# Patient Record
Sex: MALE | Race: WHITE | NOT HISPANIC OR LATINO | Employment: OTHER | URBAN - METROPOLITAN AREA
[De-identification: names, ages, dates, MRNs, and addresses within clinical notes are randomized per-mention and may not be internally consistent; named-entity substitution may affect disease eponyms.]

---

## 2023-10-10 ENCOUNTER — APPOINTMENT (OUTPATIENT)
Dept: RADIOLOGY | Facility: HOSPITAL | Age: 53
End: 2023-10-10
Payer: MEDICAID

## 2023-10-10 ENCOUNTER — HOSPITAL ENCOUNTER (EMERGENCY)
Facility: HOSPITAL | Age: 53
Discharge: HOME | End: 2023-10-10
Attending: EMERGENCY MEDICINE
Payer: MEDICAID

## 2023-10-10 VITALS
SYSTOLIC BLOOD PRESSURE: 124 MMHG | OXYGEN SATURATION: 95 % | DIASTOLIC BLOOD PRESSURE: 76 MMHG | WEIGHT: 185 LBS | TEMPERATURE: 97.5 F | RESPIRATION RATE: 29 BRPM | BODY MASS INDEX: 23.74 KG/M2 | HEIGHT: 74 IN | HEART RATE: 112 BPM

## 2023-10-10 DIAGNOSIS — S01.01XA LACERATION OF SCALP, INITIAL ENCOUNTER: ICD-10-CM

## 2023-10-10 DIAGNOSIS — F10.920 ALCOHOLIC INTOXICATION WITHOUT COMPLICATION (CMS-HCC): ICD-10-CM

## 2023-10-10 DIAGNOSIS — S09.90XA HEAD INJURY, INITIAL ENCOUNTER: Primary | ICD-10-CM

## 2023-10-10 LAB
ALBUMIN SERPL BCP-MCNC: 4.2 G/DL (ref 3.4–5)
ALP SERPL-CCNC: 98 U/L (ref 33–120)
ALT SERPL W P-5'-P-CCNC: 24 U/L (ref 10–52)
ANION GAP SERPL CALC-SCNC: 13 MMOL/L (ref 10–20)
AST SERPL W P-5'-P-CCNC: 34 U/L (ref 9–39)
BASOPHILS # BLD AUTO: 0.1 X10*3/UL (ref 0–0.1)
BASOPHILS NFR BLD AUTO: 1.7 %
BILIRUB SERPL-MCNC: 0.3 MG/DL (ref 0–1.2)
BUN SERPL-MCNC: 15 MG/DL (ref 6–23)
CALCIUM SERPL-MCNC: 9.1 MG/DL (ref 8.6–10.3)
CHLORIDE SERPL-SCNC: 99 MMOL/L (ref 98–107)
CO2 SERPL-SCNC: 29 MMOL/L (ref 21–32)
CREAT SERPL-MCNC: 0.77 MG/DL (ref 0.5–1.3)
EOSINOPHIL # BLD AUTO: 0.27 X10*3/UL (ref 0–0.7)
EOSINOPHIL NFR BLD AUTO: 4.5 %
ERYTHROCYTE [DISTWIDTH] IN BLOOD BY AUTOMATED COUNT: 12.6 % (ref 11.5–14.5)
ETHANOL SERPL-MCNC: 344 MG/DL
GFR SERPL CREATININE-BSD FRML MDRD: >90 ML/MIN/1.73M*2
GLUCOSE BLD MANUAL STRIP-MCNC: 364 MG/DL (ref 74–99)
GLUCOSE SERPL-MCNC: 349 MG/DL (ref 74–99)
HCT VFR BLD AUTO: 43.4 % (ref 41–52)
HGB BLD-MCNC: 14.9 G/DL (ref 13.5–17.5)
HOLD SPECIMEN: NORMAL
IMM GRANULOCYTES # BLD AUTO: 0.05 X10*3/UL (ref 0–0.7)
IMM GRANULOCYTES NFR BLD AUTO: 0.8 % (ref 0–0.9)
INR PPP: 1 (ref 0.9–1.1)
LYMPHOCYTES # BLD AUTO: 2.26 X10*3/UL (ref 1.2–4.8)
LYMPHOCYTES NFR BLD AUTO: 37.7 %
MCH RBC QN AUTO: 33.6 PG (ref 26–34)
MCHC RBC AUTO-ENTMCNC: 34.3 G/DL (ref 32–36)
MCV RBC AUTO: 98 FL (ref 80–100)
MONOCYTES # BLD AUTO: 0.36 X10*3/UL (ref 0.1–1)
MONOCYTES NFR BLD AUTO: 6 %
NEUTROPHILS # BLD AUTO: 2.96 X10*3/UL (ref 1.2–7.7)
NEUTROPHILS NFR BLD AUTO: 49.3 %
NRBC BLD-RTO: 0 /100 WBCS (ref 0–0)
PLATELET # BLD AUTO: 173 X10*3/UL (ref 150–450)
PMV BLD AUTO: 10.2 FL (ref 7.5–11.5)
POTASSIUM SERPL-SCNC: 4.1 MMOL/L (ref 3.5–5.3)
PROT SERPL-MCNC: 6.4 G/DL (ref 6.4–8.2)
PROTHROMBIN TIME: 10.9 SECONDS (ref 9.8–12.8)
RBC # BLD AUTO: 4.43 X10*6/UL (ref 4.5–5.9)
SODIUM SERPL-SCNC: 137 MMOL/L (ref 136–145)
WBC # BLD AUTO: 6 X10*3/UL (ref 4.4–11.3)

## 2023-10-10 PROCEDURE — 82077 ASSAY SPEC XCP UR&BREATH IA: CPT | Performed by: EMERGENCY MEDICINE

## 2023-10-10 PROCEDURE — 85610 PROTHROMBIN TIME: CPT | Performed by: EMERGENCY MEDICINE

## 2023-10-10 PROCEDURE — 90715 TDAP VACCINE 7 YRS/> IM: CPT | Performed by: EMERGENCY MEDICINE

## 2023-10-10 PROCEDURE — 85025 COMPLETE CBC W/AUTO DIFF WBC: CPT | Performed by: EMERGENCY MEDICINE

## 2023-10-10 PROCEDURE — 82947 ASSAY GLUCOSE BLOOD QUANT: CPT

## 2023-10-10 PROCEDURE — 2500000004 HC RX 250 GENERAL PHARMACY W/ HCPCS (ALT 636 FOR OP/ED): Performed by: EMERGENCY MEDICINE

## 2023-10-10 PROCEDURE — 99291 CRITICAL CARE FIRST HOUR: CPT | Performed by: EMERGENCY MEDICINE

## 2023-10-10 PROCEDURE — 70450 CT HEAD/BRAIN W/O DYE: CPT | Performed by: RADIOLOGY

## 2023-10-10 PROCEDURE — 99285 EMERGENCY DEPT VISIT HI MDM: CPT | Mod: 25 | Performed by: EMERGENCY MEDICINE

## 2023-10-10 PROCEDURE — 72170 X-RAY EXAM OF PELVIS: CPT | Performed by: RADIOLOGY

## 2023-10-10 PROCEDURE — 72125 CT NECK SPINE W/O DYE: CPT | Performed by: RADIOLOGY

## 2023-10-10 PROCEDURE — 12004 RPR S/N/AX/GEN/TRK7.6-12.5CM: CPT | Performed by: EMERGENCY MEDICINE

## 2023-10-10 PROCEDURE — 36415 COLL VENOUS BLD VENIPUNCTURE: CPT | Performed by: EMERGENCY MEDICINE

## 2023-10-10 PROCEDURE — 71045 X-RAY EXAM CHEST 1 VIEW: CPT | Mod: FY

## 2023-10-10 PROCEDURE — 72125 CT NECK SPINE W/O DYE: CPT | Mod: MG

## 2023-10-10 PROCEDURE — 90471 IMMUNIZATION ADMIN: CPT | Performed by: EMERGENCY MEDICINE

## 2023-10-10 PROCEDURE — 71045 X-RAY EXAM CHEST 1 VIEW: CPT | Performed by: RADIOLOGY

## 2023-10-10 PROCEDURE — 70450 CT HEAD/BRAIN W/O DYE: CPT

## 2023-10-10 PROCEDURE — 2500000002 HC RX 250 W HCPCS SELF ADMINISTERED DRUGS (ALT 637 FOR MEDICARE OP, ALT 636 FOR OP/ED): Performed by: EMERGENCY MEDICINE

## 2023-10-10 PROCEDURE — 80053 COMPREHEN METABOLIC PANEL: CPT | Performed by: EMERGENCY MEDICINE

## 2023-10-10 PROCEDURE — 72170 X-RAY EXAM OF PELVIS: CPT | Mod: FY

## 2023-10-10 PROCEDURE — G0390 TRAUMA RESPONS W/HOSP CRITI: HCPCS | Performed by: EMERGENCY MEDICINE

## 2023-10-10 RX ORDER — INSULIN LISPRO 100 [IU]/ML
0-5 INJECTION, SOLUTION INTRAVENOUS; SUBCUTANEOUS
Status: DISCONTINUED | OUTPATIENT
Start: 2023-10-10 | End: 2023-10-10 | Stop reason: HOSPADM

## 2023-10-10 RX ORDER — DEXTROSE MONOHYDRATE 100 MG/ML
0.3 INJECTION, SOLUTION INTRAVENOUS ONCE AS NEEDED
Status: DISCONTINUED | OUTPATIENT
Start: 2023-10-10 | End: 2023-10-10 | Stop reason: HOSPADM

## 2023-10-10 RX ORDER — DEXTROSE 50 % IN WATER (D50W) INTRAVENOUS SYRINGE
25
Status: DISCONTINUED | OUTPATIENT
Start: 2023-10-10 | End: 2023-10-10 | Stop reason: HOSPADM

## 2023-10-10 RX ADMIN — INSULIN LISPRO 4 UNITS: 100 INJECTION, SOLUTION INTRAVENOUS; SUBCUTANEOUS at 18:03

## 2023-10-10 RX ADMIN — TETANUS TOXOID, REDUCED DIPHTHERIA TOXOID AND ACELLULAR PERTUSSIS VACCINE, ADSORBED 0.5 ML: 5; 2.5; 8; 8; 2.5 SUSPENSION INTRAMUSCULAR at 18:03

## 2023-10-10 ASSESSMENT — COLUMBIA-SUICIDE SEVERITY RATING SCALE - C-SSRS
6. HAVE YOU EVER DONE ANYTHING, STARTED TO DO ANYTHING, OR PREPARED TO DO ANYTHING TO END YOUR LIFE?: NO
2. HAVE YOU ACTUALLY HAD ANY THOUGHTS OF KILLING YOURSELF?: NO
1. IN THE PAST MONTH, HAVE YOU WISHED YOU WERE DEAD OR WISHED YOU COULD GO TO SLEEP AND NOT WAKE UP?: NO

## 2023-10-10 ASSESSMENT — PAIN - FUNCTIONAL ASSESSMENT: PAIN_FUNCTIONAL_ASSESSMENT: 0-10

## 2023-10-10 ASSESSMENT — LIFESTYLE VARIABLES
HAVE PEOPLE ANNOYED YOU BY CRITICIZING YOUR DRINKING: YES
EVER FELT BAD OR GUILTY ABOUT YOUR DRINKING: YES
EVER HAD A DRINK FIRST THING IN THE MORNING TO STEADY YOUR NERVES TO GET RID OF A HANGOVER: YES
HAVE YOU EVER FELT YOU SHOULD CUT DOWN ON YOUR DRINKING: YES

## 2023-10-10 ASSESSMENT — PAIN SCALES - GENERAL: PAINLEVEL_OUTOF10: 0 - NO PAIN

## 2023-10-10 ASSESSMENT — PAIN DESCRIPTION - PROGRESSION: CLINICAL_PROGRESSION: NOT CHANGED

## 2023-10-10 NOTE — ED PROVIDER NOTES
HPI   Chief Complaint   Patient presents with    Fall       Patient is a 53-year-old male, medical history significant for cardiovascular disease, diabetes, alcohol abuse who presents emergency department as a limited trauma.  Patient states that he had a fall downstairs.  He does not know how many stairs.  He does not know how he fell.  He did strike his head.  He thinks he lost consciousness.  History is hard to gather from the patient.  He states he is been noncompliant with his medications.  He has not been taking any of his diabetic medications.  He does admit to drinking.                          [place] Coma Scale Score: 15                  Patient History   Past Medical History:   Diagnosis Date    Acute ischemic heart disease, unspecified (CMS/HCC)     Coronary syndrome, acute    Cirrhosis of liver (CMS/HCC)     Coronary artery disease     Diabetes mellitus (CMS/HCC)     Personal history of other diseases of the circulatory system     History of heart block    Personal history of other diseases of the musculoskeletal system and connective tissue     History of arthritis    Personal history of other endocrine, nutritional and metabolic disease     History of obesity     Past Surgical History:   Procedure Laterality Date    APPENDECTOMY  05/03/2018    Appendectomy    CARDIAC CATHETERIZATION  05/02/2018    Cardiac Cath Procedure Outcome:     No family history on file.  Social History     Tobacco Use    Smoking status: Every Day     Packs/day: 1     Types: Cigarettes    Smokeless tobacco: Never   Vaping Use    Vaping Use: Never used   Substance Use Topics    Alcohol use: Yes     Alcohol/week: 6.0 standard drinks of alcohol     Types: 6 Cans of beer per week    Drug use: Never       Physical Exam   ED Triage Vitals [10/10/23 1643]   Temp Heart Rate Resp BP   36.4 °C (97.5 °F) 92 20 145/86      SpO2 Temp src Heart Rate Source Patient Position   97 % -- -- --      BP Location FiO2 (%)     -- --       Physical  Exam  Vitals and nursing note reviewed.   Constitutional:       General: He is not in acute distress.  HENT:      Head: Normocephalic. Laceration (7 cm) present. No raccoon eyes or Lewis's sign.      Jaw: No trismus, tenderness or pain on movement.        Nose: Nose normal.      Mouth/Throat:      Mouth: Mucous membranes are moist.   Eyes:      Extraocular Movements: Extraocular movements intact.      Pupils: Pupils are equal, round, and reactive to light.   Cardiovascular:      Rate and Rhythm: Normal rate and regular rhythm.   Pulmonary:      Effort: Pulmonary effort is normal. No respiratory distress.      Breath sounds: Normal breath sounds.   Abdominal:      General: Abdomen is flat.   Musculoskeletal:         General: No swelling or deformity. Normal range of motion.      Cervical back: Normal range of motion. No rigidity.   Lymphadenopathy:      Cervical: No cervical adenopathy.   Skin:     General: Skin is warm.      Capillary Refill: Capillary refill takes less than 2 seconds.   Neurological:      General: No focal deficit present.      Mental Status: He is alert and oriented to person, place, and time.      Cranial Nerves: No cranial nerve deficit.      Motor: No weakness.   Psychiatric:         Mood and Affect: Mood normal.         ED Course & MDM   ED Course as of 10/10/23 2010   Tue Oct 10, 2023   1720 Chest x-ray obtained and reviewed.  No infiltrate, rib fracture, effusion, or pneumonia. [MK]   1721 CT head shows no acute intracranial hemorrhage [MK]   1727 CT cervical spine shows no evidence of acute fracture or dislocation. [MK]   1743 Patient's blood sugar mildly elevated.  Insulin is ordered.  Patient is also intoxicated with an alcohol of 344. [MK]      ED Course User Index  [MK] Oscar Fuller MD         Diagnoses as of 10/10/23 2010   Head injury, initial encounter   Laceration of scalp, initial encounter   Alcoholic intoxication without complication (CMS/[facility])       Medical Decision  Making  Medical Decision Making: Patient was activated as a limited trauma given fall with loss of consciousness.  He was sent immediately for noncontrast head CT, CT of the cervical spine, plain films of the chest and pelvis.  Tetanus was updated.  Metabolic work-up was pursued.  Labs do show mild hyperglycemia but were otherwise unremarkable.  CTs were also negative.  Patient does have elevated ethanol level.  His laceration was repaired, see procedure note.    Differential Diagnoses Considered: Intoxication, concussion, intracranial hemorrhage, skull fracture    Chronic Medical Conditions Significantly Affecting Care: History of alcohol abuse, diabetes with noncompliance    External Records Reviewed: I reviewed recent and relevant outside records including: No recent visits    Independent Interpretation of Studies:  I independently interpreted: Chest x-ray and pelvis x-ray obtained reviewed    Escalation of Care:  Appropriate for outpatient management.  Given patient's history of alcohol abuse, he was seen and evaluated by [facility].  Patient will be set up for outpatient resources.  Social Determinants of Health Significantly Affecting Care:  Medication noncompliance    Prescription Drug Consideration: Tetanus            Procedure  Critical Care    Performed by: Oscar Fuller MD  Authorized by: Oscar Fuller MD    Critical care provider statement:     Critical care time (minutes):  35    Critical care time was exclusive of:  Separately billable procedures and treating other patients and teaching time    Critical care was necessary to treat or prevent imminent or life-threatening deterioration of the following conditions:  Trauma    Critical care was time spent personally by me on the following activities:  Ordering and performing treatments and interventions, ordering and review of laboratory studies, ordering and review of radiographic studies, development of treatment plan with patient or surrogate, pulse  oximetry, evaluation of patient's response to treatment, re-evaluation of patient's condition, review of old charts, examination of patient and obtaining history from patient or surrogate    I assumed direction of critical care for this patient from another provider in my specialty: no      Care discussed with: admitting provider         Oscar Fuller MD  10/10/23 2010

## 2023-10-10 NOTE — ED PROCEDURE NOTE
Procedure  Laceration Repair    Performed by: Oscar Fuller MD  Authorized by: Oscar Fuller MD    Consent:     Consent obtained:  Verbal    Consent given by:  Patient    Risks, benefits, and alternatives were discussed: yes      Risks discussed:  Infection, pain, poor cosmetic result and poor wound healing  Universal protocol:     Procedure explained and questions answered to patient or proxy's satisfaction: yes      Relevant documents present and verified: yes      Test results available: yes      Imaging studies available: yes      Patient identity confirmed:  Verbally with patient  Anesthesia:     Anesthesia method:  None  Laceration details:     Location:  Scalp    Scalp location:  Occipital    Length (cm):  8    Depth (mm):  4  Pre-procedure details:     Preparation:  Patient was prepped and draped in usual sterile fashion  Exploration:     Limited defect created (wound extended): no      Hemostasis achieved with:  Direct pressure    Imaging outcome: foreign body not noted      Wound exploration: wound explored through full range of motion      Wound extent: no foreign bodies/material noted, no muscle damage noted, no underlying fracture noted and no vascular damage noted      Contaminated: no    Treatment:     Area cleansed with:  Soap and water    Amount of cleaning:  Standard    Irrigation solution:  Sterile saline    Irrigation method:  Pressure wash    Visualized foreign bodies/material removed: no      Debridement:  None    Undermining:  None    Scar revision: no    Skin repair:     Repair method:  Staples    Number of staples:  9  Approximation:     Approximation:  Close  Repair type:     Repair type:  Simple  Post-procedure details:     Dressing:  Antibiotic ointment    Procedure completion:  Tolerated               Oscar Fuller MD  10/10/23 7736

## 2024-03-26 ENCOUNTER — APPOINTMENT (OUTPATIENT)
Dept: CARDIOLOGY | Facility: HOSPITAL | Age: 54
End: 2024-03-26
Payer: MEDICAID

## 2024-03-26 ENCOUNTER — HOSPITAL ENCOUNTER (INPATIENT)
Facility: HOSPITAL | Age: 54
LOS: 6 days | Discharge: NURSING, REHAB OR LONG-TERM CARE | End: 2024-04-02
Attending: STUDENT IN AN ORGANIZED HEALTH CARE EDUCATION/TRAINING PROGRAM | Admitting: STUDENT IN AN ORGANIZED HEALTH CARE EDUCATION/TRAINING PROGRAM
Payer: MEDICAID

## 2024-03-26 ENCOUNTER — APPOINTMENT (OUTPATIENT)
Dept: RADIOLOGY | Facility: HOSPITAL | Age: 54
End: 2024-03-26
Payer: MEDICAID

## 2024-03-26 DIAGNOSIS — R53.1 GENERALIZED WEAKNESS: ICD-10-CM

## 2024-03-26 DIAGNOSIS — E83.42 HYPOMAGNESEMIA: ICD-10-CM

## 2024-03-26 DIAGNOSIS — F10.929 ALCOHOLIC INTOXICATION WITH COMPLICATION (CMS-HCC): ICD-10-CM

## 2024-03-26 DIAGNOSIS — I70.262 ATHEROSCLEROSIS OF NATIVE ARTERIES OF EXTREMITIES WITH GANGRENE, LEFT LEG (MULTI): ICD-10-CM

## 2024-03-26 DIAGNOSIS — L97.512 FOOT ULCER WITH FAT LAYER EXPOSED, RIGHT (MULTI): ICD-10-CM

## 2024-03-26 DIAGNOSIS — J18.9 PNEUMONIA OF RIGHT LUNG DUE TO INFECTIOUS ORGANISM, UNSPECIFIED PART OF LUNG: Primary | ICD-10-CM

## 2024-03-26 DIAGNOSIS — K70.30 ALCOHOLIC CIRRHOSIS, UNSPECIFIED WHETHER ASCITES PRESENT (MULTI): ICD-10-CM

## 2024-03-26 DIAGNOSIS — J96.01 ACUTE HYPOXIC RESPIRATORY FAILURE (MULTI): ICD-10-CM

## 2024-03-26 DIAGNOSIS — I96 GANGRENE OF LEFT FOOT (MULTI): ICD-10-CM

## 2024-03-26 DIAGNOSIS — F10.10 ALCOHOL ABUSE: ICD-10-CM

## 2024-03-26 LAB
ALBUMIN SERPL BCP-MCNC: 3.3 G/DL (ref 3.4–5)
ALBUMIN SERPL BCP-MCNC: 3.3 G/DL (ref 3.4–5)
ALBUMIN SERPL BCP-MCNC: 3.4 G/DL (ref 3.4–5)
ALP SERPL-CCNC: 157 U/L (ref 33–120)
ALP SERPL-CCNC: 158 U/L (ref 33–120)
ALP SERPL-CCNC: 161 U/L (ref 33–120)
ALT SERPL W P-5'-P-CCNC: 87 U/L (ref 10–52)
ALT SERPL W P-5'-P-CCNC: 94 U/L (ref 10–52)
ALT SERPL W P-5'-P-CCNC: 97 U/L (ref 10–52)
AMMONIA PLAS-SCNC: 50 UMOL/L (ref 16–53)
ANION GAP SERPL CALC-SCNC: 18 MMOL/L (ref 10–20)
ANION GAP SERPL CALC-SCNC: 22 MMOL/L (ref 10–20)
ANION GAP SERPL CALC-SCNC: 23 MMOL/L (ref 10–20)
APAP SERPL-MCNC: <10 UG/ML
APPEARANCE UR: ABNORMAL
AST SERPL W P-5'-P-CCNC: 185 U/L (ref 9–39)
AST SERPL W P-5'-P-CCNC: 200 U/L (ref 9–39)
AST SERPL W P-5'-P-CCNC: 209 U/L (ref 9–39)
B-OH-BUTYR SERPL-SCNC: 0.95 MMOL/L (ref 0.02–0.27)
BACTERIA #/AREA URNS AUTO: ABNORMAL /HPF
BASOPHILS # BLD MANUAL: 0.08 X10*3/UL (ref 0–0.1)
BASOPHILS NFR BLD MANUAL: 1 %
BILIRUB DIRECT SERPL-MCNC: 1.2 MG/DL (ref 0–0.3)
BILIRUB SERPL-MCNC: 2.1 MG/DL (ref 0–1.2)
BILIRUB SERPL-MCNC: 2.3 MG/DL (ref 0–1.2)
BILIRUB SERPL-MCNC: 2.3 MG/DL (ref 0–1.2)
BILIRUB UR STRIP.AUTO-MCNC: NEGATIVE MG/DL
BUN SERPL-MCNC: 14 MG/DL (ref 6–23)
BUN SERPL-MCNC: 16 MG/DL (ref 6–23)
BUN SERPL-MCNC: 18 MG/DL (ref 6–23)
CALCIUM SERPL-MCNC: 7.7 MG/DL (ref 8.6–10.3)
CALCIUM SERPL-MCNC: 7.7 MG/DL (ref 8.6–10.3)
CALCIUM SERPL-MCNC: 7.9 MG/DL (ref 8.6–10.3)
CARDIAC TROPONIN I PNL SERPL HS: 62 NG/L (ref 0–20)
CARDIAC TROPONIN I PNL SERPL HS: 62 NG/L (ref 0–20)
CHLORIDE SERPL-SCNC: 91 MMOL/L (ref 98–107)
CHLORIDE SERPL-SCNC: 91 MMOL/L (ref 98–107)
CHLORIDE SERPL-SCNC: 93 MMOL/L (ref 98–107)
CO2 SERPL-SCNC: 25 MMOL/L (ref 21–32)
CO2 SERPL-SCNC: 26 MMOL/L (ref 21–32)
CO2 SERPL-SCNC: 27 MMOL/L (ref 21–32)
COLOR UR: ABNORMAL
CREAT SERPL-MCNC: 0.54 MG/DL (ref 0.5–1.3)
CREAT SERPL-MCNC: 0.63 MG/DL (ref 0.5–1.3)
CREAT SERPL-MCNC: 0.66 MG/DL (ref 0.5–1.3)
CRP SERPL-MCNC: 13.8 MG/DL
EGFRCR SERPLBLD CKD-EPI 2021: >90 ML/MIN/1.73M*2
EOSINOPHIL # BLD MANUAL: 0 X10*3/UL (ref 0–0.7)
EOSINOPHIL NFR BLD MANUAL: 0 %
ERYTHROCYTE [DISTWIDTH] IN BLOOD BY AUTOMATED COUNT: 13 % (ref 11.5–14.5)
ERYTHROCYTE [SEDIMENTATION RATE] IN BLOOD BY WESTERGREN METHOD: 34 MM/H (ref 0–20)
ETHANOL SERPL-MCNC: 353 MG/DL
FLUAV RNA RESP QL NAA+PROBE: NOT DETECTED
FLUBV RNA RESP QL NAA+PROBE: NOT DETECTED
GLUCOSE BLD MANUAL STRIP-MCNC: 159 MG/DL (ref 74–99)
GLUCOSE BLD MANUAL STRIP-MCNC: 311 MG/DL (ref 74–99)
GLUCOSE SERPL-MCNC: 155 MG/DL (ref 74–99)
GLUCOSE SERPL-MCNC: 198 MG/DL (ref 74–99)
GLUCOSE SERPL-MCNC: 299 MG/DL (ref 74–99)
GLUCOSE UR STRIP.AUTO-MCNC: ABNORMAL MG/DL
GRAN CASTS #/AREA UR COMP ASSIST: ABNORMAL /LPF
HCT VFR BLD AUTO: 34.4 % (ref 41–52)
HGB BLD-MCNC: 12.2 G/DL (ref 13.5–17.5)
HYALINE CASTS #/AREA URNS AUTO: ABNORMAL /LPF
IMM GRANULOCYTES # BLD AUTO: 0.04 X10*3/UL (ref 0–0.7)
IMM GRANULOCYTES NFR BLD AUTO: 0.5 % (ref 0–0.9)
INR PPP: 1.2 (ref 0.9–1.1)
KETONES UR STRIP.AUTO-MCNC: ABNORMAL MG/DL
LACTATE SERPL-SCNC: 3 MMOL/L (ref 0.4–2)
LACTATE SERPL-SCNC: 3.6 MMOL/L (ref 0.4–2)
LACTATE SERPL-SCNC: 6.3 MMOL/L (ref 0.4–2)
LEUKOCYTE ESTERASE UR QL STRIP.AUTO: NEGATIVE
LYMPHOCYTES # BLD MANUAL: 0.84 X10*3/UL (ref 1.2–4.8)
LYMPHOCYTES NFR BLD MANUAL: 11 %
MAGNESIUM SERPL-MCNC: 1.3 MG/DL (ref 1.6–2.4)
MAGNESIUM SERPL-MCNC: 1.58 MG/DL (ref 1.6–2.4)
MCH RBC QN AUTO: 34.9 PG (ref 26–34)
MCHC RBC AUTO-ENTMCNC: 35.5 G/DL (ref 32–36)
MCV RBC AUTO: 98 FL (ref 80–100)
METAMYELOCYTES # BLD MANUAL: 0.08 X10*3/UL
METAMYELOCYTES NFR BLD MANUAL: 1 %
MONOCYTES # BLD MANUAL: 0.38 X10*3/UL (ref 0.1–1)
MONOCYTES NFR BLD MANUAL: 5 %
MUCOUS THREADS #/AREA URNS AUTO: ABNORMAL /LPF
NEUTROPHILS # BLD MANUAL: 6.23 X10*3/UL (ref 1.2–7.7)
NEUTS BAND # BLD MANUAL: 3.65 X10*3/UL (ref 0–0.7)
NEUTS BAND NFR BLD MANUAL: 48 %
NEUTS SEG # BLD MANUAL: 2.58 X10*3/UL (ref 1.2–7)
NEUTS SEG NFR BLD MANUAL: 34 %
NITRITE UR QL STRIP.AUTO: NEGATIVE
NRBC BLD-RTO: 0 /100 WBCS (ref 0–0)
OSMOLALITY SERPL: 370 MOSM/KG (ref 280–300)
PH UR STRIP.AUTO: 5 [PH]
PLATELET # BLD AUTO: 89 X10*3/UL (ref 150–450)
POTASSIUM SERPL-SCNC: 3.4 MMOL/L (ref 3.5–5.3)
POTASSIUM SERPL-SCNC: 3.5 MMOL/L (ref 3.5–5.3)
POTASSIUM SERPL-SCNC: 3.7 MMOL/L (ref 3.5–5.3)
PROT SERPL-MCNC: 5.8 G/DL (ref 6.4–8.2)
PROT SERPL-MCNC: 5.9 G/DL (ref 6.4–8.2)
PROT SERPL-MCNC: 5.9 G/DL (ref 6.4–8.2)
PROT UR STRIP.AUTO-MCNC: ABNORMAL MG/DL
PROTHROMBIN TIME: 13.2 SECONDS (ref 9.8–12.8)
RBC # BLD AUTO: 3.5 X10*6/UL (ref 4.5–5.9)
RBC # UR STRIP.AUTO: ABNORMAL /UL
RBC #/AREA URNS AUTO: ABNORMAL /HPF
RBC MORPH BLD: ABNORMAL
SALICYLATES SERPL-MCNC: <3 MG/DL
SARS-COV-2 RNA RESP QL NAA+PROBE: NOT DETECTED
SODIUM SERPL-SCNC: 134 MMOL/L (ref 136–145)
SODIUM SERPL-SCNC: 134 MMOL/L (ref 136–145)
SODIUM SERPL-SCNC: 137 MMOL/L (ref 136–145)
SP GR UR STRIP.AUTO: 1.02
TOTAL CELLS COUNTED BLD: 100
UROBILINOGEN UR STRIP.AUTO-MCNC: 4 MG/DL
WBC # BLD AUTO: 7.6 X10*3/UL (ref 4.4–11.3)
WBC #/AREA URNS AUTO: ABNORMAL /HPF

## 2024-03-26 PROCEDURE — 82248 BILIRUBIN DIRECT: CPT | Performed by: STUDENT IN AN ORGANIZED HEALTH CARE EDUCATION/TRAINING PROGRAM

## 2024-03-26 PROCEDURE — G0378 HOSPITAL OBSERVATION PER HR: HCPCS

## 2024-03-26 PROCEDURE — 93005 ELECTROCARDIOGRAM TRACING: CPT

## 2024-03-26 PROCEDURE — 80143 DRUG ASSAY ACETAMINOPHEN: CPT | Performed by: STUDENT IN AN ORGANIZED HEALTH CARE EDUCATION/TRAINING PROGRAM

## 2024-03-26 PROCEDURE — 83930 ASSAY OF BLOOD OSMOLALITY: CPT | Mod: PARLAB | Performed by: STUDENT IN AN ORGANIZED HEALTH CARE EDUCATION/TRAINING PROGRAM

## 2024-03-26 PROCEDURE — 83735 ASSAY OF MAGNESIUM: CPT | Performed by: STUDENT IN AN ORGANIZED HEALTH CARE EDUCATION/TRAINING PROGRAM

## 2024-03-26 PROCEDURE — 85652 RBC SED RATE AUTOMATED: CPT | Performed by: STUDENT IN AN ORGANIZED HEALTH CARE EDUCATION/TRAINING PROGRAM

## 2024-03-26 PROCEDURE — 85027 COMPLETE CBC AUTOMATED: CPT | Performed by: STUDENT IN AN ORGANIZED HEALTH CARE EDUCATION/TRAINING PROGRAM

## 2024-03-26 PROCEDURE — 83605 ASSAY OF LACTIC ACID: CPT | Performed by: STUDENT IN AN ORGANIZED HEALTH CARE EDUCATION/TRAINING PROGRAM

## 2024-03-26 PROCEDURE — 84132 ASSAY OF SERUM POTASSIUM: CPT | Performed by: STUDENT IN AN ORGANIZED HEALTH CARE EDUCATION/TRAINING PROGRAM

## 2024-03-26 PROCEDURE — 96365 THER/PROPH/DIAG IV INF INIT: CPT | Mod: 59

## 2024-03-26 PROCEDURE — 2500000001 HC RX 250 WO HCPCS SELF ADMINISTERED DRUGS (ALT 637 FOR MEDICARE OP): Performed by: STUDENT IN AN ORGANIZED HEALTH CARE EDUCATION/TRAINING PROGRAM

## 2024-03-26 PROCEDURE — 36415 COLL VENOUS BLD VENIPUNCTURE: CPT | Performed by: STUDENT IN AN ORGANIZED HEALTH CARE EDUCATION/TRAINING PROGRAM

## 2024-03-26 PROCEDURE — 82435 ASSAY OF BLOOD CHLORIDE: CPT | Performed by: STUDENT IN AN ORGANIZED HEALTH CARE EDUCATION/TRAINING PROGRAM

## 2024-03-26 PROCEDURE — 71045 X-RAY EXAM CHEST 1 VIEW: CPT

## 2024-03-26 PROCEDURE — 85610 PROTHROMBIN TIME: CPT | Performed by: STUDENT IN AN ORGANIZED HEALTH CARE EDUCATION/TRAINING PROGRAM

## 2024-03-26 PROCEDURE — 99222 1ST HOSP IP/OBS MODERATE 55: CPT | Performed by: PODIATRIST

## 2024-03-26 PROCEDURE — 82947 ASSAY GLUCOSE BLOOD QUANT: CPT

## 2024-03-26 PROCEDURE — 96375 TX/PRO/DX INJ NEW DRUG ADDON: CPT

## 2024-03-26 PROCEDURE — 82010 KETONE BODYS QUAN: CPT | Performed by: STUDENT IN AN ORGANIZED HEALTH CARE EDUCATION/TRAINING PROGRAM

## 2024-03-26 PROCEDURE — 84484 ASSAY OF TROPONIN QUANT: CPT | Performed by: STUDENT IN AN ORGANIZED HEALTH CARE EDUCATION/TRAINING PROGRAM

## 2024-03-26 PROCEDURE — 82140 ASSAY OF AMMONIA: CPT | Performed by: STUDENT IN AN ORGANIZED HEALTH CARE EDUCATION/TRAINING PROGRAM

## 2024-03-26 PROCEDURE — 96372 THER/PROPH/DIAG INJ SC/IM: CPT | Mod: GC | Performed by: STUDENT IN AN ORGANIZED HEALTH CARE EDUCATION/TRAINING PROGRAM

## 2024-03-26 PROCEDURE — A9575 INJ GADOTERATE MEGLUMI 0.1ML: HCPCS | Performed by: STUDENT IN AN ORGANIZED HEALTH CARE EDUCATION/TRAINING PROGRAM

## 2024-03-26 PROCEDURE — 87081 CULTURE SCREEN ONLY: CPT | Mod: PARLAB | Performed by: STUDENT IN AN ORGANIZED HEALTH CARE EDUCATION/TRAINING PROGRAM

## 2024-03-26 PROCEDURE — 81001 URINALYSIS AUTO W/SCOPE: CPT | Performed by: STUDENT IN AN ORGANIZED HEALTH CARE EDUCATION/TRAINING PROGRAM

## 2024-03-26 PROCEDURE — 86738 MYCOPLASMA ANTIBODY: CPT | Performed by: STUDENT IN AN ORGANIZED HEALTH CARE EDUCATION/TRAINING PROGRAM

## 2024-03-26 PROCEDURE — 36600 WITHDRAWAL OF ARTERIAL BLOOD: CPT

## 2024-03-26 PROCEDURE — 2500000004 HC RX 250 GENERAL PHARMACY W/ HCPCS (ALT 636 FOR OP/ED): Performed by: STUDENT IN AN ORGANIZED HEALTH CARE EDUCATION/TRAINING PROGRAM

## 2024-03-26 PROCEDURE — 99222 1ST HOSP IP/OBS MODERATE 55: CPT | Performed by: STUDENT IN AN ORGANIZED HEALTH CARE EDUCATION/TRAINING PROGRAM

## 2024-03-26 PROCEDURE — 2550000001 HC RX 255 CONTRASTS: Performed by: STUDENT IN AN ORGANIZED HEALTH CARE EDUCATION/TRAINING PROGRAM

## 2024-03-26 PROCEDURE — 80320 DRUG SCREEN QUANTALCOHOLS: CPT | Performed by: STUDENT IN AN ORGANIZED HEALTH CARE EDUCATION/TRAINING PROGRAM

## 2024-03-26 PROCEDURE — 85007 BL SMEAR W/DIFF WBC COUNT: CPT | Performed by: STUDENT IN AN ORGANIZED HEALTH CARE EDUCATION/TRAINING PROGRAM

## 2024-03-26 PROCEDURE — 86140 C-REACTIVE PROTEIN: CPT | Performed by: STUDENT IN AN ORGANIZED HEALTH CARE EDUCATION/TRAINING PROGRAM

## 2024-03-26 PROCEDURE — 2500000002 HC RX 250 W HCPCS SELF ADMINISTERED DRUGS (ALT 637 FOR MEDICARE OP, ALT 636 FOR OP/ED): Performed by: STUDENT IN AN ORGANIZED HEALTH CARE EDUCATION/TRAINING PROGRAM

## 2024-03-26 PROCEDURE — 96366 THER/PROPH/DIAG IV INF ADDON: CPT

## 2024-03-26 PROCEDURE — 71045 X-RAY EXAM CHEST 1 VIEW: CPT | Performed by: RADIOLOGY

## 2024-03-26 PROCEDURE — 73720 MRI LWR EXTREMITY W/O&W/DYE: CPT | Mod: LT

## 2024-03-26 PROCEDURE — 73720 MRI LWR EXTREMITY W/O&W/DYE: CPT | Mod: LEFT SIDE | Performed by: RADIOLOGY

## 2024-03-26 PROCEDURE — 99285 EMERGENCY DEPT VISIT HI MDM: CPT | Mod: 25

## 2024-03-26 PROCEDURE — 87636 SARSCOV2 & INF A&B AMP PRB: CPT | Performed by: STUDENT IN AN ORGANIZED HEALTH CARE EDUCATION/TRAINING PROGRAM

## 2024-03-26 RX ORDER — INSULIN LISPRO 100 [IU]/ML
0-15 INJECTION, SOLUTION INTRAVENOUS; SUBCUTANEOUS
Status: DISCONTINUED | OUTPATIENT
Start: 2024-03-26 | End: 2024-04-02 | Stop reason: HOSPADM

## 2024-03-26 RX ORDER — NITROGLYCERIN 0.4 MG/1
0.4 TABLET SUBLINGUAL EVERY 5 MIN PRN
COMMUNITY
End: 2024-04-15 | Stop reason: WASHOUT

## 2024-03-26 RX ORDER — IPRATROPIUM BROMIDE AND ALBUTEROL SULFATE 2.5; .5 MG/3ML; MG/3ML
3 SOLUTION RESPIRATORY (INHALATION)
Status: DISCONTINUED | OUTPATIENT
Start: 2024-03-26 | End: 2024-03-26

## 2024-03-26 RX ORDER — SODIUM CHLORIDE, SODIUM LACTATE, POTASSIUM CHLORIDE, CALCIUM CHLORIDE 600; 310; 30; 20 MG/100ML; MG/100ML; MG/100ML; MG/100ML
100 INJECTION, SOLUTION INTRAVENOUS CONTINUOUS
Status: DISCONTINUED | OUTPATIENT
Start: 2024-03-26 | End: 2024-03-26

## 2024-03-26 RX ORDER — LISINOPRIL 10 MG/1
10 TABLET ORAL DAILY
COMMUNITY
Start: 2023-04-16

## 2024-03-26 RX ORDER — FOLIC ACID 1 MG/1
1 TABLET ORAL DAILY
Status: DISCONTINUED | OUTPATIENT
Start: 2024-03-26 | End: 2024-04-02 | Stop reason: HOSPADM

## 2024-03-26 RX ORDER — DIAZEPAM 5 MG/ML
10 INJECTION, SOLUTION INTRAMUSCULAR; INTRAVENOUS EVERY 2 HOUR PRN
Status: DISCONTINUED | OUTPATIENT
Start: 2024-03-26 | End: 2024-04-01

## 2024-03-26 RX ORDER — LANOLIN ALCOHOL/MO/W.PET/CERES
100 CREAM (GRAM) TOPICAL DAILY
Status: DISCONTINUED | OUTPATIENT
Start: 2024-03-30 | End: 2024-04-02 | Stop reason: HOSPADM

## 2024-03-26 RX ORDER — ASPIRIN 81 MG/1
81 TABLET ORAL DAILY
COMMUNITY
Start: 2022-05-23

## 2024-03-26 RX ORDER — LANOLIN ALCOHOL/MO/W.PET/CERES
100 CREAM (GRAM) TOPICAL ONCE
Status: COMPLETED | OUTPATIENT
Start: 2024-03-26 | End: 2024-03-26

## 2024-03-26 RX ORDER — METOPROLOL TARTRATE 25 MG/1
25 TABLET, FILM COATED ORAL 2 TIMES DAILY
Status: DISCONTINUED | OUTPATIENT
Start: 2024-03-26 | End: 2024-04-02 | Stop reason: HOSPADM

## 2024-03-26 RX ORDER — INSULIN LISPRO 100 [IU]/ML
7 INJECTION, SOLUTION INTRAVENOUS; SUBCUTANEOUS
COMMUNITY
Start: 2023-04-18

## 2024-03-26 RX ORDER — ENOXAPARIN SODIUM 100 MG/ML
40 INJECTION SUBCUTANEOUS EVERY 24 HOURS
Status: DISCONTINUED | OUTPATIENT
Start: 2024-03-26 | End: 2024-04-02 | Stop reason: HOSPADM

## 2024-03-26 RX ORDER — INSULIN GLARGINE 100 [IU]/ML
30 INJECTION, SOLUTION SUBCUTANEOUS EVERY MORNING
COMMUNITY
Start: 2023-04-18 | End: 2024-06-05 | Stop reason: SDUPTHER

## 2024-03-26 RX ORDER — MAGNESIUM SULFATE HEPTAHYDRATE 40 MG/ML
2 INJECTION, SOLUTION INTRAVENOUS ONCE
Status: COMPLETED | OUTPATIENT
Start: 2024-03-26 | End: 2024-03-26

## 2024-03-26 RX ORDER — LANOLIN ALCOHOL/MO/W.PET/CERES
100 CREAM (GRAM) TOPICAL DAILY
Status: DISCONTINUED | OUTPATIENT
Start: 2024-03-29 | End: 2024-03-26

## 2024-03-26 RX ORDER — MULTIVIT-MIN/IRON FUM/FOLIC AC 7.5 MG-4
1 TABLET ORAL DAILY
Status: DISCONTINUED | OUTPATIENT
Start: 2024-03-26 | End: 2024-04-02 | Stop reason: HOSPADM

## 2024-03-26 RX ORDER — MULTIVIT-MIN/IRON FUM/FOLIC AC 7.5 MG-4
1 TABLET ORAL DAILY
Status: DISCONTINUED | OUTPATIENT
Start: 2024-03-26 | End: 2024-03-26

## 2024-03-26 RX ORDER — IPRATROPIUM BROMIDE AND ALBUTEROL SULFATE 2.5; .5 MG/3ML; MG/3ML
3 SOLUTION RESPIRATORY (INHALATION) EVERY 2 HOUR PRN
Status: DISCONTINUED | OUTPATIENT
Start: 2024-03-26 | End: 2024-04-02 | Stop reason: HOSPADM

## 2024-03-26 RX ORDER — SODIUM CHLORIDE 9 MG/ML
100 INJECTION, SOLUTION INTRAVENOUS CONTINUOUS
Status: DISCONTINUED | OUTPATIENT
Start: 2024-03-26 | End: 2024-03-28

## 2024-03-26 RX ORDER — ATORVASTATIN CALCIUM 40 MG/1
40 TABLET, FILM COATED ORAL DAILY
COMMUNITY
Start: 2023-04-16

## 2024-03-26 RX ORDER — ASPIRIN 81 MG/1
81 TABLET ORAL DAILY
Status: DISCONTINUED | OUTPATIENT
Start: 2024-03-26 | End: 2024-04-02 | Stop reason: HOSPADM

## 2024-03-26 RX ORDER — CEFTRIAXONE 2 G/50ML
2 INJECTION, SOLUTION INTRAVENOUS ONCE
Status: COMPLETED | OUTPATIENT
Start: 2024-03-26 | End: 2024-03-26

## 2024-03-26 RX ORDER — VANCOMYCIN HYDROCHLORIDE 1 G/20ML
INJECTION, POWDER, LYOPHILIZED, FOR SOLUTION INTRAVENOUS DAILY PRN
Status: DISCONTINUED | OUTPATIENT
Start: 2024-03-26 | End: 2024-04-01

## 2024-03-26 RX ORDER — MAGNESIUM SULFATE HEPTAHYDRATE 40 MG/ML
2 INJECTION, SOLUTION INTRAVENOUS ONCE
Status: COMPLETED | OUTPATIENT
Start: 2024-03-27 | End: 2024-03-27

## 2024-03-26 RX ORDER — DEXTROSE 50 % IN WATER (D50W) INTRAVENOUS SYRINGE
12.5
Status: DISCONTINUED | OUTPATIENT
Start: 2024-03-26 | End: 2024-04-02 | Stop reason: HOSPADM

## 2024-03-26 RX ORDER — METOPROLOL TARTRATE 25 MG/1
1 TABLET, FILM COATED ORAL 2 TIMES DAILY
COMMUNITY
Start: 2023-05-12 | End: 2024-05-11

## 2024-03-26 RX ORDER — LORAZEPAM 2 MG/ML
1 INJECTION INTRAMUSCULAR ONCE
Status: COMPLETED | OUTPATIENT
Start: 2024-03-26 | End: 2024-03-26

## 2024-03-26 RX ORDER — MUPIROCIN 20 MG/G
1 OINTMENT TOPICAL 2 TIMES DAILY
COMMUNITY
Start: 2022-10-04 | End: 2024-04-15 | Stop reason: ALTCHOICE

## 2024-03-26 RX ORDER — DEXTROSE 50 % IN WATER (D50W) INTRAVENOUS SYRINGE
12.5
Status: DISCONTINUED | OUTPATIENT
Start: 2024-03-26 | End: 2024-03-27 | Stop reason: SDUPTHER

## 2024-03-26 RX ORDER — INSULIN GLARGINE 100 [IU]/ML
20 INJECTION, SOLUTION SUBCUTANEOUS EVERY MORNING
Status: DISCONTINUED | OUTPATIENT
Start: 2024-03-27 | End: 2024-03-27

## 2024-03-26 RX ORDER — POTASSIUM CHLORIDE 20 MEQ/1
40 TABLET, EXTENDED RELEASE ORAL ONCE
Status: COMPLETED | OUTPATIENT
Start: 2024-03-27 | End: 2024-03-26

## 2024-03-26 RX ORDER — MULTIVIT-MIN/IRON FUM/FOLIC AC 7.5 MG-4
1 TABLET ORAL ONCE
Status: COMPLETED | OUTPATIENT
Start: 2024-03-26 | End: 2024-03-26

## 2024-03-26 RX ORDER — THIAMINE HYDROCHLORIDE 100 MG/ML
100 INJECTION, SOLUTION INTRAMUSCULAR; INTRAVENOUS DAILY
Status: DISPENSED | OUTPATIENT
Start: 2024-03-26 | End: 2024-03-30

## 2024-03-26 RX ORDER — GADOTERATE MEGLUMINE 376.9 MG/ML
16 INJECTION INTRAVENOUS
Status: COMPLETED | OUTPATIENT
Start: 2024-03-26 | End: 2024-03-26

## 2024-03-26 RX ORDER — GUAIFENESIN 600 MG/1
600 TABLET, EXTENDED RELEASE ORAL 2 TIMES DAILY PRN
Status: DISCONTINUED | OUTPATIENT
Start: 2024-03-26 | End: 2024-04-02 | Stop reason: HOSPADM

## 2024-03-26 RX ORDER — FOLIC ACID 1 MG/1
1 TABLET ORAL ONCE
Status: COMPLETED | OUTPATIENT
Start: 2024-03-26 | End: 2024-03-26

## 2024-03-26 RX ADMIN — AZITHROMYCIN MONOHYDRATE 500 MG: 500 INJECTION, POWDER, LYOPHILIZED, FOR SOLUTION INTRAVENOUS at 16:08

## 2024-03-26 RX ADMIN — MAGNESIUM SULFATE HEPTAHYDRATE 2 G: 40 INJECTION, SOLUTION INTRAVENOUS at 23:55

## 2024-03-26 RX ADMIN — GADOTERATE MEGLUMINE 16 ML: 376.9 INJECTION INTRAVENOUS at 19:02

## 2024-03-26 RX ADMIN — PIPERACILLIN SODIUM AND TAZOBACTAM SODIUM 3.38 G: 3; .375 INJECTION, SOLUTION INTRAVENOUS at 17:32

## 2024-03-26 RX ADMIN — MAGNESIUM SULFATE HEPTAHYDRATE 2 G: 40 INJECTION, SOLUTION INTRAVENOUS at 17:32

## 2024-03-26 RX ADMIN — MAGNESIUM SULFATE HEPTAHYDRATE 2 G: 40 INJECTION, SOLUTION INTRAVENOUS at 12:50

## 2024-03-26 RX ADMIN — SODIUM CHLORIDE, POTASSIUM CHLORIDE, SODIUM LACTATE AND CALCIUM CHLORIDE 1000 ML: 600; 310; 30; 20 INJECTION, SOLUTION INTRAVENOUS at 12:11

## 2024-03-26 RX ADMIN — Medication 1 TABLET: at 12:11

## 2024-03-26 RX ADMIN — METOPROLOL TARTRATE 25 MG: 25 TABLET, FILM COATED ORAL at 21:00

## 2024-03-26 RX ADMIN — POTASSIUM CHLORIDE 40 MEQ: 1500 TABLET, EXTENDED RELEASE ORAL at 23:55

## 2024-03-26 RX ADMIN — DIAZEPAM 10 MG: 10 INJECTION, SOLUTION INTRAMUSCULAR; INTRAVENOUS at 17:31

## 2024-03-26 RX ADMIN — INSULIN LISPRO 3 UNITS: 100 INJECTION, SOLUTION INTRAVENOUS; SUBCUTANEOUS at 21:00

## 2024-03-26 RX ADMIN — SODIUM CHLORIDE 100 ML/HR: 9 INJECTION, SOLUTION INTRAVENOUS at 20:08

## 2024-03-26 RX ADMIN — THIAMINE HCL TAB 100 MG 100 MG: 100 TAB at 12:11

## 2024-03-26 RX ADMIN — PIPERACILLIN SODIUM AND TAZOBACTAM SODIUM 3.38 G: 3; .375 INJECTION, SOLUTION INTRAVENOUS at 23:15

## 2024-03-26 RX ADMIN — ENOXAPARIN SODIUM 40 MG: 100 INJECTION SUBCUTANEOUS at 17:36

## 2024-03-26 RX ADMIN — FOLIC ACID 1 MG: 1 TABLET ORAL at 12:11

## 2024-03-26 RX ADMIN — CEFTRIAXONE SODIUM 2 G: 2 INJECTION, SOLUTION INTRAVENOUS at 16:08

## 2024-03-26 RX ADMIN — INSULIN LISPRO 12 UNITS: 100 INJECTION, SOLUTION INTRAVENOUS; SUBCUTANEOUS at 17:43

## 2024-03-26 RX ADMIN — LORAZEPAM 1 MG: 2 INJECTION, SOLUTION INTRAMUSCULAR; INTRAVENOUS at 11:17

## 2024-03-26 RX ADMIN — SODIUM CHLORIDE, POTASSIUM CHLORIDE, SODIUM LACTATE AND CALCIUM CHLORIDE 1000 ML: 600; 310; 30; 20 INJECTION, SOLUTION INTRAVENOUS at 17:55

## 2024-03-26 SDOH — SOCIAL STABILITY: SOCIAL INSECURITY: DO YOU FEEL UNSAFE GOING BACK TO THE PLACE WHERE YOU ARE LIVING?: NO

## 2024-03-26 SDOH — SOCIAL STABILITY: SOCIAL INSECURITY: WERE YOU ABLE TO COMPLETE ALL THE BEHAVIORAL HEALTH SCREENINGS?: YES

## 2024-03-26 SDOH — SOCIAL STABILITY: SOCIAL INSECURITY: ABUSE: ADULT

## 2024-03-26 SDOH — SOCIAL STABILITY: SOCIAL INSECURITY: ARE THERE ANY APPARENT SIGNS OF INJURIES/BEHAVIORS THAT COULD BE RELATED TO ABUSE/NEGLECT?: NO

## 2024-03-26 SDOH — SOCIAL STABILITY: SOCIAL INSECURITY: DO YOU FEEL ANYONE HAS EXPLOITED OR TAKEN ADVANTAGE OF YOU FINANCIALLY OR OF YOUR PERSONAL PROPERTY?: NO

## 2024-03-26 SDOH — SOCIAL STABILITY: SOCIAL INSECURITY: HAVE YOU HAD THOUGHTS OF HARMING ANYONE ELSE?: NO

## 2024-03-26 SDOH — SOCIAL STABILITY: SOCIAL INSECURITY: HAS ANYONE EVER THREATENED TO HURT YOUR FAMILY OR YOUR PETS?: NO

## 2024-03-26 SDOH — SOCIAL STABILITY: SOCIAL INSECURITY: DOES ANYONE TRY TO KEEP YOU FROM HAVING/CONTACTING OTHER FRIENDS OR DOING THINGS OUTSIDE YOUR HOME?: NO

## 2024-03-26 SDOH — SOCIAL STABILITY: SOCIAL INSECURITY: ARE YOU OR HAVE YOU BEEN THREATENED OR ABUSED PHYSICALLY, EMOTIONALLY, OR SEXUALLY BY ANYONE?: NO

## 2024-03-26 ASSESSMENT — LIFESTYLE VARIABLES
AUDITORY DISTURBANCES: NOT PRESENT
HOW OFTEN DURING THE LAST YEAR HAVE YOU NEEDED AN ALCOHOLIC DRINK FIRST THING IN THE MORNING TO GET YOURSELF GOING AFTER A NIGHT OF HEAVY DRINKING: MONTHLY
BLOOD PRESSURE: 156/83
HOW MANY STANDARD DRINKS CONTAINING ALCOHOL DO YOU HAVE ON A TYPICAL DAY: 3 OR 4
BLOOD PRESSURE: 129/69
HAS A RELATIVE, FRIEND, DOCTOR, OR ANOTHER HEALTH PROFESSIONAL EXPRESSED CONCERN ABOUT YOUR DRINKING OR SUGGESTED YOU CUT DOWN: YES, DURING THE LAST YEAR
ORIENTATION AND CLOUDING OF SENSORIUM: ORIENTED AND CAN DO SERIAL ADDITIONS
AUDIT-C TOTAL SCORE: 6
AUDITORY DISTURBANCES: NOT PRESENT
PAROXYSMAL SWEATS: NO SWEAT VISIBLE
PAROXYSMAL SWEATS: NO SWEAT VISIBLE
AUDITORY DISTURBANCES: NOT PRESENT
NAUSEA AND VOMITING: NO NAUSEA AND NO VOMITING
ANXIETY: 2
AUDIT-C TOTAL SCORE: 6
HOW OFTEN DURING THE LAST YEAR HAVE YOU FOUND THAT YOU WERE NOT ABLE TO STOP DRINKING ONCE YOU HAD STARTED: MONTHLY
HAVE PEOPLE ANNOYED YOU BY CRITICIZING YOUR DRINKING: YES
PULSE: 102
NAUSEA AND VOMITING: NO NAUSEA AND NO VOMITING
PAROXYSMAL SWEATS: NO SWEAT VISIBLE
HEADACHE, FULLNESS IN HEAD: NOT PRESENT
AGITATION: NORMAL ACTIVITY
VISUAL DISTURBANCES: NOT PRESENT
AGITATION: NORMAL ACTIVITY
HOW OFTEN DURING THE LAST YEAR HAVE YOU HAD A FEELING OF GUILT OR REMORSE AFTER DRINKING: DAILY OR ALMOST DAILY
EVER FELT BAD OR GUILTY ABOUT YOUR DRINKING: YES
ANXIETY: NO ANXIETY, AT EASE
HOW OFTEN DO YOU HAVE A DRINK CONTAINING ALCOHOL: 4 OR MORE TIMES A WEEK
TOTAL SCORE: 3
TREMOR: 5
TOTAL SCORE: 5
HAVE YOU EVER FELT YOU SHOULD CUT DOWN ON YOUR DRINKING: YES
TREMOR: 2
AGITATION: NORMAL ACTIVITY
AGITATION: NORMAL ACTIVITY
ANXIETY: MILDLY ANXIOUS
ORIENTATION AND CLOUDING OF SENSORIUM: ORIENTED AND CAN DO SERIAL ADDITIONS
AUDITORY DISTURBANCES: NOT PRESENT
HOW OFTEN DURING THE LAST YEAR HAVE YOU BEEN UNABLE TO REMEMBER WHAT HAPPENED THE NIGHT BEFORE BECAUSE YOU HAD BEEN DRINKING: NEVER
AUDIT TOTAL SCORE: 15
BLOOD PRESSURE: 168/88
HEADACHE, FULLNESS IN HEAD: NOT PRESENT
TOTAL SCORE: 4
PULSE: 112
VISUAL DISTURBANCES: NOT PRESENT
TREMOR: MODERATE, WITH PATIENT'S ARMS EXTENDED
ORIENTATION AND CLOUDING OF SENSORIUM: ORIENTED AND CAN DO SERIAL ADDITIONS
PRESCIPTION_ABUSE_PAST_12_MONTHS: NO
HEADACHE, FULLNESS IN HEAD: NOT PRESENT
VISUAL DISTURBANCES: NOT PRESENT
ORIENTATION AND CLOUDING OF SENSORIUM: CANNOT DO SERIAL ADDITIONS OR IS UNCERTAIN ABOUT DATE
ORIENTATION AND CLOUDING OF SENSORIUM: ORIENTED AND CAN DO SERIAL ADDITIONS
TOTAL SCORE: 6
SUBSTANCE_ABUSE_PAST_12_MONTHS: YES
NAUSEA AND VOMITING: NO NAUSEA AND NO VOMITING
AGITATION: NORMAL ACTIVITY
AUDITORY DISTURBANCES: NOT PRESENT
TOTAL SCORE: 6
ANXIETY: NO ANXIETY, AT EASE
VISUAL DISTURBANCES: NOT PRESENT
HOW OFTEN DURING THE LAST YEAR HAVE YOU FAILED TO DO WHAT WAS NORMALLY EXPECTED FROM YOU BECAUSE OF DRINKING: WEEKLY
TOTAL SCORE: 11
HEADACHE, FULLNESS IN HEAD: NOT PRESENT
AUDIT TOTAL SCORE: 21
HAVE YOU OR SOMEONE ELSE BEEN INJURED AS A RESULT OF YOUR DRINKING: NO
ANXIETY: MILDLY ANXIOUS
TREMOR: MODERATE, WITH PATIENT'S ARMS EXTENDED
HOW OFTEN DO YOU HAVE 6 OR MORE DRINKS ON ONE OCCASION: LESS THAN MONTHLY
EVER HAD A DRINK FIRST THING IN THE MORNING TO STEADY YOUR NERVES TO GET RID OF A HANGOVER: NO
HEADACHE, FULLNESS IN HEAD: NOT PRESENT
AGITATION: NORMAL ACTIVITY
PAROXYSMAL SWEATS: NO SWEAT VISIBLE
PULSE: 108
NAUSEA AND VOMITING: NO NAUSEA AND NO VOMITING
NAUSEA AND VOMITING: NO NAUSEA AND NO VOMITING
NAUSEA AND VOMITING: MILD NAUSEA WITH NO VOMITING
AUDITORY DISTURBANCES: NOT PRESENT
TREMOR: 5
PAROXYSMAL SWEATS: NO SWEAT VISIBLE
ORIENTATION AND CLOUDING OF SENSORIUM: DISORIENTED FOR DATE BY MORE THAN 2 CALENDAR DAYS
TOTAL SCORE: 2
TREMOR: 5
HEADACHE, FULLNESS IN HEAD: NOT PRESENT
VISUAL DISTURBANCES: NOT PRESENT
PAROXYSMAL SWEATS: NO SWEAT VISIBLE
SKIP TO QUESTIONS 9-10: 0
VISUAL DISTURBANCES: NOT PRESENT
ANXIETY: NO ANXIETY, AT EASE

## 2024-03-26 ASSESSMENT — COGNITIVE AND FUNCTIONAL STATUS - GENERAL
TURNING FROM BACK TO SIDE WHILE IN FLAT BAD: A LOT
TURNING FROM BACK TO SIDE WHILE IN FLAT BAD: A LITTLE
STANDING UP FROM CHAIR USING ARMS: A LOT
DRESSING REGULAR UPPER BODY CLOTHING: A LITTLE
PERSONAL GROOMING: A LOT
MOVING TO AND FROM BED TO CHAIR: A LOT
CLIMB 3 TO 5 STEPS WITH RAILING: TOTAL
STANDING UP FROM CHAIR USING ARMS: A LOT
MOVING FROM LYING ON BACK TO SITTING ON SIDE OF FLAT BED WITH BEDRAILS: A LITTLE
CLIMB 3 TO 5 STEPS WITH RAILING: A LOT
HELP NEEDED FOR BATHING: A LOT
PATIENT BASELINE BEDBOUND: NO
MOBILITY SCORE: 10
DRESSING REGULAR LOWER BODY CLOTHING: A LITTLE
DAILY ACTIVITIY SCORE: 22
TOILETING: TOTAL
MOVING TO AND FROM BED TO CHAIR: TOTAL
DAILY ACTIVITIY SCORE: 15
MOBILITY SCORE: 15
TOILETING: A LITTLE
WALKING IN HOSPITAL ROOM: TOTAL
HELP NEEDED FOR BATHING: A LITTLE
WALKING IN HOSPITAL ROOM: A LOT

## 2024-03-26 ASSESSMENT — ACTIVITIES OF DAILY LIVING (ADL)
WALKS IN HOME: NEEDS ASSISTANCE
HEARING - RIGHT EAR: FUNCTIONAL
BATHING: NEEDS ASSISTANCE
DRESSING YOURSELF: INDEPENDENT
JUDGMENT_ADEQUATE_SAFELY_COMPLETE_DAILY_ACTIVITIES: YES
TOILETING: NEEDS ASSISTANCE
HEARING - LEFT EAR: FUNCTIONAL
GROOMING: INDEPENDENT
PATIENT'S MEMORY ADEQUATE TO SAFELY COMPLETE DAILY ACTIVITIES?: YES
LACK_OF_TRANSPORTATION: NO
FEEDING YOURSELF: INDEPENDENT
ADEQUATE_TO_COMPLETE_ADL: YES

## 2024-03-26 ASSESSMENT — COLUMBIA-SUICIDE SEVERITY RATING SCALE - C-SSRS
6. HAVE YOU EVER DONE ANYTHING, STARTED TO DO ANYTHING, OR PREPARED TO DO ANYTHING TO END YOUR LIFE?: NO
1. IN THE PAST MONTH, HAVE YOU WISHED YOU WERE DEAD OR WISHED YOU COULD GO TO SLEEP AND NOT WAKE UP?: NO
1. IN THE PAST MONTH, HAVE YOU WISHED YOU WERE DEAD OR WISHED YOU COULD GO TO SLEEP AND NOT WAKE UP?: NO
2. HAVE YOU ACTUALLY HAD ANY THOUGHTS OF KILLING YOURSELF?: NO
2. HAVE YOU ACTUALLY HAD ANY THOUGHTS OF KILLING YOURSELF?: NO
6. HAVE YOU EVER DONE ANYTHING, STARTED TO DO ANYTHING, OR PREPARED TO DO ANYTHING TO END YOUR LIFE?: NO

## 2024-03-26 ASSESSMENT — PATIENT HEALTH QUESTIONNAIRE - PHQ9
1. LITTLE INTEREST OR PLEASURE IN DOING THINGS: NOT AT ALL
SUM OF ALL RESPONSES TO PHQ9 QUESTIONS 1 & 2: 1
2. FEELING DOWN, DEPRESSED OR HOPELESS: SEVERAL DAYS

## 2024-03-26 ASSESSMENT — PAIN SCALES - GENERAL
PAINLEVEL_OUTOF10: 0 - NO PAIN
PAINLEVEL_OUTOF10: 0 - NO PAIN

## 2024-03-26 ASSESSMENT — PAIN - FUNCTIONAL ASSESSMENT
PAIN_FUNCTIONAL_ASSESSMENT: 0-10
PAIN_FUNCTIONAL_ASSESSMENT: 0-10

## 2024-03-26 NOTE — PROGRESS NOTES
Pharmacy Medication History Review    Errol Fonseca is a 54 y.o. male admitted for Pneumonia of right lung due to infectious organism, unspecified part of lung. Pharmacy reviewed the patient's [id number] medications and allergies for accuracy.    The list below reflectives the updated PTA list. Please review each medication in order reconciliation for additional clarification and justification.  Prior to Admission medications    Medication Sig Start Date End Date Taking? Authorizing Provider   aspirin 81 mg EC tablet Take 1 tablet (81 mg) by mouth once daily. 5/23/22  Yes Historical Provider, MD   atorvastatin (Lipitor) 40 mg tablet Take 1 tablet (40 mg) by mouth once daily. 4/16/23  Yes Historical Provider, MD   insulin glargine (Lantus) 100 unit/mL (3 mL) pen Inject 30 Units under the skin once daily in the morning. 4/18/23  Yes Historical Provider, MD   insulin lispro (HumaLOG) 100 unit/mL injection Inject 5 Units under the skin 3 times a day with meals. Plus sliding scale 4/18/23  Yes Historical Provider, MD   lisinopril 10 mg tablet Take 1 tablet (10 mg) by mouth once daily. 4/16/23  Yes Historical Provider, MD   metoprolol tartrate (Lopressor) 25 mg tablet Take 1 tablet (25 mg) by mouth 2 times a day. 5/12/23 5/11/24 Yes Historical Provider, MD   mupirocin (Bactroban) 2 % ointment Apply 1 Application topically twice a day. Apply to right foot ulcer 10/4/22  Yes Historical Provider, MD   multivitamin with minerals iron-free (Centrum Silver) Take 1 tablet by mouth once daily.   Yes Historical Provider, MD   nitroglycerin (Nitrostat) 0.4 mg SL tablet Place 1 tablet (0.4 mg) under the tongue every 5 minutes if needed for chest pain (up to 3 doses).   Yes Historical Provider, MD        The list below reflectives the updated allergy list. Please review each documented allergy for additional clarification and justification.  Allergies  Reviewed by Marcy Tubbs RN on 3/26/2024   No Known Allergies          Below are additional concerns with the patient's PTA list.    Patient reports running out of his short acting insulin, Humalog, approximately 6 days prior to today.    Debbie Berumen

## 2024-03-26 NOTE — ED PROVIDER NOTES
HPI   Chief Complaint   Patient presents with    Weakness, Gen       Past medical history that includes hypertension, insulin-dependent diabetes, CAD status postcardiac stent, and alcohol abuse presents with generalized weakness and inability to ambulate secondary to tremors for the past day.  Patient states he has been out of his insulin for the past 4 days, but did take a form of insulin this morning.  States that he has been recently embarrassed by his alcohol use in front of his children, so has been drinking hand  for the past 5 days.  No recent sick symptoms.  Denies fevers, chills, chest pain, productive cough, abdominal pain, nausea, and vomiting.  No urinary symptoms.  No change in bowel habits.      History provided by:  Patient   used: No                        [place] Coma Scale Score: 15                     Patient History   Past Medical History:   Diagnosis Date    Acute ischemic heart disease, unspecified (CMS/HCC)     Coronary syndrome, acute    Cirrhosis of liver (CMS/HCC)     Coronary artery disease     Diabetes mellitus (CMS/HCC)     Personal history of other diseases of the circulatory system     History of heart block    Personal history of other diseases of the musculoskeletal system and connective tissue     History of arthritis    Personal history of other endocrine, nutritional and metabolic disease     History of obesity     Past Surgical History:   Procedure Laterality Date    APPENDECTOMY  05/03/2018    Appendectomy    CARDIAC CATHETERIZATION  05/02/2018    Cardiac Cath Procedure Outcome:     No family history on file.  Social History     Tobacco Use    Smoking status: Every Day     Packs/day: 1     Types: Cigarettes    Smokeless tobacco: Never   Vaping Use    Vaping Use: Never used   Substance Use Topics    Alcohol use: Yes     Alcohol/week: 6.0 standard drinks of alcohol     Types: 6 Cans of beer per week    Drug use: Never       Physical Exam   ED Triage  Vitals [03/26/24 1111]   Temperature Heart Rate Respirations BP   36.9 °C (98.4 °F) (!) 108 20 168/88      Pulse Ox Temp src Heart Rate Source Patient Position   (!) 89 % -- -- Lying      BP Location FiO2 (%)     Right arm --       Physical Exam  Vitals and nursing note reviewed.   HENT:      Head: Atraumatic.      Mouth/Throat:      Mouth: Mucous membranes are moist.   Eyes:      Conjunctiva/sclera: Conjunctivae normal.   Cardiovascular:      Rate and Rhythm: Normal rate and regular rhythm.   Pulmonary:      Effort: Pulmonary effort is normal.      Breath sounds: Normal breath sounds.   Abdominal:      Palpations: Abdomen is soft.      Tenderness: There is no abdominal tenderness.   Musculoskeletal:         General: No deformity.      Cervical back: Normal range of motion.   Skin:     General: Skin is warm and dry.   Neurological:      Mental Status: He is alert.      Comments: Mentating appropriately.  Cranial nerves II through XII are grossly intact.  Minimal tongue fasciculations.  Fine extremity tremors.  No drift with any extremities.  Speech is fluent.  Can identify objects in the room.         ED Course & MDM   ED Course as of 03/26/24 1438   Tue Mar 26, 2024   1108 Spoke with [facility] regarding ingestion of hand  -- Ester states that most hand sanitizers contain ethanol and usually no other toxic alcohols.  Agrees with current work-up and recommends supportive care. [AB]   1206 89% on room air.  Placed on 2 L nasal cannula by respiratory therapy. [AB]   1223 Calculated AG 19 [AB]   1224 Calculate Osm 368; do not see an order for osmolarity in the system; only see osmolality, which is different [AB]   1230 Troponin I, High Sensitivity(!!): 62 [AB]   1235 Patient is adamant that he did not ingest any other alcohol-containing product, such as ethylene glycol.  Suspect lactate elevation is secondary to alcohol.  No acidosis.  He has no ataxia on exam. [AB]   1255 ECG 12 lead  My ECG interpretation:  Normal sinus rhythm, heart rate 99, no ST segment elevation, T wave inversions in the precordium noted on prior resolved on this ECG, QTc 593 [AB]   1259 Basic metabolic panel(!)  Elevated anion gap without acidosis.  Not in DKA.  Suspect anion gap is from ethanol.  Lower suspicion for other toxic alcohols at this time. [AB]   1259 Troponin I, High Sensitivity(!!)  Does have a history of CAD status post stent.  No active chest pain.  No ischemic change on ECG.  Will trend. [AB]      ED Course User Index  [AB] Ignacio Walter MD         Diagnoses as of 03/26/24 1438   Generalized weakness   Hypomagnesemia   Alcohol abuse   Alcoholic intoxication with complication (CMS/HCC)   Pneumonia of right lung due to infectious organism, unspecified part of lung   Acute hypoxic respiratory failure (CMS/HCC)       Medical Decision Making  Presents with generalized weakness with multiple possible etiologies.    Certainly could be secondary to alcohol ingestion.  As he was drinking hands antigen, did also consider toxic alcohols.  Workup is not consistent with this.  Did discuss this with the poison center.    His difficulty walking could also be secondary to electrolyte abnormality secondary to his drinking.  Given vitamin supplementations.  Magnesium being repleted.    He has no typical cardiac features.  His troponin was noted to be elevated.  No ischemic ECG changes.  I do not think that ACS represents his current clinical picture.  Will trend troponin.    Also found to have pneumonia with new oxygen requirement.  This certainly could be the cause of his generalized weakness.  I would not be surprised if this was aspiration in the setting of his alcohol use.  Cover with antibiotics.    Will escalate care to hospitalization for further management.    Amount and/or Complexity of Data Reviewed  Labs: ordered.  Radiology: ordered and independent interpretation performed.     Details: Per my view, there is a right-sided  infiltrate  ECG/medicine tests: ordered and independent interpretation performed. Decision-making details documented in ED Course.  Discussion of management or test interpretation with external provider(s): The admitting hospitalist    Risk  Decision regarding hospitalization.        Procedure  Procedures     Ignacio Walter MD  03/26/24 2186

## 2024-03-26 NOTE — H&P
Subjective   Errol Fonseca is a 54 y.o. male who presents for Weakness, Gen.    HPI  This is a 54-year-old male who initially presented to [facility] on 3/26/2024 with generalized weakness and tremors x 2 days.  He admits to drinking 2-16 ounce bottles of hand  for the last 2 months as he was trying to hide his alcohol use from his family.  He previously was drinking 1 pint of vodka every other day or so.  His generalized weakness and tremors progressed to the point of making him unable to get up on his feet, thus prompting him to seek medical attention.  Last EtOH use was AM of 3/26.   He does have known history of liver cirrhosis, which was reportedly diagnosed 2 years ago.  However, he has been lost to follow-up since.    Upon arrival, he was tachycardic and hypertensive.  Hypoxic on room air.  Workup thus notable for metabolic acidosis in setting of mildly elevated BHB at 0.98.  EtOH level 353.  ABG showed 7.4 4/37/60 with lactate 5.5.  Patient was found on 4 L NC.  CXR concerning for right-sided pneumonia with concern for aspiration given his alcohol use.  COVID and influenza PCR negative.  Troponins elevated although flat at 62 x2.  EKG without ST/T changes although he does have prolonged QTc of 593ms.  LFTs later added, which are concerning for alcoholic hepatitis.  He will be admitted for further management of his alcoholic intoxication with impending withdrawal and acute hypoxemia secondary to suspected aspiration pneumonia.    PMH:  Alcohol use disorder  Alcoholic liver cirrhosis  History of alcohol hepatitis  IDDM2  HTN  CAD s/p PCI with stent  History of SAH    PSH: Hernia repair, appendectomy, reduction of mandible  FH: CAD  SH: Former smoker, quit 4 months ago. Admits to drinking 2-16 ounce bottles of hand . Before that, he was drinking 1 pint of vodka every other day or so. Denies any illicit drug use.    Review of Systems:  12-point ROS was reviewed and is negative, unless otherwise  noted in HPI    Objective   Vitals:    03/26/24 1703   BP: 119/59   Pulse: (!) 109   Resp: 20   Temp:    SpO2: 95%       Physical Exam:   Constitutional: obese, awake, alert, no acute distress  ENMT: mucous membranes moist, conjunctivae clear  Head/Neck: normocephalic, atraumatic; supple, trachea midline  Respiratory/Thorax: diminished breath sounds, scattered rhonchi, mild right basilar crackles  Cardiovascular: RRR, no murmur appreciated  Gastrointestinal: soft, nondistended, non-tender, bowel sounds appreciated  Extremities: R foot with ulcer seen over plantar surface without erythema or purulence, L 5th toe with necrotic appearing wound  Neurological: AO x3, no focal deficits  Psychological: appropriate mood and behavior  Skin: warm and dry    Scheduled Medications:   enoxaparin, 40 mg, subcutaneous, q24h  folic acid, 1 mg, oral, Daily  insulin lispro, 0-15 Units, subcutaneous, Before meals & nightly  magnesium sulfate, 2 g, intravenous, Once  multivitamin with minerals, 1 tablet, oral, Daily  piperacillin-tazobactam, 3.375 g, intravenous, q6h  [START ON 3/30/2024] thiamine, 100 mg, oral, Daily  thiamine, 100 mg, intravenous, Daily  vancomycin, 1,500 mg, intravenous, q12h    Continuous Medications:   lactated Ringer's, 100 mL/hr    PRN Medications:       Assessment/Plan   This is a 54-year-old male who [facility] with generalized weakness and tremors x 2 days.  He admits to drinking 2-16 ounce bottles of hand  for the last 2 months as he was trying to hide his alcohol use from his family.  He previously was drinking 1 pint of vodka every other day or so.  Last EtOH use on AM of 3/26.    Acute alcoholic intoxication with impending withdrawal  Acute hypoxemia secondary to suspected aspiration PNA  Left 5th toe would necrotic-appearing wound, concern for OM  HAGMA in setting of lactic acidosis and alcoholic ketosis  Acute alcoholic hepatitis  Elevated troponin, suspect  demand ischemia in setting of hypoxemia  Hypomagnesemia  IDDM2 with hyperglycemia    #Alcohol use disorder  #Alcoholic liver cirrhosis  #History of alcohol hepatitis  #IDDM2  #HTN  #CAD s/p PCI with stent  #History of SAH    Given consumption of hand , will check for volatile compounds, including isopropyl alcohol.  CIWA protocol with PRN Valium IV. Continue thiamine, folic acid. Counseled on alcohol cessation. SW consulted for resources.  Given concern for left 5th toe OM, will broaden antibiotics to Vanc/Zosyn. This will also cover PNA. Check ESR, CRP, MRSA, and MRI of left foot. Consult podiatry. Pt had been following with podiatry at [facility] with last visit on 3/12. XR of left toes reviewed from 2/27, significant only for soft tissue swelling.  Check UAg, Mycoplasma IgM. Wean O2 as tolerated. Sched and PRN Duonebs, Mucinex.  MDF 7.6 with good prognosis. No need for steroids.  MELD-Na 11 with 6% estimated 3-month mortality. Consult GI given that pt was lost to follow up.  Trend CMP, lactate.  Monitor and replete electrolytes PRN.  Insulin regimen:  Home: Lantus 30 QAM, Lispro 5 TID AC, SSI  Inpatient: Lantus 20 QAM, SSI (0-15) TID AC    - Continue home ASA 81mg, metoprolol tartrate, atorvastatin,   - Hold home lisinopril for now while normotensive    DVT PPX: Lovenox  Diet: 2g Na  IVF: LR @ 100cc/hr  Consults: GI, Podiatry  Code status: FULL    This is a preliminary note written by the resident. Please wait for attending addendum for finalization of note and recommendations.

## 2024-03-26 NOTE — PROGRESS NOTES
"Vancomycin Dosing by Pharmacy- INITIAL    Errol Fonseca is a 54 y.o. year old male who Pharmacy has been consulted for vancomycin dosing for osteomyelitis/septic arthritis. Based on the patient's indication and renal status this patient will be dosed based on a goal AUC of 500-600.     Renal function is currently stable.    Visit Vitals  /69   Pulse (!) 102   Temp 36.9 °C (98.4 °F)   Resp 18        Lab Results   Component Value Date    CREATININE 0.66 03/26/2024    CREATININE 0.77 10/10/2023    CREATININE 0.84 10/23/2022    CREATININE 0.90 01/20/2021    CREATININE 0.69 01/17/2021    CREATININE 0.57 04/05/2019        Patient weight is No results found for: \"PTWEIGHT\"    No results found for: \"CULTURE\"     No intake/output data recorded.  [unfilled]    Lab Results   Component Value Date    PATIENTTEMP 37.0 03/26/2024          Assessment/Plan     Patient will not be given a loading dose.  Will initiate vancomycin maintenance,  1250 mg every 12 hours.    This dosing regimen is predicted by [employer] to result in the following pharmacokinetic parameters:  Regimen: 1250 mg IV every 12 hours.  Start time: 16:44 on 03/26/2024  Exposure target: AUC24 (range)400-600 mg/L.hr   AUC24,ss: 458 mg/L.hr  Probability of AUC24 > 400: 64 %  Ctrough,ss: 13.3 mg/L  Probability of Ctrough,ss > 20: 20 %  Probability of nephrotoxicity (Lodise REI 2009): 8 %    Follow-up level will be ordered on 3/27 at mid am labs unless clinically indicated sooner.  Will continue to monitor renal function daily while on vancomycin and order serum creatinine at least every 48 hours if not already ordered.  Follow for continued vancomycin needs, clinical response, and signs/symptoms of toxicity.       Lissy Contreras, PharmD       "

## 2024-03-26 NOTE — NURSING NOTE
Admission complete. Patient is living with his daughter and son-in-law. States his last drink was a couple of weeks ago. States he has been drinking hand  for the past couple of weeks to try to hide his drinking problem from his kids

## 2024-03-26 NOTE — CONSULTS
Inpatient consult to Podiatry  Consult performed by: Stanislav Avila DPM  Consult ordered by: Ignacio Walter MD  Reason for consult: Right foot ulceration left foot gangrene          Reason For Consult   gangrenous changes to the left fifth digit.  Chronic ulcer plantar right foot.   History Of Present Illness  Errol Fonseca is a 54 y.o.   diabetic neuropathic male with a history of ulceration plantar aspect of the right foot.he states that ulcer was quite larger than what he has currently.  It has improved significantly.  It is dry at this time.  He has had no drainage from it.  It was quite larger previously.  He was seen within the last 2 weeks at [facility] by his podiatrist.  Additionally he has discoloration of the fifth digit of the left foot.  He was recently seen at [facility] by his podiatrist who did what sounds like a debridement.this too was about 2 weeks ago.  Duration unknown. He states that the redness on the dorsum of the foot and digit has diminished.   He last saw his podiatrist at [facility] approximately 2 weeks ago for both of these problems.  He has a long history of alcohol abuse.  His last drink was earlier today before coming to the hospital.   He feels that he is going through withdrawals.   Valium is currently being administered otherwise denies any fever or chills currently.  Admits to shortness of breath.  Patient denies having had any vascular intervention of the lower extremities.  He was scheduled to see vascular surgery but that has not done so.    Per admission: This is a 54-year-old male who initially presented to [facility] on 3/26/2024 with generalized weakness and tremors x 2 days.  He admits to drinking 2-16 ounce bottles of hand  for the last 2 months as he was trying to hide his alcohol use from his family.  He previously was drinking 1 pint of vodka every other day or so.  His generalized weakness and tremors progressed to the point of making him unable to  get up on his feet, thus prompting him to seek medical attention.  Last EtOH use was AM of 3/26.   He does have known history of liver cirrhosis, which was reportedly diagnosed 2 years ago.  However, he has been lost to follow-up since.     Upon arrival, he was tachycardic and hypertensive.  Hypoxic on room air.  Workup thus notable for metabolic acidosis in setting of mildly elevated BHB at 0.98.  EtOH level 353.  ABG showed 7.4 4/37/60 with lactate 5.5.  Patient was found on 4 L NC.  CXR concerning for right-sided pneumonia with concern for aspiration given his alcohol use.  COVID and influenza PCR negative.  Troponins elevated although flat at 62 x2.  EKG without ST/T changes although he does have prolonged QTc of 593ms.  LFTs later added, which are concerning for alcoholic hepatitis.  He will be admitted for further management of his alcoholic intoxication with impending withdrawal and acute hypoxemia secondary to suspected aspiration pneumonia.     PMH:  1. Alcohol use disorder  2. Alcoholic liver cirrhosis  3. History of alcohol hepatitis  4. IDDM2  5. HTN  6. CAD s/p PCI with stent  7. History of SAH     PSH: Hernia repair, appendectomy, reduction of mandible  FH: CAD  SH: Former smoker, quit 4 months ago. Admits to drinking 2-16 ounce bottles of hand . Before that, he was drinking 1 pint of vodka every other day or so. Denies any illicit drug use.     Review of Systems:  12-point ROS was reviewed and is negative, unless otherwise noted in HP    Past Medical History  He has a past medical history of Acute ischemic heart disease, unspecified (CMS/HCC), Cirrhosis of liver (CMS/HCC), Coronary artery disease, Diabetes mellitus (CMS/HCC), Personal history of other diseases of the circulatory system, Personal history of other diseases of the musculoskeletal system and connective tissue, and Personal history of other endocrine, nutritional and metabolic disease.    Surgical History  He has a past surgical  "history that includes Cardiac catheterization (05/02/2018) and Appendectomy (05/03/2018).     Social History  He reports that he has been smoking cigarettes. He has been smoking an average of 1 pack per day. He has never used smokeless tobacco. He reports current alcohol use of about 6.0 standard drinks of alcohol per week. He reports that he does not use drugs.    Family History  No family history on file.     Allergies  Patient has no known allergies.    Physical Exam     General/Constitutional: Alert. Anxious.   Respiratory: 02 per NC 5 L  Psychiatric: Normal appearing mood and affect.   HEENT: Sclera clear.  Dermatologic: Unstageable ulceration plantar aspect right foot beneath the second metatarsal head.  No erythema around the ulcer. There is some mild distal erythema in sulcus 2nd toe.  No ascending cellulitis.  No swelling at the second MTP joint.  No drainage from the ulcer site.  No fluctuance noted.  No malodor.  Web spaces dry.  Fifth digit left foot with distal gangrene and swelling of the toe and erythema progressing to the dorsum of the foot. Mild swelling. No drainage.   Vascular: Pedal pulses not palpable except for the left DP pulse which is normal. Other pedal pulses not palpable. Feet warm.   Neurological: Alert and oriented. No acute distress. S/W monofilament testing diminished both feet. Gross sensation intact.   Musculoskeletal: Strength is normal for age. No acute deficits appreciated.  PVRs pending  X-rays right foot pending  MRI left foot positive for osteomyelitis fifth digit.                    Last Recorded Vitals  Blood pressure 119/59, pulse (!) 109, temperature 36.9 °C (98.4 °F), resp. rate 20, height 1.854 m (6' 1\"), weight 85.3 kg (188 lb), SpO2 95 %.    Relevant Results  MR foot left w and wo IV contrast    Result Date: 3/26/2024  Interpreted By:  Emanuel Gaines, STUDY: MRI of the  left forefoot  without and with contrast dated  3/26/2024.   INDICATION: Signs/Symptoms:left 5th " toe with necrotic tissue   COMPARISON: None.   ACCESSION NUMBER(S): [id number]   ORDERING CLINICIAN: DERRICK STOVALL   TECHNIQUE: Multiplanar multisequence MRI of the  left forefoot was performed without and with intravenous gadolinium based contrast.   FINDINGS: OSSEOUS STRUCTURES AND JOINTS:   No fracture or dislocation is evident.  There is increased T2 and STIR signal intensity in the distal phalanx of the 5th digit. There is associated low T1 signal intensity on the T1 weighted imaging. There is not definitively increased T1 signal intensity after the administration of contrast although this may be technically limited. Increased T2 signal intensity is seen in other phalanges of the digits without corresponding STIR signal intensity and thus felt to be related to poor fat saturation. Mild degenerative changes seen in the midfoot. Mild degenerative changes seen of the 1st digit metatarsophalangeal joint. No joint effusion is evident.   SOFT TISSUES:   Diffuse atrophy is seen in the musculature. There is diffuse increased T2 signal intensity in the musculature with mild ill-defined postcontrast enhancement. Reticular increased T2 signal intensity is seen in the subcutaneous tissues greatest on the dorsum of the foot. There is extension into the 4th and 5th toes. No focal soft tissue collection is evident. Site wound/ulcer of the 5th toe is not well assessed for on this MRI, but there may be irregularity to the skin/subcutaneous tissues of the lateral to lateral plantar aspect of the 5th toe.       1. High likelihood of osteomyelitis involving the distal phalanx of the 5th digit. 2. Cellulitis and/or lymphedema of the lower extremity greatest on the dorsum of the foot extending into the 4th and 5th toes. 3. Limited assessment of known necrotic tissue of the 5th digit. 4. 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   Signed by: Emanuel Gaines 3/26/2024 8:15 PM Dictation workstation:   [id number]    XR  chest 1 view    Result Date: 3/26/2024  Interpreted By:  Shauna Coffey, STUDY: XR CHEST 1 VIEW;  3/26/2024 1:49 pm   INDICATION: Signs/Symptoms:89% RA, eval for PNA.   COMPARISON: 10/10/2023; CT chest dated 04/04/2019   ACCESSION NUMBER(S): [id number]   ORDERING CLINICIAN: ZEKE HERRON   FINDINGS: Heart is normal in size.   There is right-sided consolidation   There appears to be fullness to the left of the trachea. This may be due to rotation.   There are numerous old left rib fractures.   There are degenerative changes of the spine.   COMPARISON OF FINDING: The right-sided infiltrate is new.       Right-sided infiltration.   MACRO: none   Signed by: Shauna Coffey 3/26/2024 1:56 PM Dictation workstation:   [id number]       Scheduled medications  aspirin, 81 mg, oral, Daily  enoxaparin, 40 mg, subcutaneous, q24h  folic acid, 1 mg, oral, Daily  [START ON 3/27/2024] insulin glargine, 20 Units, subcutaneous, q AM  insulin lispro, 0-15 Units, subcutaneous, Before meals & nightly  lactated Ringer's, 500 mL, intravenous, Once  metoprolol tartrate, 25 mg, oral, BID  multivitamin with minerals, 1 tablet, oral, Daily  piperacillin-tazobactam, 3.375 g, intravenous, q6h  [START ON 3/30/2024] thiamine, 100 mg, oral, Daily  thiamine, 100 mg, intravenous, Daily  vancomycin, 1,500 mg, intravenous, q12h      Continuous medications  sodium chloride 0.9%, 100 mL/hr, Last Rate: 100 mL/hr (03/26/24 2008)      PRN medications  PRN medications: dextrose, dextrose, diazePAM, glucagon, glucagon, guaiFENesin, ipratropium-albuteroL, vancomycin     Results for orders placed or performed during the hospital encounter of 03/26/24 (from the past 24 hour(s))   CBC and Auto Differential   Result Value Ref Range    WBC 7.6 4.4 - 11.3 x10*3/uL    nRBC 0.0 0.0 - 0.0 /100 WBCs    RBC 3.50 (L) 4.50 - 5.90 x10*6/uL    Hemoglobin 12.2 (L) 13.5 - 17.5 g/dL    Hematocrit 34.4 (L) 41.0 - 52.0 %    MCV 98 80 - 100 fL    MCH 34.9 (H) 26.0 - 34.0 pg    MCHC  35.5 32.0 - 36.0 g/dL    RDW 13.0 11.5 - 14.5 %    Platelets 89 (L) 150 - 450 x10*3/uL    Immature Granulocytes %, Automated 0.5 0.0 - 0.9 %    Immature Granulocytes Absolute, Automated 0.04 0.00 - 0.70 x10*3/uL   Basic metabolic panel   Result Value Ref Range    Glucose 198 (H) 74 - 99 mg/dL    Sodium 137 136 - 145 mmol/L    Potassium 3.5 3.5 - 5.3 mmol/L    Chloride 91 (L) 98 - 107 mmol/L    Bicarbonate 27 21 - 32 mmol/L    Anion Gap 23 (H) 10 - 20 mmol/L    Urea Nitrogen 18 6 - 23 mg/dL    Creatinine 0.66 0.50 - 1.30 mg/dL    eGFR >90 >60 mL/min/1.73m*2    Calcium 7.9 (L) 8.6 - 10.3 mg/dL   Magnesium   Result Value Ref Range    Magnesium 1.30 (L) 1.60 - 2.40 mg/dL   Acute Toxicology Panel, Blood   Result Value Ref Range    Acetaminophen <10.0 10.0 - 30.0 ug/mL    Salicylate  <3 4 - 20 mg/dL    Alcohol 353 (H) <=10 mg/dL   Beta Hydroxybutyrate   Result Value Ref Range    Beta-Hydroxybutyrate 0.95 (H) 0.02 - 0.27 mmol/L   Troponin I, High Sensitivity   Result Value Ref Range    Troponin I, High Sensitivity 62 (HH) 0 - 20 ng/L   Manual Differential   Result Value Ref Range    Neutrophils %, Manual 34.0 40.0 - 80.0 %    Bands %, Manual 48.0 0.0 - 5.0 %    Lymphocytes %, Manual 11.0 13.0 - 44.0 %    Monocytes %, Manual 5.0 2.0 - 10.0 %    Eosinophils %, Manual 0.0 0.0 - 6.0 %    Basophils %, Manual 1.0 0.0 - 2.0 %    Metamyelocytes %, Manual 1.0 0.0 - 0.0 %    Seg Neutrophils Absolute, Manual 2.58 1.20 - 7.00 x10*3/uL    Bands Absolute, Manual 3.65 (H) 0.00 - 0.70 x10*3/uL    Lymphocytes Absolute, Manual 0.84 (L) 1.20 - 4.80 x10*3/uL    Monocytes Absolute, Manual 0.38 0.10 - 1.00 x10*3/uL    Eosinophils Absolute, Manual 0.00 0.00 - 0.70 x10*3/uL    Basophils Absolute, Manual 0.08 0.00 - 0.10 x10*3/uL    Metamyelocytes Absolute, Manual 0.08 0.00 - 0.00 x10*3/uL    Total Cells Counted 100     Neutrophils Absolute, Manual 6.23 1.20 - 7.70 x10*3/uL    RBC Morphology No significant RBC morphology present    Sedimentation  rate, automated   Result Value Ref Range    Sedimentation Rate 34 (H) 0 - 20 mm/h   Blood Gas Arterial Full Panel   Result Value Ref Range    POCT pH, Arterial 7.44 (H) 7.38 - 7.42 pH    POCT pCO2, Arterial 37 (L) 38 - 42 mm Hg    POCT pO2, Arterial 60 (L) 85 - 95 mm Hg    POCT SO2, Arterial 87 (L) 94 - 100 %    POCT Oxy Hemoglobin, Arterial 83.9 (L) 94.0 - 98.0 %    POCT Hematocrit Calculated, Arterial 36.0 (L) 41.0 - 52.0 %    POCT Sodium, Arterial 135 (L) 136 - 145 mmol/L    POCT Potassium, Arterial 3.6 3.5 - 5.3 mmol/L    POCT Chloride, Arterial 94 (L) 98 - 107 mmol/L    POCT Ionized Calcium, Arterial 0.98 (L) 1.10 - 1.33 mmol/L    POCT Glucose, Arterial 222 (H) 74 - 99 mg/dL    POCT Lactate, Arterial 5.5 (HH) 0.4 - 2.0 mmol/L    POCT Base Excess, Arterial 1.1 -2.0 - 3.0 mmol/L    POCT HCO3 Calculated, Arterial 25.1 22.0 - 26.0 mmol/L    POCT Hemoglobin, Arterial 11.9 (L) 13.5 - 17.5 g/dL    POCT Anion Gap, Arterial 20 10 - 25 mmo/L    Patient Temperature 37.0 degrees Celsius    FiO2 21 %    Critical Called By GUILLERMINA MARIA RRT     Critical Called To DR HERRON     Critical Call Time 1150     Critical Read Back Y     Site of Arterial Puncture Brachial Right     Sree's Test Positive    Sars-CoV-2 and Influenza A/B PCR   Result Value Ref Range    Flu A Result Not Detected Not Detected    Flu B Result Not Detected Not Detected    Coronavirus 2019, PCR Not Detected Not Detected   Troponin I, High Sensitivity   Result Value Ref Range    Troponin I, High Sensitivity 62 (HH) 0 - 20 ng/L   Hepatic function panel   Result Value Ref Range    Albumin 3.3 (L) 3.4 - 5.0 g/dL    Bilirubin, Total 2.1 (H) 0.0 - 1.2 mg/dL    Bilirubin, Direct 1.2 (H) 0.0 - 0.3 mg/dL    Alkaline Phosphatase 158 (H) 33 - 120 U/L    ALT 97 (H) 10 - 52 U/L     (H) 9 - 39 U/L    Total Protein 5.8 (L) 6.4 - 8.2 g/dL   C-reactive protein   Result Value Ref Range    C-Reactive Protein 13.80 (H) <1.00 mg/dL   Protime-INR   Result Value Ref Range     Protime 13.2 (H) 9.8 - 12.8 seconds    INR 1.2 (H) 0.9 - 1.1   Urinalysis with Reflex Culture and Microscopic   Result Value Ref Range    Color, Urine Meghan (N) Straw, Yellow    Appearance, Urine Hazy (N) Clear    Specific Gravity, Urine 1.023 1.005 - 1.035    pH, Urine 5.0 5.0, 5.5, 6.0, 6.5, 7.0, 7.5, 8.0    Protein, Urine 100 (2+) (N) NEGATIVE mg/dL    Glucose, Urine 150 (2+) (A) NEGATIVE mg/dL    Blood, Urine MODERATE (2+) (A) NEGATIVE    Ketones, Urine 20 (1+) (A) NEGATIVE mg/dL    Bilirubin, Urine NEGATIVE NEGATIVE    Urobilinogen, Urine 4.0 (N) <2.0 mg/dL    Nitrite, Urine NEGATIVE NEGATIVE    Leukocyte Esterase, Urine NEGATIVE NEGATIVE   Urinalysis Microscopic   Result Value Ref Range    WBC, Urine 1-5 1-5, NONE /HPF    RBC, Urine 1-2 NONE, 1-2, 3-5 /HPF    Bacteria, Urine 1+ (A) NONE SEEN /HPF    Mucus, Urine 3+ Reference range not established. /LPF    Hyaline Casts, Urine 2+ (A) NONE /LPF    Fine Granular Casts, Urine 1+ (A) NONE /LPF   POCT GLUCOSE   Result Value Ref Range    POCT Glucose 311 (H) 74 - 99 mg/dL   Comprehensive Metabolic Panel   Result Value Ref Range    Glucose 299 (H) 74 - 99 mg/dL    Sodium 134 (L) 136 - 145 mmol/L    Potassium 3.7 3.5 - 5.3 mmol/L    Chloride 91 (L) 98 - 107 mmol/L    Bicarbonate 25 21 - 32 mmol/L    Anion Gap 22 (H) 10 - 20 mmol/L    Urea Nitrogen 16 6 - 23 mg/dL    Creatinine 0.63 0.50 - 1.30 mg/dL    eGFR >90 >60 mL/min/1.73m*2    Calcium 7.7 (L) 8.6 - 10.3 mg/dL    Albumin 3.4 3.4 - 5.0 g/dL    Alkaline Phosphatase 161 (H) 33 - 120 U/L    Total Protein 5.9 (L) 6.4 - 8.2 g/dL     (H) 9 - 39 U/L    Bilirubin, Total 2.3 (H) 0.0 - 1.2 mg/dL    ALT 94 (H) 10 - 52 U/L   Ammonia   Result Value Ref Range    Ammonia 50 16 - 53 umol/L   Lactate   Result Value Ref Range    Lactate 3.0 (H) 0.4 - 2.0 mmol/L   POCT GLUCOSE   Result Value Ref Range    POCT Glucose 159 (H) 74 - 99 mg/dL      Assessment/Plan     Impression:  PVD/gangrene left foot; OM toe 5 left foot;  neuropathic ulcer/unstageable right foot.     -Today's treatment and course of therapy was discussed with the patient in detail. Patient's questions were answered. Proper foot care was discussed. This dictation was done using Dragon computer software and as such may contain grammatical errors.    -Review notes and labs and diagnostics and imaging.    -Lengthy discussion with patient on findings.  He is understanding of this.    -Questions were answered.    -This was discussed with vascular surgery.    -Will order PVR studies.  Obtain vascular consult.    -Continue current anti biotics.     -Discussed with Dr. Villegas as she will be managing patient tomorrow.

## 2024-03-27 ENCOUNTER — APPOINTMENT (OUTPATIENT)
Dept: VASCULAR MEDICINE | Facility: HOSPITAL | Age: 54
End: 2024-03-27
Payer: MEDICAID

## 2024-03-27 ENCOUNTER — APPOINTMENT (OUTPATIENT)
Dept: RADIOLOGY | Facility: HOSPITAL | Age: 54
End: 2024-03-27
Payer: MEDICAID

## 2024-03-27 LAB
ALBUMIN SERPL BCP-MCNC: 3.3 G/DL (ref 3.4–5)
ALP SERPL-CCNC: 195 U/L (ref 33–120)
ALT SERPL W P-5'-P-CCNC: 90 U/L (ref 10–52)
ANION GAP BLDA CALCULATED.4IONS-SCNC: 20 MMO/L (ref 10–25)
ANION GAP SERPL CALC-SCNC: 17 MMOL/L (ref 10–20)
ARTERIAL PATENCY WRIST A: ABNORMAL
AST SERPL W P-5'-P-CCNC: 202 U/L (ref 9–39)
ATRIAL RATE: 100 BPM
BASE EXCESS BLDA CALC-SCNC: 1.1 MMOL/L (ref -2–3)
BILIRUB SERPL-MCNC: 3.7 MG/DL (ref 0–1.2)
BODY TEMPERATURE: 37 DEGREES CELSIUS
BUN SERPL-MCNC: 16 MG/DL (ref 6–23)
CA-I BLDA-SCNC: 0.98 MMOL/L (ref 1.1–1.33)
CALCIUM SERPL-MCNC: 7.9 MG/DL (ref 8.6–10.3)
CHLORIDE BLDA-SCNC: 94 MMOL/L (ref 98–107)
CHLORIDE SERPL-SCNC: 92 MMOL/L (ref 98–107)
CO2 SERPL-SCNC: 27 MMOL/L (ref 21–32)
CREAT SERPL-MCNC: 0.61 MG/DL (ref 0.5–1.3)
CRITICAL CALL TIME: 1150
CRITICAL CALLED BY: ABNORMAL
CRITICAL CALLED TO: ABNORMAL
CRITICAL READ BACK: ABNORMAL
EGFRCR SERPLBLD CKD-EPI 2021: >90 ML/MIN/1.73M*2
ERYTHROCYTE [DISTWIDTH] IN BLOOD BY AUTOMATED COUNT: 13.3 % (ref 11.5–14.5)
EST. AVERAGE GLUCOSE BLD GHB EST-MCNC: 194 MG/DL
GLUCOSE BLD MANUAL STRIP-MCNC: 180 MG/DL (ref 74–99)
GLUCOSE BLD MANUAL STRIP-MCNC: 192 MG/DL (ref 74–99)
GLUCOSE BLD MANUAL STRIP-MCNC: 200 MG/DL (ref 74–99)
GLUCOSE BLD MANUAL STRIP-MCNC: 269 MG/DL (ref 74–99)
GLUCOSE BLDA-MCNC: 222 MG/DL (ref 74–99)
GLUCOSE SERPL-MCNC: 261 MG/DL (ref 74–99)
HBA1C MFR BLD: 8.4 %
HCO3 BLDA-SCNC: 25.1 MMOL/L (ref 22–26)
HCT VFR BLD AUTO: 29.6 % (ref 41–52)
HCT VFR BLD EST: 36 % (ref 41–52)
HGB BLD-MCNC: 10.5 G/DL (ref 13.5–17.5)
HGB BLDA-MCNC: 11.9 G/DL (ref 13.5–17.5)
INHALED O2 CONCENTRATION: 21 %
INR PPP: 1.2 (ref 0.9–1.1)
LACTATE BLDA-SCNC: 5.5 MMOL/L (ref 0.4–2)
LACTATE SERPL-SCNC: 0.8 MMOL/L (ref 0.4–2)
MAGNESIUM SERPL-MCNC: 2.23 MG/DL (ref 1.6–2.4)
MCH RBC QN AUTO: 34.9 PG (ref 26–34)
MCHC RBC AUTO-ENTMCNC: 35.5 G/DL (ref 32–36)
MCV RBC AUTO: 98 FL (ref 80–100)
NRBC BLD-RTO: 0.3 /100 WBCS (ref 0–0)
OXYHGB MFR BLDA: 83.9 % (ref 94–98)
P AXIS: -19 DEGREES
PCO2 BLDA: 37 MM HG (ref 38–42)
PH BLDA: 7.44 PH (ref 7.38–7.42)
PLATELET # BLD AUTO: 59 X10*3/UL (ref 150–450)
PO2 BLDA: 60 MM HG (ref 85–95)
POTASSIUM BLDA-SCNC: 3.6 MMOL/L (ref 3.5–5.3)
POTASSIUM SERPL-SCNC: 4.2 MMOL/L (ref 3.5–5.3)
PR INTERVAL: 51 MS
PROT SERPL-MCNC: 5.5 G/DL (ref 6.4–8.2)
PROTHROMBIN TIME: 13.4 SECONDS (ref 9.8–12.8)
Q ONSET: 249 MS
QRS COUNT: 16 BEATS
QRS DURATION: 108 MS
QT INTERVAL: 462 MS
QTC CALCULATION(BAZETT): 593 MS
QTC FREDERICIA: 545 MS
R AXIS: 15 DEGREES
RBC # BLD AUTO: 3.01 X10*6/UL (ref 4.5–5.9)
SAO2 % BLDA: 87 % (ref 94–100)
SODIUM BLDA-SCNC: 135 MMOL/L (ref 136–145)
SODIUM SERPL-SCNC: 132 MMOL/L (ref 136–145)
SPECIMEN DRAWN FROM PATIENT: ABNORMAL
T AXIS: 76 DEGREES
T OFFSET: 480 MS
VENTRICULAR RATE: 99 BPM
WBC # BLD AUTO: 6.4 X10*3/UL (ref 4.4–11.3)

## 2024-03-27 PROCEDURE — 2500000004 HC RX 250 GENERAL PHARMACY W/ HCPCS (ALT 636 FOR OP/ED): Performed by: STUDENT IN AN ORGANIZED HEALTH CARE EDUCATION/TRAINING PROGRAM

## 2024-03-27 PROCEDURE — 2500000001 HC RX 250 WO HCPCS SELF ADMINISTERED DRUGS (ALT 637 FOR MEDICARE OP): Performed by: STUDENT IN AN ORGANIZED HEALTH CARE EDUCATION/TRAINING PROGRAM

## 2024-03-27 PROCEDURE — 2500000002 HC RX 250 W HCPCS SELF ADMINISTERED DRUGS (ALT 637 FOR MEDICARE OP, ALT 636 FOR OP/ED): Performed by: STUDENT IN AN ORGANIZED HEALTH CARE EDUCATION/TRAINING PROGRAM

## 2024-03-27 PROCEDURE — 73630 X-RAY EXAM OF FOOT: CPT | Mod: RT

## 2024-03-27 PROCEDURE — 93922 UPR/L XTREMITY ART 2 LEVELS: CPT

## 2024-03-27 PROCEDURE — 85610 PROTHROMBIN TIME: CPT | Performed by: STUDENT IN AN ORGANIZED HEALTH CARE EDUCATION/TRAINING PROGRAM

## 2024-03-27 PROCEDURE — 1200000002 HC GENERAL ROOM WITH TELEMETRY DAILY

## 2024-03-27 PROCEDURE — S4991 NICOTINE PATCH NONLEGEND: HCPCS | Performed by: STUDENT IN AN ORGANIZED HEALTH CARE EDUCATION/TRAINING PROGRAM

## 2024-03-27 PROCEDURE — 2500000001 HC RX 250 WO HCPCS SELF ADMINISTERED DRUGS (ALT 637 FOR MEDICARE OP)

## 2024-03-27 PROCEDURE — 93922 UPR/L XTREMITY ART 2 LEVELS: CPT | Performed by: INTERNAL MEDICINE

## 2024-03-27 PROCEDURE — 85027 COMPLETE CBC AUTOMATED: CPT | Performed by: STUDENT IN AN ORGANIZED HEALTH CARE EDUCATION/TRAINING PROGRAM

## 2024-03-27 PROCEDURE — 99222 1ST HOSP IP/OBS MODERATE 55: CPT | Performed by: INTERNAL MEDICINE

## 2024-03-27 PROCEDURE — 87205 SMEAR GRAM STAIN: CPT | Mod: PARLAB

## 2024-03-27 PROCEDURE — 99232 SBSQ HOSP IP/OBS MODERATE 35: CPT

## 2024-03-27 PROCEDURE — 73630 X-RAY EXAM OF FOOT: CPT | Mod: RIGHT SIDE | Performed by: RADIOLOGY

## 2024-03-27 PROCEDURE — 83036 HEMOGLOBIN GLYCOSYLATED A1C: CPT | Performed by: STUDENT IN AN ORGANIZED HEALTH CARE EDUCATION/TRAINING PROGRAM

## 2024-03-27 PROCEDURE — 36415 COLL VENOUS BLD VENIPUNCTURE: CPT | Performed by: STUDENT IN AN ORGANIZED HEALTH CARE EDUCATION/TRAINING PROGRAM

## 2024-03-27 PROCEDURE — 82947 ASSAY GLUCOSE BLOOD QUANT: CPT

## 2024-03-27 PROCEDURE — 99222 1ST HOSP IP/OBS MODERATE 55: CPT | Performed by: NURSE PRACTITIONER

## 2024-03-27 PROCEDURE — 83735 ASSAY OF MAGNESIUM: CPT | Performed by: STUDENT IN AN ORGANIZED HEALTH CARE EDUCATION/TRAINING PROGRAM

## 2024-03-27 PROCEDURE — 99232 SBSQ HOSP IP/OBS MODERATE 35: CPT | Performed by: STUDENT IN AN ORGANIZED HEALTH CARE EDUCATION/TRAINING PROGRAM

## 2024-03-27 PROCEDURE — 80053 COMPREHEN METABOLIC PANEL: CPT | Performed by: STUDENT IN AN ORGANIZED HEALTH CARE EDUCATION/TRAINING PROGRAM

## 2024-03-27 PROCEDURE — 83605 ASSAY OF LACTIC ACID: CPT | Performed by: STUDENT IN AN ORGANIZED HEALTH CARE EDUCATION/TRAINING PROGRAM

## 2024-03-27 RX ORDER — POTASSIUM CHLORIDE 20 MEQ/1
40 TABLET, EXTENDED RELEASE ORAL ONCE
Status: COMPLETED | OUTPATIENT
Start: 2024-03-27 | End: 2024-03-27

## 2024-03-27 RX ORDER — INSULIN GLARGINE 100 [IU]/ML
10 INJECTION, SOLUTION SUBCUTANEOUS EVERY MORNING
Status: DISCONTINUED | OUTPATIENT
Start: 2024-03-27 | End: 2024-03-27

## 2024-03-27 RX ORDER — LACTULOSE 10 G/15ML
20 SOLUTION ORAL DAILY
Status: DISCONTINUED | OUTPATIENT
Start: 2024-03-27 | End: 2024-03-28

## 2024-03-27 RX ORDER — INSULIN GLARGINE 100 [IU]/ML
20 INJECTION, SOLUTION SUBCUTANEOUS EVERY MORNING
Status: DISCONTINUED | OUTPATIENT
Start: 2024-03-28 | End: 2024-04-01

## 2024-03-27 RX ORDER — IBUPROFEN 200 MG
1 TABLET ORAL DAILY
Status: DISCONTINUED | OUTPATIENT
Start: 2024-03-27 | End: 2024-04-02 | Stop reason: HOSPADM

## 2024-03-27 RX ORDER — MAGNESIUM SULFATE HEPTAHYDRATE 40 MG/ML
2 INJECTION, SOLUTION INTRAVENOUS ONCE
Status: COMPLETED | OUTPATIENT
Start: 2024-03-27 | End: 2024-03-27

## 2024-03-27 RX ORDER — TALC
3 POWDER (GRAM) TOPICAL NIGHTLY
Status: DISCONTINUED | OUTPATIENT
Start: 2024-03-27 | End: 2024-04-02 | Stop reason: HOSPADM

## 2024-03-27 RX ORDER — LISINOPRIL 10 MG/1
10 TABLET ORAL DAILY
Status: DISCONTINUED | OUTPATIENT
Start: 2024-03-27 | End: 2024-04-02 | Stop reason: HOSPADM

## 2024-03-27 RX ADMIN — METOPROLOL TARTRATE 25 MG: 25 TABLET, FILM COATED ORAL at 21:18

## 2024-03-27 RX ADMIN — SODIUM CHLORIDE 100 ML/HR: 9 INJECTION, SOLUTION INTRAVENOUS at 23:04

## 2024-03-27 RX ADMIN — VANCOMYCIN HYDROCHLORIDE 1500 MG: 1.5 INJECTION, POWDER, LYOPHILIZED, FOR SOLUTION INTRAVENOUS at 05:41

## 2024-03-27 RX ADMIN — Medication 1 TABLET: at 09:09

## 2024-03-27 RX ADMIN — PIPERACILLIN SODIUM AND TAZOBACTAM SODIUM 3.38 G: 3; .375 INJECTION, SOLUTION INTRAVENOUS at 09:18

## 2024-03-27 RX ADMIN — LISINOPRIL 10 MG: 10 TABLET ORAL at 15:37

## 2024-03-27 RX ADMIN — PIPERACILLIN SODIUM AND TAZOBACTAM SODIUM 3.38 G: 3; .375 INJECTION, SOLUTION INTRAVENOUS at 23:13

## 2024-03-27 RX ADMIN — FOLIC ACID 1 MG: 1 TABLET ORAL at 09:17

## 2024-03-27 RX ADMIN — Medication 3 MG: at 21:18

## 2024-03-27 RX ADMIN — INSULIN GLARGINE 10 UNITS: 100 INJECTION, SOLUTION SUBCUTANEOUS at 09:11

## 2024-03-27 RX ADMIN — MAGNESIUM SULFATE HEPTAHYDRATE 2 G: 40 INJECTION, SOLUTION INTRAVENOUS at 09:09

## 2024-03-27 RX ADMIN — THIAMINE HYDROCHLORIDE 100 MG: 100 INJECTION, SOLUTION INTRAMUSCULAR; INTRAVENOUS at 09:13

## 2024-03-27 RX ADMIN — PIPERACILLIN SODIUM AND TAZOBACTAM SODIUM 3.38 G: 3; .375 INJECTION, SOLUTION INTRAVENOUS at 05:07

## 2024-03-27 RX ADMIN — Medication 1 PATCH: at 09:08

## 2024-03-27 RX ADMIN — SODIUM CHLORIDE 100 ML/HR: 9 INJECTION, SOLUTION INTRAVENOUS at 06:41

## 2024-03-27 RX ADMIN — POTASSIUM CHLORIDE 40 MEQ: 1500 TABLET, EXTENDED RELEASE ORAL at 09:10

## 2024-03-27 RX ADMIN — METOPROLOL TARTRATE 25 MG: 25 TABLET, FILM COATED ORAL at 09:09

## 2024-03-27 RX ADMIN — LACTULOSE 20 G: 20 SOLUTION ORAL at 15:37

## 2024-03-27 RX ADMIN — PIPERACILLIN SODIUM AND TAZOBACTAM SODIUM 3.38 G: 3; .375 INJECTION, SOLUTION INTRAVENOUS at 15:38

## 2024-03-27 RX ADMIN — VANCOMYCIN HYDROCHLORIDE 1500 MG: 1.5 INJECTION, POWDER, LYOPHILIZED, FOR SOLUTION INTRAVENOUS at 17:07

## 2024-03-27 RX ADMIN — ASPIRIN 81 MG: 81 TABLET, COATED ORAL at 09:08

## 2024-03-27 RX ADMIN — INSULIN LISPRO 3 UNITS: 100 INJECTION, SOLUTION INTRAVENOUS; SUBCUTANEOUS at 17:07

## 2024-03-27 RX ADMIN — DOXYCYCLINE 100 MG: 100 INJECTION, POWDER, LYOPHILIZED, FOR SOLUTION INTRAVENOUS at 23:05

## 2024-03-27 RX ADMIN — INSULIN LISPRO 9 UNITS: 100 INJECTION, SOLUTION INTRAVENOUS; SUBCUTANEOUS at 21:18

## 2024-03-27 ASSESSMENT — LIFESTYLE VARIABLES
AUDITORY DISTURBANCES: NOT PRESENT
ANXIETY: MILDLY ANXIOUS
AUDITORY DISTURBANCES: NOT PRESENT
AGITATION: NORMAL ACTIVITY
HEADACHE, FULLNESS IN HEAD: NOT PRESENT
NAUSEA AND VOMITING: NO NAUSEA AND NO VOMITING
ORIENTATION AND CLOUDING OF SENSORIUM: ORIENTED AND CAN DO SERIAL ADDITIONS
PULSE: 93
TOTAL SCORE: 4
VISUAL DISTURBANCES: NOT PRESENT
HEADACHE, FULLNESS IN HEAD: NOT PRESENT
BLOOD PRESSURE: 167/92
BLOOD PRESSURE: 138/82
TOTAL SCORE: 2
TOTAL SCORE: 4
TREMOR: 3
ANXIETY: MILDLY ANXIOUS
VISUAL DISTURBANCES: NOT PRESENT
HEADACHE, FULLNESS IN HEAD: NOT PRESENT
HEADACHE, FULLNESS IN HEAD: NOT PRESENT
VISUAL DISTURBANCES: NOT PRESENT
ORIENTATION AND CLOUDING OF SENSORIUM: ORIENTED AND CAN DO SERIAL ADDITIONS
NAUSEA AND VOMITING: NO NAUSEA AND NO VOMITING
ORIENTATION AND CLOUDING OF SENSORIUM: ORIENTED AND CAN DO SERIAL ADDITIONS
VISUAL DISTURBANCES: NOT PRESENT
HEADACHE, FULLNESS IN HEAD: NOT PRESENT
TREMOR: 2
TREMOR: 3
TOTAL SCORE: 3
ORIENTATION AND CLOUDING OF SENSORIUM: ORIENTED AND CAN DO SERIAL ADDITIONS
ORIENTATION AND CLOUDING OF SENSORIUM: ORIENTED AND CAN DO SERIAL ADDITIONS
AGITATION: NORMAL ACTIVITY
TREMOR: 3
ANXIETY: MILDLY ANXIOUS
TREMOR: 3
PULSE: 87
HEADACHE, FULLNESS IN HEAD: NOT PRESENT
TREMOR: 3
ORIENTATION AND CLOUDING OF SENSORIUM: ORIENTED AND CAN DO SERIAL ADDITIONS
TOTAL SCORE: 4
AGITATION: NORMAL ACTIVITY
PAROXYSMAL SWEATS: NO SWEAT VISIBLE
AUDITORY DISTURBANCES: NOT PRESENT
AUDITORY DISTURBANCES: NOT PRESENT
HEADACHE, FULLNESS IN HEAD: NOT PRESENT
VISUAL DISTURBANCES: NOT PRESENT
AGITATION: NORMAL ACTIVITY
ANXIETY: MILDLY ANXIOUS
HEADACHE, FULLNESS IN HEAD: NOT PRESENT
AGITATION: NORMAL ACTIVITY
PULSE: 85
PAROXYSMAL SWEATS: NO SWEAT VISIBLE
VISUAL DISTURBANCES: NOT PRESENT
AUDITORY DISTURBANCES: NOT PRESENT
NAUSEA AND VOMITING: NO NAUSEA AND NO VOMITING
PAROXYSMAL SWEATS: NO SWEAT VISIBLE
ORIENTATION AND CLOUDING OF SENSORIUM: ORIENTED AND CAN DO SERIAL ADDITIONS
PAROXYSMAL SWEATS: NO SWEAT VISIBLE
AGITATION: NORMAL ACTIVITY
TOTAL SCORE: 4
AUDITORY DISTURBANCES: NOT PRESENT
PAROXYSMAL SWEATS: NO SWEAT VISIBLE
ANXIETY: MILDLY ANXIOUS
AGITATION: NORMAL ACTIVITY
NAUSEA AND VOMITING: NO NAUSEA AND NO VOMITING
BLOOD PRESSURE: 132/75
PAROXYSMAL SWEATS: NO SWEAT VISIBLE
ANXIETY: MILDLY ANXIOUS
AUDITORY DISTURBANCES: NOT PRESENT
TREMOR: 3
NAUSEA AND VOMITING: NO NAUSEA AND NO VOMITING
ORIENTATION AND CLOUDING OF SENSORIUM: ORIENTED AND CAN DO SERIAL ADDITIONS
NAUSEA AND VOMITING: NO NAUSEA AND NO VOMITING
VISUAL DISTURBANCES: NOT PRESENT
TREMOR: 3
PAROXYSMAL SWEATS: NO SWEAT VISIBLE
VISUAL DISTURBANCES: NOT PRESENT
ANXIETY: NO ANXIETY, AT EASE
PULSE: 80
AGITATION: NORMAL ACTIVITY
PAROXYSMAL SWEATS: NO SWEAT VISIBLE
TOTAL SCORE: 4
TOTAL SCORE: 4
AUDITORY DISTURBANCES: NOT PRESENT
ANXIETY: NO ANXIETY, AT EASE

## 2024-03-27 ASSESSMENT — COGNITIVE AND FUNCTIONAL STATUS - GENERAL
WALKING IN HOSPITAL ROOM: A LOT
PERSONAL GROOMING: A LOT
HELP NEEDED FOR BATHING: A LOT
TOILETING: A LOT
DAILY ACTIVITIY SCORE: 14
MOVING TO AND FROM BED TO CHAIR: A LOT
MOVING FROM LYING ON BACK TO SITTING ON SIDE OF FLAT BED WITH BEDRAILS: A LITTLE
CLIMB 3 TO 5 STEPS WITH RAILING: TOTAL
TURNING FROM BACK TO SIDE WHILE IN FLAT BAD: A LOT
STANDING UP FROM CHAIR USING ARMS: A LOT
DRESSING REGULAR LOWER BODY CLOTHING: A LOT
DRESSING REGULAR UPPER BODY CLOTHING: A LOT
MOBILITY SCORE: 12

## 2024-03-27 ASSESSMENT — PAIN SCALES - GENERAL: PAINLEVEL_OUTOF10: 0 - NO PAIN

## 2024-03-27 NOTE — PROGRESS NOTES
"SW went and spoke to patient bedside to discuss concerns about ETOH use.  Patient has resorted consuming two 8oz bottles of hand  per day in efforts to hide his alcohol use from his daughter who he lives with.  Patient has been in residential treatment in the past in [place] for three months but could not recall the name of the facility.  He continued he was sober for three years but relapsed during a time when him and his ex-wife were not getting along.  He sated he bought a beer at the gas station and that was what instigated the relapse.  When talking about a resources and help, patient would just say \"I will just go back to AA.\"  He continues his Uncle is in AA and he may ask him to be his sponsor.  Patient states he has a good amount of family support, including his children..  Patient ended up agreeing to [facility] consult.  SW did call the daughter however her VM box was full.  [facility] consult was placed.  SARAH will continue to follow up and assist with dispo planning.    VALDEMAR TORRES, KASHW      " [de-identified] : TTP midline spine and paraspinal musculature \par Strength                                         \par Hip flexor\par   Right: 5/5; Left: 5/5                             \par Knee extensor  \par   Right: 5/5; Left: 5/5                     \par Ankle dorsiflexion\par   Right: 5/5; Left: 5/5                  \par EHL        \par   Right: 5/5; Left: 5/5                                \par Ankle plantarflexion    \par   Right: 5/5; Left: 5/5\par \par Sensation\par L1\par   Right: 2/2; Left: 2/2\par L2\par   Right: 2/2; Left: 2/2\par L3\par   Right: 2/2; Left: 2/2\par L4\par   Right: 2/2; Left: 2/2\par L5\par   Right: 2/2; Left: 2/2\par S1\par   Right: 2/2; Left: 2/2\par \par Reflexes\par Patella\par   Right: 2+; Left 2+\par Achilles\par   Right: 2+; Left 2+\par Clonus\par  Right: absent; L: absent\par

## 2024-03-27 NOTE — PROGRESS NOTES
03/27/24 1564   Discharge Planning   Living Arrangements Family members   Support Systems Family members   Assistance Needed IADLs   Type of Residence Private residence   Number of Stairs to Enter Residence 2   Who is requesting discharge planning? Provider   Patient expects to be discharged to: Waiting for therapy notes     TCC Note: Requested SW to see regarding ETOH issue. They agreed, came to talk to pt regarding [facility]. Pt lives with daughter and will return home, no needs. Therapy was not ordered for this patient. Attempted to meet with pt at bedside and complete Discharge Assessment however, jpt was down in Ultrasound. Will attempt again. Thao De Luna, MSN, RN, TCC.

## 2024-03-27 NOTE — CARE PLAN
The patient's goals for the shift include to be safe and get better    The clinical goals for the shift include to decrease tremors    Over the shift, the patient did not make progress toward the following goals. Barriers to progression include acute illness. Recommendations to address these barriers include communication.

## 2024-03-27 NOTE — CONSULTS
Consults  Referred by Dr. Doran    Primary MD: No Assigned PCP Generic Provider, MD    Reason For Consult: Osteomyelitis.      History Of Present Illness  This is a 53 yo male with underlying hypertension, dyslipidemia, diabetes type 2 on insulin, CAD s/p stent, alcohol misuse disorder (recently drinking hand ) with alcohol liver cirrhosis, hx of SAH who presented to the ED on 3/26/2024 due to generalized weakness, tremors, and loss of balance. In the ED found to be hypoxic, alcohol level was high, CXR concerning for right sided PNA, and left 5th toe necrotic wound with MR left foot showing high likelihood osteomyelitis and cellulitis. Patient also has a right foot plantar ulcer. Started on CIWA protocol and broad spectrum antibiotics (vanc and zosyn). Patient follows with podiatrist at [facility]. Podiatrist consult here while inpatient. ID consulted due to osteomyelitis. Currently denies: fever, headache, visual changes, chest pain, palpitations, SOB, abdomen pain, urinary symptoms, and bloody stools.       Past Medical History  As above.    Surgical History  He has a past surgical history that includes Cardiac catheterization (05/02/2018) and Appendectomy (05/03/2018).     Social History     Occupational History    Not on file   Tobacco Use    Smoking status: Every Day     Packs/day: 1     Types: Cigarettes    Smokeless tobacco: Never   Vaping Use    Vaping Use: Never used   Substance and Sexual Activity    Alcohol use: Yes     Alcohol/week: 6.0 standard drinks of alcohol     Types: 6 Cans of beer per week    Drug use: Never    Sexual activity: Not on file     Travel History   Travel since 02/27/24    No documented travel since 02/27/24            Pets: unknown  Hobbies: unknown    Family History  No family history on file.  Allergies  Patient has no known allergies.     Immunization History   Administered Date(s) Administered    Tdap vaccine, age 7 year and older (BOOSTRIX, ADACEL) 10/10/2023      Medications  Home medications:  Medications Prior to Admission   Medication Sig Dispense Refill Last Dose    aspirin 81 mg EC tablet Take 1 tablet (81 mg) by mouth once daily.   3/24/2024    atorvastatin (Lipitor) 40 mg tablet Take 1 tablet (40 mg) by mouth once daily.   3/24/2024    insulin glargine (Lantus) 100 unit/mL (3 mL) pen Inject 30 Units under the skin once daily in the morning.   3/26/2024    insulin lispro (HumaLOG) 100 unit/mL injection Inject 5 Units under the skin 3 times a day with meals. Plus sliding scale   Unknown    lisinopril 10 mg tablet Take 1 tablet (10 mg) by mouth once daily.   3/24/2024    metoprolol tartrate (Lopressor) 25 mg tablet Take 1 tablet (25 mg) by mouth 2 times a day.   3/24/2024    mupirocin (Bactroban) 2 % ointment Apply 1 Application topically twice a day. Apply to right foot ulcer   3/24/2024    multivitamin with minerals iron-free (Centrum Silver) Take 1 tablet by mouth once daily.   3/24/2024    nitroglycerin (Nitrostat) 0.4 mg SL tablet Place 1 tablet (0.4 mg) under the tongue every 5 minutes if needed for chest pain (up to 3 doses).   Unknown     Current medications:  Scheduled medications  aspirin, 81 mg, oral, Daily  enoxaparin, 40 mg, subcutaneous, q24h  folic acid, 1 mg, oral, Daily  insulin glargine, 10 Units, subcutaneous, q AM  insulin lispro, 0-15 Units, subcutaneous, Before meals & nightly  lactated Ringer's, 500 mL, intravenous, Once  magnesium sulfate, 2 g, intravenous, Once  metoprolol tartrate, 25 mg, oral, BID  multivitamin with minerals, 1 tablet, oral, Daily  nicotine, 1 patch, transdermal, Daily  piperacillin-tazobactam, 3.375 g, intravenous, q6h  potassium chloride CR, 40 mEq, oral, Once  [START ON 3/30/2024] thiamine, 100 mg, oral, Daily  thiamine, 100 mg, intravenous, Daily  vancomycin, 1,500 mg, intravenous, q12h      Continuous medications  sodium chloride 0.9%, 100 mL/hr, Last Rate: 100 mL/hr (03/27/24 0642)      PRN medications  PRN  "medications: dextrose, dextrose, diazePAM, glucagon, glucagon, guaiFENesin, ipratropium-albuteroL, vancomycin    Review of Systems  A 12 point review of systems was performed and all systems were negative/normal except what is noted in the HPI. Please refer to the HPI for details.       Objective  Range of Vitals (last 24 hours)  Heart Rate:  []   Temp:  [36.3 °C (97.3 °F)-36.9 °C (98.4 °F)]   Resp:  [18-22]   BP: (119-168)/(59-92)   Height:  [185.4 cm (6' 0.99\")-185.4 cm (6' 1\")]   Weight:  [85.3 kg (188 lb)-85.3 kg (188 lb 0.8 oz)]   SpO2:  [89 %-97 %]   Daily Weight  03/26/24 : 85.3 kg (188 lb 0.8 oz)    Body mass index is 24.82 kg/m².     Physical Exam  General: alert and oriented. No acute distress.  HEENT: oral mucosa moist, EOMI.  CHEST: clear breath sounds, no crackles, no wheeze, no tachypnea.  CVS: regular rate and rhythm, no murmurs.   ABD: Soft, Non Tender, BS present.  EXT: no edema. Right foot plantar ulcer without discharge. Left 5th toe with necrotic open wound. Good DPs and PTs pulses.   SKIN: no rash.       Relevant Results  Outside Hospital Results  No  Labs  Results from last 72 hours   Lab Units 03/27/24  0656 03/26/24  1142   WBC AUTO x10*3/uL 6.4 7.6   HEMOGLOBIN g/dL 10.5* 12.2*   HEMATOCRIT % 29.6* 34.4*   PLATELETS AUTO x10*3/uL 59* 89*   LYMPHO PCT MAN %  --  11.0   MONO PCT MAN %  --  5.0   EOSINO PCT MAN %  --  0.0     Results from last 72 hours   Lab Units 03/27/24  0656 03/26/24  2249 03/26/24  1758   SODIUM mmol/L 132* 134* 134*   POTASSIUM mmol/L 4.2 3.4* 3.7   CHLORIDE mmol/L 92* 93* 91*   CO2 mmol/L 27 26 25   BUN mg/dL 16 14 16   CREATININE mg/dL 0.61 0.54 0.63   GLUCOSE mg/dL 261* 155* 299*   CALCIUM mg/dL 7.9* 7.7* 7.7*   ANION GAP mmol/L 17 18 22*   EGFR mL/min/1.73m*2 >90 >90 >90     Results from last 72 hours   Lab Units 03/27/24  0656 03/26/24  2249 03/26/24  1758 03/26/24  1332   ALK PHOS U/L 195* 157* 161* 158*   BILIRUBIN TOTAL mg/dL 3.7* 2.3* 2.3* 2.1*   BILIRUBIN " DIRECT mg/dL  --   --   --  1.2*   PROTEIN TOTAL g/dL 5.5* 5.9* 5.9* 5.8*   ALT U/L 90* 87* 94* 97*   AST U/L 202* 185* 200* 209*   ALBUMIN g/dL 3.3* 3.3* 3.4 3.3*     Estimated Creatinine Clearance: 125 mL/min (by C-G formula based on SCr of 0.61 mg/dL).  C-Reactive Protein   Date Value Ref Range Status   03/26/2024 13.80 (H) <1.00 mg/dL Final     Sedimentation Rate   Date Value Ref Range Status   03/26/2024 34 (H) 0 - 20 mm/h Final     HIV 1 and 2 Screen   Date Value Ref Range Status   01/20/2021 NON-REACTIVE NONREACTIVE Final     Comment:     The  HIV Ag/Ab Combo assay for use on the [employer]  iSystem is a two-step immunoassay to determine the presence  of HIV-1 p24 antigen as well as antibodies to HIV-1   (Group M and O) and antibodies to HIV-2.       Hepatitis C Ab   Date Value Ref Range Status   01/20/2021 NON-REACTIVE NONREACTIVE Final     Comment:      Patients receiving more than 5 mg/day of biotin may have interference   in test results.  A sample should be taken no sooner than eight hours   after  previous dose. Contact the testing laboratory for additional    information.        Microbiology  3/26/2024 MRSA concepciones in process.      Imaging  3/26/2024 chest x-ray right-sided infiltration.  3/26/2024 MR left foot highly likely hold off osteomyelitis of fifth digit, cellulitis dorsum of the foot extending to fourth and fifth toes.  3/26/24 PVRs final report pending.       Assessment/Plan:    Left foot cellulitis with 5th digit osteomyelitis. Right foot plantar ulcer. ON vanc and zosyn. Seen by vascular surgery and podiatrist. Dr. Pelletier called podiatrist to discuss case. Likely procedure tomorrow.    PNA, right. Concern for aspiration  in a setting of alcohol misuse disorder. ON zosyn.       Other issues  Hypertension, dyslipidemia, diabetes type 2 on insulin, CAD s/p stent, alcohol misuse disorder (recently drinking hand ) with alcohol liver cirrhosis, hx of  [facility].      Antibiotics:  -Received 1 dose of azithromycin and ceftriaxone on 3/26/2024  -Day 1 vancomycin  -Day 2 Zosyn      I have reviewed and interpreted all lab test imaging studies and documentations from other healthcare providers. Discussed antibiotics and potential side effects with patient.     This document was created using dragon dictation and may contain unintended error.        Mahsa Mckoy MD

## 2024-03-27 NOTE — PROGRESS NOTES
PROGRESS NOTE - PODIATRIC MEDICINE & SURGERY    HPI    Patient is a 54 y.o. male admitted for pneumonia. Laying in bed comfortably. Podiatry following for bilateral foot wounds. Right foot with plantar wound that has been stable for weeks. About 2 months ago, had episode of left foot cellulitis that improved with antibiotic treatment managed by his outpatient podiatrist.  Two weeks ago is when he began noticing the left 5th digit necrosis.     ROS: Negative except as above.        Past Medical History:   Diagnosis    Acute ischemic heart disease, unspecified (CMS/HCC)    Coronary syndrome, acute    Cirrhosis of liver (CMS/HCC)    Coronary artery disease    Diabetes mellitus (CMS/HCC)    Personal history of other diseases of the circulatory system    History of heart block    Personal history of other diseases of the musculoskeletal system and connective tissue    History of arthritis    Personal history of other endocrine, nutritional and metabolic disease    History of obesity         Problem List  Problem List Items Addressed This Visit          Pulmonary and Pneumonias    * (Principal) Pneumonia of right lung due to infectious organism, unspecified part of lung - Primary     Other Visit Diagnoses       Generalized weakness        Hypomagnesemia        Alcohol abuse        Alcoholic intoxication with complication (CMS/HCC)        Acute hypoxic respiratory failure (CMS/HCC)        Gangrene of left foot (CMS/HCC)        Relevant Orders    Vascular US Ankle Brachial Index (SPARKLE) Without Exercise (Completed)    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      PHYSICAL EXAM    General: Alert and oriented. No acute distress. Cooperative. Pleasant. Laying in bed comfortably.    Vascular: DP and PT pulses palpable b/l. LE warm to touch b/l.     Derm:  Ulcer along right sub-2nd metatarsal. Eschar fell off during exam. 1.0 x  0.8 x 0.5 cm. Granular base. No probe to bone. Hyperkeratotic fidelia-wound. Small amount of undermining distally. Serosanguinous drainage.  No malodor, No ascending cellulitis.  No surrounding edema. No fluctuance or crepitation. No necrotic tissue.     Left 5th digit is necrotic along distal-plantar aspect. Dry. No drainage. No malodor. No pain with palpation, patient is neuropathic. Mild edema and erythema of the digit.     Nails 1-5 b/l intact. Webspaces 1-4 b/l are clean, dry, intact.    Neuro: SWMF diminished both feet. Light touch sensation diminished b/l. No pain with palpation of wounds.    MSK: Strength intact to age and mobility status. No acute deficits appreciated.        RESULTS    CBC  Lab Results   Component Value Date    WBC 6.4 03/27/2024    HGB 10.5 (L) 03/27/2024    HCT 29.6 (L) 03/27/2024    MCV 98 03/27/2024    PLT 59 (L) 03/27/2024       ESR  Lab Results   Component Value Date    SEDRATE 34 (H) 03/26/2024       CRP  Lab Results   Component Value Date    CRP 13.80 (H) 03/26/2024       HgbA1c  Lab Results   Component Value Date    HGBA1C 8.4 (H) 03/27/2024       Cultures  Right foot wound culture pending      Right foot XR 3/27/24  IMPRESSION:  Probable plantar soft tissue edema, possibly with ulceration.        PVR 3/7/24  PRELIMINARY CONCLUSIONS:  Right Lower PVR: No evidence of arterial occlusive disease in the right lower extremity at rest. Normal digital perfusion noted. Triphasic flow is noted in the right posterior tibial artery, right dorsalis pedis artery and right common femoral artery.  Left Lower PVR: No evidence of arterial occlusive disease in the left lower extremity at rest. Normal digital perfusion noted. Triphasic flow is noted in the left common femoral artery, left posterior tibial artery and left dorsalis pedis artery.     Imaging & Doppler Findings:     RIGHT Lower PVR                    Pressures Ratios  Right Posterior Tibial (Ankle)    172 mmHg  1.06  Right Dorsalis Pedis  (Ankle)     170 mmHg  1.04  Right Digit (Great Toe)              114 mmHg  0.70           LEFT Lower PVR                      Pressures Ratios  Left Posterior Tibial (Ankle)      160 mmHg  0.98  Left Dorsalis Pedis (Ankle)       166 mmHg  1.02  Left Digit (Great Toe)                101 mmHg  0.62                                                Right                 Left  Brachial Pressure   163 mmHg     163 mmHg        MRI left foot 3/26/24  IMPRESSION:  1. High likelihood of osteomyelitis involving the distal phalanx of the 5th digit.  2. Cellulitis and/or lymphedema of the lower extremity greatest on the dorsum of the foot extending into the 4th and 5th toes.  3. Limited assessment of known necrotic tissue of the 5th digit.  4. 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 All questions answered to patient's satisfaction.  - WBC WNL.  CRP 13.8.  ESR 34.  Lactate WNL.  A1c 8.4%, improved.  - Received IV vanc/zosyn.  - Culture collected from right plantar wound. Pending. Applied betadine and Mepilex border.  - Podiatry will continue following. No emergent podiatric surgical intervention needed. Patient will need amputation of left 5th digit, level to be determined. This will be scheduled once patient is medically stable.      This patient was discussed with the attending, Dr. Nelly DPM. Please await the attending's signature for finalization of this note.     Lashawn Smith DPM PGY1  Podiatric Medicine and Surgery

## 2024-03-27 NOTE — PROGRESS NOTES
Vancomycin Dosing by Pharmacy- FOLLOW UP    Errol Fonseca is a 54 y.o. year old male who Pharmacy has been consulted for vancomycin dosing for osteomyelitis/septic arthritis. Based on the patient's indication and renal status this patient is being dosed based on a goal AUC of 400-600.     Renal function is currently stable.    Current vancomycin dose: 1500 mg given every 12 hours    Visit Vitals  BP (!) 167/92   Pulse 80   Temp 36.6 °C (97.9 °F) (Temporal)   Resp 20        Lab Results   Component Value Date    CREATININE 0.61 03/27/2024    CREATININE 0.54 03/26/2024    CREATININE 0.63 03/26/2024    CREATININE 0.66 03/26/2024     I/O last 3 completed shifts:  In: 2106.7 (24.7 mL/kg) [I.V.:1106.7 (13 mL/kg); IV Piggyback:1000]  Out: 500 (5.9 mL/kg) [Urine:500 (0.2 mL/kg/hr)]  Weight: 85.3 kg       Assessment/Plan    Day #1 of vancomycin therapy. Will continue current regimen at this time.   Level adjusted to be drawn on 3/28 with AM labs.   Will continue to monitor renal function daily while on vancomycin and order serum creatinine at least every 48 hours if not already ordered.  Will follow for continued vancomycin needs, clinical response, and signs/symptoms of toxicity.     Please call with any questions.  Radha Stubbs, PharmD, BCCCP  [id number]

## 2024-03-27 NOTE — PROGRESS NOTES
Subjective   Resting in bed.  Bothered by his tremors, which kept him up all night.  Weaned to 2 L NC.    Objective   Vitals:    03/27/24 0952   BP: 163/85   Pulse: 95   Resp: 19   Temp: 37.3 °C (99.1 °F)   SpO2: 92%       Physical Exam:   Constitutional: obese, awake, alert, no acute distress  ENMT: mucous membranes dry, conjunctivae clear  Head/Neck: normocephalic, atraumatic; supple, trachea midline  Respiratory/Thorax: diminished breath sounds, mild right basilar crackles  Cardiovascular: RRR, no murmur appreciated  Gastrointestinal: soft, nondistended, non-tender, bowel sounds appreciated  Extremities: R foot ulcer seen over plantar surface without erythema or purulence, L 5th toe with necrotic appearing wound  Neurological: AO x3, no focal deficits  Psychological: appropriate mood and behavior  Skin: warm and dry    Scheduled Medications:   [Held by provider] aspirin, 81 mg, oral, Daily  [Held by provider] enoxaparin, 40 mg, subcutaneous, q24h  folic acid, 1 mg, oral, Daily  insulin glargine, 10 Units, subcutaneous, q AM  insulin lispro, 0-15 Units, subcutaneous, Before meals & nightly  lactated Ringer's, 500 mL, intravenous, Once  lactulose, 20 g, oral, Daily  lisinopril, 10 mg, oral, Daily  metoprolol tartrate, 25 mg, oral, BID  multivitamin with minerals, 1 tablet, oral, Daily  nicotine, 1 patch, transdermal, Daily  piperacillin-tazobactam, 3.375 g, intravenous, q6h  [START ON 3/30/2024] thiamine, 100 mg, oral, Daily  thiamine, 100 mg, intravenous, Daily  vancomycin, 1,500 mg, intravenous, q12h    Continuous Medications:   sodium chloride 0.9%, 100 mL/hr, Last Rate: 100 mL/hr (03/27/24 0642)    PRN Medications:   PRN medications: dextrose, diazePAM, glucagon, guaiFENesin, ipratropium-albuteroL, vancomycin    Assessment/Plan   This is a 54-year-old male who initially presented to [facility] on 3/26/2024 with generalized weakness and tremors x 2 days.  He admits to drinking 2-16 ounce bottles of hand   for the last 2 months as he was trying to hide his alcohol use from his family.  He previously was drinking 1 pint of vodka every other day or so.  Last EtOH use on AM of 3/26.     Acute alcoholic intoxication with impending withdrawal  Acute hypoxemia secondary to suspected aspiration PNA  Left 5th digit OM  HAGMA in setting of lactic acidosis and alcoholic ketosis, resolved  Acute alcoholic hepatitis  Thrombocytopenia in setting of liver cirrhosis  Elevated troponin, suspect demand ischemia in setting of hypoxemia  IDDM2 with hyperglycemia     #Alcohol use disorder  #Alcoholic liver cirrhosis  #History of alcohol hepatitis  #IDDM2  #HTN  #CAD s/p PCI with stent  #History of SAH     CIWA protocol with PRN Valium IV. Continue thiamine, folic acid. Counseled on alcohol cessation. SW following.  Continue Vanc/Zosyn (Day 2). ESR 34, CRP 13.8. MRI confirms L 5th digit OM. Podiatry following. Vascular consulted.  UAg, Mycoplasma IgM pending. Wean O2 as tolerated. Sched and PRN Duonebs, Mucinex.  MDF 7.6 with good prognosis. No need for steroids.  MELD-Na 11 with 6% estimated 3-month mortality. Consult GI given that pt was lost to follow up.  Insulin regimen:  Home: Lantus 30 QAM, Lispro 5 TID AC, SSI  Inpatient: Lantus 10 QAM, SSI (0-15) TID AC     - Continue home metoprolol tartrate, lisinopril, atorvastatin,   - Hold home ASA in anticipation for surgical intervention     DVT PPX: SCDs only given thrombocytopenia  Diet: 2g Na  IVF: NS @ 100cc/hr  Consults: GI, Podiatry  Code status: FULL     This is a preliminary note written by the resident. Please wait for attending addendum for finalization of note and recommendations.

## 2024-03-27 NOTE — CONSULTS
Reason For Consult  Alcoholic cirrhosis, lost to follow up    History Of Present Illness  Errol Fonseca is a 54 y.o. male with alcohol liver cirrhosis (last seen at [facility] hepatology 6/22 and lost to follow up since), hx of alcohol hepatitis, poorly controlled IDDM2 who comes in on 3/26 with generalized weakness and tremors for the past 2 days. He drinks 1-2 16 oz bottles of hand  for the last 2 months as he was trying to hide alcohol use from family.  Patient reports he had been sober for 3 years and had participated in AA however few months back was drinking 1 pink of vodka about ever other day along with beers. Last drink was morning of 3/26.  He denies ever ingesting rubbing alcohol. he denies ever having a colonoscopy or endoscopy ( it was ordered at [facility] 2022) Upon arrival patient was tachycardic and hypertensive. Pertinent lab included 209 AST/ 97ALT, Alk Phos 158, albumin 3.3 creatinine .66, lactate 3 (later snow to 6.3 before normalizing), ETOH 353, ABG was 7.44 with 37 PCO2 metabolic acidosis, glucose 299, prolonged Qtc 593.      Past Medical History: As noted above and HTN, CAD s/p PCI, hx of SAH  Surgical History: He has a past surgical history that includes Cardiac catheterization (05/02/2018) and Appendectomy (05/03/2018).   Social History: Lives with daughter.  Former smoker, quit 4 months ago. Drinks 2 16 ounce bottles of hand . Before that, he was drinking 1 pint of vodka every other day or so. Denies any illicit drug use.   Family History: CAD   Allergies: Patient has no known allergies.     Physical Exam  General:  Pleasant and cooperative. No apparent distress.  HEENT:  Normocephalic, atraumatic  Chest:  Clear to auscultation. Bilateral and appropriate chest rise  CV:  Regular rate and rhythm.    Abdomen: Abdomen is soft, non-tender, non-distended. BS +   Extremities: Bilateral tremors/asterixis noted  Neurological:  Conversing and answering questions appropriately   Skin:   "Warm and dry.       Last Recorded Vitals  Blood pressure 163/85, pulse 95, temperature 37.3 °C (99.1 °F), resp. rate 19, height 1.854 m (6' 0.99\"), weight 85.3 kg (188 lb 0.8 oz), SpO2 92 %.       Assessment/Plan     #Acute alcohol intoxication w/ withdrawals   #Transamanitis/elevated alk phosphatase   #Alcoholic liver cirrhosis  #History of alcohol hepatitis  -admits to drinking 1-2 16 oz bottles of hand  daily for the past few months in order to hide alcohol problem from family.  Volatile compounds and isopropyl alcohol pending  -On arrival scores MELD Na 13 with 2% estimated 90 day mortality. MDF 8.5 with good prognosis no need for steroids as not >32  -US liver 8/2023 shows cirrhotic liver and hepatic steatosis, 3.4 cm left hepatic lobe hypoechoic lesion at [facility] - recommended MRI - not completed per records  -Lactulose daily for hepatic encephalopathy titrate to 3-4  bowel movements a day  -Daily meld labs (hepatic function, bmp, inr)  -CIWA w/ prn valium, thiamine, folic acid, alcohol cessation  -advance diet as tolerated, recommend nutrition consult  -alcohol cessation counseling, social work following  -Will need outpatient follow-up with hepatology      Luanne Cheng DO    "

## 2024-03-27 NOTE — CONSULTS
Consults  Vascular surgery  Reason For Consult  Gangrene left foot    History Of Present Illness  Errol Fonseca is a 54 y.o. male presenting with  presents for Weakness, Gen.     HPI  This is a 54-year-old male who initially presented to [facility] on 3/26/2024 with generalized weakness and tremors x 2 days.  He admits to drinking 2-16 ounce bottles of hand  for the last 2 months as he was trying to hide his alcohol use from his family.  He previously was drinking 1 pint of vodka every other day or so.  His generalized weakness and tremors progressed to the point of making him unable to get up on his feet, thus prompting him to seek medical attention.  Last EtOH use was AM of 3/26.   He does have known history of liver cirrhosis, which was reportedly diagnosed 2 years ago.  However, he has been lost to follow-up since.     Upon arrival, he was tachycardic and hypertensive.  Hypoxic on room air.  Workup thus notable for metabolic acidosis in setting of mildly elevated BHB at 0.98.  EtOH level 353.  ABG showed 7.4 4/37/60 with lactate 5.5.  Patient was found on 4 L NC.  CXR concerning for right-sided pneumonia with concern for aspiration given his alcohol use.  COVID and influenza PCR negative.  Troponins elevated although flat at 62 x2.  EKG without ST/T changes although he does have prolonged QTc of 593ms.  LFTs later added, which are concerning for alcoholic hepatitis.  He will be admitted for further management of his alcoholic intoxication with impending withdrawal and acute hypoxemia secondary to suspected aspiration pneumonia.  I evaluated patient at bedside information is obtained from chart review and interview with patient.     PMH:  Alcohol use disorder  Alcoholic liver cirrhosis  History of alcohol hepatitis  IDDM2  HTN  CAD s/p PCI with stent  History of SAH     PSH: Hernia repair, appendectomy, reduction of mandible  FH: CAD  SH: Former smoker, quit 4 months ago.  Currently vapes.  Admits to  drinking 2-16 ounce bottles of hand . Before that, he was drinking 1 pint of vodka every other day or so. Denies any illicit drug use.     Review of Systems:  12-point ROS was reviewed and is negative, unless otherwise noted in HPI.     Past Medical History  He has a past medical history of Acute ischemic heart disease, unspecified (CMS/HCC), Cirrhosis of liver (CMS/HCC), Coronary artery disease, Diabetes mellitus (CMS/HCC), Personal history of other diseases of the circulatory system, Personal history of other diseases of the musculoskeletal system and connective tissue, and Personal history of other endocrine, nutritional and metabolic disease.    Surgical History  He has a past surgical history that includes Cardiac catheterization (05/02/2018) and Appendectomy (05/03/2018).     Social History  He reports that he has been smoking cigarettes. He has been smoking an average of 1 pack per day. He has never used smokeless tobacco. He reports current alcohol use of about 6.0 standard drinks of alcohol per week. He reports that he does not use drugs.    Family History  No family history on file.     Allergies  Patient has no known allergies.    Review of Systems A 10 point ROS was performed with the patient denying any complaint at this time aside from those listed in the HPI above.     Physical Exam  Constitutional: Well developed , awake/alert/oriented x3, in no distress,  Eyes: Clear sclera  ENMT: mucous membranes are moist, no apparent injury, no lesions seen,   Head/neck: Neck supple, trachea  is midline, no apparent injury, no bruits, no mass, no stridor  Respiratory/thorax: Breath sounds clear and equal bilaterally somewhat diminished with good chest expansion, thorax symmetric  Cardiac/Vascular: Regular, rate and rhythm, no murmurs, 2+ radial pulses, palpable bilateral femorals, popliteals, DPs, PTs, capillary refill brisk, extremities warm  Gastrointestinal: Nondistended soft nontender, positive bowel  "sounds, no bruits.  Musculoskeletal: Moves all extremities, limited range of motion , no joint swelling, generalized weakness  Extremities: No cyanosis, right plantar foot ulcer and left dry gangrene fifth toe  Neurological: Alert and oriented x3, bilateral upper and lower extremity tremors  Lymphatic: No significant lymphadenopathy  Skin: Warm and dry, no lesions, no rashes  Psychological: Appropriate mood and behavior  Last Recorded Vitals  Blood pressure 163/85, pulse 95, temperature 37.3 °C (99.1 °F), resp. rate 19, height 1.854 m (6' 0.99\"), weight 85.3 kg (188 lb 0.8 oz), SpO2 92 %.         Results for orders placed or performed during the hospital encounter of 03/26/24 (from the past 24 hour(s))   Protime-INR   Result Value Ref Range    Protime 13.2 (H) 9.8 - 12.8 seconds    INR 1.2 (H) 0.9 - 1.1   Urinalysis with Reflex Culture and Microscopic   Result Value Ref Range    Color, Urine Meghan (N) Straw, Yellow    Appearance, Urine Hazy (N) Clear    Specific Gravity, Urine 1.023 1.005 - 1.035    pH, Urine 5.0 5.0, 5.5, 6.0, 6.5, 7.0, 7.5, 8.0    Protein, Urine 100 (2+) (N) NEGATIVE mg/dL    Glucose, Urine 150 (2+) (A) NEGATIVE mg/dL    Blood, Urine MODERATE (2+) (A) NEGATIVE    Ketones, Urine 20 (1+) (A) NEGATIVE mg/dL    Bilirubin, Urine NEGATIVE NEGATIVE    Urobilinogen, Urine 4.0 (N) <2.0 mg/dL    Nitrite, Urine NEGATIVE NEGATIVE    Leukocyte Esterase, Urine NEGATIVE NEGATIVE   Urinalysis Microscopic   Result Value Ref Range    WBC, Urine 1-5 1-5, NONE /HPF    RBC, Urine 1-2 NONE, 1-2, 3-5 /HPF    Bacteria, Urine 1+ (A) NONE SEEN /HPF    Mucus, Urine 3+ Reference range not established. /LPF    Hyaline Casts, Urine 2+ (A) NONE /LPF    Fine Granular Casts, Urine 1+ (A) NONE /LPF   POCT GLUCOSE   Result Value Ref Range    POCT Glucose 311 (H) 74 - 99 mg/dL   Comprehensive Metabolic Panel   Result Value Ref Range    Glucose 299 (H) 74 - 99 mg/dL    Sodium 134 (L) 136 - 145 mmol/L    Potassium 3.7 3.5 - 5.3 mmol/L "    Chloride 91 (L) 98 - 107 mmol/L    Bicarbonate 25 21 - 32 mmol/L    Anion Gap 22 (H) 10 - 20 mmol/L    Urea Nitrogen 16 6 - 23 mg/dL    Creatinine 0.63 0.50 - 1.30 mg/dL    eGFR >90 >60 mL/min/1.73m*2    Calcium 7.7 (L) 8.6 - 10.3 mg/dL    Albumin 3.4 3.4 - 5.0 g/dL    Alkaline Phosphatase 161 (H) 33 - 120 U/L    Total Protein 5.9 (L) 6.4 - 8.2 g/dL     (H) 9 - 39 U/L    Bilirubin, Total 2.3 (H) 0.0 - 1.2 mg/dL    ALT 94 (H) 10 - 52 U/L   Ammonia   Result Value Ref Range    Ammonia 50 16 - 53 umol/L   Lactate   Result Value Ref Range    Lactate 3.0 (H) 0.4 - 2.0 mmol/L   POCT GLUCOSE   Result Value Ref Range    POCT Glucose 159 (H) 74 - 99 mg/dL   Lactate   Result Value Ref Range    Lactate 6.3 (HH) 0.4 - 2.0 mmol/L   Lactate   Result Value Ref Range    Lactate 3.6 (H) 0.4 - 2.0 mmol/L   Comprehensive Metabolic Panel   Result Value Ref Range    Glucose 155 (H) 74 - 99 mg/dL    Sodium 134 (L) 136 - 145 mmol/L    Potassium 3.4 (L) 3.5 - 5.3 mmol/L    Chloride 93 (L) 98 - 107 mmol/L    Bicarbonate 26 21 - 32 mmol/L    Anion Gap 18 10 - 20 mmol/L    Urea Nitrogen 14 6 - 23 mg/dL    Creatinine 0.54 0.50 - 1.30 mg/dL    eGFR >90 >60 mL/min/1.73m*2    Calcium 7.7 (L) 8.6 - 10.3 mg/dL    Albumin 3.3 (L) 3.4 - 5.0 g/dL    Alkaline Phosphatase 157 (H) 33 - 120 U/L    Total Protein 5.9 (L) 6.4 - 8.2 g/dL     (H) 9 - 39 U/L    Bilirubin, Total 2.3 (H) 0.0 - 1.2 mg/dL    ALT 87 (H) 10 - 52 U/L   Magnesium   Result Value Ref Range    Magnesium 1.58 (L) 1.60 - 2.40 mg/dL   POCT GLUCOSE   Result Value Ref Range    POCT Glucose 200 (H) 74 - 99 mg/dL   CBC   Result Value Ref Range    WBC 6.4 4.4 - 11.3 x10*3/uL    nRBC 0.3 (H) 0.0 - 0.0 /100 WBCs    RBC 3.01 (L) 4.50 - 5.90 x10*6/uL    Hemoglobin 10.5 (L) 13.5 - 17.5 g/dL    Hematocrit 29.6 (L) 41.0 - 52.0 %    MCV 98 80 - 100 fL    MCH 34.9 (H) 26.0 - 34.0 pg    MCHC 35.5 32.0 - 36.0 g/dL    RDW 13.3 11.5 - 14.5 %    Platelets 59 (L) 150 - 450 x10*3/uL    Comprehensive Metabolic Panel   Result Value Ref Range    Glucose 261 (H) 74 - 99 mg/dL    Sodium 132 (L) 136 - 145 mmol/L    Potassium 4.2 3.5 - 5.3 mmol/L    Chloride 92 (L) 98 - 107 mmol/L    Bicarbonate 27 21 - 32 mmol/L    Anion Gap 17 10 - 20 mmol/L    Urea Nitrogen 16 6 - 23 mg/dL    Creatinine 0.61 0.50 - 1.30 mg/dL    eGFR >90 >60 mL/min/1.73m*2    Calcium 7.9 (L) 8.6 - 10.3 mg/dL    Albumin 3.3 (L) 3.4 - 5.0 g/dL    Alkaline Phosphatase 195 (H) 33 - 120 U/L    Total Protein 5.5 (L) 6.4 - 8.2 g/dL     (H) 9 - 39 U/L    Bilirubin, Total 3.7 (H) 0.0 - 1.2 mg/dL    ALT 90 (H) 10 - 52 U/L   Magnesium   Result Value Ref Range    Magnesium 2.23 1.60 - 2.40 mg/dL   Protime-INR   Result Value Ref Range    Protime 13.4 (H) 9.8 - 12.8 seconds    INR 1.2 (H) 0.9 - 1.1   Lactate   Result Value Ref Range    Lactate 0.8 0.4 - 2.0 mmol/L   Hemoglobin A1c   Result Value Ref Range    Hemoglobin A1C 8.4 (H) see below %    Estimated Average Glucose 194 Not Established mg/dL   POCT GLUCOSE   Result Value Ref Range    POCT Glucose 192 (H) 74 - 99 mg/dL       ECG 12 lead    Result Date: 3/27/2024  Sinus rhythm Minimal ST depression, lateral leads Prolonged QT interval See ED provider note for full interpretation and clinical correlation Confirmed by Anahi Niño ([id number]) on 3/27/2024 11:40:07 AM    MR foot left w and wo IV contrast    Result Date: 3/26/2024  Interpreted By:  Emanuel Gaines, STUDY: MRI of the  left forefoot  without and with contrast dated  3/26/2024.   INDICATION: Signs/Symptoms:left 5th toe with necrotic tissue   COMPARISON: None.   ACCESSION NUMBER(S): [id number]   ORDERING CLINICIAN: DERRICK STOVALL   TECHNIQUE: Multiplanar multisequence MRI of the  left forefoot was performed without and with intravenous gadolinium based contrast.   FINDINGS: OSSEOUS STRUCTURES AND JOINTS:   No fracture or dislocation is evident.  There is increased T2 and STIR signal intensity in the distal phalanx of the 5th digit.  There is associated low T1 signal intensity on the T1 weighted imaging. There is not definitively increased T1 signal intensity after the administration of contrast although this may be technically limited. Increased T2 signal intensity is seen in other phalanges of the digits without corresponding STIR signal intensity and thus felt to be related to poor fat saturation. Mild degenerative changes seen in the midfoot. Mild degenerative changes seen of the 1st digit metatarsophalangeal joint. No joint effusion is evident.   SOFT TISSUES:   Diffuse atrophy is seen in the musculature. There is diffuse increased T2 signal intensity in the musculature with mild ill-defined postcontrast enhancement. Reticular increased T2 signal intensity is seen in the subcutaneous tissues greatest on the dorsum of the foot. There is extension into the 4th and 5th toes. No focal soft tissue collection is evident. Site wound/ulcer of the 5th toe is not well assessed for on this MRI, but there may be irregularity to the skin/subcutaneous tissues of the lateral to lateral plantar aspect of the 5th toe.       1. High likelihood of osteomyelitis involving the distal phalanx of the 5th digit. 2. Cellulitis and/or lymphedema of the lower extremity greatest on the dorsum of the foot extending into the 4th and 5th toes. 3. Limited assessment of known necrotic tissue of the 5th digit. 4. 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   Signed by: Emanuel Gaines 3/26/2024 8:15 PM Dictation workstation:   [id number]    XR chest 1 view    Result Date: 3/26/2024  Interpreted By:  Shauna Coffey, STUDY: XR CHEST 1 VIEW;  3/26/2024 1:49 pm   INDICATION: Signs/Symptoms:89% RA, eval for PNA.   COMPARISON: 10/10/2023; CT chest dated 04/04/2019   ACCESSION NUMBER(S): [id number]   ORDERING CLINICIAN: ZEKE HERRON   FINDINGS: Heart is normal in size.   There is right-sided consolidation   There appears to be fullness to the left of the trachea.  This may be due to rotation.   There are numerous old left rib fractures.   There are degenerative changes of the spine.   COMPARISON OF FINDING: The right-sided infiltrate is new.       Right-sided infiltration.   MACRO: none   Signed by: Shauna Coffey 3/26/2024 1:56 PM Dictation workstation:   [id number]    XR foot right 3+ views    Result Date: 2/28/2024  EXAMINATION: XR FOOT RIGHT 3 VIEWSPRO/RT   02/27/2024 02:36 PM CLINICAL HISTORY: sub 2nd met head dm ulcer r/o om 2nd met head [employer]OC[employer] DIAGNOSIS: DM type 2 with diabetic peripheral neuropathy (HCC) Foot ulcer, right, with fat layer exposed (HCC) ORDERING PROVIDER: DOMINGO FRANCOIS TECHNOLOGISTS NOTE: COMPARISON: 9/13/2022 Findings: No acute right foot fracture or dislocation is identified. No radiopaque foreign bodies. Chronic cortical deformity of distal third metatarsal bone is noted. No significant change. Hammertoe deformities are noted. There is evidence of prominent os trigonum. Os trigonum syndrome is not excluded. No evidence of demonstrable osteomyelitis. Small plantar calcaneal spur is noted. Impression: Chronic deformity of distal third metatarsal bone. No evidence of acute osteomyelitis. Right foot MACRO: None    XR toe left 2+ views    Result Date: 2/28/2024  EXAMINATION: XR TOES LEFT MINIMUM 2 VIEWSPRO/LT/FY/Left Fifth Digit   02/27/2024 02:35 PM CLINICAL HISTORY: 5th toe ulcer r/o OM distaL [facility] [employer] DIAGNOSIS: DM type 2 with diabetic peripheral neuropathy (HCC) Toe ulcer, left, limited to breakdown of skin (HCC) ORDERING PROVIDER: DOMINGO FRANCOIS TECHNOLOGISTS NOTE: COMPARISON: None Findings: No acute left toe fracture or dislocation. No radiopaque foreign bodies. No evidence of demonstrable osteomyelitis. Joint spaces are normal. Soft tissue swelling of fifth toe is noted. Soft tissue defect close to the nailbed could represent soft tissue ulcer. Impression: No evidence of demonstrable osteomyelitis. Soft tissue swelling of  fifth toe is noted. Left toe MACRO: None       Assessment/Plan  1. Pneumonia of right lung due to infectious organism, unspecified part of lung        2. Generalized weakness        3. Hypomagnesemia        4. Alcohol abuse        5. Alcoholic intoxication with complication (CMS/HCC)        6. Acute hypoxic respiratory failure (CMS/HCC)        7. Gangrene of left foot (CMS/HCC)  Vascular US PVR without exercise    Vascular US PVR without exercise           I evaluated this patient at bedside and discussed this case with Dr. Acuna.  We are awaiting the results of imaging (SPARKLE/PVR).  MRI no fracture noted or osteomyelitis.  Overall physical findings: Patient has strong palpable pulses in the lower extremities as documented on this physical exam.  Concerns for wound healing are patient's proteins and albumin are low, patient has thrombocytopenia which prevents use of antiplatelet therapy.  With his liver condition there may be contraindications for statin therapy.  Overall patient appears to have acceptable arterial perfusion (awaiting SPARKLE PVR for corroborating evidence).  Patient has dry gangrene of the left toe could have been caused by trauma.  Patient has considerable neuropathy and his alcoholism  which could be contributing factors uncertain if this was an embolic event.  Patient strongly advised cessation from vaping , smoking, and alcohol usage.  Patient needs to increase his activity and eat a more healthy diabetic diet.  Recommend Betadine to left fifth dry gangrene toe. Patient denies any pain to the fifth toe.  Discussed case with vascular tech patient has essentially normal ABIs which corroborates physical exam findings.  Will reevaluate patient tomorrow, awaiting imaging results.       Thank you very much for allowing Vascular Surgery to be involved in the care of your patient sincerely Phillip SAUER .  (This note was generated with voice recognition software and may contain errors including  spelling, grammar, syntax and missed recognition of what was dictated, of which may not have been fully corrected)

## 2024-03-28 ENCOUNTER — APPOINTMENT (OUTPATIENT)
Dept: RADIOLOGY | Facility: HOSPITAL | Age: 54
End: 2024-03-28
Payer: MEDICAID

## 2024-03-28 PROBLEM — I96 GANGRENE OF LEFT FOOT (MULTI): Status: ACTIVE | Noted: 2024-03-26

## 2024-03-28 PROBLEM — L97.512: Status: ACTIVE | Noted: 2024-03-26

## 2024-03-28 LAB
ALBUMIN SERPL BCP-MCNC: 3.2 G/DL (ref 3.4–5)
ALP SERPL-CCNC: 233 U/L (ref 33–120)
ALT SERPL W P-5'-P-CCNC: 87 U/L (ref 10–52)
ANION GAP SERPL CALC-SCNC: 17 MMOL/L (ref 10–20)
AST SERPL W P-5'-P-CCNC: 193 U/L (ref 9–39)
BILIRUB SERPL-MCNC: 3.8 MG/DL (ref 0–1.2)
BUN SERPL-MCNC: 12 MG/DL (ref 6–23)
CALCIUM SERPL-MCNC: 7.9 MG/DL (ref 8.6–10.3)
CHLORIDE SERPL-SCNC: 95 MMOL/L (ref 98–107)
CO2 SERPL-SCNC: 23 MMOL/L (ref 21–32)
CREAT SERPL-MCNC: 0.57 MG/DL (ref 0.5–1.3)
EGFRCR SERPLBLD CKD-EPI 2021: >90 ML/MIN/1.73M*2
ERYTHROCYTE [DISTWIDTH] IN BLOOD BY AUTOMATED COUNT: 13.2 % (ref 11.5–14.5)
GLUCOSE BLD MANUAL STRIP-MCNC: 120 MG/DL (ref 74–99)
GLUCOSE BLD MANUAL STRIP-MCNC: 189 MG/DL (ref 74–99)
GLUCOSE BLD MANUAL STRIP-MCNC: 192 MG/DL (ref 74–99)
GLUCOSE BLD MANUAL STRIP-MCNC: 192 MG/DL (ref 74–99)
GLUCOSE SERPL-MCNC: 186 MG/DL (ref 74–99)
HCT VFR BLD AUTO: 33.5 % (ref 41–52)
HGB BLD-MCNC: 11.3 G/DL (ref 13.5–17.5)
INR PPP: 1.2 (ref 0.9–1.1)
MAGNESIUM SERPL-MCNC: 1.72 MG/DL (ref 1.6–2.4)
MCH RBC QN AUTO: 35.1 PG (ref 26–34)
MCHC RBC AUTO-ENTMCNC: 33.7 G/DL (ref 32–36)
MCV RBC AUTO: 104 FL (ref 80–100)
NRBC BLD-RTO: 0.3 /100 WBCS (ref 0–0)
PLATELET # BLD AUTO: 65 X10*3/UL (ref 150–450)
POTASSIUM SERPL-SCNC: 4 MMOL/L (ref 3.5–5.3)
PROT SERPL-MCNC: 5.5 G/DL (ref 6.4–8.2)
PROTHROMBIN TIME: 13.2 SECONDS (ref 9.8–12.8)
RBC # BLD AUTO: 3.22 X10*6/UL (ref 4.5–5.9)
SODIUM SERPL-SCNC: 131 MMOL/L (ref 136–145)
VANCOMYCIN SERPL-MCNC: 17.3 UG/ML (ref 5–20)
WBC # BLD AUTO: 6 X10*3/UL (ref 4.4–11.3)

## 2024-03-28 PROCEDURE — 2500000001 HC RX 250 WO HCPCS SELF ADMINISTERED DRUGS (ALT 637 FOR MEDICARE OP): Performed by: STUDENT IN AN ORGANIZED HEALTH CARE EDUCATION/TRAINING PROGRAM

## 2024-03-28 PROCEDURE — 82947 ASSAY GLUCOSE BLOOD QUANT: CPT

## 2024-03-28 PROCEDURE — 76705 ECHO EXAM OF ABDOMEN: CPT

## 2024-03-28 PROCEDURE — 2500000004 HC RX 250 GENERAL PHARMACY W/ HCPCS (ALT 636 FOR OP/ED): Performed by: STUDENT IN AN ORGANIZED HEALTH CARE EDUCATION/TRAINING PROGRAM

## 2024-03-28 PROCEDURE — 85027 COMPLETE CBC AUTOMATED: CPT | Performed by: STUDENT IN AN ORGANIZED HEALTH CARE EDUCATION/TRAINING PROGRAM

## 2024-03-28 PROCEDURE — 2500000001 HC RX 250 WO HCPCS SELF ADMINISTERED DRUGS (ALT 637 FOR MEDICARE OP)

## 2024-03-28 PROCEDURE — 1200000002 HC GENERAL ROOM WITH TELEMETRY DAILY

## 2024-03-28 PROCEDURE — S4991 NICOTINE PATCH NONLEGEND: HCPCS | Performed by: STUDENT IN AN ORGANIZED HEALTH CARE EDUCATION/TRAINING PROGRAM

## 2024-03-28 PROCEDURE — 36415 COLL VENOUS BLD VENIPUNCTURE: CPT | Performed by: STUDENT IN AN ORGANIZED HEALTH CARE EDUCATION/TRAINING PROGRAM

## 2024-03-28 PROCEDURE — 99232 SBSQ HOSP IP/OBS MODERATE 35: CPT | Performed by: PODIATRIST

## 2024-03-28 PROCEDURE — 83735 ASSAY OF MAGNESIUM: CPT | Performed by: STUDENT IN AN ORGANIZED HEALTH CARE EDUCATION/TRAINING PROGRAM

## 2024-03-28 PROCEDURE — 76705 ECHO EXAM OF ABDOMEN: CPT | Performed by: RADIOLOGY

## 2024-03-28 PROCEDURE — 85610 PROTHROMBIN TIME: CPT | Performed by: STUDENT IN AN ORGANIZED HEALTH CARE EDUCATION/TRAINING PROGRAM

## 2024-03-28 PROCEDURE — 80202 ASSAY OF VANCOMYCIN: CPT | Performed by: STUDENT IN AN ORGANIZED HEALTH CARE EDUCATION/TRAINING PROGRAM

## 2024-03-28 PROCEDURE — 99232 SBSQ HOSP IP/OBS MODERATE 35: CPT

## 2024-03-28 PROCEDURE — 99232 SBSQ HOSP IP/OBS MODERATE 35: CPT | Performed by: STUDENT IN AN ORGANIZED HEALTH CARE EDUCATION/TRAINING PROGRAM

## 2024-03-28 PROCEDURE — 2500000002 HC RX 250 W HCPCS SELF ADMINISTERED DRUGS (ALT 637 FOR MEDICARE OP, ALT 636 FOR OP/ED): Performed by: STUDENT IN AN ORGANIZED HEALTH CARE EDUCATION/TRAINING PROGRAM

## 2024-03-28 PROCEDURE — 84075 ASSAY ALKALINE PHOSPHATASE: CPT | Performed by: STUDENT IN AN ORGANIZED HEALTH CARE EDUCATION/TRAINING PROGRAM

## 2024-03-28 RX ORDER — ACETAMINOPHEN 325 MG/1
650 TABLET ORAL EVERY 4 HOURS PRN
Status: DISCONTINUED | OUTPATIENT
Start: 2024-03-28 | End: 2024-03-28

## 2024-03-28 RX ORDER — ACETAMINOPHEN 325 MG/1
650 TABLET ORAL EVERY 4 HOURS PRN
Status: DISCONTINUED | OUTPATIENT
Start: 2024-03-28 | End: 2024-04-02 | Stop reason: HOSPADM

## 2024-03-28 RX ORDER — LACTULOSE 10 G/15ML
15 SOLUTION ORAL DAILY
Status: DISCONTINUED | OUTPATIENT
Start: 2024-03-29 | End: 2024-04-02 | Stop reason: HOSPADM

## 2024-03-28 RX ADMIN — PIPERACILLIN SODIUM AND TAZOBACTAM SODIUM 3.38 G: 3; .375 INJECTION, SOLUTION INTRAVENOUS at 23:45

## 2024-03-28 RX ADMIN — THIAMINE HYDROCHLORIDE 100 MG: 100 INJECTION, SOLUTION INTRAMUSCULAR; INTRAVENOUS at 09:02

## 2024-03-28 RX ADMIN — Medication 1 PATCH: at 09:02

## 2024-03-28 RX ADMIN — VANCOMYCIN HYDROCHLORIDE 1500 MG: 1.5 INJECTION, POWDER, LYOPHILIZED, FOR SOLUTION INTRAVENOUS at 16:52

## 2024-03-28 RX ADMIN — PIPERACILLIN SODIUM AND TAZOBACTAM SODIUM 3.38 G: 3; .375 INJECTION, SOLUTION INTRAVENOUS at 09:30

## 2024-03-28 RX ADMIN — DOXYCYCLINE 100 MG: 100 INJECTION, POWDER, LYOPHILIZED, FOR SOLUTION INTRAVENOUS at 21:19

## 2024-03-28 RX ADMIN — LISINOPRIL 10 MG: 10 TABLET ORAL at 09:02

## 2024-03-28 RX ADMIN — DOXYCYCLINE 100 MG: 100 INJECTION, POWDER, LYOPHILIZED, FOR SOLUTION INTRAVENOUS at 09:03

## 2024-03-28 RX ADMIN — PIPERACILLIN SODIUM AND TAZOBACTAM SODIUM 3.38 G: 3; .375 INJECTION, SOLUTION INTRAVENOUS at 16:52

## 2024-03-28 RX ADMIN — METOPROLOL TARTRATE 25 MG: 25 TABLET, FILM COATED ORAL at 09:01

## 2024-03-28 RX ADMIN — INSULIN LISPRO 3 UNITS: 100 INJECTION, SOLUTION INTRAVENOUS; SUBCUTANEOUS at 09:00

## 2024-03-28 RX ADMIN — ACETAMINOPHEN 650 MG: 325 TABLET ORAL at 17:43

## 2024-03-28 RX ADMIN — PIPERACILLIN SODIUM AND TAZOBACTAM SODIUM 3.38 G: 3; .375 INJECTION, SOLUTION INTRAVENOUS at 06:08

## 2024-03-28 RX ADMIN — Medication 1 TABLET: at 09:01

## 2024-03-28 RX ADMIN — METOPROLOL TARTRATE 25 MG: 25 TABLET, FILM COATED ORAL at 21:18

## 2024-03-28 RX ADMIN — INSULIN LISPRO 3 UNITS: 100 INJECTION, SOLUTION INTRAVENOUS; SUBCUTANEOUS at 21:20

## 2024-03-28 RX ADMIN — Medication 3 MG: at 21:18

## 2024-03-28 RX ADMIN — LACTULOSE 20 G: 20 SOLUTION ORAL at 09:01

## 2024-03-28 RX ADMIN — FOLIC ACID 1 MG: 1 TABLET ORAL at 09:02

## 2024-03-28 RX ADMIN — VANCOMYCIN HYDROCHLORIDE 1500 MG: 1.5 INJECTION, POWDER, LYOPHILIZED, FOR SOLUTION INTRAVENOUS at 06:09

## 2024-03-28 RX ADMIN — INSULIN GLARGINE 20 UNITS: 100 INJECTION, SOLUTION SUBCUTANEOUS at 09:08

## 2024-03-28 ASSESSMENT — COGNITIVE AND FUNCTIONAL STATUS - GENERAL
DRESSING REGULAR UPPER BODY CLOTHING: A LITTLE
DAILY ACTIVITIY SCORE: 18
CLIMB 3 TO 5 STEPS WITH RAILING: TOTAL
TOILETING: A LITTLE
STANDING UP FROM CHAIR USING ARMS: A LOT
MOBILITY SCORE: 15
TURNING FROM BACK TO SIDE WHILE IN FLAT BAD: A LITTLE
STANDING UP FROM CHAIR USING ARMS: A LOT
TOILETING: A LOT
DRESSING REGULAR LOWER BODY CLOTHING: A LITTLE
MOBILITY SCORE: 15
MOVING TO AND FROM BED TO CHAIR: A LITTLE
EATING MEALS: A LITTLE
WALKING IN HOSPITAL ROOM: A LOT
DAILY ACTIVITIY SCORE: 17
HELP NEEDED FOR BATHING: A LITTLE
DRESSING REGULAR UPPER BODY CLOTHING: A LITTLE
TURNING FROM BACK TO SIDE WHILE IN FLAT BAD: A LITTLE
MOVING TO AND FROM BED TO CHAIR: A LOT
WALKING IN HOSPITAL ROOM: A LOT
HELP NEEDED FOR BATHING: A LITTLE
CLIMB 3 TO 5 STEPS WITH RAILING: A LOT
PERSONAL GROOMING: A LITTLE
PERSONAL GROOMING: A LITTLE
DRESSING REGULAR LOWER BODY CLOTHING: A LOT

## 2024-03-28 ASSESSMENT — LIFESTYLE VARIABLES
HEADACHE, FULLNESS IN HEAD: NOT PRESENT
ORIENTATION AND CLOUDING OF SENSORIUM: ORIENTED AND CAN DO SERIAL ADDITIONS
AUDITORY DISTURBANCES: NOT PRESENT
VISUAL DISTURBANCES: NOT PRESENT
ANXIETY: NO ANXIETY, AT EASE
NAUSEA AND VOMITING: NO NAUSEA AND NO VOMITING
TOTAL SCORE: 2
AGITATION: NORMAL ACTIVITY
TREMOR: 2
PULSE: 93
PAROXYSMAL SWEATS: NO SWEAT VISIBLE

## 2024-03-28 ASSESSMENT — PAIN DESCRIPTION - LOCATION: LOCATION: CHEST

## 2024-03-28 ASSESSMENT — PAIN SCALES - GENERAL
PAINLEVEL_OUTOF10: 5 - MODERATE PAIN
PAINLEVEL_OUTOF10: 0 - NO PAIN

## 2024-03-28 NOTE — PROGRESS NOTES
PROGRESS NOTE - PODIATRIC MEDICINE & SURGERY    HPI    Patient is a 54 y.o. male admitted for pneumonia. Laying in bed comfortably. Podiatry following for bilateral foot wounds.  Denies any pain in the feet.  No new pedal complaints.      Past Medical History:   Diagnosis    Acute ischemic heart disease, unspecified (CMS/HCC)    Coronary syndrome, acute    Cirrhosis of liver (CMS/HCC)    Coronary artery disease    Diabetes mellitus (CMS/HCC)    Personal history of other diseases of the circulatory system    History of heart block    Personal history of other diseases of the musculoskeletal system and connective tissue    History of arthritis    Personal history of other endocrine, nutritional and metabolic disease    History of obesity         Problem List  Problem List Items Addressed This Visit       * (Principal) Pneumonia of right lung due to infectious organism, unspecified part of lung - Primary     Other Visit Diagnoses       Generalized weakness        Hypomagnesemia        Alcohol abuse        Alcoholic intoxication with complication (CMS/HCC)        Acute hypoxic respiratory failure (CMS/HCC)        Gangrene of left foot (CMS/HCC)        Relevant Orders    Vascular US Ankle Brachial Index (SPARKLE) Without Exercise (Completed)    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      PHYSICAL EXAM    General: Alert and oriented. No acute distress. Cooperative. Pleasant. Laying in bed comfortably.    Vascular: DP and PT pulses palpable b/l. LE warm to touch b/l.     Derm:  Ulcer along right sub-2nd metatarsal.  Ulcer with a red granular base.  Ulcer measures 1.0 x 0.8 x 0.5 cm. No probe to bone. Small amount of undermining distally.  No purulent drainage noted.  No active bleeding noted.  Scant bloody discharge noted on bandage.  No malodor, No ascending cellulitis.  No surrounding edema. No fluctuance or crepitation.  No necrotic tissue.     Left 5th digit is necrotic along distal-plantar aspect. Dry gangrene. No drainage. No malodor. No pain with palpation, patient is neuropathic. Mild edema and erythema of the digit.     Nails 1-5 b/l intact. Webspaces 1-4 b/l are clean, dry, intact.    Neuro: SWMF diminished both feet. Light touch sensation diminished b/l. No pain with palpation of wounds.    MSK: Strength intact to age and mobility status. No acute deficits appreciated.        RESULTS    CBC  Lab Results   Component Value Date    WBC 6.0 03/28/2024    HGB 11.3 (L) 03/28/2024    HCT 33.5 (L) 03/28/2024     (H) 03/28/2024    PLT 65 (L) 03/28/2024       ESR  Lab Results   Component Value Date    SEDRATE 34 (H) 03/26/2024       CRP  Lab Results   Component Value Date    CRP 13.80 (H) 03/26/2024       HgbA1c  Lab Results   Component Value Date    HGBA1C 8.4 (H) 03/27/2024       Cultures  Right foot wound culture pending      Right foot XR 3/27/24  IMPRESSION:  Probable plantar soft tissue edema, possibly with ulceration.        PVR 3/7/24  PRELIMINARY CONCLUSIONS:  Right Lower PVR: No evidence of arterial occlusive disease in the right lower extremity at rest. Normal digital perfusion noted. Triphasic flow is noted in the right posterior tibial artery, right dorsalis pedis artery and right common femoral artery.  Left Lower PVR: No evidence of arterial occlusive disease in the left lower extremity at rest. Normal digital perfusion noted. Triphasic flow is noted in the left common femoral artery, left posterior tibial artery and left dorsalis pedis artery.     Imaging & Doppler Findings:     RIGHT Lower PVR                    Pressures Ratios  Right Posterior Tibial (Ankle)    172 mmHg  1.06  Right Dorsalis Pedis (Ankle)     170 mmHg  1.04  Right Digit (Great Toe)              114 mmHg  0.70           LEFT Lower PVR                      Pressures Ratios  Left Posterior Tibial (Ankle)      160 mmHg  0.98  Left Dorsalis Pedis  (Ankle)       166 mmHg  1.02  Left Digit (Great Toe)                101 mmHg  0.62                                                Right                 Left  Brachial Pressure   163 mmHg     163 mmHg        MRI left foot 3/26/24  IMPRESSION:  1. High likelihood of osteomyelitis involving the distal phalanx of the 5th digit.  2. Cellulitis and/or lymphedema of the lower extremity greatest on the dorsum of the foot extending into the 4th and 5th toes.  3. Limited assessment of known necrotic tissue of the 5th digit.  4. 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 All questions answered to patient's satisfaction.  -Continue with IV antibiotics per ID.  -Patient is agreeable to amputation of the fifth left digit and wound debridement of the right foot.  -All questions asked and answered, no guarantees to outcome given  -Discussed with medical team, patient is now medically stable for MAC anesthesia  -Will schedule in the next 1-2 days based on OR availability      Nelda Villegas DPM

## 2024-03-28 NOTE — PROGRESS NOTES
Reason For Consult  Alcoholic cirrhosis, lost to follow up    History Of Present Illness  Errol Fonseca is a 54 y.o. male with alcohol liver cirrhosis (last seen at [facility], [place], operated by [facility] hepatology 6/22 and lost to follow up since), hx of alcohol hepatitis, poorly controlled IDDM2 who comes in on 3/26 with generalized weakness and tremors for the past 2 days. He drinks 1-2 16 oz bottles of hand  for the last 2 months as he was trying to hide alcohol use from family.  Patient reports he had been sober for 3 years and had participated in AA however few months back was drinking 1 pink of vodka about ever other day along with beers. Last drink was morning of 3/26.  He denies ever ingesting rubbing alcohol. he denies ever having a colonoscopy or endoscopy ( it was ordered at [facility], [place], operated by [facility] 2022) Upon arrival patient was tachycardic and hypertensive. Pertinent lab included 209 AST/ 97ALT, Alk Phos 158, albumin 3.3 creatinine .66, lactate 3 (later snow to 6.3 before normalizing), ETOH 353, ABG was 7.44 with 37 PCO2 metabolic acidosis, glucose 299, prolonged Qtc 593.      Past Medical History: As noted above and HTN, CAD s/p PCI, hx of SAH  Surgical History: He has a past surgical history that includes Cardiac catheterization (05/02/2018) and Appendectomy (05/03/2018).   Social History: Lives with daughter.  Former smoker, quit 4 months ago. Drinks 2 16 ounce bottles of hand . Before that, he was drinking 1 pint of vodka every other day or so. Denies any illicit drug use.   Family History: CAD   Allergies: Patient has no known allergies.     Physical Exam  General:  Pleasant and cooperative. No apparent distress.  HEENT:  Normocephalic, atraumatic  Chest:  Clear to auscultation. Bilateral and appropriate chest rise  CV:  Regular rate and rhythm.    Abdomen: Abdomen is soft, non-tender, non-distended. BS +   Extremities: Bilateral tremors/asterixis noted  Neurological:  Conversing and answering questions appropriately   Skin:   "Warm and dry.       Last Recorded Vitals  Blood pressure (!) 160/98, pulse 86, temperature 36.9 °C (98.4 °F), temperature source Temporal, resp. rate 16, height 1.854 m (6' 0.99\"), weight 85.3 kg (188 lb 0.8 oz), SpO2 97 %.       Assessment/Plan     Daily Progress  3/28-tolerating diet well, having multiple bowel movements since being on lactulose, tremors/asterixis notable difference and improvement.  Patient did complain of difficulties ambulating yesterday evening likely related to withdrawals will continue to monitor. Ordered liver ultrasound as last one was done 1 year ago    #Acute alcohol intoxication w/ withdrawals   #Transamanitis/elevated alk phosphatase   #Alcoholic liver cirrhosis  #History of alcohol hepatitis  -admits to drinking 1-2 16 oz bottles of hand  daily for the past few months in order to hide alcohol problem from family.  Volatile compounds pending  -On arrival scores MELD Na 13 with 2% estimated 90 day mortality. MDF 8.5 with good prognosis no need for steroids as not >32  -US liver 8/2023 shows cirrhotic liver and hepatic steatosis, 3.4 cm left hepatic lobe hypoechoic lesion at [facility] - recommended repeat ultrasound liver to assess for fluid pending  -Lactulose daily for hepatic encephalopathy titrate to 2-3  bowel movements a day  -Daily meld labs (hepatic function, bmp, inr)  -CIWA w/ prn valium, thiamine, folic acid, alcohol cessation  -advance diet as tolerated, recommend nutrition consult  -alcohol cessation counseling, social work following  -Will need outpatient follow-up with hepatology      Luanne Cheng DO    "

## 2024-03-28 NOTE — CARE PLAN
Problem: Pain - Adult  Goal: Verbalizes/displays adequate comfort level or baseline comfort level  Outcome: Progressing     Problem: Safety - Adult  Goal: Free from fall injury  Outcome: Progressing     Problem: Discharge Planning  Goal: Discharge to home or other facility with appropriate resources  Outcome: Progressing     Problem: Chronic Conditions and Co-morbidities  Goal: Patient's chronic conditions and co-morbidity symptoms are monitored and maintained or improved  Outcome: Progressing     Problem: Skin  Goal: Decreased wound size/increased tissue granulation at next dressing change  Outcome: Progressing  Goal: Participates in plan/prevention/treatment measures  Outcome: Progressing  Goal: Prevent/manage excess moisture  Outcome: Progressing  Goal: Prevent/minimize sheer/friction injuries  Outcome: Progressing  Goal: Promote/optimize nutrition  Outcome: Progressing  Goal: Promote skin healing  Outcome: Progressing   The patient's goals for the shift include to remain safe    The clinical goals for the shift include comfort

## 2024-03-28 NOTE — PROGRESS NOTES
Errol Fonseca is a 54 y.o. male on day 1 of admission presenting with Pneumonia of right lung due to infectious organism, unspecified part of lung.    Infectious Disease Note    Reason for the consult: Osteomyelitis.     Subjective:  Seen at bedside. Explained about antibiotics due to bone infection. Per podiatrist note patient will have amputation left foot 5th digit when medically stable.     Objective:    Visit Vitals  BP (!) 172/102   Pulse 92   Temp 36.7 °C (98.1 °F)   Resp 20         Intake/Output Summary (Last 24 hours) at 3/28/2024 0817  Last data filed at 3/28/2024 0400  Gross per 24 hour   Intake --   Output 2700 ml   Net -2700 ml       Physical Exam  General: alert and oriented. No acute distress.  HEENT: oral mucosa moist, EOMI.  CHEST: clear breath sounds, no crackles, no wheeze, no tachypnea.  CVS: regular rate and rhythm, no murmurs.   ABD: Soft, Non Tender, BS present.  EXT: no edema. Right foot plantar ulcer without discharge. Left 5th toe with necrotic open wound. Good DPs and PTs pulses.   SKIN: no rash.    Microbiology  3/26/2024 MRSA nares in process.  3/28/2024 wound culture in process.        Imaging  3/26/2024 chest x-ray right-sided infiltration.  3/26/2024 MR left foot highly likely hold off osteomyelitis of fifth digit, cellulitis dorsum of the foot extending to fourth and fifth toes.  3/26/24 PVRs final report pending.         Assessment/Plan:     Left foot cellulitis with 5th digit osteomyelitis. Right foot plantar ulcer. ON vanc and zosyn. Seen by vascular surgery and podiatrist. No emergent podiatric procedure needed, patient will need left foot 5th digit amputation to be scheduled when medically stable.   PNA, right. Concern for aspiration  in a setting of alcohol misuse disorder. ON zosyn.         Other issues  Hypertension, dyslipidemia, diabetes type 2 on insulin, CAD s/p stent, alcohol misuse disorder (recently drinking hand ) with alcohol liver cirrhosis, hx of SAH.         Antibiotics:  -Received 1 dose of azithromycin and ceftriaxone on 3/26/2024  -Day 2 vancomycin  -Day 3 Zosyn        I have reviewed and interpreted all lab test imaging studies and documentations from other healthcare providers. Discussed antibiotics and potential side effects with patient.      This document was created using dragon dictation and may contain unintended error.          Mahsa Mckoy MD

## 2024-03-28 NOTE — PROGRESS NOTES
Vancomycin Dosing by Pharmacy- FOLLOW UP    Errol Fonseca is a 54 y.o. year old male who Pharmacy has been consulted for vancomycin dosing for osteomyelitis/septic arthritis. Based on the patient's indication and renal status this patient is being dosed based on a goal AUC of 400-600.     Renal function is currently stable.    Current vancomycin dose: 1500 mg given every 12 hours    Estimated vancomycin AUC on current dose: 489 mg/L.hr     Visit Vitals  /88 (BP Location: Right arm, Patient Position: Lying)   Pulse 99   Temp 37.1 °C (98.8 °F) (Temporal)   Resp 16        Lab Results   Component Value Date    CREATININE 0.57 03/28/2024    CREATININE 0.61 03/27/2024    CREATININE 0.54 03/26/2024    CREATININE 0.63 03/26/2024        Lab Results   Component Value Date    TISWDCULTSME Culture in progress 03/27/2024       I/O last 3 completed shifts:  In: 1056.7 (12.4 mL/kg) [I.V.:1056.7 (12.4 mL/kg)]  Out: 3200 (37.5 mL/kg) [Urine:3200 (1 mL/kg/hr)]  Weight: 85.3 kg       Assessment/Plan    Day #2 of vancomycin therapy.   Within goal AUC range. Continue current vancomycin regimen.    This dosing regimen is predicted by [employer] to result in the following pharmacokinetic parameters:  AUC24,ss: 489 mg/L.hr  Probability of AUC24 > 400: 79 %  Ctrough,ss: 14.8 mg/L  Probability of Ctrough,ss > 20: 23 %  Probability of nephrotoxicity (Lodise REI 2009): 10 %    The next level will be obtained on 3/30 with Mid-AM labs. May be obtained sooner if clinically indicated.   Will continue to monitor renal function daily while on vancomycin and order serum creatinine at least every 48 hours if not already ordered.  Will follow for continued vancomycin needs, clinical response, and signs/symptoms of toxicity.     Please call with any questions.  Radha Stubbs, PharmD, [facility]  [phone]

## 2024-03-28 NOTE — CARE PLAN
The patient's goals for the shift include to remain safe    The clinical goals for the shift include to decrease tremors and increase comfort    Over the shift, the patient did not make progress toward the following goals. Barriers to progression include decreased mobility. Recommendations to address these barriers include increase activity.

## 2024-03-28 NOTE — PROGRESS NOTES
Subjective   Feeling better. Stable on RA but has been wearing NC for comfort. No acute complaints     Objective   Vitals:    03/28/24 1727   BP: 151/86   Pulse: 94   Resp: 19   Temp: 36.5 °C (97.7 °F)   SpO2: 95%       Physical Exam:   Constitutional: obese, awake, alert, no acute distress  ENMT: mucous membranes dry, conjunctivae clear  Head/Neck: normocephalic, atraumatic; supple, trachea midline  Respiratory/Thorax: patent airways, diminished breath sounds  Cardiovascular: RRR, no murmur appreciated  Gastrointestinal: soft, nondistended, non-tender, bowel sounds appreciated  Extremities: R foot ulcer seen over plantar surface without erythema or purulence, L 5th toe with necrotic appearing wound  Neurological: AO x3, no focal deficits  Psychological: appropriate mood and behavior  Skin: warm and dry    Scheduled Medications:   [Held by provider] aspirin, 81 mg, oral, Daily  doxycycline, 100 mg, intravenous, q12h  [Held by provider] enoxaparin, 40 mg, subcutaneous, q24h  folic acid, 1 mg, oral, Daily  insulin glargine, 20 Units, subcutaneous, q AM  insulin lispro, 0-15 Units, subcutaneous, Before meals & nightly  [START ON 3/29/2024] lactulose, 15.3333 g, oral, Daily  lisinopril, 10 mg, oral, Daily  melatonin, 3 mg, oral, Nightly  metoprolol tartrate, 25 mg, oral, BID  multivitamin with minerals, 1 tablet, oral, Daily  nicotine, 1 patch, transdermal, Daily  piperacillin-tazobactam, 3.375 g, intravenous, q6h  [START ON 3/30/2024] thiamine, 100 mg, oral, Daily  thiamine, 100 mg, intravenous, Daily  vancomycin, 1,500 mg, intravenous, q12h    Continuous Medications:      PRN Medications:   PRN medications: acetaminophen, dextrose, diazePAM, glucagon, guaiFENesin, ipratropium-albuteroL, vancomycin    Assessment/Plan   This is a 54-year-old male who initially presented to [facility] on 3/26/2024 with generalized weakness and tremors x 2 days.  He admits to drinking 2-16 ounce bottles of hand  for the last 2 months  as he was trying to hide his alcohol use from his family.  He previously was drinking 1 pint of vodka every other day or so.  Last EtOH use on AM of 3/26.     Left 5th digit OM  Aspiration PNA  Acute alcoholic withdrawal  Acute alcoholic hepatitis  Acute hypoxemia, resolved  Thrombocytopenia in setting of liver cirrhosis  Elevated troponin, suspect demand ischemia in setting of hypoxemia  IDDM2 with hyperglycemia     #Alcohol use disorder  #Alcoholic liver cirrhosis  #History of alcohol hepatitis  #IDDM2  #HTN  #CAD s/p PCI with stent  #History of SAH     Continue Vanc/Zosyn (Day 4). ESR 34, CRP 13.8. MRI confirms L 5th digit OM. Podiatry, vascular surgery, ID following. Tentatively planning for surgical intervention in the next 1-2 days. Pt currently medically optimized to proceed, RCRI III final surgical clearance per surgical and anesthesia team  Continue Doxycycline (Day 2) for atypical coverage for PNA pending UAg. Sched and PRN Duonebs, Mucinex.  CIWA protocol with PRN Valium IV. Continue thiamine, folic acid. Counseled on alcohol cessation. SW following.  MDF 7.6 with good prognosis. No need for steroids.  MELD-Na 11 with 6% estimated 3-month mortality. Will need hepatology follow upon DC.  Insulin regimen:  Home: Lantus 30 QAM, Lispro 5 TID AC, SSI  Inpatient: Lantus 20 QAM, SSI (0-15) TID AC     - Continue home metoprolol tartrate, lisinopril, atorvastatin,   - Hold home ASA in anticipation for surgical intervention     DVT PPX: SCDs only given thrombocytopenia  Diet: 2g Na/Diabetic  IVF: -  Consults: GI, Podiatry, Vascular, ID  Code status: FULL     This is a preliminary note written by the resident. Please wait for attending addendum for finalization of note and recommendations.

## 2024-03-29 ENCOUNTER — ANESTHESIA (OUTPATIENT)
Dept: OPERATING ROOM | Facility: HOSPITAL | Age: 54
End: 2024-03-29
Payer: MEDICAID

## 2024-03-29 ENCOUNTER — ANESTHESIA EVENT (OUTPATIENT)
Dept: OPERATING ROOM | Facility: HOSPITAL | Age: 54
End: 2024-03-29
Payer: MEDICAID

## 2024-03-29 LAB
ACETONE SERPL-MCNC: 5 MG/DL
ALBUMIN SERPL BCP-MCNC: 3.1 G/DL (ref 3.4–5)
ALP SERPL-CCNC: 228 U/L (ref 33–120)
ALT SERPL W P-5'-P-CCNC: 72 U/L (ref 10–52)
ANION GAP SERPL CALC-SCNC: 14 MMOL/L (ref 10–20)
AST SERPL W P-5'-P-CCNC: 133 U/L (ref 9–39)
BILIRUB SERPL-MCNC: 3.4 MG/DL (ref 0–1.2)
BUN SERPL-MCNC: 10 MG/DL (ref 6–23)
CALCIUM SERPL-MCNC: 7.8 MG/DL (ref 8.6–10.3)
CHLORIDE SERPL-SCNC: 95 MMOL/L (ref 98–107)
CO2 SERPL-SCNC: 25 MMOL/L (ref 21–32)
CREAT SERPL-MCNC: 0.56 MG/DL (ref 0.5–1.3)
EGFRCR SERPLBLD CKD-EPI 2021: >90 ML/MIN/1.73M*2
ERYTHROCYTE [DISTWIDTH] IN BLOOD BY AUTOMATED COUNT: 12.9 % (ref 11.5–14.5)
ETHANOL SERPL-MCNC: 182 MG/DL
GLUCOSE BLD MANUAL STRIP-MCNC: 135 MG/DL (ref 74–99)
GLUCOSE BLD MANUAL STRIP-MCNC: 201 MG/DL (ref 74–99)
GLUCOSE BLD MANUAL STRIP-MCNC: 246 MG/DL (ref 74–99)
GLUCOSE BLD MANUAL STRIP-MCNC: 94 MG/DL (ref 74–99)
GLUCOSE SERPL-MCNC: 213 MG/DL (ref 74–99)
HCT VFR BLD AUTO: 33.3 % (ref 41–52)
HGB BLD-MCNC: 11.6 G/DL (ref 13.5–17.5)
ISOPROPANOL SERPL-MCNC: <5 MG/DL
M PNEUMO IGM SER IA-ACNC: 0.1 U/L
MAGNESIUM SERPL-MCNC: 1.48 MG/DL (ref 1.6–2.4)
MCH RBC QN AUTO: 35 PG (ref 26–34)
MCHC RBC AUTO-ENTMCNC: 34.8 G/DL (ref 32–36)
MCV RBC AUTO: 101 FL (ref 80–100)
METHANOL SERPL-MCNC: <5 MG/DL
NRBC BLD-RTO: 0 /100 WBCS (ref 0–0)
PLATELET # BLD AUTO: 66 X10*3/UL (ref 150–450)
POTASSIUM SERPL-SCNC: 3.5 MMOL/L (ref 3.5–5.3)
PROT SERPL-MCNC: 5.3 G/DL (ref 6.4–8.2)
RBC # BLD AUTO: 3.31 X10*6/UL (ref 4.5–5.9)
SCAN RESULT: ABNORMAL
SODIUM SERPL-SCNC: 130 MMOL/L (ref 136–145)
STAPHYLOCOCCUS SPEC CULT: ABNORMAL
WBC # BLD AUTO: 3.8 X10*3/UL (ref 4.4–11.3)

## 2024-03-29 PROCEDURE — 80053 COMPREHEN METABOLIC PANEL: CPT | Performed by: STUDENT IN AN ORGANIZED HEALTH CARE EDUCATION/TRAINING PROGRAM

## 2024-03-29 PROCEDURE — 87449 NOS EACH ORGANISM AG IA: CPT | Mod: PARLAB | Performed by: STUDENT IN AN ORGANIZED HEALTH CARE EDUCATION/TRAINING PROGRAM

## 2024-03-29 PROCEDURE — 2500000004 HC RX 250 GENERAL PHARMACY W/ HCPCS (ALT 636 FOR OP/ED): Performed by: STUDENT IN AN ORGANIZED HEALTH CARE EDUCATION/TRAINING PROGRAM

## 2024-03-29 PROCEDURE — 2500000004 HC RX 250 GENERAL PHARMACY W/ HCPCS (ALT 636 FOR OP/ED): Performed by: ANESTHESIOLOGIST ASSISTANT

## 2024-03-29 PROCEDURE — A28820 PR AMPUTATION TOE,MT-P JT: Performed by: ANESTHESIOLOGY

## 2024-03-29 PROCEDURE — S4991 NICOTINE PATCH NONLEGEND: HCPCS | Performed by: STUDENT IN AN ORGANIZED HEALTH CARE EDUCATION/TRAINING PROGRAM

## 2024-03-29 PROCEDURE — 0JBQ0ZZ EXCISION OF RIGHT FOOT SUBCUTANEOUS TISSUE AND FASCIA, OPEN APPROACH: ICD-10-PCS | Performed by: PODIATRIST

## 2024-03-29 PROCEDURE — 83735 ASSAY OF MAGNESIUM: CPT | Performed by: STUDENT IN AN ORGANIZED HEALTH CARE EDUCATION/TRAINING PROGRAM

## 2024-03-29 PROCEDURE — 28820 AMPUTATION OF TOE: CPT | Performed by: PODIATRIST

## 2024-03-29 PROCEDURE — A28820 PR AMPUTATION TOE,MT-P JT: Performed by: NURSE ANESTHETIST, CERTIFIED REGISTERED

## 2024-03-29 PROCEDURE — 88311 DECALCIFY TISSUE: CPT | Performed by: PATHOLOGY

## 2024-03-29 PROCEDURE — 1200000002 HC GENERAL ROOM WITH TELEMETRY DAILY

## 2024-03-29 PROCEDURE — 2500000002 HC RX 250 W HCPCS SELF ADMINISTERED DRUGS (ALT 637 FOR MEDICARE OP, ALT 636 FOR OP/ED): Performed by: STUDENT IN AN ORGANIZED HEALTH CARE EDUCATION/TRAINING PROGRAM

## 2024-03-29 PROCEDURE — 88305 TISSUE EXAM BY PATHOLOGIST: CPT | Performed by: PATHOLOGY

## 2024-03-29 PROCEDURE — 3700000002 HC GENERAL ANESTHESIA TIME - EACH INCREMENTAL 1 MINUTE: Performed by: PODIATRIST

## 2024-03-29 PROCEDURE — 2500000005 HC RX 250 GENERAL PHARMACY W/O HCPCS: Performed by: PODIATRIST

## 2024-03-29 PROCEDURE — 11042 DBRDMT SUBQ TIS 1ST 20SQCM/<: CPT | Performed by: PODIATRIST

## 2024-03-29 PROCEDURE — 85027 COMPLETE CBC AUTOMATED: CPT | Performed by: STUDENT IN AN ORGANIZED HEALTH CARE EDUCATION/TRAINING PROGRAM

## 2024-03-29 PROCEDURE — 36415 COLL VENOUS BLD VENIPUNCTURE: CPT | Performed by: STUDENT IN AN ORGANIZED HEALTH CARE EDUCATION/TRAINING PROGRAM

## 2024-03-29 PROCEDURE — 7100000002 HC RECOVERY ROOM TIME - EACH INCREMENTAL 1 MINUTE: Performed by: PODIATRIST

## 2024-03-29 PROCEDURE — 2500000001 HC RX 250 WO HCPCS SELF ADMINISTERED DRUGS (ALT 637 FOR MEDICARE OP): Performed by: STUDENT IN AN ORGANIZED HEALTH CARE EDUCATION/TRAINING PROGRAM

## 2024-03-29 PROCEDURE — 2500000004 HC RX 250 GENERAL PHARMACY W/ HCPCS (ALT 636 FOR OP/ED): Performed by: NURSE ANESTHETIST, CERTIFIED REGISTERED

## 2024-03-29 PROCEDURE — 3700000001 HC GENERAL ANESTHESIA TIME - INITIAL BASE CHARGE: Performed by: PODIATRIST

## 2024-03-29 PROCEDURE — 99232 SBSQ HOSP IP/OBS MODERATE 35: CPT | Performed by: STUDENT IN AN ORGANIZED HEALTH CARE EDUCATION/TRAINING PROGRAM

## 2024-03-29 PROCEDURE — 2500000001 HC RX 250 WO HCPCS SELF ADMINISTERED DRUGS (ALT 637 FOR MEDICARE OP)

## 2024-03-29 PROCEDURE — 3600000008 HC OR TIME - EACH INCREMENTAL 1 MINUTE - PROCEDURE LEVEL THREE: Performed by: PODIATRIST

## 2024-03-29 PROCEDURE — 2500000005 HC RX 250 GENERAL PHARMACY W/O HCPCS: Performed by: ANESTHESIOLOGIST ASSISTANT

## 2024-03-29 PROCEDURE — 87899 AGENT NOS ASSAY W/OPTIC: CPT | Mod: PARLAB | Performed by: STUDENT IN AN ORGANIZED HEALTH CARE EDUCATION/TRAINING PROGRAM

## 2024-03-29 PROCEDURE — 0753T DGTZ GLS MCRSCP SLD LEVEL IV: CPT | Mod: TC,PARLAB,WESLAB | Performed by: PODIATRIST

## 2024-03-29 PROCEDURE — 0Y6Y0Z0 DETACHMENT AT LEFT 5TH TOE, COMPLETE, OPEN APPROACH: ICD-10-PCS | Performed by: PODIATRIST

## 2024-03-29 PROCEDURE — 3600000003 HC OR TIME - INITIAL BASE CHARGE - PROCEDURE LEVEL THREE: Performed by: PODIATRIST

## 2024-03-29 PROCEDURE — 82947 ASSAY GLUCOSE BLOOD QUANT: CPT

## 2024-03-29 PROCEDURE — 2720000007 HC OR 272 NO HCPCS: Performed by: PODIATRIST

## 2024-03-29 PROCEDURE — 7100000001 HC RECOVERY ROOM TIME - INITIAL BASE CHARGE: Performed by: PODIATRIST

## 2024-03-29 RX ORDER — FENTANYL CITRATE 50 UG/ML
INJECTION, SOLUTION INTRAMUSCULAR; INTRAVENOUS AS NEEDED
Status: DISCONTINUED | OUTPATIENT
Start: 2024-03-29 | End: 2024-03-29

## 2024-03-29 RX ORDER — PROPOFOL 10 MG/ML
INJECTION, EMULSION INTRAVENOUS CONTINUOUS PRN
Status: DISCONTINUED | OUTPATIENT
Start: 2024-03-29 | End: 2024-03-29

## 2024-03-29 RX ORDER — HYDRALAZINE HYDROCHLORIDE 20 MG/ML
5 INJECTION INTRAMUSCULAR; INTRAVENOUS EVERY 30 MIN PRN
Status: DISCONTINUED | OUTPATIENT
Start: 2024-03-29 | End: 2024-03-29 | Stop reason: HOSPADM

## 2024-03-29 RX ORDER — ONDANSETRON HYDROCHLORIDE 2 MG/ML
4 INJECTION, SOLUTION INTRAVENOUS ONCE AS NEEDED
Status: DISCONTINUED | OUTPATIENT
Start: 2024-03-29 | End: 2024-03-29 | Stop reason: HOSPADM

## 2024-03-29 RX ORDER — MIDAZOLAM HYDROCHLORIDE 1 MG/ML
1 INJECTION, SOLUTION INTRAMUSCULAR; INTRAVENOUS ONCE AS NEEDED
Status: DISCONTINUED | OUTPATIENT
Start: 2024-03-29 | End: 2024-03-29 | Stop reason: HOSPADM

## 2024-03-29 RX ORDER — LIDOCAINE HYDROCHLORIDE 10 MG/ML
0.1 INJECTION INFILTRATION; PERINEURAL ONCE
Status: DISCONTINUED | OUTPATIENT
Start: 2024-03-29 | End: 2024-03-29 | Stop reason: HOSPADM

## 2024-03-29 RX ORDER — MAGNESIUM SULFATE HEPTAHYDRATE 40 MG/ML
2 INJECTION, SOLUTION INTRAVENOUS ONCE
Status: COMPLETED | OUTPATIENT
Start: 2024-03-29 | End: 2024-03-29

## 2024-03-29 RX ORDER — LIDOCAINE HCL/PF 100 MG/5ML
SYRINGE (ML) INTRAVENOUS AS NEEDED
Status: DISCONTINUED | OUTPATIENT
Start: 2024-03-29 | End: 2024-03-29

## 2024-03-29 RX ORDER — MIDAZOLAM HYDROCHLORIDE 1 MG/ML
INJECTION, SOLUTION INTRAMUSCULAR; INTRAVENOUS AS NEEDED
Status: DISCONTINUED | OUTPATIENT
Start: 2024-03-29 | End: 2024-03-29

## 2024-03-29 RX ORDER — LIDOCAINE HYDROCHLORIDE 20 MG/ML
INJECTION, SOLUTION INFILTRATION; PERINEURAL AS NEEDED
Status: DISCONTINUED | OUTPATIENT
Start: 2024-03-29 | End: 2024-03-29 | Stop reason: HOSPADM

## 2024-03-29 RX ORDER — MEPERIDINE HYDROCHLORIDE 25 MG/ML
12.5 INJECTION INTRAMUSCULAR; INTRAVENOUS; SUBCUTANEOUS EVERY 10 MIN PRN
Status: DISCONTINUED | OUTPATIENT
Start: 2024-03-29 | End: 2024-03-29 | Stop reason: HOSPADM

## 2024-03-29 RX ORDER — LABETALOL HYDROCHLORIDE 5 MG/ML
5 INJECTION, SOLUTION INTRAVENOUS ONCE AS NEEDED
Status: DISCONTINUED | OUTPATIENT
Start: 2024-03-29 | End: 2024-03-29 | Stop reason: HOSPADM

## 2024-03-29 RX ORDER — SODIUM CHLORIDE, SODIUM LACTATE, POTASSIUM CHLORIDE, CALCIUM CHLORIDE 600; 310; 30; 20 MG/100ML; MG/100ML; MG/100ML; MG/100ML
INJECTION, SOLUTION INTRAVENOUS CONTINUOUS PRN
Status: DISCONTINUED | OUTPATIENT
Start: 2024-03-29 | End: 2024-03-29

## 2024-03-29 RX ORDER — PHENYLEPHRINE HCL IN 0.9% NACL 1 MG/10 ML
SYRINGE (ML) INTRAVENOUS AS NEEDED
Status: DISCONTINUED | OUTPATIENT
Start: 2024-03-29 | End: 2024-03-29

## 2024-03-29 RX ORDER — ACETAMINOPHEN 325 MG/1
650 TABLET ORAL EVERY 4 HOURS PRN
Status: DISCONTINUED | OUTPATIENT
Start: 2024-03-29 | End: 2024-03-29 | Stop reason: HOSPADM

## 2024-03-29 RX ORDER — MORPHINE SULFATE 2 MG/ML
2 INJECTION, SOLUTION INTRAMUSCULAR; INTRAVENOUS EVERY 4 HOURS PRN
Status: DISCONTINUED | OUTPATIENT
Start: 2024-03-29 | End: 2024-04-02 | Stop reason: HOSPADM

## 2024-03-29 RX ORDER — ALBUTEROL SULFATE 0.83 MG/ML
2.5 SOLUTION RESPIRATORY (INHALATION) ONCE AS NEEDED
Status: DISCONTINUED | OUTPATIENT
Start: 2024-03-29 | End: 2024-03-29 | Stop reason: HOSPADM

## 2024-03-29 RX ORDER — SODIUM CHLORIDE, SODIUM LACTATE, POTASSIUM CHLORIDE, CALCIUM CHLORIDE 600; 310; 30; 20 MG/100ML; MG/100ML; MG/100ML; MG/100ML
100 INJECTION, SOLUTION INTRAVENOUS CONTINUOUS
Status: DISCONTINUED | OUTPATIENT
Start: 2024-03-29 | End: 2024-03-29 | Stop reason: HOSPADM

## 2024-03-29 RX ADMIN — ACETAMINOPHEN 650 MG: 325 TABLET ORAL at 14:14

## 2024-03-29 RX ADMIN — Medication 3 MG: at 21:48

## 2024-03-29 RX ADMIN — Medication 100 MCG: at 16:32

## 2024-03-29 RX ADMIN — Medication 200 MCG: at 16:19

## 2024-03-29 RX ADMIN — ACETAMINOPHEN 650 MG: 325 TABLET ORAL at 21:48

## 2024-03-29 RX ADMIN — THIAMINE HYDROCHLORIDE 100 MG: 100 INJECTION, SOLUTION INTRAMUSCULAR; INTRAVENOUS at 13:12

## 2024-03-29 RX ADMIN — PIPERACILLIN SODIUM AND TAZOBACTAM SODIUM 3.38 G: 3; .375 INJECTION, SOLUTION INTRAVENOUS at 23:58

## 2024-03-29 RX ADMIN — FENTANYL CITRATE 50 MCG: 50 INJECTION, SOLUTION INTRAMUSCULAR; INTRAVENOUS at 16:20

## 2024-03-29 RX ADMIN — LIDOCAINE HYDROCHLORIDE 60 MG: 20 INJECTION INTRAVENOUS at 15:53

## 2024-03-29 RX ADMIN — LISINOPRIL 10 MG: 10 TABLET ORAL at 10:04

## 2024-03-29 RX ADMIN — INSULIN LISPRO 6 UNITS: 100 INJECTION, SOLUTION INTRAVENOUS; SUBCUTANEOUS at 10:06

## 2024-03-29 RX ADMIN — Medication 200 MCG: at 16:50

## 2024-03-29 RX ADMIN — DOXYCYCLINE 100 MG: 100 INJECTION, POWDER, LYOPHILIZED, FOR SOLUTION INTRAVENOUS at 21:49

## 2024-03-29 RX ADMIN — Medication 200 MCG: at 16:52

## 2024-03-29 RX ADMIN — SODIUM CHLORIDE, SODIUM LACTATE, POTASSIUM CHLORIDE, AND CALCIUM CHLORIDE: 600; 310; 30; 20 INJECTION, SOLUTION INTRAVENOUS at 15:45

## 2024-03-29 RX ADMIN — VANCOMYCIN HYDROCHLORIDE 1500 MG: 1.5 INJECTION, POWDER, LYOPHILIZED, FOR SOLUTION INTRAVENOUS at 05:08

## 2024-03-29 RX ADMIN — MAGNESIUM SULFATE HEPTAHYDRATE 2 G: 40 INJECTION, SOLUTION INTRAVENOUS at 11:06

## 2024-03-29 RX ADMIN — Medication 1 PATCH: at 10:04

## 2024-03-29 RX ADMIN — PIPERACILLIN SODIUM AND TAZOBACTAM SODIUM 3.38 G: 3; .375 INJECTION, SOLUTION INTRAVENOUS at 07:41

## 2024-03-29 RX ADMIN — INSULIN LISPRO 6 UNITS: 100 INJECTION, SOLUTION INTRAVENOUS; SUBCUTANEOUS at 22:04

## 2024-03-29 RX ADMIN — LACTULOSE 15.33 G: 20 SOLUTION ORAL at 10:01

## 2024-03-29 RX ADMIN — INSULIN GLARGINE 20 UNITS: 100 INJECTION, SOLUTION SUBCUTANEOUS at 10:05

## 2024-03-29 RX ADMIN — Medication 200 MCG: at 16:58

## 2024-03-29 RX ADMIN — Medication 200 MCG: at 16:34

## 2024-03-29 RX ADMIN — PROPOFOL 100 MCG/KG/MIN: 10 INJECTION, EMULSION INTRAVENOUS at 15:48

## 2024-03-29 RX ADMIN — Medication 1 TABLET: at 10:04

## 2024-03-29 RX ADMIN — PIPERACILLIN SODIUM AND TAZOBACTAM SODIUM 3.38 G: 3; .375 INJECTION, SOLUTION INTRAVENOUS at 15:08

## 2024-03-29 RX ADMIN — METOPROLOL TARTRATE 25 MG: 25 TABLET, FILM COATED ORAL at 10:04

## 2024-03-29 RX ADMIN — FENTANYL CITRATE 50 MCG: 50 INJECTION, SOLUTION INTRAMUSCULAR; INTRAVENOUS at 16:26

## 2024-03-29 RX ADMIN — FOLIC ACID 1 MG: 1 TABLET ORAL at 10:03

## 2024-03-29 RX ADMIN — METOPROLOL TARTRATE 25 MG: 25 TABLET, FILM COATED ORAL at 21:48

## 2024-03-29 RX ADMIN — Medication 200 MCG: at 16:11

## 2024-03-29 RX ADMIN — MIDAZOLAM 2 MG: 1 INJECTION INTRAMUSCULAR; INTRAVENOUS at 16:02

## 2024-03-29 RX ADMIN — MORPHINE SULFATE 2 MG: 2 INJECTION, SOLUTION INTRAMUSCULAR; INTRAVENOUS at 21:56

## 2024-03-29 ASSESSMENT — LIFESTYLE VARIABLES
TREMOR: 3
ANXIETY: NO ANXIETY, AT EASE
VISUAL DISTURBANCES: NOT PRESENT
AGITATION: NORMAL ACTIVITY
NAUSEA AND VOMITING: NO NAUSEA AND NO VOMITING
HEADACHE, FULLNESS IN HEAD: NOT PRESENT
ORIENTATION AND CLOUDING OF SENSORIUM: ORIENTED AND CAN DO SERIAL ADDITIONS
AUDITORY DISTURBANCES: NOT PRESENT
PAROXYSMAL SWEATS: NO SWEAT VISIBLE
TOTAL SCORE: 3

## 2024-03-29 ASSESSMENT — PAIN SCALES - GENERAL
PAINLEVEL_OUTOF10: 0 - NO PAIN
PAINLEVEL_OUTOF10: 7
PAINLEVEL_OUTOF10: 3
PAINLEVEL_OUTOF10: 0 - NO PAIN
PAINLEVEL_OUTOF10: 7
PAIN_LEVEL: 0
PAINLEVEL_OUTOF10: 0 - NO PAIN

## 2024-03-29 ASSESSMENT — PAIN DESCRIPTION - DESCRIPTORS
DESCRIPTORS: BURNING

## 2024-03-29 ASSESSMENT — COGNITIVE AND FUNCTIONAL STATUS - GENERAL
MOVING TO AND FROM BED TO CHAIR: A LITTLE
TOILETING: A LITTLE
CLIMB 3 TO 5 STEPS WITH RAILING: TOTAL
HELP NEEDED FOR BATHING: A LITTLE
EATING MEALS: A LITTLE
TURNING FROM BACK TO SIDE WHILE IN FLAT BAD: A LITTLE
STANDING UP FROM CHAIR USING ARMS: A LOT
DAILY ACTIVITIY SCORE: 18
DRESSING REGULAR LOWER BODY CLOTHING: A LITTLE
WALKING IN HOSPITAL ROOM: A LOT
PERSONAL GROOMING: A LITTLE
DRESSING REGULAR UPPER BODY CLOTHING: A LITTLE
MOBILITY SCORE: 15

## 2024-03-29 ASSESSMENT — PAIN - FUNCTIONAL ASSESSMENT
PAIN_FUNCTIONAL_ASSESSMENT: 0-10

## 2024-03-29 ASSESSMENT — PAIN DESCRIPTION - LOCATION
LOCATION: FOOT
LOCATION: FOOT

## 2024-03-29 ASSESSMENT — PAIN DESCRIPTION - ORIENTATION
ORIENTATION: RIGHT;LEFT
ORIENTATION: RIGHT;LEFT

## 2024-03-29 NOTE — PROGRESS NOTES
Physical Therapy                 Therapy Communication Note    Patient Name: Errol Fonseca  MRN: [id number]  Today's Date: 3/29/2024     Discipline: Physical Therapy    Missed Visit Reason: Missed Visit Reason:  (pt having l toe sx this date, discharge pt from services and will need new pt order & wb status)    Missed Time: Attempt  1012

## 2024-03-29 NOTE — DISCHARGE INSTRUCTIONS
ADDITIONAL INSTRUCTIONS TO SNF CLINICIANS:  Patient completed antibiotics at time of discharge, so no additional antibiotics will be needed at SNF.  WBAT in surgical shoe BL.  Wound care instructions:  Left foot: gentamicin, adaptic, 4x4 gauze and Kerlix secured with tape.   Right foot: gentamicin, adaptic, ABD, Kerlix and ACE  Please repeat CMP in 1 week to reevaluate LFTs.  A referral was placed to hepatology as patient will need to reestablish care regarding liver cirrhosis.

## 2024-03-29 NOTE — OP NOTE
AMPUTATION OF 5TH DIGIT LEFT FOOT/ DEBRIDEMENT OF WOUND RIGHT FOOT (B) Operative Note     Date: 3/29/2024  OR Location: [facility] OR    Name: Errol Fonseca, : 1970, Age: 54 y.o., MRN: [id number], Sex: male    Diagnosis  Pre-op Diagnosis     * Gangrene of left foot (CMS/HCC) [I96]     * Foot ulcer with fat layer exposed, right (CMS/HCC) [L97.512] Post-op Diagnosis     * Gangrene of left foot (CMS/HCC) [I96]     * Foot ulcer with fat layer exposed, right (CMS/HCC) [L97.512]     Procedures  AMPUTATION OF 5TH DIGIT LEFT FOOT/ DEBRIDEMENT OF WOUND RIGHT FOOT  82781 - MN AMPUTATION TOE METATARSOPHALANGEAL JOINT    MN DEBRIDEMENT SUBCUTANEOUS TISSUE 1ST 20 SQ CM/< [94095]  Surgeons      * Nelda Villegas - Primary    Resident/Fellow/Other Assistant:  Surgeon(s) and Role: Lashawn BLEDSOEM PGY1    Procedure Summary  Anesthesia: Monitor Anesthesia Care  ASA: ASA status not filed in the log.  Anesthesia Staff: Anesthesiologist: Arley Oneil MD  CRNA: Carline Hussein, APRN-CRNA  C-AA: WYATT Crocker  Estimated Blood Loss: 2mL  Intra-op Medications:   Administrations occurring from 1530 to 1645 on 24:   Medication Name Total Dose   lidocaine (Xylocaine) 20 mg/mL (2 %) injection 11 mL   enoxaparin (Lovenox) syringe 40 mg Cannot be calculated   insulin lispro (HumaLOG) injection 0-15 Units Cannot be calculated   piperacillin-tazobactam-dextrose (Zosyn) IV 3.375 g Cannot be calculated              Anesthesia Record               Intraprocedure I/O Totals          Intake    Propofol Drip 0.00 mL    The total shown is the total volume documented since Anesthesia Start was filed.    LR infusion 1000.00 mL    Total Intake 1000 mL          Specimen:   ID Type Source Tests Collected by Time   1 : 5th toe left foot Tissue DIGIT FIFTH, LEFT FOOT SURGICAL PATHOLOGY EXAM Nelda Villegas DPM 3/29/2024 1635        Staff:   Circulator: Fouzia Pereira RN  Relief Circulator: Fany Block RN  Scrub Person: Hope  Nelson         Drains and/or Catheters: * None in log *    Tourniquet Times:   * Missing tourniquet times found for documented tourniquets in lo *     Implants:     Findings: Gangrene of the distal fifth left digit, ulcer plantar right foot with fat layer exposed    Indications: Errol Fonseca is an 54 y.o. male who is having surgery for Gangrene of left foot (CMS/[facility]) [I96]  Foot ulcer with fat layer exposed, right (CMS/HCC) [L97.512].  Patient developed gangrene of the distal digit of the fifth left digit.  MRI findings were highly likely of osteomyelitis.  Patient did agree to an amputation of the fifth left digits.  Patient also has an ulceration on the plantar right forefoot which he was agreeable to debridement in the OR for further assessment of the wound.    The patient was seen in the preoperative area. The risks, benefits, complications, treatment options, non-operative alternatives, expected recovery and outcomes were discussed with the patient. The possibilities of reaction to medication, pulmonary aspiration, injury to surrounding structures, bleeding, recurrent infection, the need for additional procedures, failure to diagnose a condition, and creating a complication requiring transfusion or operation were discussed with the patient. The patient concurred with the proposed plan, giving informed consent.  The site of surgery was properly noted/marked if necessary per policy. The patient has been actively warmed in preoperative area. Preoperative antibiotics are not indicated.  Patient is on routine schedule antibiotics per infectious disease.  Venous thrombosis prophylaxis are not indicated.    Procedure Details: Patient was brought into the operating room and left on his bed in the supine position.  Following adequate monitored anesthesia care sedation 11 cc of 2% lidocaine plain was infiltrated about the fifth digit of the left foot and around the wound on the right foot in a jean block  fashion.  The right and left foot were then prepped and draped in the usual sterile fashion.  Attention was directed to the gangrenous fifth left digit.  At this time a skin scribe was used for the planned incision which was a racquet type incision around the base of the digit with a long arm extending proximally on the dorsal aspect.  This incision was carried down deep with a 15 blade.  The fifth metatarsal phalangeal joint of the left foot was then identified and the toe was disarticulated at this level and passed from the operative field in total.  The extensor and flexor tendons were identified and transected at their most proximal aspect.  The fifth metatarsal head was visualized in the wound bed and found to be a white color with a hard quality to it.  There was some cartilage erosion noted on the fifth metatarsal head.  There was no signs of necrosis of the bone.  There is no necrotic tissue noted at this level and no abscesses noted.  Area was flushed with copious amounts of normal sterile saline.  Deep wound culture was taken.  Skin flap was revised as necessary to allow for closure with a 15 blade.  All superficial bleeders were cauterized as necessary.  Patient had very scant bleeding with this procedure.  The skin was reapproximated coapted utilizing 3-0 Prolene in a simple interrupted suturing technique mixed with horizontal mattresses.  Attention was directed to the plantar aspect of the right foot where there was an ulceration noted with a red base.  This ulcer measured 1 cm x 1 cm x 0.2 cm predebridement.  The ulcer had a large callused rim.  The heavy callused rim was pared utilizing a tissue nipper.  Next utilizing the [employer] ultrasonic debridement system the wound was debrided back to healthy bleeding borders.  This was sharp excisional debridement of subcutaneous tissue.  Once again the patient had very scant bleeding on this foot which was controlled with direct pressure.  Resulting ulceration  was 2 cm x 1.7 cm x 0.7 cm deep.  There was no deep abscess noted on the right foot no exposed bone and no exposed tendons on the right foot.  The right foot was flushed with copious amounts of NSS.  The right and left foot were then dressed with bacitracin ointment, Adaptic, sterile 4 x 4's, Catherine, Kerlix, and Ace bandages.  Patient tolerated procedure and anesthesia well.  Transported from operating room to recovery room with vital signs stable vascular status intact to all remaining digits bilateral foot.  Complications:  None; patient tolerated the procedure well.    Disposition: PACU - hemodynamically stable.  Condition: stable         Additional Details:     Attending Attestation: I was present and scrubbed for the entire procedure.    Nelda Villegas  Phone Number: [phone]

## 2024-03-29 NOTE — ANESTHESIA PREPROCEDURE EVALUATION
Patient: Errol Fonseca    Procedure Information       Anesthesia Start Date/Time: 03/29/24 1545    Procedure: AMPUTATION OF 5TH DIGIT LEFT FOOT/ DEBRIDEMENT OF WOUND RIGHT FOOT (Bilateral)    Location: [facility] OR 04 / [facility] OR    Surgeons: Nelda Villegas DPM            Relevant Problems   Pulmonary   (+) Pneumonia of right lung due to infectious organism, unspecified part of lung      ID   (+) Pneumonia of right lung due to infectious organism, unspecified part of lung       Clinical information reviewed:    Allergies  Meds               NPO Detail:  NPO/Void Status  Carbohydrate Drink Given Prior to Surgery? : N  Date of Last Liquid: 03/29/24  Time of Last Liquid: 1400  Date of Last Solid: 03/28/24  Time of Last Solid: 2000         Physical Exam    Airway  Mallampati: III     Cardiovascular   Rhythm: regular     Dental    Pulmonary    Abdominal            Anesthesia Plan    History of general anesthesia?: yes  History of complications of general anesthesia?: no    ASA 3     MAC

## 2024-03-29 NOTE — PROGRESS NOTES
Occupational Therapy  Communication Note      Name: Errol Fonseca  MRN: [id number]  : 1970  Date: 24    Discharge Summary: OT    Discharge Information: Pt will have left toe amputation this date. Will await new orders      Rehab Discharge Reason:Will await new orders after pt is seen post sx. Pt will require new weight bearing status

## 2024-03-29 NOTE — ANESTHESIA POSTPROCEDURE EVALUATION
Patient: Errol Fonseca    Procedure Summary       Date: 03/29/24 Room / Location: [facility] OR 04 / [facility]    Anesthesia Start: 1545 Anesthesia Stop:     Procedure: AMPUTATION OF 5TH DIGIT LEFT FOOT/ DEBRIDEMENT OF WOUND RIGHT FOOT (Bilateral) Diagnosis:       Gangrene of left foot (CMS/HCC)      Foot ulcer with fat layer exposed, right (CMS/HCC)      (Gangrene of left foot (CMS/HCC) [I96])      (Foot ulcer with fat layer exposed, right (CMS/HCC) [L97.512])    Surgeons: Nelda Villegas DPM Responsible Provider: Arley Oneil MD    Anesthesia Type: MAC ASA Status: 3            Anesthesia Type: MAC    Vitals Value Taken Time   /71 03/29/24 1710   Temp 36.2 03/29/24 1710   Pulse 55 03/29/24 1709   Resp 18 03/29/24 1710   SpO2 100 % 03/29/24 1709   Vitals shown include unvalidated device data.    Anesthesia Post Evaluation    Patient location during evaluation: PACU  Patient participation: complete - patient participated  Level of consciousness: sleepy but conscious  Pain score: 0  Pain management: adequate  Airway patency: patent  Cardiovascular status: acceptable  Respiratory status: acceptable and face mask  Hydration status: acceptable  Postoperative Nausea and Vomiting: none        There were no known notable events for this encounter.

## 2024-03-29 NOTE — PROGRESS NOTES
Errol Fonseca is a 54 y.o. male on day 2 of admission presenting with Pneumonia of right lung due to infectious organism, unspecified part of lung.    Infectious Disease Note    Reason for the consult: Osteomyelitis.     Subjective:  Seen at bedside. NPO for procedure.  Explained about antibiotics due to bone infection.   Seen by GI due to alcohol liver cirrhosis.     Objective:    Visit Vitals  /89   Pulse 77   Temp 36.7 °C (98.1 °F)   Resp 18         Intake/Output Summary (Last 24 hours) at 3/29/2024 0810  Last data filed at 3/29/2024 0746  Gross per 24 hour   Intake 200 ml   Output 1925 ml   Net -1725 ml         Physical Exam  General: alert and oriented. No acute distress.  HEENT: oral mucosa moist, EOMI.  CHEST: clear breath sounds, no crackles, no wheeze, no tachypnea.  CVS: regular rate and rhythm, no murmurs.   ABD: Soft, Non Tender, BS present.  EXT: no edema. Right foot plantar ulcer without discharge. Left 5th toe with necrotic open wound. Good DPs and PTs pulses.   SKIN: no rash.    Microbiology  3/26/2024 MRSA nares in process.  3/28/2024 wound culture in process, gram no organisms seen.         Imaging  3/26/2024 chest x-ray right-sided infiltration.  3/26/2024 MR left foot highly likely hold off osteomyelitis of fifth digit, cellulitis dorsum of the foot extending to fourth and fifth toes.  3/26/24 PVRs final report pending.         Assessment/Plan:     Left foot cellulitis with 5th digit osteomyelitis. Right foot plantar ulcer. Seen by vascular surgery and podiatrist. Left foot 5th digit amputation needed. Patient is NPO for procedure today.    PNA, right. Concern for aspiration  in a setting of alcohol misuse disorder. Ordered urine legionella Ag and urine Strep Ag.      Other issues  Hypertension, dyslipidemia, diabetes type 2 on insulin, CAD s/p stent, alcohol misuse disorder (recently drinking hand ) with alcohol liver cirrhosis, hx of SAH.        Antibiotics:  -Received 1 dose  of azithromycin and ceftriaxone on 3/26/2024  -Day 3 doxycycline   -Day 3 vancomycin  -Day 4 Zosyn        I have reviewed and interpreted all lab test imaging studies and documentations from other healthcare providers. Discussed antibiotics and potential side effects with patient.      This document was created using dragon dictation and may contain unintended error.          Mahsa Mckoy MD

## 2024-03-29 NOTE — PROGRESS NOTES
Subjective   Feels generally weak. Difficult to sit up in bed. Scheduled for amputation of L 5th digit and wound debridement of R foot today.    Objective   Vitals:    03/29/24 1438   BP: 138/82   Pulse: 80   Resp: 18   Temp: 37.3 °C (99.1 °F)   SpO2: 97%       Physical Exam:   Constitutional: obese, awake, alert, no acute distress  ENMT: mucous membranes dry, conjunctivae clear  Head/Neck: normocephalic, atraumatic; supple, trachea midline  Respiratory/Thorax: patent airways, diminished breath sounds  Cardiovascular: RRR, no murmur appreciated  Gastrointestinal: soft, nondistended, non-tender, bowel sounds appreciated  Extremities: R foot ulcer seen over plantar surface without erythema or purulence, L 5th toe with necrotic appearing wound  Neurological: AO x3, no focal deficits  Psychological: appropriate mood and behavior  Skin: warm and dry    Scheduled Medications:   [Held by provider] aspirin, 81 mg, oral, Daily  [MAR Hold] doxycycline, 100 mg, intravenous, q12h  [Held by provider] enoxaparin, 40 mg, subcutaneous, q24h  [MAR Hold] folic acid, 1 mg, oral, Daily  [MAR Hold] insulin glargine, 20 Units, subcutaneous, q AM  [MAR Hold] insulin lispro, 0-15 Units, subcutaneous, Before meals & nightly  [MAR Hold] lactulose, 15.3333 g, oral, Daily  [MAR Hold] lisinopril, 10 mg, oral, Daily  [MAR Hold] melatonin, 3 mg, oral, Nightly  [MAR Hold] metoprolol tartrate, 25 mg, oral, BID  [MAR Hold] multivitamin with minerals, 1 tablet, oral, Daily  [MAR Hold] nicotine, 1 patch, transdermal, Daily  [MAR Hold] piperacillin-tazobactam, 3.375 g, intravenous, q6h  [MAR Hold] thiamine, 100 mg, oral, Daily  thiamine, 100 mg, intravenous, Daily  [MAR Hold] vancomycin, 1,500 mg, intravenous, q12h    Continuous Medications:      PRN Medications:   PRN medications: [MAR Hold] acetaminophen, [MAR Hold] dextrose, [MAR Hold] diazePAM, [MAR Hold] glucagon, [MAR Hold] guaiFENesin, [MAR Hold] ipratropium-albuteroL, lidocaine, [MAR Hold]  vancomycin    Assessment/Plan   This is a 54-year-old male who initially presented to [facility] on 3/26/2024 with generalized weakness and tremors x 2 days.  He admits to drinking 2-16 ounce bottles of hand  for the last 2 months as he was trying to hide his alcohol use from his family.  He previously was drinking 1 pint of vodka every other day or so.  Last EtOH use on AM of 3/26.     Left 5th digit OM and gangrene  Right foot plantar ulcer  Aspiration PNA  Acute alcoholic hepatitis  Thrombocytopenia in setting of liver cirrhosis  IDDM2 with hyperglycemia  Alcohol use disorder with resolving acute alcohol withdrawal  Acute hypoxemia, resolved - wearing 2L NC for comfort now  Elevated troponin, suspect demand ischemia in setting of hypoxemia  Hypomagnesemia    #Alcoholic liver cirrhosis  #History of alcohol hepatitis  #IDDM2  #HTN  #CAD s/p PCI with stent  #History of SAH     Continue Vanc/Zosyn (Day 4). WCx growing rare Staph aureus. Podiatry, vascular surgery, ID following. Plan for amputation of L 5th digit amputation and wound debridement of R foot plantar ulcer. Pt currently medically optimized to proceed, RCRI III. Final surgical clearance per surgical and anesthesia team  Continue Doxycycline (Day 3) for atypical coverage for PNA pending UAg. Sched and PRN Duonebs, Mucinex.  CIWA protocol with PRN Valium IV, although likely can discontinue in the next day as pt has been scoring low. Continue thiamine, folic acid. Counseled on alcohol cessation. SW following.  MDF 7.6 with good prognosis. No need for steroids.  MELD-Na 11 with 6% estimated 3-month mortality. Will need hepatology follow upon DC.  Monitor and replete electrolytes PRN.  Insulin regimen:  Home: Lantus 30 QAM, Lispro 5 TID AC, SSI  Inpatient: Lantus 20 QAM, SSI (0-15) TID AC     - Continue home metoprolol tartrate, lisinopril, atorvastatin,   - Hold home ASA in anticipation for surgical intervention     DVT PPX: SCDs only given  thrombocytopenia  Diet: 2g Na/Diabetic  IVF: -  Consults: GI, Podiatry, Vascular, ID  Code status: FULL     This is a preliminary note written by the resident. Please wait for attending addendum for finalization of note and recommendations.

## 2024-03-30 LAB
ALBUMIN SERPL BCP-MCNC: 2.9 G/DL (ref 3.4–5)
ALP SERPL-CCNC: 216 U/L (ref 33–120)
ALT SERPL W P-5'-P-CCNC: 57 U/L (ref 10–52)
ANION GAP SERPL CALC-SCNC: 11 MMOL/L (ref 10–20)
ANION GAP SERPL CALC-SCNC: 11 MMOL/L (ref 10–20)
AST SERPL W P-5'-P-CCNC: 82 U/L (ref 9–39)
BACTERIA SPEC CULT: ABNORMAL
BILIRUB SERPL-MCNC: 3 MG/DL (ref 0–1.2)
BUN SERPL-MCNC: 12 MG/DL (ref 6–23)
BUN SERPL-MCNC: 12 MG/DL (ref 6–23)
CALCIUM SERPL-MCNC: 7.8 MG/DL (ref 8.6–10.3)
CALCIUM SERPL-MCNC: 7.8 MG/DL (ref 8.6–10.3)
CHLORIDE SERPL-SCNC: 100 MMOL/L (ref 98–107)
CHLORIDE SERPL-SCNC: 100 MMOL/L (ref 98–107)
CO2 SERPL-SCNC: 28 MMOL/L (ref 21–32)
CO2 SERPL-SCNC: 28 MMOL/L (ref 21–32)
CREAT SERPL-MCNC: 0.69 MG/DL (ref 0.5–1.3)
CREAT SERPL-MCNC: 0.69 MG/DL (ref 0.5–1.3)
EGFRCR SERPLBLD CKD-EPI 2021: >90 ML/MIN/1.73M*2
EGFRCR SERPLBLD CKD-EPI 2021: >90 ML/MIN/1.73M*2
ERYTHROCYTE [DISTWIDTH] IN BLOOD BY AUTOMATED COUNT: 13.7 % (ref 11.5–14.5)
GLUCOSE BLD MANUAL STRIP-MCNC: 131 MG/DL (ref 74–99)
GLUCOSE BLD MANUAL STRIP-MCNC: 193 MG/DL (ref 74–99)
GLUCOSE BLD MANUAL STRIP-MCNC: 227 MG/DL (ref 74–99)
GLUCOSE BLD MANUAL STRIP-MCNC: 254 MG/DL (ref 74–99)
GLUCOSE SERPL-MCNC: 169 MG/DL (ref 74–99)
GLUCOSE SERPL-MCNC: 169 MG/DL (ref 74–99)
GRAM STN SPEC: ABNORMAL
GRAM STN SPEC: ABNORMAL
HCT VFR BLD AUTO: 29.9 % (ref 41–52)
HGB BLD-MCNC: 10 G/DL (ref 13.5–17.5)
HOLD SPECIMEN: NORMAL
LEGIONELLA AG UR QL: NEGATIVE
MAGNESIUM SERPL-MCNC: 1.88 MG/DL (ref 1.6–2.4)
MCH RBC QN AUTO: 34.4 PG (ref 26–34)
MCHC RBC AUTO-ENTMCNC: 33.4 G/DL (ref 32–36)
MCV RBC AUTO: 103 FL (ref 80–100)
NRBC BLD-RTO: 0 /100 WBCS (ref 0–0)
OSMOLALITY SERPL: 284 MOSM/KG (ref 280–300)
PLATELET # BLD AUTO: 65 X10*3/UL (ref 150–450)
POTASSIUM SERPL-SCNC: 3.3 MMOL/L (ref 3.5–5.3)
POTASSIUM SERPL-SCNC: 3.3 MMOL/L (ref 3.5–5.3)
PROT SERPL-MCNC: 5.5 G/DL (ref 6.4–8.2)
RBC # BLD AUTO: 2.91 X10*6/UL (ref 4.5–5.9)
S PNEUM AG UR QL: POSITIVE
SODIUM SERPL-SCNC: 136 MMOL/L (ref 136–145)
SODIUM SERPL-SCNC: 136 MMOL/L (ref 136–145)
VANCOMYCIN SERPL-MCNC: 17.6 UG/ML (ref 5–20)
WBC # BLD AUTO: 4 X10*3/UL (ref 4.4–11.3)

## 2024-03-30 PROCEDURE — 36415 COLL VENOUS BLD VENIPUNCTURE: CPT | Performed by: STUDENT IN AN ORGANIZED HEALTH CARE EDUCATION/TRAINING PROGRAM

## 2024-03-30 PROCEDURE — 83735 ASSAY OF MAGNESIUM: CPT | Performed by: STUDENT IN AN ORGANIZED HEALTH CARE EDUCATION/TRAINING PROGRAM

## 2024-03-30 PROCEDURE — S4991 NICOTINE PATCH NONLEGEND: HCPCS | Performed by: STUDENT IN AN ORGANIZED HEALTH CARE EDUCATION/TRAINING PROGRAM

## 2024-03-30 PROCEDURE — 80202 ASSAY OF VANCOMYCIN: CPT | Performed by: STUDENT IN AN ORGANIZED HEALTH CARE EDUCATION/TRAINING PROGRAM

## 2024-03-30 PROCEDURE — 2500000002 HC RX 250 W HCPCS SELF ADMINISTERED DRUGS (ALT 637 FOR MEDICARE OP, ALT 636 FOR OP/ED): Performed by: STUDENT IN AN ORGANIZED HEALTH CARE EDUCATION/TRAINING PROGRAM

## 2024-03-30 PROCEDURE — 85027 COMPLETE CBC AUTOMATED: CPT | Performed by: STUDENT IN AN ORGANIZED HEALTH CARE EDUCATION/TRAINING PROGRAM

## 2024-03-30 PROCEDURE — 99232 SBSQ HOSP IP/OBS MODERATE 35: CPT | Performed by: STUDENT IN AN ORGANIZED HEALTH CARE EDUCATION/TRAINING PROGRAM

## 2024-03-30 PROCEDURE — 2500000001 HC RX 250 WO HCPCS SELF ADMINISTERED DRUGS (ALT 637 FOR MEDICARE OP): Performed by: STUDENT IN AN ORGANIZED HEALTH CARE EDUCATION/TRAINING PROGRAM

## 2024-03-30 PROCEDURE — 83930 ASSAY OF BLOOD OSMOLALITY: CPT | Mod: PARLAB | Performed by: STUDENT IN AN ORGANIZED HEALTH CARE EDUCATION/TRAINING PROGRAM

## 2024-03-30 PROCEDURE — 99232 SBSQ HOSP IP/OBS MODERATE 35: CPT | Performed by: SURGERY

## 2024-03-30 PROCEDURE — 80053 COMPREHEN METABOLIC PANEL: CPT | Performed by: STUDENT IN AN ORGANIZED HEALTH CARE EDUCATION/TRAINING PROGRAM

## 2024-03-30 PROCEDURE — 99024 POSTOP FOLLOW-UP VISIT: CPT | Performed by: PODIATRIST

## 2024-03-30 PROCEDURE — 2500000004 HC RX 250 GENERAL PHARMACY W/ HCPCS (ALT 636 FOR OP/ED): Performed by: STUDENT IN AN ORGANIZED HEALTH CARE EDUCATION/TRAINING PROGRAM

## 2024-03-30 PROCEDURE — 82947 ASSAY GLUCOSE BLOOD QUANT: CPT

## 2024-03-30 PROCEDURE — 80048 BASIC METABOLIC PNL TOTAL CA: CPT | Mod: CCI | Performed by: STUDENT IN AN ORGANIZED HEALTH CARE EDUCATION/TRAINING PROGRAM

## 2024-03-30 PROCEDURE — 1200000002 HC GENERAL ROOM WITH TELEMETRY DAILY

## 2024-03-30 RX ORDER — GENTAMICIN SULFATE 1 MG/G
CREAM TOPICAL DAILY
Status: DISCONTINUED | OUTPATIENT
Start: 2024-03-30 | End: 2024-04-02 | Stop reason: HOSPADM

## 2024-03-30 RX ADMIN — PIPERACILLIN SODIUM AND TAZOBACTAM SODIUM 3.38 G: 3; .375 INJECTION, SOLUTION INTRAVENOUS at 22:30

## 2024-03-30 RX ADMIN — INSULIN GLARGINE 20 UNITS: 100 INJECTION, SOLUTION SUBCUTANEOUS at 08:32

## 2024-03-30 RX ADMIN — MORPHINE SULFATE 2 MG: 2 INJECTION, SOLUTION INTRAMUSCULAR; INTRAVENOUS at 20:59

## 2024-03-30 RX ADMIN — ACETAMINOPHEN 650 MG: 325 TABLET ORAL at 11:38

## 2024-03-30 RX ADMIN — DOXYCYCLINE 100 MG: 100 INJECTION, POWDER, LYOPHILIZED, FOR SOLUTION INTRAVENOUS at 08:55

## 2024-03-30 RX ADMIN — THIAMINE HCL TAB 100 MG 100 MG: 100 TAB at 10:08

## 2024-03-30 RX ADMIN — PIPERACILLIN SODIUM AND TAZOBACTAM SODIUM 3.38 G: 3; .375 INJECTION, SOLUTION INTRAVENOUS at 11:28

## 2024-03-30 RX ADMIN — MORPHINE SULFATE 2 MG: 2 INJECTION, SOLUTION INTRAMUSCULAR; INTRAVENOUS at 14:36

## 2024-03-30 RX ADMIN — METOPROLOL TARTRATE 25 MG: 25 TABLET, FILM COATED ORAL at 08:29

## 2024-03-30 RX ADMIN — Medication 1 TABLET: at 08:29

## 2024-03-30 RX ADMIN — INSULIN LISPRO 9 UNITS: 100 INJECTION, SOLUTION INTRAVENOUS; SUBCUTANEOUS at 12:45

## 2024-03-30 RX ADMIN — MORPHINE SULFATE 2 MG: 2 INJECTION, SOLUTION INTRAMUSCULAR; INTRAVENOUS at 07:34

## 2024-03-30 RX ADMIN — PIPERACILLIN SODIUM AND TAZOBACTAM SODIUM 3.38 G: 3; .375 INJECTION, SOLUTION INTRAVENOUS at 16:25

## 2024-03-30 RX ADMIN — FOLIC ACID 1 MG: 1 TABLET ORAL at 08:29

## 2024-03-30 RX ADMIN — LISINOPRIL 10 MG: 10 TABLET ORAL at 08:29

## 2024-03-30 RX ADMIN — VANCOMYCIN HYDROCHLORIDE 1500 MG: 1.5 INJECTION, POWDER, LYOPHILIZED, FOR SOLUTION INTRAVENOUS at 16:54

## 2024-03-30 RX ADMIN — METOPROLOL TARTRATE 25 MG: 25 TABLET, FILM COATED ORAL at 20:58

## 2024-03-30 RX ADMIN — MORPHINE SULFATE 2 MG: 2 INJECTION, SOLUTION INTRAMUSCULAR; INTRAVENOUS at 02:01

## 2024-03-30 RX ADMIN — INSULIN LISPRO 6 UNITS: 100 INJECTION, SOLUTION INTRAVENOUS; SUBCUTANEOUS at 20:59

## 2024-03-30 RX ADMIN — ACETAMINOPHEN 650 MG: 325 TABLET ORAL at 05:02

## 2024-03-30 RX ADMIN — DOXYCYCLINE 100 MG: 100 INJECTION, POWDER, LYOPHILIZED, FOR SOLUTION INTRAVENOUS at 21:10

## 2024-03-30 RX ADMIN — INSULIN LISPRO 3 UNITS: 100 INJECTION, SOLUTION INTRAVENOUS; SUBCUTANEOUS at 08:31

## 2024-03-30 RX ADMIN — VANCOMYCIN HYDROCHLORIDE 1500 MG: 1.5 INJECTION, POWDER, LYOPHILIZED, FOR SOLUTION INTRAVENOUS at 06:44

## 2024-03-30 RX ADMIN — Medication 1 PATCH: at 08:27

## 2024-03-30 RX ADMIN — Medication 3 MG: at 20:58

## 2024-03-30 RX ADMIN — PIPERACILLIN SODIUM AND TAZOBACTAM SODIUM 3.38 G: 3; .375 INJECTION, SOLUTION INTRAVENOUS at 05:00

## 2024-03-30 ASSESSMENT — PAIN SCALES - GENERAL
PAINLEVEL_OUTOF10: 0 - NO PAIN
PAINLEVEL_OUTOF10: 7
PAINLEVEL_OUTOF10: 0 - NO PAIN
PAINLEVEL_OUTOF10: 7
PAINLEVEL_OUTOF10: 3
PAINLEVEL_OUTOF10: 3
PAINLEVEL_OUTOF10: 2
PAINLEVEL_OUTOF10: 9
PAINLEVEL_OUTOF10: 7
PAINLEVEL_OUTOF10: 6

## 2024-03-30 ASSESSMENT — PAIN - FUNCTIONAL ASSESSMENT
PAIN_FUNCTIONAL_ASSESSMENT: 0-10

## 2024-03-30 ASSESSMENT — COGNITIVE AND FUNCTIONAL STATUS - GENERAL
TOILETING: A LITTLE
EATING MEALS: A LITTLE
PERSONAL GROOMING: A LITTLE
WALKING IN HOSPITAL ROOM: A LOT
DAILY ACTIVITIY SCORE: 18
STANDING UP FROM CHAIR USING ARMS: A LOT
HELP NEEDED FOR BATHING: A LITTLE
DRESSING REGULAR LOWER BODY CLOTHING: A LITTLE
CLIMB 3 TO 5 STEPS WITH RAILING: TOTAL
MOBILITY SCORE: 15
DRESSING REGULAR UPPER BODY CLOTHING: A LITTLE
MOVING TO AND FROM BED TO CHAIR: A LITTLE
TURNING FROM BACK TO SIDE WHILE IN FLAT BAD: A LITTLE

## 2024-03-30 ASSESSMENT — PAIN DESCRIPTION - DESCRIPTORS
DESCRIPTORS: ACHING;BURNING
DESCRIPTORS: ACHING;BURNING
DESCRIPTORS: ACHING;BURNING;DISCOMFORT
DESCRIPTORS: ACHING;BURNING

## 2024-03-30 ASSESSMENT — PAIN DESCRIPTION - ORIENTATION
ORIENTATION: RIGHT;LEFT
ORIENTATION: RIGHT;LEFT

## 2024-03-30 ASSESSMENT — PAIN DESCRIPTION - LOCATION
LOCATION: FOOT
LOCATION: FOOT

## 2024-03-30 NOTE — PROGRESS NOTES
"Vancomycin Dosing by Pharmacy- FOLLOW UP    Errol Fonseca is a 54 y.o. year old male who Pharmacy has been consulted for vancomycin dosing for osteomyelitis/septic arthritis. Based on the patient's indication and renal status this patient is being dosed based on a goal AUC of 400-600.     Renal function is currently stable.    Current vancomycin dose: 1500 mg given every 12 hours    Auc 465 and trough 17.6    Visit Vitals  /87 (BP Location: Left arm, Patient Position: Lying)   Pulse 75   Temp 36.5 °C (97.7 °F) (Temporal)   Resp 16        Lab Results   Component Value Date    CREATININE 0.69 03/30/2024    CREATININE 0.69 03/30/2024    CREATININE 0.56 03/29/2024    CREATININE 0.57 03/28/2024        Patient weight is No results found for: \"PTWEIGHT\"    No results found for: \"CULTURE\"     I/O last 3 completed shifts:  In: 1000 (11.7 mL/kg) [I.V.:1000 (11.7 mL/kg)]  Out: 2935 (34.4 mL/kg) [Urine:2935 (1 mL/kg/hr)]  Weight: 85.3 kg   [unfilled]    Lab Results   Component Value Date    PATIENTTEMP 37.0 03/26/2024        Assessment/Plan    Within goal AUC range. Continue current vancomycin regimen.    This dosing regimen is predicted by [employer] to result in the following pharmacokinetic parameters:  Loading dose: N/A  Regimen: 1500 mg IV every 12 hours.  Start time: 18:44 on 03/30/2024  Exposure target: AUC24 (range)400-600 mg/L.hr   AUC24,ss: 465 mg/L.hr  Probability of AUC24 > 400: 80 %  Ctrough,ss: 14.1 mg/L  Probability of Ctrough,ss > 20: 14 %  Probability of nephrotoxicity (Lodise REI 2009): 9 %    The next level will be obtained on 3/31 at 1100. May be obtained sooner if clinically indicated.   Will continue to monitor renal function daily while on vancomycin and order serum creatinine at least every 48 hours if not already ordered.  Follow for continued vancomycin needs, clinical response, and signs/symptoms of toxicity.       Marie Ortega [facility]           "

## 2024-03-30 NOTE — PROGRESS NOTES
"Errol Fonseca is a 54 y.o. male on day 3 of admission presenting with Pneumonia of right lung due to infectious organism, unspecified part of lung.    Subjective   This was seen at bedside for follow-up of his lower extremity issues.  He did have a right fifth toe amputation yesterday.  He currently denies any fever chills nausea vomiting or headache.  He has a significant alcohol history.  .  He did quit smoking 6 months ago.  He was admitted with generalized weakness.  He has a strong alcohol abuse history.         Objective     Vitals:    03/30/24 0925   BP: 140/87   Pulse: 75   Resp: 16   Temp: 36.5 °C (97.7 °F)   SpO2: 95%      Physical Exam  This patient is alert and oriented x 3.  He is in no acute distress.  His head is normocephalic.  Neck is soft and supple without palpable lymph nodes.  Heart is regular rate.  Lungs have some decreased breath sounds posteriorly.  Abdomen soft with positive bowel sounds with no pain on palpation.  Upper extremities seem to have adequate range of motion.  His lower extremities have decreased range of motion with palpable brachial radial femoral popliteal and pedal pulses.  Skin turgor is somewhat decreased in his lower extremities.  Psychologically seems to be acting appropriately he does have a dressing involving his right foot  Blood pressure 140/87, pulse 75, temperature 36.5 °C (97.7 °F), temperature source Temporal, resp. rate 16, height 1.854 m (6' 0.99\"), weight 85.3 kg (188 lb 0.8 oz), SpO2 95 %.        Intake/Output last 3 Shifts:  I/O last 3 completed shifts:  In: 1000 (11.7 mL/kg) [I.V.:1000 (11.7 mL/kg)]  Out: 2935 (34.4 mL/kg) [Urine:2935 (1 mL/kg/hr)]  Weight: 85.3 kg     Patient Active Problem List    Diagnosis Date Noted    Pneumonia of right lung due to infectious organism, unspecified part of lung 03/26/2024    Gangrene of left foot (CMS/HCC) 03/26/2024    Foot ulcer with fat layer exposed, right (CMS/HCC) 03/26/2024          Current " Facility-Administered Medications:     acetaminophen (Tylenol) tablet 650 mg, 650 mg, oral, q4h PRN, Celestine S Lopez, DO, 650 mg at 03/30/24 1138    [Held by provider] aspirin EC tablet 81 mg, 81 mg, oral, Daily, Celestine S Lopez, DO, 81 mg at 03/27/24 0908    dextrose 50 % injection 12.5 g, 12.5 g, intravenous, q15 min PRN, Celestine S Lopez, DO    diazePAM (Valium) injection 10 mg, 10 mg, intravenous, q2h PRN, Celestine S Lopez, DO, 10 mg at 03/26/24 1731    doxycycline (Vibramycin) in dextrose 5 % in water (D5W) 100 mL  mg, 100 mg, intravenous, q12h, Celestine S Lopez, DO, Stopped at 03/30/24 0955    [Held by provider] enoxaparin (Lovenox) syringe 40 mg, 40 mg, subcutaneous, q24h, Celestine S Lopez, DO, 40 mg at 03/26/24 1736    folic acid (Folvite) tablet 1 mg, 1 mg, oral, Daily, Celestine S Lopez, DO, 1 mg at 03/30/24 0829    glucagon (Glucagen) injection 1 mg, 1 mg, intramuscular, q15 min PRN, Celestine S Lopez, DO    guaiFENesin (Mucinex) 12 hr tablet 600 mg, 600 mg, oral, BID PRN, Celestine S Lopez, DO    insulin glargine (Lantus) injection 20 Units, 20 Units, subcutaneous, q AM, Celestine S Lopez, DO, 20 Units at 03/30/24 0832    insulin lispro (HumaLOG) injection 0-15 Units, 0-15 Units, subcutaneous, Before meals & nightly, Celestine S Lopez, DO, 3 Units at 03/30/24 0831    ipratropium-albuteroL (Duo-Neb) 0.5-2.5 mg/3 mL nebulizer solution 3 mL, 3 mL, nebulization, q2h PRN, Celestine S Lopez, DO    lactulose 20 gram/30 mL oral solution 15.3333 g, 15.3333 g, oral, Daily, Celestine S Lopez, DO, 15.3333 g at 03/29/24 1001    lisinopril tablet 10 mg, 10 mg, oral, Daily, Celestine Shenel, DO, 10 mg at 03/30/24 0829    melatonin tablet 3 mg, 3 mg, oral, Nightly, Celestine Shenel, DO, 3 mg at 03/29/24 2148    metoprolol tartrate (Lopressor) tablet 25 mg, 25 mg, oral, BID, Celestine Lopez, DO, 25 mg at 03/30/24 0829    morphine injection 2 mg, 2 mg, intravenous, q4h PRN, Celestine Shenel, DO, 2 mg at 03/30/24 0734    multivitamin with minerals 1 tablet, 1 tablet, oral, Daily, Celestine SARMIENTO  John, DO, 1 tablet at 03/30/24 0829    nicotine (Nicoderm CQ) 14 mg/24 hr patch 1 patch, 1 patch, transdermal, Daily, Celestine Lopez DO, 1 patch at 03/30/24 0827    piperacillin-tazobactam-dextrose (Zosyn) IV 3.375 g, 3.375 g, intravenous, q6h, Celestine Lopez DO, Last Rate: 100 mL/hr at 03/30/24 1128, 3.375 g at 03/30/24 1128    thiamine (Vitamin B-1) tablet 100 mg, 100 mg, oral, Daily, Celestine Lopez DO, 100 mg at 03/30/24 1008    vancomycin (Vancocin) pharmacy to dose - pharmacy monitoring, , miscellaneous, Daily PRN, Celestine Lopez DO    vancomycin 1,500 mg in dextrose 5 % in water (D5W) 500 mL IV, 1,500 mg, intravenous, q12h, Celestine Lopez DO, Stopped at 03/30/24 0814     Lab Results   Component Value Date    WBC 4.0 (L) 03/30/2024    HGB 10.0 (L) 03/30/2024    HCT 29.9 (L) 03/30/2024    PLT 65 (L) 03/30/2024    CHOL 310 (H) 04/05/2019    TRIG 300 (H) 04/05/2019    HDL 15.0 (A) 04/05/2019    ALT 57 (H) 03/30/2024    AST 82 (H) 03/30/2024     03/30/2024     03/30/2024    K 3.3 (L) 03/30/2024    K 3.3 (L) 03/30/2024     03/30/2024     03/30/2024    CREATININE 0.69 03/30/2024    CREATININE 0.69 03/30/2024    BUN 12 03/30/2024    BUN 12 03/30/2024    CO2 28 03/30/2024    CO2 28 03/30/2024    TSH 6.23 (H) 04/05/2019    INR 1.2 (H) 03/28/2024    HGBA1C 8.4 (H) 03/27/2024       Vascular US Ankle Brachial Index (SPARKLE) Without Exercise    Result Date: 3/28/2024           [facility] [address], [place] [address] Tel [phone] and Fax [phone]  Vascular Lab Report [facility] ANKLE BRACHIAL INDEX (SPARKLE) WITHOUT EXERCISE  Patient Name:     MAXIME Gonzalez Physician: [id number] Mae Moore MD Study Date:       3/27/2024           Ordering           [id number] CONSTANTINE OLIVEIRA                                       Physician:         BEL MRN/PID:          [id number]            Technologist:      Amada Yen RVT Accession#:        [id number]        Technologist 2: Date of           1970 / 54      Encounter#:        [id number] Birth/Age:        years Gender:           M Admission Status: Inpatient           Location           [facility]                                       Performed:  Diagnosis/ICD: 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 Normal digital perfusion noted. Triphasic flow is noted in the right posterior tibial artery, right dorsalis pedis artery and right common femoral artery. Left Lower PVR: No evidence of arterial occlusive disease in the left lower extremity at rest. Normal digital perfusion noted. Triphasic flow is noted in the left common femoral artery, left posterior tibial artery and left dorsalis pedis artery.  Imaging & Doppler Findings:  RIGHT Lower PVR                Pressures Ratios Right Posterior Tibial (Ankle) 172 mmHg  1.06 Right Dorsalis Pedis (Ankle)   170 mmHg  1.04 Right Digit (Great Toe)        114 mmHg  0.70   LEFT Lower PVR                Pressures Ratios Left Posterior Tibial (Ankle) 160 mmHg  0.98 Left Dorsalis Pedis (Ankle)   166 mmHg  1.02 Left Digit (Great Toe)        101 mmHg  0.62                     Right     Left Brachial Pressure 163 mmHg 163 mmHg   [id number] Mae Moore MD Electronically signed by [id number] Mae Moore MD on 3/28/2024 at 11:53:37 AM  ** Final **     XR foot right 3+ views    Result Date: 3/27/2024  Interpreted By:  Joel Zavaleta, STUDY: XR FOOT RIGHT 3+ VIEWS;  3/27/2024 2:50 pm   INDICATION: Signs/Symptoms:chronic ulcer.   COMPARISON: None.   ACCESSION NUMBER(S): [id number]   ORDERING CLINICIAN: CONSTANTINE STAPLES   FINDINGS: Bony structures:  Intact. There is cortical thickening of the shaft of the 3rd metatarsal laterally that may be from remote  fracture.   Joint spaces:  Mild hammertoe deformities.   Soft tissues:  There is soft tissue fullness plantarly at the level of the heads of the metatarsals on lateral projection which may be due to edema. There is superficial linear hypodensity at this location which may be due to an ulcer.   Other:  None significant       Probable plantar soft tissue edema, possibly with ulceration.   MACRO: None   Signed by: Joel Zavaleta 3/27/2024 4:33 PM Dictation workstation:   [id number]    ECG 12 lead    Result Date: 3/27/2024  Sinus rhythm Minimal ST depression, lateral leads Prolonged QT interval See ED provider note for full interpretation and clinical correlation Confirmed by Anahi Niño ([id number]) on 3/27/2024 11:40:07 AM    MR foot left w and wo IV contrast    Result Date: 3/26/2024  Interpreted By:  Emanuel Gaines, STUDY: MRI of the  left forefoot  without and with contrast dated  3/26/2024.   INDICATION: Signs/Symptoms:left 5th toe with necrotic tissue   COMPARISON: None.   ACCESSION NUMBER(S): [id number]   ORDERING CLINICIAN: DERRICK STOVALL   TECHNIQUE: Multiplanar multisequence MRI of the  left forefoot was performed without and with intravenous gadolinium based contrast.   FINDINGS: OSSEOUS STRUCTURES AND JOINTS:   No fracture or dislocation is evident.  There is increased T2 and STIR signal intensity in the distal phalanx of the 5th digit. There is associated low T1 signal intensity on the T1 weighted imaging. There is not definitively increased T1 signal intensity after the administration of contrast although this may be technically limited. Increased T2 signal intensity is seen in other phalanges of the digits without corresponding STIR signal intensity and thus felt to be related to poor fat saturation. Mild degenerative changes seen in the midfoot. Mild degenerative changes seen of the 1st digit metatarsophalangeal joint. No joint effusion is evident.   SOFT TISSUES:   Diffuse atrophy is seen in the musculature.  There is diffuse increased T2 signal intensity in the musculature with mild ill-defined postcontrast enhancement. Reticular increased T2 signal intensity is seen in the subcutaneous tissues greatest on the dorsum of the foot. There is extension into the 4th and 5th toes. No focal soft tissue collection is evident. Site wound/ulcer of the 5th toe is not well assessed for on this MRI, but there may be irregularity to the skin/subcutaneous tissues of the lateral to lateral plantar aspect of the 5th toe.       1. High likelihood of osteomyelitis involving the distal phalanx of the 5th digit. 2. Cellulitis and/or lymphedema of the lower extremity greatest on the dorsum of the foot extending into the 4th and 5th toes. 3. Limited assessment of known necrotic tissue of the 5th digit. 4. 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   Signed by: Emanuel Gaines 3/26/2024 8:15 PM Dictation workstation:   [id number]    XR chest 1 view    Result Date: 3/26/2024  Interpreted By:  Shauna Coffey, STUDY: XR CHEST 1 VIEW;  3/26/2024 1:49 pm   INDICATION: Signs/Symptoms:89% RA, eval for PNA.   COMPARISON: 10/10/2023; CT chest dated 04/04/2019   ACCESSION NUMBER(S): [id number]   ORDERING CLINICIAN: ZEKE HERRON   FINDINGS: Heart is normal in size.   There is right-sided consolidation   There appears to be fullness to the left of the trachea. This may be due to rotation.   There are numerous old left rib fractures.   There are degenerative changes of the spine.   COMPARISON OF FINDING: The right-sided infiltrate is new.       Right-sided infiltration.   MACRO: none   Signed by: Shauna Coffey 3/26/2024 1:56 PM Dictation workstation:   [id number]                Assessment/Plan   Principal Problem:    Pneumonia of right lung due to infectious organism, unspecified part of lung  Active Problems:    Gangrene of left foot (CMS/HCC)    Foot ulcer with fat layer exposed, right (CMS/HCC)      This patient was seen and evaluated at  bedside for his lower extremity issues.  He did have a noninvasive study that did show adequate/normal perfusion of his feet and digits.  He quit smoking 6 months ago.  He has a significant alcohol abuse history.  I did discuss the amputation that he had yesterday with Dr. Villegas.  Apparently, there was not much bleeding during the amputation.  He is a diabetic.  He may need higher level amputation if his wounds are not healing.  Vascular intervention on hold for now.      Omer Acuna, DO

## 2024-03-30 NOTE — PROGRESS NOTES
Physical Therapy                 Therapy Communication Note    Patient Name: Errol Fonseca  MRN: [id number]  Today's Date: 3/30/2024     Discipline: Physical Therapy    Missed Visit Reason: Missed Visit Reason:  (Postop PT order received, however no WB orders per chart, message sent to MD. Will reattempt as able/appropriate once WB order received. Nursing aware.)    Missed Time: Attempt

## 2024-03-30 NOTE — PROGRESS NOTES
PODIATRIC MEDICINE & SURGERY - PROGRESS NOTE    Subjective   Errol Fonseca is a 54 y.o. male who is on day 3 of admission for Pneumonia of right lung due to infectious organism, unspecified part of lung. S/p left 5th toe amputation and wound debridement right foot (DOS 3/29/24, Dr. Villegas)    Interval HPI: Patient resting comfortably in bed.  Patient is complaining of burning pain at the amputation site of the fifth left digit.  Patient is also complaining of overall general weakness and feels he is not able to participate in physical therapy at this time.    Review Of Systems:  A 10+ point ROS was completed and otherwise negative except as noted above and per HPI.    Past Medical History  Past Medical History:   Diagnosis Date    Acute ischemic heart disease, unspecified (CMS/HCC)     Coronary syndrome, acute    Cirrhosis of liver (CMS/HCC)     Coronary artery disease     Diabetes mellitus (CMS/HCC)     Personal history of other diseases of the circulatory system     History of heart block    Personal history of other diseases of the musculoskeletal system and connective tissue     History of arthritis    Personal history of other endocrine, nutritional and metabolic disease     History of obesity     Allergies  No Known Allergies  Medications  Scheduled medications  [Held by provider] aspirin, 81 mg, oral, Daily  doxycycline, 100 mg, intravenous, q12h  [Held by provider] enoxaparin, 40 mg, subcutaneous, q24h  folic acid, 1 mg, oral, Daily  insulin glargine, 20 Units, subcutaneous, q AM  insulin lispro, 0-15 Units, subcutaneous, Before meals & nightly  lactulose, 15.3333 g, oral, Daily  lisinopril, 10 mg, oral, Daily  melatonin, 3 mg, oral, Nightly  metoprolol tartrate, 25 mg, oral, BID  multivitamin with minerals, 1 tablet, oral, Daily  nicotine, 1 patch, transdermal, Daily  piperacillin-tazobactam, 3.375 g, intravenous, q6h  thiamine, 100 mg, oral, Daily  vancomycin, 1,500 mg, intravenous, q12h    Continuous  "medications       PRN medications: acetaminophen, dextrose, diazePAM, glucagon, guaiFENesin, ipratropium-albuteroL, morphine, vancomycin    Objective   Visit Vitals  /87 (BP Location: Left arm, Patient Position: Lying)   Pulse 75   Temp 36.5 °C (97.7 °F) (Temporal)   Resp 16   Ht 1.854 m (6' 0.99\")   Wt 85.3 kg (188 lb 0.8 oz)   SpO2 95%   BMI 24.82 kg/m²   Smoking Status Every Day   BSA 2.1 m²     Physical Exam  Constitutional: NAD and AAOx3.   HEENT: Head is normocephalic and atraumatic. External ear is normal B/L. Conjunctivae normal B/L. Nose is normal. Oropharynx is clear. Mouth is moist.   Cardiovascular: Normal rate.  Pulmonary: No increased work of breathing.  Psychological: Appropriate mood and behavior.     Vascular: DP and PT pulses are palpable bilateral.  CFT is less than 3 seconds bilateral.  Skin temperature is warm to warm proximal to distal bilateral.       Neurologic:  Light touch is diminished to the foot bilateral.      Musculoskeletal: Moves all extremities spontaneously. S/p left 5th toe ampuation    Dermatologic: Nails 1-5 right and 1-4 left are intact.  Webspaces are clean, dry and intact bilateral.      WOUND #1: Left 5th toe amputation site  Incision is well-coapted with with sutures intact. Mild periwound erythema and edema. Mild serosanguinous drainage on inner dressing with no odor. No strikethrough. No ascending cellulitis or lymphangitis.  Skin edges are viable with no signs of necrosis.    WOUND #2: Plantar right 2nd metatarsal   Ulcer measures 2 x 1.7 x 0.7 cm. No probe to bone.  No exposed tendon in wound bed.  No purulent drainage noted. Moderate sanguinous drainage with strikethrough noted to outer dressing. No malodor. No ascending cellulitis.  No surrounding edema. No fluctuance or crepitus. No necrotic tissue.     Lab Results   Component Value Date    WBC 4.0 (L) 03/30/2024    HGB 10.0 (L) 03/30/2024    HCT 29.9 (L) 03/30/2024     (H) 03/30/2024    PLT 65 (L) " 03/30/2024     Lab Results   Component Value Date    GLUCOSE 169 (H) 03/30/2024    GLUCOSE 169 (H) 03/30/2024    CALCIUM 7.8 (L) 03/30/2024    CALCIUM 7.8 (L) 03/30/2024     03/30/2024     03/30/2024    K 3.3 (L) 03/30/2024    K 3.3 (L) 03/30/2024    CO2 28 03/30/2024    CO2 28 03/30/2024     03/30/2024     03/30/2024    BUN 12 03/30/2024    BUN 12 03/30/2024    CREATININE 0.69 03/30/2024    CREATININE 0.69 03/30/2024     Lab Results   Component Value Date    HGBA1C 8.4 (H) 03/27/2024     Susceptibility data from last 90 days.  Collected Specimen Info Organism Clindamycin Erythromycin Oxacillin Tetracycline Trimethoprim/Sulfamethoxazole Vancomycin   03/27/24 Tissue/Biopsy from Wound/Tissue Methicillin Resistant Staphylococcus aureus (MRSA) S R R R S S   03/26/24 Swab from Anterior Nares Methicillin Resistant Staphylococcus aureus (MRSA)           Imaging  Vascular US Ankle Brachial Index (SPARKLE) Without Exercise  Result Date: 3/28/2024  CONCLUSIONS:   Right Lower PVR: No evidence of arterial occlusive disease in the right lower extremity at rest. Normal digital perfusion noted. Triphasic flow is noted in the right posterior tibial artery, right dorsalis pedis artery and right common femoral artery.   Left Lower PVR: No evidence of arterial occlusive disease in the left lower extremity at rest. Normal digital perfusion noted. Triphasic flow is noted in the left common femoral artery, left posterior tibial artery and left dorsalis pedis artery.    RIGHT Lower PVR                Pressures Ratios  Right Posterior Tibial (Ankle) 172 mmHg  1.06  Right Dorsalis Pedis (Ankle)   170 mmHg  1.04  Right Digit (Great Toe)        114 mmHg  0.70  LEFT Lower PVR                Pressures Ratios  Left Posterior Tibial (Ankle) 160 mmHg  0.98  Left Dorsalis Pedis (Ankle)   166 mmHg  1.02  Left Digit (Great Toe)        101 mmHg  0.62    Assessment/Plan   Errol Fonseca is a 54 y.o. male who is on day 3 of  admission for Pneumonia of right lung due to infectious organism, unspecified part of lung.  S/p left 5th toe amputation and wound debridement right foot(DOS 3/29/24, Dr. Villegas)    #Gangrene of left 5th toe - S/p left 5th toe amputation (DOS 3/29/24, Dr. Villegas)  #Osteomyelitis of left 5th digit  #Neuropathic ulcer, right foot  #Diabetes mellitus, uncontrolled  #Diabetic peripheral neuropathy    Reviewed labs, imaging and notes. Afebrile and no leukocytosis.  On exam, left 5th toe amputation site incision is well-coapted.   Patient complains of pain, burning in nature to 5th left amputation site.     Plan/Recommendations:  - Left foot dressing changed with  adaptic, 4x4 gauze and Kerlix secured with tape.   - Right foot dressing changed with adaptic, ABD, Kerlix and ACE wrap  - Nursing orders entered for daily dressings as above with gentamicin cream  - WBAT in surgical shoe B/L  - Continue IV abx per primary team. Pain management per primary team.   - Podiatry team will continue to follow.     Patient examined and evaluated with attending physician, Dr. Nelda Villegas DPM.    Niharika Zamudio DPM PGY-1  Podiatric Medicine & Surgery    I saw and evaluated the patient. I personally obtained the key and critical portions of the history and physical exam or was physically present for key and critical portions performed by the resident/fellow. I reviewed the resident/fellow's documentation and discussed the patient with the resident/fellow. I agree with the resident/fellow's medical decision making as documented in the note.    Nelda Villegas DPM      Good

## 2024-03-30 NOTE — CARE PLAN
The patient's goals for the shift include to remain safe    The clinical goals for the shift include pt will report decrease in pain levels    Over the shift, the patient did not make progress toward the following goals. Barriers to progression include reporting pain early. Recommendations to address these barriers include assess pain frequently.

## 2024-03-30 NOTE — PROGRESS NOTES
Subjective   Feels generally weak. Toe amp yesterday said he tolerated well, he is hopeful to work with pt     Objective   Vitals:    03/30/24 0925   BP: 140/87   Pulse: 75   Resp: 16   Temp: 36.5 °C (97.7 °F)   SpO2: 95%       Physical Exam:   Constitutional: obese, awake, alert, no acute distress  ENMT: mucous membranes dry, conjunctivae clear  Head/Neck: normocephalic, atraumatic; supple, trachea midline  Respiratory/Thorax: patent airways, diminished breath sounds  Cardiovascular: RRR, no murmur appreciated  Gastrointestinal: soft, nondistended, non-tender, bowel sounds appreciated  Extremities: s/p L 5th toe amp and debridement of ulcer plantar aspect R foot   Neurological: AO x3, no focal deficits  Psychological: appropriate mood and behavior  Skin: warm and dry      Assessment/Plan   This is a 54-year-old male who initially presented to [facility] on 3/26/2024 with generalized weakness and tremors x 2 days.  He admits to drinking 2-16 ounce bottles of hand  for the last 2 months as he was trying to hide his alcohol use from his family.  He previously was drinking 1 pint of vodka every other day or so.  Last EtOH use on AM of 3/26.     Left 5th digit OM and gangrene s/p amp 3/29  Right foot plantar ulcer debridement 3/29  PNA - strep pneumo  Acute alcoholic hepatitis  Thrombocytopenia in setting of liver cirrhosis  IDDM2 with hyperglycemia  Alcohol use disorder with resolving acute alcohol withdrawal  Acute hypoxemia, resolved - wearing 2L NC for comfort now  Elevated troponin, suspect demand ischemia in setting of hypoxemia  Hypomagnesemia    #Alcoholic liver cirrhosis  #History of alcohol hepatitis  #IDDM2  #HTN  #CAD s/p PCI with stent  #History of SAH     Continue Vanc/Zosyn/Doxy per ID. WCx growing rare Staph aureus. Podiatry, vascular surgery, ID following. S/p 5th digit amputation and wound debridement of R foot plantar ulcer. Resume ASA when ok by podiatry   Continue Doxycycline  for atypical  coverage for PNA pending UAg. Sched and PRN Rusty Fuentes appreciate ID recs.  CIWA protocol with PRN Valium IV, scoring low. Continue thiamine, folic acid. Counseled on alcohol cessation. SW following. Case was d/w posion control previously regarding hand  ingestion. No indication for dialysis, c/w supportive measures pt mentating well pressures stable low CIWA score, repeat serum osm  MDF 7.6 with good prognosis. No need for steroids.  MELD-Na 11 with 6% estimated 3-month mortality. Will need hepatology follow upon DC.  Monitor and replete electrolytes PRN.  Insulin regimen:  Home: Lantus 30 QAM, Lispro 5 TID AC, SSI  Inpatient: Lantus 20 QAM, SSI (0-15) TID AC     - Continue home metoprolol tartrate, lisinopril, atorvastatin,   - resume ASA when ok by podiatry      DVT PPX: SCDs only given thrombocytopenia  Diet: 2g Na/Diabetic  IVF: -  Consults: GI, Podiatry, Vascular, ID  Code status: FULL     This is a preliminary note written by the resident. Please wait for attending addendum for finalization of note and recommendations.

## 2024-03-30 NOTE — NURSING NOTE
Patient complaints of increased pain to bilateral extremities. Patient has Tylenol PRN but reports a pain level of 7/10 throbbing in nature. Reached out to Dr. LORENA Lopez for further pain management orders.

## 2024-03-30 NOTE — PROGRESS NOTES
Occupational Therapy    Occupational Therapy    Evaluation    Patient Name: Errol Fonseca  MRN: [id number]  Today's Date: 3/30/2024       Assessment       Plan:       Subjective     Current Problem:  1. Pneumonia of right lung due to infectious organism, unspecified part of lung        2. Generalized weakness        3. Hypomagnesemia        4. Alcohol abuse        5. Alcoholic intoxication with complication (CMS/HCC)        6. Acute hypoxic respiratory failure (CMS/HCC)        7. Gangrene of left foot (CMS/[facility])  Vascular US Ankle Brachial Index (SPARKLE) Without Exercise    Vascular US Ankle Brachial Index (SPARKLE) Without Exercise    Case Request Operating Room: Amputation Digit Foot 5th left, debridement of wound right foot    Case Request Operating Room: Amputation Digit Foot 5th left, debridement of wound right foot    Surgical Pathology Exam    Surgical Pathology Exam    CANCELED: Vascular US PVR without exercise    CANCELED: Vascular US PVR without exercise      8. 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 Foot ulcer with fat layer exposed, right (CMS/[facility])  Case Request Operating Room: Amputation Digit Foot 5th left, debridement of wound right foot    Case Request Operating Room: Amputation Digit Foot 5th left, debridement of wound right foot    Surgical Pathology Exam    Surgical Pathology Exam          General:  General  Referred By: NOE  Missed Visit: Yes  Missed Visit Reason: Other (Comment) (WAITING ON CLEARANCE FROM MD ON WEIGHT BEARING STATUS WITH NEW ONSET AMPUTATION)- THERAPY TO ENGAGE ONCE ORDER RECEIVED   Prior to Session Communication: Bedside nurse

## 2024-03-30 NOTE — CARE PLAN
The patient's goals for the shift include to remain safe    The clinical goals for the shift include to decrease tremors and increase comfort throughout the shift with pain management.     Patient given PRN Morphine IV and Tylenol for pain management with assistance.

## 2024-03-31 LAB
ALBUMIN SERPL BCP-MCNC: 3 G/DL (ref 3.4–5)
ALP SERPL-CCNC: 208 U/L (ref 33–120)
ALT SERPL W P-5'-P-CCNC: 51 U/L (ref 10–52)
ANION GAP SERPL CALC-SCNC: 11 MMOL/L (ref 10–20)
AST SERPL W P-5'-P-CCNC: 70 U/L (ref 9–39)
BILIRUB SERPL-MCNC: 3 MG/DL (ref 0–1.2)
BUN SERPL-MCNC: 9 MG/DL (ref 6–23)
CALCIUM SERPL-MCNC: 8.2 MG/DL (ref 8.6–10.3)
CHLORIDE SERPL-SCNC: 97 MMOL/L (ref 98–107)
CO2 SERPL-SCNC: 28 MMOL/L (ref 21–32)
CREAT SERPL-MCNC: 0.47 MG/DL (ref 0.5–1.3)
EGFRCR SERPLBLD CKD-EPI 2021: >90 ML/MIN/1.73M*2
ERYTHROCYTE [DISTWIDTH] IN BLOOD BY AUTOMATED COUNT: 14 % (ref 11.5–14.5)
GLUCOSE BLD MANUAL STRIP-MCNC: 229 MG/DL (ref 74–99)
GLUCOSE BLD MANUAL STRIP-MCNC: 251 MG/DL (ref 74–99)
GLUCOSE BLD MANUAL STRIP-MCNC: 295 MG/DL (ref 74–99)
GLUCOSE SERPL-MCNC: 168 MG/DL (ref 74–99)
HCT VFR BLD AUTO: 30.9 % (ref 41–52)
HGB BLD-MCNC: 10.5 G/DL (ref 13.5–17.5)
HOLD SPECIMEN: NORMAL
MAGNESIUM SERPL-MCNC: 1.73 MG/DL (ref 1.6–2.4)
MCH RBC QN AUTO: 35.1 PG (ref 26–34)
MCHC RBC AUTO-ENTMCNC: 34 G/DL (ref 32–36)
MCV RBC AUTO: 103 FL (ref 80–100)
NRBC BLD-RTO: 0 /100 WBCS (ref 0–0)
PLATELET # BLD AUTO: 71 X10*3/UL (ref 150–450)
POTASSIUM SERPL-SCNC: 3.3 MMOL/L (ref 3.5–5.3)
PROT SERPL-MCNC: 5.7 G/DL (ref 6.4–8.2)
RBC # BLD AUTO: 2.99 X10*6/UL (ref 4.5–5.9)
SODIUM SERPL-SCNC: 133 MMOL/L (ref 136–145)
VANCOMYCIN SERPL-MCNC: 11.8 UG/ML (ref 5–20)
VANCOMYCIN SERPL-MCNC: 21.6 UG/ML (ref 5–20)
WBC # BLD AUTO: 3.4 X10*3/UL (ref 4.4–11.3)

## 2024-03-31 PROCEDURE — 2500000001 HC RX 250 WO HCPCS SELF ADMINISTERED DRUGS (ALT 637 FOR MEDICARE OP): Performed by: STUDENT IN AN ORGANIZED HEALTH CARE EDUCATION/TRAINING PROGRAM

## 2024-03-31 PROCEDURE — 82947 ASSAY GLUCOSE BLOOD QUANT: CPT

## 2024-03-31 PROCEDURE — S4991 NICOTINE PATCH NONLEGEND: HCPCS | Performed by: STUDENT IN AN ORGANIZED HEALTH CARE EDUCATION/TRAINING PROGRAM

## 2024-03-31 PROCEDURE — 83735 ASSAY OF MAGNESIUM: CPT | Performed by: STUDENT IN AN ORGANIZED HEALTH CARE EDUCATION/TRAINING PROGRAM

## 2024-03-31 PROCEDURE — 80202 ASSAY OF VANCOMYCIN: CPT | Performed by: STUDENT IN AN ORGANIZED HEALTH CARE EDUCATION/TRAINING PROGRAM

## 2024-03-31 PROCEDURE — 1200000002 HC GENERAL ROOM WITH TELEMETRY DAILY

## 2024-03-31 PROCEDURE — 97166 OT EVAL MOD COMPLEX 45 MIN: CPT | Mod: GO

## 2024-03-31 PROCEDURE — 99024 POSTOP FOLLOW-UP VISIT: CPT

## 2024-03-31 PROCEDURE — 2500000002 HC RX 250 W HCPCS SELF ADMINISTERED DRUGS (ALT 637 FOR MEDICARE OP, ALT 636 FOR OP/ED): Performed by: STUDENT IN AN ORGANIZED HEALTH CARE EDUCATION/TRAINING PROGRAM

## 2024-03-31 PROCEDURE — 2500000004 HC RX 250 GENERAL PHARMACY W/ HCPCS (ALT 636 FOR OP/ED): Performed by: STUDENT IN AN ORGANIZED HEALTH CARE EDUCATION/TRAINING PROGRAM

## 2024-03-31 PROCEDURE — 99232 SBSQ HOSP IP/OBS MODERATE 35: CPT | Performed by: STUDENT IN AN ORGANIZED HEALTH CARE EDUCATION/TRAINING PROGRAM

## 2024-03-31 PROCEDURE — 2500000001 HC RX 250 WO HCPCS SELF ADMINISTERED DRUGS (ALT 637 FOR MEDICARE OP)

## 2024-03-31 PROCEDURE — 36415 COLL VENOUS BLD VENIPUNCTURE: CPT | Performed by: STUDENT IN AN ORGANIZED HEALTH CARE EDUCATION/TRAINING PROGRAM

## 2024-03-31 PROCEDURE — 85027 COMPLETE CBC AUTOMATED: CPT | Performed by: STUDENT IN AN ORGANIZED HEALTH CARE EDUCATION/TRAINING PROGRAM

## 2024-03-31 PROCEDURE — 97161 PT EVAL LOW COMPLEX 20 MIN: CPT | Mod: GP

## 2024-03-31 PROCEDURE — 80053 COMPREHEN METABOLIC PANEL: CPT | Performed by: STUDENT IN AN ORGANIZED HEALTH CARE EDUCATION/TRAINING PROGRAM

## 2024-03-31 RX ORDER — MAGNESIUM SULFATE HEPTAHYDRATE 40 MG/ML
2 INJECTION, SOLUTION INTRAVENOUS ONCE
Status: COMPLETED | OUTPATIENT
Start: 2024-03-31 | End: 2024-03-31

## 2024-03-31 RX ORDER — FAMOTIDINE 20 MG/1
20 TABLET, FILM COATED ORAL ONCE
Status: COMPLETED | OUTPATIENT
Start: 2024-03-31 | End: 2024-04-01

## 2024-03-31 RX ORDER — POTASSIUM CHLORIDE 1.5 G/1.58G
40 POWDER, FOR SOLUTION ORAL ONCE
Status: COMPLETED | OUTPATIENT
Start: 2024-03-31 | End: 2024-03-31

## 2024-03-31 RX ORDER — ALUMINUM HYDROXIDE, MAGNESIUM HYDROXIDE, AND SIMETHICONE 1200; 120; 1200 MG/30ML; MG/30ML; MG/30ML
10 SUSPENSION ORAL ONCE
Status: DISCONTINUED | OUTPATIENT
Start: 2024-03-31 | End: 2024-04-02

## 2024-03-31 RX ADMIN — THIAMINE HCL TAB 100 MG 100 MG: 100 TAB at 08:45

## 2024-03-31 RX ADMIN — INSULIN GLARGINE 20 UNITS: 100 INJECTION, SOLUTION SUBCUTANEOUS at 08:41

## 2024-03-31 RX ADMIN — VANCOMYCIN HYDROCHLORIDE 1500 MG: 1.5 INJECTION, POWDER, LYOPHILIZED, FOR SOLUTION INTRAVENOUS at 18:05

## 2024-03-31 RX ADMIN — Medication 3 MG: at 21:03

## 2024-03-31 RX ADMIN — ACETAMINOPHEN 650 MG: 325 TABLET ORAL at 06:18

## 2024-03-31 RX ADMIN — FOLIC ACID 1 MG: 1 TABLET ORAL at 08:37

## 2024-03-31 RX ADMIN — INSULIN LISPRO 6 UNITS: 100 INJECTION, SOLUTION INTRAVENOUS; SUBCUTANEOUS at 21:08

## 2024-03-31 RX ADMIN — VANCOMYCIN HYDROCHLORIDE 1500 MG: 1.5 INJECTION, POWDER, LYOPHILIZED, FOR SOLUTION INTRAVENOUS at 06:45

## 2024-03-31 RX ADMIN — LISINOPRIL 10 MG: 10 TABLET ORAL at 08:37

## 2024-03-31 RX ADMIN — METOPROLOL TARTRATE 25 MG: 25 TABLET, FILM COATED ORAL at 08:37

## 2024-03-31 RX ADMIN — INSULIN LISPRO 9 UNITS: 100 INJECTION, SOLUTION INTRAVENOUS; SUBCUTANEOUS at 18:10

## 2024-03-31 RX ADMIN — PIPERACILLIN SODIUM AND TAZOBACTAM SODIUM 3.38 G: 3; .375 INJECTION, SOLUTION INTRAVENOUS at 11:25

## 2024-03-31 RX ADMIN — MAGNESIUM SULFATE HEPTAHYDRATE 2 G: 40 INJECTION, SOLUTION INTRAVENOUS at 11:56

## 2024-03-31 RX ADMIN — INSULIN LISPRO 9 UNITS: 100 INJECTION, SOLUTION INTRAVENOUS; SUBCUTANEOUS at 12:37

## 2024-03-31 RX ADMIN — PIPERACILLIN SODIUM AND TAZOBACTAM SODIUM 3.38 G: 3; .375 INJECTION, SOLUTION INTRAVENOUS at 06:14

## 2024-03-31 RX ADMIN — MORPHINE SULFATE 2 MG: 2 INJECTION, SOLUTION INTRAMUSCULAR; INTRAVENOUS at 09:40

## 2024-03-31 RX ADMIN — MORPHINE SULFATE 2 MG: 2 INJECTION, SOLUTION INTRAMUSCULAR; INTRAVENOUS at 03:12

## 2024-03-31 RX ADMIN — Medication 1 PATCH: at 08:38

## 2024-03-31 RX ADMIN — DOXYCYCLINE 100 MG: 100 INJECTION, POWDER, LYOPHILIZED, FOR SOLUTION INTRAVENOUS at 09:40

## 2024-03-31 RX ADMIN — Medication 1 TABLET: at 08:37

## 2024-03-31 RX ADMIN — METOPROLOL TARTRATE 25 MG: 25 TABLET, FILM COATED ORAL at 21:03

## 2024-03-31 RX ADMIN — MORPHINE SULFATE 2 MG: 2 INJECTION, SOLUTION INTRAMUSCULAR; INTRAVENOUS at 16:07

## 2024-03-31 RX ADMIN — GENTAMICIN SULFATE: 1 CREAM TOPICAL at 08:54

## 2024-03-31 RX ADMIN — PIPERACILLIN SODIUM AND TAZOBACTAM SODIUM 3.38 G: 3; .375 INJECTION, SOLUTION INTRAVENOUS at 23:34

## 2024-03-31 RX ADMIN — POTASSIUM CHLORIDE 40 MEQ: 1.5 POWDER, FOR SOLUTION ORAL at 11:56

## 2024-03-31 RX ADMIN — DOXYCYCLINE 100 MG: 100 INJECTION, POWDER, LYOPHILIZED, FOR SOLUTION INTRAVENOUS at 21:23

## 2024-03-31 RX ADMIN — PIPERACILLIN SODIUM AND TAZOBACTAM SODIUM 3.38 G: 3; .375 INJECTION, SOLUTION INTRAVENOUS at 17:01

## 2024-03-31 RX ADMIN — MORPHINE SULFATE 2 MG: 2 INJECTION, SOLUTION INTRAMUSCULAR; INTRAVENOUS at 22:18

## 2024-03-31 ASSESSMENT — COGNITIVE AND FUNCTIONAL STATUS - GENERAL
CLIMB 3 TO 5 STEPS WITH RAILING: TOTAL
PERSONAL GROOMING: A LITTLE
PERSONAL GROOMING: A LITTLE
DRESSING REGULAR LOWER BODY CLOTHING: A LITTLE
DAILY ACTIVITIY SCORE: 19
DAILY ACTIVITIY SCORE: 18
TOILETING: A LITTLE
DRESSING REGULAR LOWER BODY CLOTHING: A LITTLE
STANDING UP FROM CHAIR USING ARMS: A LOT
CLIMB 3 TO 5 STEPS WITH RAILING: A LOT
WALKING IN HOSPITAL ROOM: A LOT
DAILY ACTIVITIY SCORE: 19
DRESSING REGULAR UPPER BODY CLOTHING: A LITTLE
MOBILITY SCORE: 15
EATING MEALS: A LITTLE
DRESSING REGULAR UPPER BODY CLOTHING: A LITTLE
MOBILITY SCORE: 14
STANDING UP FROM CHAIR USING ARMS: A LOT
CLIMB 3 TO 5 STEPS WITH RAILING: A LOT
HELP NEEDED FOR BATHING: A LITTLE
WALKING IN HOSPITAL ROOM: A LOT
TURNING FROM BACK TO SIDE WHILE IN FLAT BAD: A LITTLE
MOVING TO AND FROM BED TO CHAIR: A LOT
STANDING UP FROM CHAIR USING ARMS: A LOT
WALKING IN HOSPITAL ROOM: A LOT
DRESSING REGULAR UPPER BODY CLOTHING: A LITTLE
TURNING FROM BACK TO SIDE WHILE IN FLAT BAD: A LITTLE
MOVING FROM LYING ON BACK TO SITTING ON SIDE OF FLAT BED WITH BEDRAILS: A LITTLE
HELP NEEDED FOR BATHING: A LITTLE
MOVING TO AND FROM BED TO CHAIR: A LITTLE
TOILETING: A LITTLE
PERSONAL GROOMING: A LITTLE
DRESSING REGULAR LOWER BODY CLOTHING: A LITTLE
MOBILITY SCORE: 14
TURNING FROM BACK TO SIDE WHILE IN FLAT BAD: A LITTLE
MOVING FROM LYING ON BACK TO SITTING ON SIDE OF FLAT BED WITH BEDRAILS: A LITTLE
MOVING TO AND FROM BED TO CHAIR: A LOT
HELP NEEDED FOR BATHING: A LITTLE
TOILETING: A LITTLE

## 2024-03-31 ASSESSMENT — ACTIVITIES OF DAILY LIVING (ADL)
ADL_ASSISTANCE: INDEPENDENT
BATHING_ASSISTANCE: MODERATE

## 2024-03-31 ASSESSMENT — PAIN SCALES - GENERAL
PAINLEVEL_OUTOF10: 8
PAINLEVEL_OUTOF10: 7
PAINLEVEL_OUTOF10: 7
PAINLEVEL_OUTOF10: 5 - MODERATE PAIN
PAINLEVEL_OUTOF10: 0 - NO PAIN
PAINLEVEL_OUTOF10: 3
PAINLEVEL_OUTOF10: 8
PAINLEVEL_OUTOF10: 6
PAINLEVEL_OUTOF10: 0 - NO PAIN

## 2024-03-31 ASSESSMENT — PAIN DESCRIPTION - ORIENTATION
ORIENTATION: RIGHT;LEFT
ORIENTATION: RIGHT;LEFT
ORIENTATION: LEFT

## 2024-03-31 ASSESSMENT — PAIN - FUNCTIONAL ASSESSMENT
PAIN_FUNCTIONAL_ASSESSMENT: 0-10

## 2024-03-31 ASSESSMENT — PAIN DESCRIPTION - LOCATION
LOCATION: FOOT
LOCATION: FOOT
LOCATION: HEAD

## 2024-03-31 ASSESSMENT — PAIN DESCRIPTION - DESCRIPTORS
DESCRIPTORS: ACHING;BURNING
DESCRIPTORS: ACHING;BURNING

## 2024-03-31 NOTE — PROGRESS NOTES
PODIATRIC MEDICINE & SURGERY - PROGRESS NOTE    Subjective   Errol Fonseca is a 54 y.o. male who is on day 4 of admission for Pneumonia of right lung due to infectious organism, unspecified part of lung. S/p left 5th toe amputation and wound debridement right foot (DOS 3/29/24, Dr. Villegas)    Interval HPI: Patient resting comfortably in bed.  Patient states he was able to participate in PT today but did notice strike through bleeding to the left foot dressing.     Review Of Systems:  A 10+ point ROS was completed and otherwise negative except as noted above and per HPI.    Past Medical History  Past Medical History:   Diagnosis Date    Acute ischemic heart disease, unspecified (CMS/HCC)     Coronary syndrome, acute    Cirrhosis of liver (CMS/HCC)     Coronary artery disease     Diabetes mellitus (CMS/HCC)     Personal history of other diseases of the circulatory system     History of heart block    Personal history of other diseases of the musculoskeletal system and connective tissue     History of arthritis    Personal history of other endocrine, nutritional and metabolic disease     History of obesity     Allergies  No Known Allergies  Medications  Scheduled medications  aspirin, 81 mg, oral, Daily  doxycycline, 100 mg, intravenous, q12h  enoxaparin, 40 mg, subcutaneous, q24h  folic acid, 1 mg, oral, Daily  gentamicin, , Topical, Daily  insulin glargine, 20 Units, subcutaneous, q AM  insulin lispro, 0-15 Units, subcutaneous, Before meals & nightly  lactulose, 15.3333 g, oral, Daily  lisinopril, 10 mg, oral, Daily  melatonin, 3 mg, oral, Nightly  metoprolol tartrate, 25 mg, oral, BID  multivitamin with minerals, 1 tablet, oral, Daily  nicotine, 1 patch, transdermal, Daily  piperacillin-tazobactam, 3.375 g, intravenous, q6h  thiamine, 100 mg, oral, Daily  vancomycin, 1,500 mg, intravenous, q12h    Continuous medications       PRN medications: acetaminophen, dextrose, diazePAM, glucagon, guaiFENesin,  "ipratropium-albuteroL, morphine, vancomycin    Objective   Visit Vitals  /87 (Patient Position: Sitting)   Pulse 79   Temp 37 °C (98.6 °F)   Resp 17   Ht 1.854 m (6' 0.99\")   Wt 85.3 kg (188 lb 0.8 oz)   SpO2 97%   BMI 24.82 kg/m²   Smoking Status Every Day   BSA 2.1 m²     Physical Exam  Constitutional: NAD and AAOx3.   HEENT: Head is normocephalic and atraumatic. External ear is normal B/L. Conjunctivae normal B/L. Nose is normal. Oropharynx is clear. Mouth is moist.   Cardiovascular: Normal rate.  Pulmonary: No increased work of breathing.  Psychological: Appropriate mood and behavior.     Vascular: DP and PT pulses are palpable bilateral.  CFT is less than 3 seconds bilateral.  Skin temperature is warm to warm proximal to distal bilateral.       Neurologic:  Light touch is diminished to the foot bilateral.      Musculoskeletal: Moves all extremities spontaneously. S/p left 5th toe ampuation    Dermatologic: Nails 1-5 right and 1-4 left are intact.  Webspaces are clean, dry and intact bilateral.      WOUND #1: Left 5th toe amputation site  Incision is well-coapted with with sutures intact. Mild periwound erythema and edema. Mild serosanguinous drainage on inner dressing with no odor. Strikethrough on dressing today. No ascending cellulitis or lymphangitis.  Skin edges are viable with no signs of necrosis.    WOUND #2: Plantar right 2nd metatarsal   Ulcer measures 2 x 1.7 x 0.7 cm. No probe to bone.  No exposed tendon in wound bed.  No purulent drainage noted. Moderate sanguinous drainage with strikethrough noted to outer dressing. No malodor. No ascending cellulitis.  No surrounding edema. No fluctuance or crepitus. No necrotic tissue.     Lab Results   Component Value Date    WBC 3.4 (L) 03/31/2024    HGB 10.5 (L) 03/31/2024    HCT 30.9 (L) 03/31/2024     (H) 03/31/2024    PLT 71 (L) 03/31/2024     Lab Results   Component Value Date    GLUCOSE 168 (H) 03/31/2024    CALCIUM 8.2 (L) 03/31/2024    NA " 133 (L) 03/31/2024    K 3.3 (L) 03/31/2024    CO2 28 03/31/2024    CL 97 (L) 03/31/2024    BUN 9 03/31/2024    CREATININE 0.47 (L) 03/31/2024     Lab Results   Component Value Date    HGBA1C 8.4 (H) 03/27/2024     Susceptibility data from last 90 days.  Collected Specimen Info Organism Clindamycin Erythromycin Oxacillin Tetracycline Trimethoprim/Sulfamethoxazole Vancomycin   03/27/24 Tissue/Biopsy from Wound/Tissue Methicillin Resistant Staphylococcus aureus (MRSA) S R R R S S   03/26/24 Swab from Anterior Nares Methicillin Resistant Staphylococcus aureus (MRSA)           Imaging  Vascular US Ankle Brachial Index (SPARKLE) Without Exercise  Result Date: 3/28/2024  CONCLUSIONS:   Right Lower PVR: No evidence of arterial occlusive disease in the right lower extremity at rest. Normal digital perfusion noted. Triphasic flow is noted in the right posterior tibial artery, right dorsalis pedis artery and right common femoral artery.   Left Lower PVR: No evidence of arterial occlusive disease in the left lower extremity at rest. Normal digital perfusion noted. Triphasic flow is noted in the left common femoral artery, left posterior tibial artery and left dorsalis pedis artery.    RIGHT Lower PVR                Pressures Ratios  Right Posterior Tibial (Ankle) 172 mmHg  1.06  Right Dorsalis Pedis (Ankle)   170 mmHg  1.04  Right Digit (Great Toe)        114 mmHg  0.70  LEFT Lower PVR                Pressures Ratios  Left Posterior Tibial (Ankle) 160 mmHg  0.98  Left Dorsalis Pedis (Ankle)   166 mmHg  1.02  Left Digit (Great Toe)        101 mmHg  0.62    Assessment/Plan   Errol Fonseca is a 54 y.o. male who is on day 4 of admission for Pneumonia of right lung due to infectious organism, unspecified part of lung.  S/p left 5th toe amputation and wound debridement right foot(DOS 3/29/24, Dr. Villegas)    #Gangrene of left 5th toe - S/p left 5th toe amputation (DOS 3/29/24, Dr. Villegas)  #Osteomyelitis of left 5th digit  #Neuropathic  ulcer, right foot  #Diabetes mellitus, uncontrolled  #Diabetic peripheral neuropathy    Reviewed labs, imaging and notes. Afebrile and no leukocytosis.  On exam, left 5th toe amputation site incision is well-coapted.   Patient complains of pain, burning in nature to 5th left amputation site, unchanged from yesterday. Patient tolerated PT today.     Plan/Recommendations:  - Left foot dressing changed with gentamicin cream, adaptic, 4x4 gauze and Kerlix secured with tape.   - Right foot dressing changed with gentamicin cream, adaptic, ABD, Kerlix and ACE wrap  - Nursing orders entered for daily dressings as above   - WBAT in surgical shoe B/L  - Continue IV abx per primary team. Pain management per primary team.   - Podiatry team will continue to follow.     Plan discussed with attending physician, Dr. Nelda Villegas, RAKANM.    Niharika Zamudio DPM PGY-1  Podiatric Medicine & Surgery

## 2024-03-31 NOTE — PROGRESS NOTES
Subjective   Feels generally weak. Toe amp yesterday said he tolerated well, he is hopeful to work with pt if ok by podiatry     Objective   Vitals:    03/31/24 0949   BP: 171/87   Pulse: 79   Resp: 17   Temp: 37 °C (98.6 °F)   SpO2: 97%       Physical Exam:   Constitutional: obese, awake, alert, no acute distress  ENMT: mucous membranes dry, conjunctivae clear  Head/Neck: normocephalic, atraumatic; supple, trachea midline  Respiratory/Thorax: patent airways, diminished breath sounds  Cardiovascular: RRR, no murmur appreciated  Gastrointestinal: soft, nondistended, non-tender, bowel sounds appreciated  Extremities: s/p L 5th toe amp and debridement of ulcer plantar aspect R foot   Neurological: AO x3, no focal deficits  Psychological: appropriate mood and behavior  Skin: warm and dry      Assessment/Plan   This is a 54-year-old male who initially presented to [facility] on 3/26/2024 with generalized weakness and tremors x 2 days.  He admits to drinking 2-16 ounce bottles of hand  for the last 2 months as he was trying to hide his alcohol use from his family.  He previously was drinking 1 pint of vodka every other day or so.  Last EtOH use on AM of 3/26.     Left 5th digit OM and gangrene s/p amp 3/29  Right foot plantar ulcer debridement 3/29  PNA - strep pneumo  Acute alcoholic hepatitis  Thrombocytopenia in setting of liver cirrhosis  IDDM2 with hyperglycemia  Alcohol use disorder with resolving acute alcohol withdrawal  Acute hypoxemia, resolved - wearing 2L NC for comfort now  Elevated troponin, suspect demand ischemia in setting of hypoxemia  Hypomagnesemia    #Alcoholic liver cirrhosis  #History of alcohol hepatitis  #IDDM2  #HTN  #CAD s/p PCI with stent  #History of SAH     Continue Vanc/Zosyn/Doxy per ID. WCx growing rare Staph aureus. Podiatry, vascular surgery, ID following. S/p 5th digit amputation and wound debridement of R foot plantar ulcer. Will resume asa    Sched and PRN Duonebs, Mucine  appreciate ID recs. Suspect can discontinue Doxycycline  however await final ID rec    CIWA protocol with PRN Valium IV, scoring low. Continue thiamine, folic acid. Counseled on alcohol cessation. SW following. Case was d/w posion control previously regarding hand  ingestion. No indication for dialysis, c/w supportive measures pt mentating well pressures stable low CIWA score, repeat serum osm  MDF 7.6 with good prognosis. No need for steroids.  MELD-Na 11 with 6% estimated 3-month mortality. Will need hepatology follow upon DC.  Monitor and replete electrolytes PRN.  Insulin regimen:  Home: Lantus 30 QAM, Lispro 5 TID AC, SSI  Inpatient: Lantus 20 QAM, SSI (0-15) TID AC     - Continue home metoprolol tartrate, lisinopril, atorvastatin,   - resume ASA when ok by podiatry      DVT PPX: SCDs only given thrombocytopenia  Diet: 2g Na/Diabetic  IVF: -  Consults: GI, Podiatry, Vascular, ID  Code status: FULL

## 2024-03-31 NOTE — PROGRESS NOTES
Occupational Therapy    Occupational Therapy    Evaluation    Patient Name: Errol Fonseca  MRN: [id number]  Today's Date: 3/31/2024  Time Calculation  Start Time: 1000  Stop Time: 1030  Time Calculation (min): 30 min    Assessment  IP OT Assessment  OT Assessment: PATIENT WAS MOTIVATED TO PARTICIPATE / NEEDS ASSIST WITH LE SELF CARE AND TRANSFERS AS NOTED DECREASED BALANCE ION STANDING - WOULD BENEFIT FROM ONGOING THERAPY POST HOSPITAL STAY. PATIENT TO WEAR CAST SHOES WHEN OOB PER ORDER   Prognosis: Good  Barriers to Discharge: None  Evaluation/Treatment Tolerance: Patient limited by fatigue  Medical Staff Made Aware: Yes  End of Session Communication: Bedside nurse  End of Session Patient Position: Bed, 2 rail up    Plan:  Treatment Interventions: ADL retraining, Functional transfer training, UE strengthening/ROM, Patient/family training, Equipment evaluation/education  OT Frequency: 5 times per week  OT Discharge Recommendations: High intensity level of continued care  Equipment Recommended upon Discharge: Wheeled walker    Subjective     Current Problem:  1. Pneumonia of right lung due to infectious organism, unspecified part of lung        2. Generalized weakness        3. Hypomagnesemia        4. Alcohol abuse        5. Alcoholic intoxication with complication (CMS/HCC)        6. Acute hypoxic respiratory failure (CMS/HCC)        7. Gangrene of left foot (CMS/HCC)  Vascular US Ankle Brachial Index (SPARKLE) Without Exercise    Vascular US Ankle Brachial Index (SPARKLE) Without Exercise    Case Request Operating Room: Amputation Digit Foot 5th left, debridement of wound right foot    Case Request Operating Room: Amputation Digit Foot 5th left, debridement of wound right foot    Surgical Pathology Exam    Surgical Pathology Exam    CANCELED: Vascular US PVR without exercise    CANCELED: Vascular US PVR without exercise      8. 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 Foot ulcer with fat layer exposed, right (CMS/HCC)  Case Request Operating Room: Amputation Digit Foot 5th left, debridement of wound right foot    Case Request Operating Room: Amputation Digit Foot 5th left, debridement of wound right foot    Surgical Pathology Exam    Surgical Pathology Exam          General:  General  Reason for Referral: OT EVAL AND TREAT  Referred By: SANDEEP  Past Medical History Relevant to Rehab: B FOOT ULCERATIONS , ETOH ABUSE, RESP FAILURE, L FOOT OSTEOMYELITIS- 5TH DIGIT DM, CAD, CARDIAC STENT, ALOCOHOL HEPATITIS , PERIPHERAL NEUROPATHY  Missed Visit: Yes  Missed Visit Reason: Other (Comment) (WAITING ON CLEARANCE FROM MD ON WEIGHT BEARING STATUS WITH NEW ONSET AMPUTATION)  Co-Treatment: PT  Prior to Session Communication: Bedside nurse  General Comment: PATIENT HAD R FOOT DEBRIEDMENT AND L FOOT 5TH TOE AMPUTATION.    Precautions:       Vital Signs:       Pain:  Pain Assessment  Pain Assessment: 0-10  Pain Score: 5 - Moderate pain  Pain Type: Acute pain  Pain Location: Foot  Pain Orientation: Left  Pain Radiating Towards:  (WOUND / SURGICAL SITE)  Pain Interventions: Medication (See MAR)    Objective     Cognition:  Overall Cognitive Status: Within Functional Limits             Home Living:  Type of Home: House  Lives With: Adult children  Home Adaptive Equipment: None  Home Layout: Multi-level (PATIENT LIVES IN BASEMENT - BATHROOM ON MAIN FLOOR UPSTAIRS)  Home Access: No concerns  Bathroom Shower/Tub: Tub/shower unit     Prior Function:  Level of [place]: Independent with ADLs and functional transfers, Independent with homemaking with ambulation  Receives Help From: Parent(s)  ADL Assistance: Independent  Ambulatory Assistance: Independent  Hand Dominance: Right    IADL History:  Current License: Yes  Mode of Transportation: Car  Occupation: Unemployed    ADL:  Eating Assistance: Independent  Grooming Assistance:  Independent  Bathing Assistance: Moderate  UE Dressing Assistance: Independent  LE Dressing Assistance: Moderate  Toileting Assistance with Device: Not performed    Activity Tolerance:  Endurance: Tolerates less than 10 min exercise with changes in vital signs    Bed Mobility/Transfers:   Bed Mobility  Bed Mobility: Yes  Bed Mobility 1  Bed Mobility 1: Supine to sitting  Level of Assistance 1: Minimum assistance  Transfers  Transfer: Yes  Transfer 1  Transfer From 1: Bed to  Transfer to 1: Stand  Technique 1: Sit to stand  Transfer Device 1: Walker  Transfer Level of Assistance 1: Moderate assistance, +2    Ambulation/Gait Training:  Functional Mobility  Functional Mobility Performed: Yes  Functional Mobility 1  Surface 1: Level tile  Device 1: Rolling walker  Functional Mobility Support Devices: Other (Comment) (BILATERAL CAST SHOES WORN)  Assistance 1: Moderate assistance    Sitting Balance:  Static Sitting Balance  Static Sitting-Balance Support: Feet supported  Static Sitting-Level of Assistance: Contact guard  Dynamic Sitting Balance  Dynamic Sitting-Balance Support: Bilateral upper extremity supported, Feet supported  Dynamic Sitting-Balance: Reaching across midline    Standing Balance:       Vision: Vision - Basic Assessment  Current Vision: No visual deficits   and Vision - Complex Assessment  Ocular Range of Motion: Within Functional Limits  Head Position: WFL  Tracking: WFL    Sensation:  Light Touch: No apparent deficits    Strength:  Strength Comments: BUE STRENGTH 4-/5    Perception:  Inattention/Neglect: Appears intact    Coordination:  Movements are Fluid and Coordinated: Yes  Finger to Nose: Intact     Hand Function:  Hand Function  Gross Grasp: Functional  Coordination: Functional    Extremities: RUE   RUE : Within Functional Limits and LUE   LUE: Within Functional Limits    Outcome Measures: [facility] Daily Activity  Putting on and taking off regular lower body clothing: A little  Bathing (including  washing, rinsing, drying): A little  Putting on and taking off regular upper body clothing: A little  Toileting, which includes using toilet, bedpan or urinal: A little  Taking care of personal grooming such as brushing teeth: A little  Eating Meals: None  Daily Activity - Total Score: 19                    EDUCATION:     Education Documentation  No documentation found.  Education Comments  No comments found.        Goals:   Encounter Problems       Encounter Problems (Active)       ADLs       Patient will perform UB and LB bathing AT modified independent level of assistance       Start:  03/31/24    Expected End:  04/14/24            Patient with complete lower body dressing with modified independent level of assistance donning and doffing all LE clothes  with PRN adaptive equipment while edge of bed        Start:  03/31/24    Expected End:  04/14/24            Patient will complete toileting including hygiene clothing management/hygiene with  SET UP assistance and PRN EQUIPMENT.       Start:  03/31/24    Expected End:  04/14/24               BALANCE       Patient will tolerate standing for 10 minutes to modified independent level of assistance with least restrictive device in order to improve functional activity tolerance for ADL tasks. (Progressing)       Start:  03/31/24    Expected End:  04/14/24               EXERCISE/STRENGTHENING       Patient will complete BUE exercises in order to improve strength and activity for ADL performance.  (Progressing)       Start:  03/31/24    Expected End:  04/14/24               MOBILITY       Patient will perform Functional mobility mod  Household distances/Community Distances with modified independent level of assistance and least restrictive device in order to improve safety and functional mobility. (Progressing)       Start:  03/31/24    Expected End:  04/14/24               TRANSFERS       Patient will perform bed mobility independent level of assistance and bed rails in  order to improve safety and independence with mobility (Progressing)       Start:  03/31/24    Expected End:  04/14/24            Patient will complete functional transfer TOILET AND IN/OUT OF A TUB with least restrictive device with set-up level of assistance. (Progressing)       Start:  03/31/24    Expected End:  04/14/24

## 2024-03-31 NOTE — PROGRESS NOTES
"     Errol Fonseca is a 54 y.o. year old male who Pharmacy has been consulted for vancomycin dosing for osteomyelitis/septic arthritis. Based on the patient's indication and renal status this patient is being dosed based on a goal AUC of 400-600.      Renal function is currently stable.     Current vancomycin dose: 1500 mg given every 12 hours     Auc 465 and trough 21.6 but this is due to draw time is 930 and dose given at 7 am      Visit Vitals  /87 (BP Location: Left arm, Patient Position: Lying)   Pulse 75   Temp 36.5 °C (97.7 °F) (Temporal)   Resp 16               Lab Results   Component Value Date     CREATININE 0.69 03/30/2024     CREATININE 0.69 03/30/2024     CREATININE 0.56 03/29/2024     CREATININE 0.57 03/28/2024         Patient weight is No results found for: \"PTWEIGHT\"     No results found for: \"CULTURE\"      I/O last 3 completed shifts:  In: 1000 (11.7 mL/kg) [I.V.:1000 (11.7 mL/kg)]  Out: 2935 (34.4 mL/kg) [Urine:2935 (1 mL/kg/hr)]  Weight: 85.3 kg   [unfilled]           Lab Results   Component Value Date     PATIENTTEMP 37.0 03/26/2024         Assessment/Plan     Within goal AUC range. Continue current vancomycin regimen.     This dosing regimen is predicted by [employer] to result in the following pharmacokinetic parameters:  Loading dose: N/A  Regimen: 1500 mg IV every 12 hours.  Start time: 18:44 on 03/30/2024  Exposure target: AUC24 (range)400-600 mg/L.hr   AUC24,ss: 465 mg/L.hr  Probability of AUC24 > 400: 80 %  Ctrough,ss: 14.1 mg/L  Probability of Ctrough,ss > 20: 14 %  Probability of nephrotoxicity (Lodise REI 2009): 9 %     The next level will be obtained on 3/31 at 1100. May be obtained sooner if clinically indicated.   Will continue to monitor renal function daily while on vancomycin and order serum creatinine at least every 48 hours if not already ordered.  Follow for continued vancomycin needs, clinical response, and signs/symptoms of toxicity.         "

## 2024-03-31 NOTE — PROGRESS NOTES
Physical Therapy    Physical Therapy Evaluation    Patient Name: Errol Fonseca  MRN: [id number]  Today's Date: 3/31/2024   Time Calculation  Start Time: 0947  Stop Time: 1008  Time Calculation (min): 21 min    Assessment/Plan   PT Assessment  PT Assessment Results: Decreased strength, Decreased endurance, Impaired balance, Decreased mobility, Impaired sensation, Decreased skin integrity, Pain  Rehab Prognosis: Good  Evaluation/Treatment Tolerance: Patient limited by fatigue  Medical Staff Made Aware: Yes  End of Session Communication: Bedside nurse  Assessment Comment: Pt presents /c above impairments and decline from functional baseline 2nd postop LE sx /c pain and altered mobility patterns. Pt will benefit from continued PT services at high intensity to address above and maximize functional mobility.  End of Session Patient Position: Bed, 2 rail up, Alarm off, not on at start of session  IP OR SWING BED PT PLAN  Inpatient or Swing Bed: Inpatient  PT Plan  Treatment/Interventions: Bed mobility, Transfer training, Gait training, Balance training, Neuromuscular re-education, Strengthening, Endurance training, Therapeutic exercise, Therapeutic activity  PT Plan: Skilled PT  PT Frequency: 5 times per week  PT Discharge Recommendations: High intensity level of continued care  Equipment Recommended upon Discharge: Wheeled walker  PT - OK to Discharge: Yes    Subjective     Current Problem:  Patient Active Problem List   Diagnosis    Pneumonia of right lung due to infectious organism, unspecified part of lung    Gangrene of left foot (CMS/HCC)    Foot ulcer with fat layer exposed, right (CMS/HCC)       General Visit Information:  General  Reason for Referral: PT eval and treat  Referred By: Andreea  Past Medical History Relevant to Rehab: HTN, DM, CAD, ETOH abuse, obesity, alcoholic hepatitis, neuropathy  Prior to Session Communication: Bedside nurse  Patient Position Received: Bed, 2 rail up, Alarm off, not on at start of  session  General Comment: Room number: 340  Pt presents with weakness, tremors, difficulty ambulating and h/o etoh abuse. Found to be hypoxic, CXR concerning for PNA. Podiatry, vascular and ID consulted d/t LE wounds, concern for OM. Pt now s/p R foot debridement and L 5th digit amputation 3/29 d/t gangrene and ulcer. Per podiatry no WB restrictions, pt to wear nate surgical shoes. Pt cleared for therapy by nursing. Pt supine, agreeable.    Home Living:  Home Living  Home Living Comments: Pt lives /c dtr and OCTAVIO. Living area on basement level, ~12steps /c rail. Bathroom on 1st floor.    Prior Level of Function:  Prior Function Per Pt/Caregiver Report  Prior Function Comments: Indep, not driving. Denies AD use. Denies recent falls. Dtr/OCTAVIO supportive.    Precautions:  Precautions  Precautions Comment: falls, BLE surgical shoes, CIWA scale    Vital Signs:  Vital Signs  Patient Position: Lying (HR 78, SpO2 97%)  Objective     Pain:  Pain Assessment  Pain Assessment: 0-10  Pain Score: 7 (c/o LLE pain, states pain meds recently given)    Cognition:  Cognition  Overall Cognitive Status: Within Functional Limits    General Assessments:  General Observation  General Observation: activity orders: OOB /c A  lines: PIV, NC O2   skin integrity: BLE dressings in place, small amt of strikethrough noted distal LLE dressing end of session. Nursing notified.   Activity Tolerance  Endurance: Tolerates less than 10 min exercise, no significant change in vital signs  Sensation  Sensation Comment: chronic n/t BLE  Strength  Strength Comments: BLE 4-/5           Dynamic Sitting Balance  Dynamic Sitting-Comments: F+  Dynamic Standing Balance  Dynamic Standing-Comments: F-    Functional Assessments:     Bed Mobility  Bed Mobility: Yes  Bed Mobility 1  Bed Mobility Comments 1: Supine<>Sit /c minAx2, HOB elevated, use of rail. Pt reports dizziness EOB, seated rest ~1min to recover.  Transfers  Transfer: Yes  Transfer 1  Trials/Comments 1:  Sit<>Stand /c modAx2, bed elevated. K[place] surgical shoes donned.  Ambulation/Gait Training  Ambulation/Gait Training Performed:  (Pt ambulates 4' modAx2, WW. Step to gait, downward gaze, slightly flexed posture. Distance limited 2nd weakness and dizziness.)         Outcome Measures:  [facility] Basic Mobility  Turning from your back to your side while in a flat bed without using bedrails: A little  Moving from lying on your back to sitting on the side of a flat bed without using bedrails: A little  Moving to and from bed to chair (including a wheelchair): A lot  Standing up from a chair using your arms (e.g. wheelchair or bedside chair): A lot  To walk in hospital room: A lot  Climbing 3-5 steps with railing: A lot  Basic Mobility - Total Score: 14             Goals:  Encounter Problems       Encounter Problems (Active)       PT Problem       STG - Pt will transition supine <> sitting with mod I (Progressing)       Start:  03/31/24    Expected End:  04/14/24            STG - Pt will transfer STS with mod I (Progressing)       Start:  03/31/24    Expected End:  04/14/24            STG - Pt will amb 60' using LRAD, surgical shoes with supervision (Progressing)       Start:  03/31/24    Expected End:  04/14/24            STG - Pt will maintain F+ dynamic standing balance to decrease risk of falls  (Progressing)       Start:  03/31/24    Expected End:  04/14/24            STG - Pt will perform 2-3 sets of BLE therex x10 to maximize functional strength and independence  (Progressing)       Start:  03/31/24    Expected End:  04/14/24                 Education Documentation  Mobility Training, taught by Anahi Schuler PT at 3/31/2024  1:41 PM.  Learner: Patient  Readiness: Acceptance  Method: Explanation  Response: Verbalizes Understanding, Needs Reinforcement    Education Comments  No comments found.

## 2024-03-31 NOTE — CARE PLAN
Problem: Pain - Adult  Goal: Verbalizes/displays adequate comfort level or baseline comfort level  Outcome: Progressing     Problem: Safety - Adult  Goal: Free from fall injury  Outcome: Progressing     Problem: Discharge Planning  Goal: Discharge to home or other facility with appropriate resources  Outcome: Progressing     Problem: Chronic Conditions and Co-morbidities  Goal: Patient's chronic conditions and co-morbidity symptoms are monitored and maintained or improved  Outcome: Progressing     Problem: Skin  Goal: Decreased wound size/increased tissue granulation at next dressing change  Outcome: Progressing  Flowsheets (Taken 3/31/2024 0330)  Decreased wound size/increased tissue granulation at next dressing change: Promote sleep for wound healing  Goal: Participates in plan/prevention/treatment measures  Outcome: Progressing  Flowsheets (Taken 3/30/2024 0206)  Participates in plan/prevention/treatment measures: Elevate heels  Goal: Prevent/manage excess moisture  Outcome: Progressing  Flowsheets (Taken 3/31/2024 0330)  Prevent/manage excess moisture:   Follow provider orders for dressing changes   Monitor for/manage infection if present  Goal: Prevent/minimize sheer/friction injuries  Outcome: Progressing  Flowsheets (Taken 3/31/2024 0330)  Prevent/minimize sheer/friction injuries:   HOB 30 degrees or less   Increase activity/out of bed for meals  Goal: Promote/optimize nutrition  Outcome: Progressing  Flowsheets (Taken 3/31/2024 0330)  Promote/optimize nutrition: Consume > 50% meals/supplements  Goal: Promote skin healing  Outcome: Progressing  Flowsheets (Taken 3/31/2024 0330)  Promote skin healing: Assess skin/pad under line(s)/device(s)   The patient's goals for the shift include to remain safe    The clinical goals for the shift include pt will report decrease in pain levels    Over the shift, the patient did not make progress toward the following goals. Barriers to progression include continued pain at  surgical location. Educated on trying to stay with Tylenol for pan management and using Morphine Sparingly.

## 2024-04-01 LAB
ALBUMIN SERPL BCP-MCNC: 2.8 G/DL (ref 3.4–5)
ALP SERPL-CCNC: 208 U/L (ref 33–120)
ALT SERPL W P-5'-P-CCNC: 51 U/L (ref 10–52)
ANION GAP SERPL CALC-SCNC: 10 MMOL/L (ref 10–20)
AST SERPL W P-5'-P-CCNC: 74 U/L (ref 9–39)
BILIRUB SERPL-MCNC: 2.7 MG/DL (ref 0–1.2)
BUN SERPL-MCNC: 11 MG/DL (ref 6–23)
CALCIUM SERPL-MCNC: 7.7 MG/DL (ref 8.6–10.3)
CHLORIDE SERPL-SCNC: 98 MMOL/L (ref 98–107)
CO2 SERPL-SCNC: 26 MMOL/L (ref 21–32)
CREAT SERPL-MCNC: 0.57 MG/DL (ref 0.5–1.3)
EGFRCR SERPLBLD CKD-EPI 2021: >90 ML/MIN/1.73M*2
ERYTHROCYTE [DISTWIDTH] IN BLOOD BY AUTOMATED COUNT: 13.8 % (ref 11.5–14.5)
GLUCOSE BLD MANUAL STRIP-MCNC: 215 MG/DL (ref 74–99)
GLUCOSE BLD MANUAL STRIP-MCNC: 226 MG/DL (ref 74–99)
GLUCOSE BLD MANUAL STRIP-MCNC: 244 MG/DL (ref 74–99)
GLUCOSE BLD MANUAL STRIP-MCNC: 272 MG/DL (ref 74–99)
GLUCOSE SERPL-MCNC: 260 MG/DL (ref 74–99)
HCT VFR BLD AUTO: 28.1 % (ref 41–52)
HGB BLD-MCNC: 9.2 G/DL (ref 13.5–17.5)
MAGNESIUM SERPL-MCNC: 1.75 MG/DL (ref 1.6–2.4)
MCH RBC QN AUTO: 34.3 PG (ref 26–34)
MCHC RBC AUTO-ENTMCNC: 32.7 G/DL (ref 32–36)
MCV RBC AUTO: 105 FL (ref 80–100)
NRBC BLD-RTO: 0 /100 WBCS (ref 0–0)
PLATELET # BLD AUTO: 92 X10*3/UL (ref 150–450)
POTASSIUM SERPL-SCNC: 3.8 MMOL/L (ref 3.5–5.3)
PROT SERPL-MCNC: 5.5 G/DL (ref 6.4–8.2)
RBC # BLD AUTO: 2.68 X10*6/UL (ref 4.5–5.9)
SODIUM SERPL-SCNC: 130 MMOL/L (ref 136–145)
WBC # BLD AUTO: 3.4 X10*3/UL (ref 4.4–11.3)

## 2024-04-01 PROCEDURE — 2500000002 HC RX 250 W HCPCS SELF ADMINISTERED DRUGS (ALT 637 FOR MEDICARE OP, ALT 636 FOR OP/ED): Performed by: STUDENT IN AN ORGANIZED HEALTH CARE EDUCATION/TRAINING PROGRAM

## 2024-04-01 PROCEDURE — 2500000004 HC RX 250 GENERAL PHARMACY W/ HCPCS (ALT 636 FOR OP/ED): Performed by: STUDENT IN AN ORGANIZED HEALTH CARE EDUCATION/TRAINING PROGRAM

## 2024-04-01 PROCEDURE — 99024 POSTOP FOLLOW-UP VISIT: CPT | Performed by: PODIATRIST

## 2024-04-01 PROCEDURE — 80053 COMPREHEN METABOLIC PANEL: CPT | Performed by: STUDENT IN AN ORGANIZED HEALTH CARE EDUCATION/TRAINING PROGRAM

## 2024-04-01 PROCEDURE — 83735 ASSAY OF MAGNESIUM: CPT | Performed by: STUDENT IN AN ORGANIZED HEALTH CARE EDUCATION/TRAINING PROGRAM

## 2024-04-01 PROCEDURE — 99232 SBSQ HOSP IP/OBS MODERATE 35: CPT | Performed by: STUDENT IN AN ORGANIZED HEALTH CARE EDUCATION/TRAINING PROGRAM

## 2024-04-01 PROCEDURE — 2500000001 HC RX 250 WO HCPCS SELF ADMINISTERED DRUGS (ALT 637 FOR MEDICARE OP): Performed by: INTERNAL MEDICINE

## 2024-04-01 PROCEDURE — 36415 COLL VENOUS BLD VENIPUNCTURE: CPT | Performed by: STUDENT IN AN ORGANIZED HEALTH CARE EDUCATION/TRAINING PROGRAM

## 2024-04-01 PROCEDURE — 85027 COMPLETE CBC AUTOMATED: CPT | Performed by: STUDENT IN AN ORGANIZED HEALTH CARE EDUCATION/TRAINING PROGRAM

## 2024-04-01 PROCEDURE — 2500000001 HC RX 250 WO HCPCS SELF ADMINISTERED DRUGS (ALT 637 FOR MEDICARE OP): Performed by: STUDENT IN AN ORGANIZED HEALTH CARE EDUCATION/TRAINING PROGRAM

## 2024-04-01 PROCEDURE — 1200000002 HC GENERAL ROOM WITH TELEMETRY DAILY

## 2024-04-01 PROCEDURE — 2500000002 HC RX 250 W HCPCS SELF ADMINISTERED DRUGS (ALT 637 FOR MEDICARE OP, ALT 636 FOR OP/ED): Performed by: INTERNAL MEDICINE

## 2024-04-01 PROCEDURE — 97116 GAIT TRAINING THERAPY: CPT | Mod: GP,CQ

## 2024-04-01 PROCEDURE — 82947 ASSAY GLUCOSE BLOOD QUANT: CPT

## 2024-04-01 PROCEDURE — S4991 NICOTINE PATCH NONLEGEND: HCPCS | Performed by: STUDENT IN AN ORGANIZED HEALTH CARE EDUCATION/TRAINING PROGRAM

## 2024-04-01 RX ORDER — INSULIN GLARGINE 100 [IU]/ML
30 INJECTION, SOLUTION SUBCUTANEOUS EVERY MORNING
Status: DISCONTINUED | OUTPATIENT
Start: 2024-04-01 | End: 2024-04-02 | Stop reason: HOSPADM

## 2024-04-01 RX ORDER — POTASSIUM CHLORIDE 20 MEQ/1
40 TABLET, EXTENDED RELEASE ORAL ONCE
Status: COMPLETED | OUTPATIENT
Start: 2024-04-01 | End: 2024-04-01

## 2024-04-01 RX ORDER — LEVOFLOXACIN 500 MG/1
750 TABLET, FILM COATED ORAL
Status: DISCONTINUED | OUTPATIENT
Start: 2024-04-01 | End: 2024-04-02

## 2024-04-01 RX ORDER — LANOLIN ALCOHOL/MO/W.PET/CERES
400 CREAM (GRAM) TOPICAL ONCE
Status: COMPLETED | OUTPATIENT
Start: 2024-04-01 | End: 2024-04-01

## 2024-04-01 RX ORDER — MAGNESIUM SULFATE HEPTAHYDRATE 40 MG/ML
2 INJECTION, SOLUTION INTRAVENOUS ONCE
Status: COMPLETED | OUTPATIENT
Start: 2024-04-01 | End: 2024-04-01

## 2024-04-01 RX ORDER — SULFAMETHOXAZOLE AND TRIMETHOPRIM 400; 80 MG/1; MG/1
160 TABLET ORAL EVERY 12 HOURS SCHEDULED
Status: DISCONTINUED | OUTPATIENT
Start: 2024-04-01 | End: 2024-04-02 | Stop reason: HOSPADM

## 2024-04-01 RX ADMIN — INSULIN LISPRO 6 UNITS: 100 INJECTION, SOLUTION INTRAVENOUS; SUBCUTANEOUS at 22:51

## 2024-04-01 RX ADMIN — FAMOTIDINE 20 MG: 20 TABLET ORAL at 10:31

## 2024-04-01 RX ADMIN — MAGNESIUM OXIDE TAB 400 MG (241.3 MG ELEMENTAL MG) 400 MG: 400 (241.3 MG) TAB at 10:31

## 2024-04-01 RX ADMIN — INSULIN LISPRO 9 UNITS: 100 INJECTION, SOLUTION INTRAVENOUS; SUBCUTANEOUS at 10:32

## 2024-04-01 RX ADMIN — MORPHINE SULFATE 2 MG: 2 INJECTION, SOLUTION INTRAMUSCULAR; INTRAVENOUS at 16:08

## 2024-04-01 RX ADMIN — FOLIC ACID 1 MG: 1 TABLET ORAL at 09:24

## 2024-04-01 RX ADMIN — METOPROLOL TARTRATE 25 MG: 25 TABLET, FILM COATED ORAL at 09:25

## 2024-04-01 RX ADMIN — SULFAMETHOXAZOLE AND TRIMETHOPRIM 160 MG: 400; 80 TABLET ORAL at 17:52

## 2024-04-01 RX ADMIN — VANCOMYCIN HYDROCHLORIDE 1500 MG: 1.5 INJECTION, POWDER, LYOPHILIZED, FOR SOLUTION INTRAVENOUS at 05:23

## 2024-04-01 RX ADMIN — INSULIN GLARGINE 30 UNITS: 100 INJECTION, SOLUTION SUBCUTANEOUS at 09:25

## 2024-04-01 RX ADMIN — Medication 1 PATCH: at 09:25

## 2024-04-01 RX ADMIN — POTASSIUM CHLORIDE 40 MEQ: 1500 TABLET, EXTENDED RELEASE ORAL at 10:31

## 2024-04-01 RX ADMIN — ASPIRIN 81 MG: 81 TABLET, COATED ORAL at 09:25

## 2024-04-01 RX ADMIN — METOPROLOL TARTRATE 25 MG: 25 TABLET, FILM COATED ORAL at 20:12

## 2024-04-01 RX ADMIN — MORPHINE SULFATE 2 MG: 2 INJECTION, SOLUTION INTRAMUSCULAR; INTRAVENOUS at 03:59

## 2024-04-01 RX ADMIN — INSULIN LISPRO 6 UNITS: 100 INJECTION, SOLUTION INTRAVENOUS; SUBCUTANEOUS at 17:52

## 2024-04-01 RX ADMIN — Medication 3 MG: at 20:12

## 2024-04-01 RX ADMIN — MAGNESIUM SULFATE HEPTAHYDRATE 2 G: 40 INJECTION, SOLUTION INTRAVENOUS at 16:03

## 2024-04-01 RX ADMIN — THIAMINE HCL TAB 100 MG 100 MG: 100 TAB at 09:00

## 2024-04-01 RX ADMIN — Medication 1 TABLET: at 09:24

## 2024-04-01 RX ADMIN — PIPERACILLIN SODIUM AND TAZOBACTAM SODIUM 3.38 G: 3; .375 INJECTION, SOLUTION INTRAVENOUS at 04:00

## 2024-04-01 RX ADMIN — MORPHINE SULFATE 2 MG: 2 INJECTION, SOLUTION INTRAMUSCULAR; INTRAVENOUS at 10:32

## 2024-04-01 RX ADMIN — LEVOFLOXACIN 750 MG: 500 TABLET, FILM COATED ORAL at 12:56

## 2024-04-01 RX ADMIN — MORPHINE SULFATE 2 MG: 2 INJECTION, SOLUTION INTRAMUSCULAR; INTRAVENOUS at 20:12

## 2024-04-01 RX ADMIN — INSULIN LISPRO 6 UNITS: 100 INJECTION, SOLUTION INTRAVENOUS; SUBCUTANEOUS at 12:56

## 2024-04-01 RX ADMIN — LISINOPRIL 10 MG: 10 TABLET ORAL at 10:31

## 2024-04-01 RX ADMIN — PIPERACILLIN SODIUM AND TAZOBACTAM SODIUM 3.38 G: 3; .375 INJECTION, SOLUTION INTRAVENOUS at 09:25

## 2024-04-01 ASSESSMENT — PAIN SCALES - GENERAL
PAINLEVEL_OUTOF10: 7
PAINLEVEL_OUTOF10: 1
PAINLEVEL_OUTOF10: 3

## 2024-04-01 ASSESSMENT — COGNITIVE AND FUNCTIONAL STATUS - GENERAL
CLIMB 3 TO 5 STEPS WITH RAILING: A LOT
MOVING FROM LYING ON BACK TO SITTING ON SIDE OF FLAT BED WITH BEDRAILS: A LITTLE
MOBILITY SCORE: 14
WALKING IN HOSPITAL ROOM: A LOT
DRESSING REGULAR UPPER BODY CLOTHING: A LITTLE
PERSONAL GROOMING: A LITTLE
MOBILITY SCORE: 16
STANDING UP FROM CHAIR USING ARMS: A LITTLE
DAILY ACTIVITIY SCORE: 18
MOVING TO AND FROM BED TO CHAIR: A LITTLE
TURNING FROM BACK TO SIDE WHILE IN FLAT BAD: A LITTLE
STANDING UP FROM CHAIR USING ARMS: A LOT
EATING MEALS: A LITTLE
WALKING IN HOSPITAL ROOM: A LOT
DRESSING REGULAR LOWER BODY CLOTHING: A LITTLE
TURNING FROM BACK TO SIDE WHILE IN FLAT BAD: A LITTLE
MOVING TO AND FROM BED TO CHAIR: A LOT
TOILETING: A LITTLE
HELP NEEDED FOR BATHING: A LITTLE
CLIMB 3 TO 5 STEPS WITH RAILING: A LOT
MOVING FROM LYING ON BACK TO SITTING ON SIDE OF FLAT BED WITH BEDRAILS: A LITTLE

## 2024-04-01 ASSESSMENT — PAIN - FUNCTIONAL ASSESSMENT
PAIN_FUNCTIONAL_ASSESSMENT: 0-10

## 2024-04-01 NOTE — PROGRESS NOTES
04/01/24 1228   Discharge Planning   Living Arrangements Family members   Support Systems Family members   Assistance Needed IADLs   Type of Residence Private residence   Number of Stairs to Enter Residence 2   Who is requesting discharge planning? Provider   Home or Post Acute Services Post acute facilities (Rehab/SNF/etc)   Type of Post Acute Facility Services Rehab   Patient expects to be discharged to: SNF-either [facility], [facility] or [facility]   Does the patient need discharge transport arranged? Yes   RoundTrip coordination needed? Yes     TCC Note: Met with pt at bedside, introduced self and explained role on the Transition of Care team. Discussed Discharge planning and therapy recommendation of Mod. Presented pt with SNF list [facility], which includes facilities that are within  Post- Acute Quality network as well as meeting patient's medical needs and are in-network for patient's insurance while also in discharge geographic are patient/family prefers. List identifies each facilities CMS star rating. Pt's choices in order of preference are: [facility], [facility] and [facility]. Requested that DSC please place referrals. Will follow for facility acceptance. Messaged MD to inform her of pt's decisions. Thao De Luna, MSN, RN, TCC.

## 2024-04-01 NOTE — PROGRESS NOTES
Physical Therapy    Physical Therapy Treatment    Patient Name: Errol Fonseca  MRN: [id number]  Today's Date: 4/1/2024  Time Calculation  Start Time: 1310  Stop Time: 1337  Time Calculation (min): 27 min       Assessment/Plan   PT Assessment  End of Session Communication: Bedside nurse, PCT/NA/CTA  End of Session Patient Position: Bed, 2 rail up, Alarm on     PT Plan  Treatment/Interventions: Bed mobility, Transfer training, Gait training, Balance training, Neuromuscular re-education, Strengthening, Endurance training, Therapeutic exercise, Therapeutic activity  PT Plan: Skilled PT  PT Frequency: 5 times per week  PT Discharge Recommendations: High intensity level of continued care  Equipment Recommended upon Discharge: Wheeled walker  PT - OK to Discharge: Yes      General Visit Information:   PT  Visit  PT Received On: 04/01/24  General  Prior to Session Communication: Bedside nurse  Patient Position Received: Bed, 2 rail up, Alarm off, not on at start of session  General Comment:  (pt agreeable to participate in therapy session.  pt expresses frustration throughout tx regarding decreased LOF; support and encouragement provided.)    Subjective   Precautions:  Precautions  Precautions Comment: falls, BLE surgical shoes, CIWA scale       Objective   Pain:  Pain Assessment  Pain Assessment:  (pt c/o pain at L toe amputation site, does not quantify)     Treatments:  Bed Mobility  Bed Mobility:  (sup <> sit with SBA.  pt able to doff socks and don bilat surgical shoes while seated EOB, demonstrating good dynamic sitting balance.)    Ambulation/Gait Training  Ambulation/Gait Training Performed:  (pt ambulates 25-30 ft x 4 with seated rest break MCFP through.  pt also taking several brief standing rest breaks throughout d/t fatigue.  included multiple turns and changes in direction.  FWW and min A x 1 overall plus chair follow for safety.  effortful)    Transfers  Transfer:  (sit <> stand x 2 trials with FWW and  CGA.)    Outcome Measures:  [facility] Basic Mobility  Turning from your back to your side while in a flat bed without using bedrails: A little  Moving from lying on your back to sitting on the side of a flat bed without using bedrails: A little  Moving to and from bed to chair (including a wheelchair): A little  Standing up from a chair using your arms (e.g. wheelchair or bedside chair): A little  To walk in hospital room: A lot  Climbing 3-5 steps with railing: A lot  Basic Mobility - Total Score: 16    Education Documentation  Mobility Training, taught by Juli Torres PTA at 4/1/2024  3:46 PM.  Learner: Patient  Readiness: Acceptance  Method: Explanation  Response: Verbalizes Understanding    Education Comments  No comments found.        EDUCATION:       Encounter Problems       Encounter Problems (Active)       PT Problem       STG - Pt will transition supine <> sitting with mod I (Progressing)       Start:  03/31/24    Expected End:  04/14/24            STG - Pt will transfer STS with mod I (Progressing)       Start:  03/31/24    Expected End:  04/14/24            STG - Pt will amb 60' using LRAD, surgical shoes with supervision (Progressing)       Start:  03/31/24    Expected End:  04/14/24            STG - Pt will maintain F+ dynamic standing balance to decrease risk of falls  (Progressing)       Start:  03/31/24    Expected End:  04/14/24            STG - Pt will perform 2-3 sets of BLE therex x10 to maximize functional strength and independence  (Progressing)       Start:  03/31/24    Expected End:  04/14/24

## 2024-04-01 NOTE — CARE PLAN
The patient's goals for the shift include to remain safe    The clinical goals for the shift include manage pain      Problem: Pain - Adult  Goal: Verbalizes/displays adequate comfort level or baseline comfort level  Outcome: Progressing     Problem: Safety - Adult  Goal: Free from fall injury  Outcome: Progressing     Problem: Discharge Planning  Goal: Discharge to home or other facility with appropriate resources  Outcome: Progressing     Problem: Chronic Conditions and Co-morbidities  Goal: Patient's chronic conditions and co-morbidity symptoms are monitored and maintained or improved  Outcome: Progressing     Problem: Skin  Goal: Decreased wound size/increased tissue granulation at next dressing change  Outcome: Progressing  Goal: Participates in plan/prevention/treatment measures  Outcome: Progressing  Goal: Prevent/manage excess moisture  Outcome: Progressing  Goal: Prevent/minimize sheer/friction injuries  Outcome: Progressing  Goal: Promote/optimize nutrition  Outcome: Progressing  Goal: Promote skin healing  Outcome: Progressing

## 2024-04-01 NOTE — CARE PLAN
The patient's goals for the shift include to remain safe    The clinical goals for the shift include patient will have decreased pain during shift.    Over the shift, the patient did not make progress toward the following goals. Barriers to progression include weakness, left foot pain and amputated areas. Recommendations to address these barriers include PT OT eval, up with assist and walker, pain medicine.

## 2024-04-01 NOTE — PROGRESS NOTES
Infectious disease progress note  Subjective   Left fifth toe osteomyelitis status post amputation (MRSA)  Pneumonia (positive strep pneumo antigen)  Right foot plantar ulcer status postdebridement    Antibiotics  Will discontinue vancomycin Zosyn and Doxy  Bactrim and Levaquin  Objective   Range of Vitals (last 24 hours)  Heart Rate:  [69-82]   Temp:  [36 °C (96.8 °F)-37.1 °C (98.8 °F)]   Resp:  [17-18]   BP: (123-142)/(60-89)   SpO2:  [94 %-97 %]   Daily Weight  03/26/24 : 85.3 kg (188 lb 0.8 oz)    Body mass index is 24.82 kg/m².      Physical Exam  HEENT PERRLA  Chest fair air entry bilaterally  CVS S1-S2 regular  Abdomen soft nontender positive bowel sounds  Feet are both wrapped      Relevant Results  Labs  Lab Results   Component Value Date    WBC 3.4 (L) 04/01/2024    HGB 9.2 (L) 04/01/2024    HCT 28.1 (L) 04/01/2024     (H) 04/01/2024    PLT 92 (L) 04/01/2024     Lab Results   Component Value Date    GLUCOSE 260 (H) 04/01/2024    CALCIUM 7.7 (L) 04/01/2024     (L) 04/01/2024    K 3.8 04/01/2024    CO2 26 04/01/2024    CL 98 04/01/2024    BUN 11 04/01/2024    CREATININE 0.57 04/01/2024   ESR: --  Lab Results   Component Value Date    SEDRATE 34 (H) 03/26/2024     Lab Results   Component Value Date    CRP 13.80 (H) 03/26/2024     Lab Results   Component Value Date    ALT 51 04/01/2024    AST 74 (H) 04/01/2024    GGT 1,832 (H) 03/29/2019    ALKPHOS 208 (H) 04/01/2024    BILITOT 2.7 (H) 04/01/2024       Microbiology    3- MRSA nasal swab positive  3- wound cultures rare MRSA  3- strep pneumo antigen in the urine positive for strep pneumo  Imaging  3- MRI of left foot shows high likelihood of osteomyelitis involving the distal phalanx of the fifth digit    3- x-ray of foot probable plantar soft tissue edema  3- chest x-ray right-sided infiltrate    Assessment/Plan   1.  Will discontinue vancomycin and Zosyn Doxy changed to Bactrim and Levaquin for a 7-day  total treatment course  2.  Patient leaving for ECF today  3.  Will give a strep pneumo vaccine before discharge    Other issues  Alcoholic liver cirrhosis chronic for patient  Diabetes mellitus chronic for patient puts him at higher risk for infections  Hypertension chronic for patient  CAD with stents chronic for patient  History of subarachnoid hemorrhage      I reviewed and interpreted all lab test imaging studies and documentations from other healthcare providers  I am monitoring for antibiotic side effects and toxicity     Chantale Pelletier MD

## 2024-04-01 NOTE — PROGRESS NOTES
Subjective   Seen and examined this AM. Feels generally weak. Hesitant regarding SNF but ultimately agreeable given that he was unable to get up on his feet prior to admission.    Objective   Vitals:    04/01/24 1817   BP: 130/66   Pulse: 70   Resp: 17   Temp: 36.9 °C (98.4 °F)   SpO2: 96%       Physical Exam:   Constitutional: obese, awake, alert, no acute distress  ENMT: mucous membranes dry, conjunctivae clear  Head/Neck: normocephalic, atraumatic; supple, trachea midline  Respiratory/Thorax: patent airways, diminished breath sounds  Cardiovascular: RRR, no murmur appreciated  Gastrointestinal: soft, nondistended, non-tender, bowel sounds appreciated  Extremities: s/p L 5th toe amp and debridement of ulcer plantar aspect R foot   Neurological: AO x3, no focal deficits  Psychological: appropriate mood and behavior  Skin: warm and dry      Assessment/Plan   This is a 54-year-old male who initially presented to [facility] on 3/26/2024 with generalized weakness and tremors x 2 days.  He admits to drinking 2-16 ounce bottles of hand  for the last 2 months as he was trying to hide his alcohol use from his family.  He previously was drinking 1 pint of vodka every other day or so.  Last EtOH use on AM of 3/26.     Left 5th digit OM and gangrene s/p amputation on 3/29  Right foot plantar ulcer s/p debridement on 3/29  Strep pneumo PNA  Acute alcoholic hepatitis, resolving  Thrombocytopenia in setting of liver cirrhosis  IDDM2 with hyperglycemia  Alcohol use disorder with acute alcohol withdrawal, resolved  Acute hypoxemia, resolved - wearing 2L NC for comfort now  Elevated troponin, suspect demand ischemia in setting of hypoxemia  Prolonged QTc    #Alcoholic liver cirrhosis  #History of alcohol hepatitis  #IDDM2  #HTN  #CAD s/p PCI with stent  #History of SAH    Transitioned from IV antibiotics to PO Bactrim and Levaquin with plans to complete total of 7-day course (end: 4/1). Appreciate ID recs.  Continue thiamine,  folic acid. Counseled on alcohol cessation. SW following. On admission, case was d/w posion control regarding hand  ingestion. No indication for dialysis, c/w supportive measures. Pt mentating well. RAVINDER DC'ed.  MDF 7.6 with good prognosis. No need for steroids. MELD-Na 11 with 6% estimated 3-month mortality. Will need hepatology follow upon DC.  Insulin regimen:  Home: Lantus 30 QAM, Lispro 5 TID AC, SSI  Inpatient: Lantus 30 QAM, SSI (0-15) TID AC     - Continue home metoprolol tartrate, lisinopril, atorvastatin, ASA     DVT PPX: Lovenox  Diet: 2g Na/Diabetic  IVF: -  Consults: GI, Podiatry, Vascular, ID  Code status: FULL

## 2024-04-01 NOTE — CONSULTS
Vancomycin Dosing by Pharmacy- Cessation of Therapy    Consult to pharmacy for vancomycin dosing has been discontinued by the prescriber, pharmacy will sign off at this time.    Please call pharmacy if there are further questions or re-enter a consult if vancomycin is resumed.     Radha Stubbs, PharmD

## 2024-04-01 NOTE — PROGRESS NOTES
PROGRESS NOTE - PODIATRIC MEDICINE & SURGERY    HPI    Patient is a 54 y.o. male who is S/P left 5th digit amputation and right foot ulcer debridement (DOS: 3/29/24, Dr. Villegas).   Patient seen and evaluated today. Laying in bed comfortably. Endorses left amputation site burning pain. Denies pain of the right foot wound. Has been ambulating with walker during physical therapy and does not feel completely balanced. He is using his surgical shoes at all times when ambulating. Overall, he is feeling better compared to when he was admitted to hospital. He feels stressed because he will be transferred to a rehab facility for further care. Denies F/C/N/V.      ROS: Negative except as above.        Past Medical History:   Diagnosis Date    Acute ischemic heart disease, unspecified (CMS/HCC)     Coronary syndrome, acute    Cirrhosis of liver (CMS/HCC)     Coronary artery disease     Diabetes mellitus (CMS/HCC)     Personal history of other diseases of the circulatory system     History of heart block    Personal history of other diseases of the musculoskeletal system and connective tissue     History of arthritis    Personal history of other endocrine, nutritional and metabolic disease     History of obesity           Problem List Items Addressed This Visit          Musculoskeletal and Injuries    Gangrene of left foot (CMS/HCC)    Relevant Orders    Vascular US Ankle Brachial Index (SPARKLE) Without Exercise (Completed)    Case Request Operating Room: Amputation Digit Foot 5th left, debridement of wound right foot (Completed)    Surgical Pathology Exam    Foot ulcer with fat layer exposed, right (CMS/HCC)    Relevant Orders    Case Request Operating Room: Amputation Digit Foot 5th left, debridement of wound right foot (Completed)    Surgical Pathology Exam       Pulmonary and Pneumonias    * (Principal) Pneumonia of right lung due to infectious organism, unspecified part of lung - Primary     Other Visit Diagnoses        Generalized weakness        Hypomagnesemia        Alcohol abuse        Alcoholic intoxication with complication (CMS/HCC)        Acute hypoxic respiratory failure (CMS/HCC)        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      PHYSICAL EXAM  Vitals:    04/01/24 1023   BP: 141/77   Pulse: 70   Resp: 18   Temp: 37.1 °C (98.8 °F)   SpO2: 96%       General: Alert and oriented. No acute distress. Cooperative. Pleasant. Laying in bed comfortably.      Vascular: DP and PT pulses are palpable bilateral.  CFT is less than 3 seconds bilateral.  Skin temperature is warm to warm proximal to distal bilateral.         Neuro: Light touch is diminished to the foot bilateral.        MSK: S/p left 5th toe amputation. Moves all extremities spontaneously.        Derm: Nails 1-5 right and 1-4 left are intact.  Webspaces are clean, dry and intact bilateral.       WOUND #1: Left 5th toe amputation site  Incision is well-coapted with with sutures intact. Mild periwound erythema and edema. No streaking. Mild serosanguinous drainage on inner dressing. No odor. No strikethrough. No fluctuance or crepitation. No necrotic tissue.     WOUND #2: Plantar right 2nd metatarsal   Ulcer measures 2.3 x 1.5 x 0.4 cm. No probe to bone.  No exposed tendon in wound bed.  Mild sanguinous drainage. No purulent drainage. No strikethrough. No malodor. No surrounding erythema or edema. No fluctuance or crepitus. No necrotic tissue.       RESULTS    CBC  Lab Results   Component Value Date    WBC 3.4 (L) 04/01/2024    HGB 9.2 (L) 04/01/2024    HCT 28.1 (L) 04/01/2024     (H) 04/01/2024    PLT 92 (L) 04/01/2024       ESR  Lab Results   Component Value Date    SEDRATE 34 (H) 03/26/2024       CRP  Lab Results   Component Value Date    CRP 13.80 (H) 03/26/2024       HgbA1c  Lab Results   Component Value Date    HGBA1C 8.4 (H) 03/27/2024        Cultures  Susceptibility data from last 90 days.  Collected Specimen Info Organism Clindamycin Erythromycin Oxacillin Tetracycline Trimethoprim/Sulfamethoxazole Vancomycin   03/27/24 Tissue/Biopsy from Wound/Tissue Methicillin Resistant Staphylococcus aureus (MRSA) S R R R S S   03/26/24 Swab from Anterior Nares Methicillin Resistant Staphylococcus aureus (MRSA)                 ASSESSMENT  #S/P left 5th toe amputation (DOS 3/29/24, Dr. Villegas)  #Osteomyelitis of left 5th digit  #Neuropathic ulcer, right foot  #Diabetes mellitus, uncontrolled  #Diabetic peripheral neuropathy    PLAN  - Interval charts, labs, findings reviewed. All findings discussed with patient. All questions answered to patient's satisfaction.    - Left foot: gentamicin cream, adaptic, 4x4 gauze, Kerlix, ACE.   - Right foot: gentamicin cream, adaptic, ABD, Kerlix, ACE.  - Nursing orders entered for daily dressings as above.    - WBAT in surgical shoe b/l. Participating in physical therapy.    - Continue IV abx per primary team. Pain management per primary team.     - Podiatry team will continue to follow.       This patient was discussed with the attending, Dr. Nelly DPM. Please await the attending's signature for finalization of this note.     Lashawn Smith DPM PGY1  Podiatric Medicine and Surgery  I saw and evaluated the patient. I personally obtained the key and critical portions of the history and physical exam or was physically present for key and critical portions performed by the resident/fellow. I reviewed the resident/fellow's documentation and discussed the patient with the resident/fellow. I agree with the resident/fellow's medical decision making as documented in the note.    Nelda Villegas DPM

## 2024-04-02 VITALS
HEART RATE: 77 BPM | WEIGHT: 188.05 LBS | DIASTOLIC BLOOD PRESSURE: 62 MMHG | SYSTOLIC BLOOD PRESSURE: 109 MMHG | HEIGHT: 73 IN | BODY MASS INDEX: 24.92 KG/M2 | OXYGEN SATURATION: 96 % | RESPIRATION RATE: 18 BRPM | TEMPERATURE: 97.7 F

## 2024-04-02 LAB
ALBUMIN SERPL BCP-MCNC: 3 G/DL (ref 3.4–5)
ALP SERPL-CCNC: 240 U/L (ref 33–120)
ALT SERPL W P-5'-P-CCNC: 56 U/L (ref 10–52)
ANION GAP SERPL CALC-SCNC: 12 MMOL/L (ref 10–20)
AST SERPL W P-5'-P-CCNC: 73 U/L (ref 9–39)
BILIRUB SERPL-MCNC: 2.5 MG/DL (ref 0–1.2)
BUN SERPL-MCNC: 11 MG/DL (ref 6–23)
CALCIUM SERPL-MCNC: 8.2 MG/DL (ref 8.6–10.3)
CHLORIDE SERPL-SCNC: 99 MMOL/L (ref 98–107)
CO2 SERPL-SCNC: 27 MMOL/L (ref 21–32)
CREAT SERPL-MCNC: 0.64 MG/DL (ref 0.5–1.3)
EGFRCR SERPLBLD CKD-EPI 2021: >90 ML/MIN/1.73M*2
ERYTHROCYTE [DISTWIDTH] IN BLOOD BY AUTOMATED COUNT: 13.6 % (ref 11.5–14.5)
GLUCOSE BLD MANUAL STRIP-MCNC: 167 MG/DL (ref 74–99)
GLUCOSE BLD MANUAL STRIP-MCNC: 245 MG/DL (ref 74–99)
GLUCOSE BLD MANUAL STRIP-MCNC: 280 MG/DL (ref 74–99)
GLUCOSE SERPL-MCNC: 172 MG/DL (ref 74–99)
HCT VFR BLD AUTO: 30 % (ref 41–52)
HGB BLD-MCNC: 9.9 G/DL (ref 13.5–17.5)
MAGNESIUM SERPL-MCNC: 1.98 MG/DL (ref 1.6–2.4)
MCH RBC QN AUTO: 35.2 PG (ref 26–34)
MCHC RBC AUTO-ENTMCNC: 33 G/DL (ref 32–36)
MCV RBC AUTO: 107 FL (ref 80–100)
NRBC BLD-RTO: 0 /100 WBCS (ref 0–0)
PLATELET # BLD AUTO: 142 X10*3/UL (ref 150–450)
POTASSIUM SERPL-SCNC: 4.7 MMOL/L (ref 3.5–5.3)
PROT SERPL-MCNC: 5.4 G/DL (ref 6.4–8.2)
RBC # BLD AUTO: 2.81 X10*6/UL (ref 4.5–5.9)
SODIUM SERPL-SCNC: 133 MMOL/L (ref 136–145)
WBC # BLD AUTO: 4.7 X10*3/UL (ref 4.4–11.3)

## 2024-04-02 PROCEDURE — 2500000002 HC RX 250 W HCPCS SELF ADMINISTERED DRUGS (ALT 637 FOR MEDICARE OP, ALT 636 FOR OP/ED): Performed by: INTERNAL MEDICINE

## 2024-04-02 PROCEDURE — 2500000004 HC RX 250 GENERAL PHARMACY W/ HCPCS (ALT 636 FOR OP/ED): Performed by: STUDENT IN AN ORGANIZED HEALTH CARE EDUCATION/TRAINING PROGRAM

## 2024-04-02 PROCEDURE — 2500000001 HC RX 250 WO HCPCS SELF ADMINISTERED DRUGS (ALT 637 FOR MEDICARE OP): Performed by: STUDENT IN AN ORGANIZED HEALTH CARE EDUCATION/TRAINING PROGRAM

## 2024-04-02 PROCEDURE — 83735 ASSAY OF MAGNESIUM: CPT | Performed by: STUDENT IN AN ORGANIZED HEALTH CARE EDUCATION/TRAINING PROGRAM

## 2024-04-02 PROCEDURE — 2500000001 HC RX 250 WO HCPCS SELF ADMINISTERED DRUGS (ALT 637 FOR MEDICARE OP)

## 2024-04-02 PROCEDURE — S4991 NICOTINE PATCH NONLEGEND: HCPCS | Performed by: STUDENT IN AN ORGANIZED HEALTH CARE EDUCATION/TRAINING PROGRAM

## 2024-04-02 PROCEDURE — 2500000002 HC RX 250 W HCPCS SELF ADMINISTERED DRUGS (ALT 637 FOR MEDICARE OP, ALT 636 FOR OP/ED): Performed by: STUDENT IN AN ORGANIZED HEALTH CARE EDUCATION/TRAINING PROGRAM

## 2024-04-02 PROCEDURE — 36415 COLL VENOUS BLD VENIPUNCTURE: CPT | Performed by: STUDENT IN AN ORGANIZED HEALTH CARE EDUCATION/TRAINING PROGRAM

## 2024-04-02 PROCEDURE — 82947 ASSAY GLUCOSE BLOOD QUANT: CPT

## 2024-04-02 PROCEDURE — 80053 COMPREHEN METABOLIC PANEL: CPT | Performed by: STUDENT IN AN ORGANIZED HEALTH CARE EDUCATION/TRAINING PROGRAM

## 2024-04-02 PROCEDURE — 99239 HOSP IP/OBS DSCHRG MGMT >30: CPT | Performed by: STUDENT IN AN ORGANIZED HEALTH CARE EDUCATION/TRAINING PROGRAM

## 2024-04-02 PROCEDURE — 85027 COMPLETE CBC AUTOMATED: CPT | Performed by: STUDENT IN AN ORGANIZED HEALTH CARE EDUCATION/TRAINING PROGRAM

## 2024-04-02 RX ORDER — FOLIC ACID 1 MG/1
1 TABLET ORAL DAILY
Start: 2024-04-03

## 2024-04-02 RX ORDER — LACTULOSE 10 G/15ML
15 SOLUTION ORAL DAILY
Start: 2024-04-03

## 2024-04-02 RX ORDER — LANOLIN ALCOHOL/MO/W.PET/CERES
100 CREAM (GRAM) TOPICAL DAILY
Start: 2024-04-03

## 2024-04-02 RX ORDER — GENTAMICIN SULFATE 1 MG/G
CREAM TOPICAL DAILY
Qty: 1 G | Refills: 0
Start: 2024-04-03 | End: 2024-04-17

## 2024-04-02 RX ADMIN — MORPHINE SULFATE 2 MG: 2 INJECTION, SOLUTION INTRAMUSCULAR; INTRAVENOUS at 00:06

## 2024-04-02 RX ADMIN — Medication 1 PATCH: at 08:17

## 2024-04-02 RX ADMIN — FOLIC ACID 1 MG: 1 TABLET ORAL at 08:11

## 2024-04-02 RX ADMIN — THIAMINE HCL TAB 100 MG 100 MG: 100 TAB at 08:10

## 2024-04-02 RX ADMIN — ENOXAPARIN SODIUM 40 MG: 100 INJECTION SUBCUTANEOUS at 16:32

## 2024-04-02 RX ADMIN — ASPIRIN 81 MG: 81 TABLET, COATED ORAL at 08:10

## 2024-04-02 RX ADMIN — MORPHINE SULFATE 2 MG: 2 INJECTION, SOLUTION INTRAMUSCULAR; INTRAVENOUS at 14:08

## 2024-04-02 RX ADMIN — INSULIN LISPRO 3 UNITS: 100 INJECTION, SOLUTION INTRAVENOUS; SUBCUTANEOUS at 08:11

## 2024-04-02 RX ADMIN — INSULIN LISPRO 9 UNITS: 100 INJECTION, SOLUTION INTRAVENOUS; SUBCUTANEOUS at 16:32

## 2024-04-02 RX ADMIN — SULFAMETHOXAZOLE AND TRIMETHOPRIM 160 MG: 400; 80 TABLET ORAL at 10:32

## 2024-04-02 RX ADMIN — Medication 1 TABLET: at 08:10

## 2024-04-02 RX ADMIN — MORPHINE SULFATE 2 MG: 2 INJECTION, SOLUTION INTRAMUSCULAR; INTRAVENOUS at 05:20

## 2024-04-02 RX ADMIN — INSULIN GLARGINE 30 UNITS: 100 INJECTION, SOLUTION SUBCUTANEOUS at 08:11

## 2024-04-02 RX ADMIN — METOPROLOL TARTRATE 25 MG: 25 TABLET, FILM COATED ORAL at 08:10

## 2024-04-02 RX ADMIN — INSULIN LISPRO 6 UNITS: 100 INJECTION, SOLUTION INTRAVENOUS; SUBCUTANEOUS at 12:08

## 2024-04-02 RX ADMIN — LISINOPRIL 10 MG: 10 TABLET ORAL at 08:10

## 2024-04-02 RX ADMIN — MORPHINE SULFATE 2 MG: 2 INJECTION, SOLUTION INTRAMUSCULAR; INTRAVENOUS at 09:27

## 2024-04-02 RX ADMIN — GENTAMICIN SULFATE: 1 CREAM TOPICAL at 10:32

## 2024-04-02 RX ADMIN — ACETAMINOPHEN 650 MG: 325 TABLET ORAL at 18:31

## 2024-04-02 ASSESSMENT — COGNITIVE AND FUNCTIONAL STATUS - GENERAL
DAILY ACTIVITIY SCORE: 19
TOILETING: A LITTLE
PERSONAL GROOMING: A LITTLE
DRESSING REGULAR UPPER BODY CLOTHING: A LITTLE
WALKING IN HOSPITAL ROOM: A LOT
MOBILITY SCORE: 16
STANDING UP FROM CHAIR USING ARMS: A LITTLE
TURNING FROM BACK TO SIDE WHILE IN FLAT BAD: A LITTLE
MOVING TO AND FROM BED TO CHAIR: A LITTLE
CLIMB 3 TO 5 STEPS WITH RAILING: A LOT
HELP NEEDED FOR BATHING: A LITTLE
MOVING FROM LYING ON BACK TO SITTING ON SIDE OF FLAT BED WITH BEDRAILS: A LITTLE
DRESSING REGULAR LOWER BODY CLOTHING: A LITTLE

## 2024-04-02 ASSESSMENT — PAIN - FUNCTIONAL ASSESSMENT
PAIN_FUNCTIONAL_ASSESSMENT: 0-10

## 2024-04-02 ASSESSMENT — PAIN SCALES - GENERAL
PAINLEVEL_OUTOF10: 5 - MODERATE PAIN
PAINLEVEL_OUTOF10: 7
PAINLEVEL_OUTOF10: 2
PAINLEVEL_OUTOF10: 7
PAINLEVEL_OUTOF10: 4
PAINLEVEL_OUTOF10: 2

## 2024-04-02 ASSESSMENT — PAIN DESCRIPTION - LOCATION
LOCATION: TOE (COMMENT WHICH ONE)
LOCATION: FOOT
LOCATION: FOOT

## 2024-04-02 NOTE — PROGRESS NOTES
Physical Therapy                 Therapy Communication Note    Patient Name: Errol Fonseca  MRN: [id number]  Today's Date: 4/2/2024     Discipline: Physical Therapy    Missed Visit Reason: Patient refused    Missed Time: Attempt    Comment:  pt very politely declining tx at this time;  states he has already been up ambulating today and is currently awaiting d/c to snf.

## 2024-04-02 NOTE — DISCHARGE SUMMARY
Hospital Medicine Discharge Summary    Patient Name: Errol Fonseca  YOB: 1970    Discharge Diagnosis:   Left 5th digit OM and gangrene s/p amputation on 3/29  Right foot plantar ulcer s/p debridement on 3/29  Strep pneumo PNA  Acute alcoholic hepatitis, resolving  Thrombocytopenia in setting of liver cirrhosis  IDDM2 with hyperglycemia  Alcohol use disorder with acute alcohol withdrawal, resolved  Acute hypoxemia, resolved - wearing 2L NC for comfort now  Elevated troponin, suspect demand ischemia in setting of hypoxemia  Prolonged QTc    Discharge Date: 4/2/2024  Discharge Location: [facility]    Hospital Course:  This is a 54-year-old male, with history of alcohol use disorder, liver cirrhosis, hx alcoholic hepatitis, NIDDM 2, HTN, CAD s/p PCI with stent, and hx SAH, who initially presented to [facility] on 3/26/2024 with generalized weakness and tremors x 2 days. He had been drinking 2-16 ounce bottles of hand  for the last 2 months as he was trying to hide his alcohol use from his family. He previously was drinking 1 pint of vodka every other day or so. Last EtOH use on AM of 3/26.  He was maintained on CIWA protocol for alcohol withdrawal.  Case had been discussed with poison control regarding hand  ingestion, and it was recommended to continue with supportive measures.  Further workup revealed alcohol hepatitis with MDF <32, so no steroids were indicated.  He was additionally found to have Strep pneumo pneumonia for which he briefly required O2.  He was maintained on IV antibiotics, which were broadened to cover left 5th digit osteomyelitis and gangrene.  He underwent amputation of his left 5th digit on as well as debridement of his right foot plantar ulcer on 3/29.  At time of discharge, patient has been weaned to RA and completed his antibiotics.  He is stable for discharge to SNF for further rehab.  He has been advised to quit alcohol and follow up with hepatology upon  "discharge.      Time Spent: 35 minutes    Physical Exam:     /62 (BP Location: Right arm, Patient Position: Lying)   Pulse 63   Temp 35.5 °C (95.9 °F) (Temporal)   Resp 18   Ht 1.854 m (6' 0.99\")   Wt 85.3 kg (188 lb 0.8 oz)   SpO2 93%   BMI 24.82 kg/m²     Physical Exam:   Constitutional: awake, alert, no acute distress  ENMT: mucous membranes dry, conjunctivae clear  Head/Neck: normocephalic, atraumatic; supple, trachea midline  Respiratory/Thorax: patent airways, diminished breath sounds  Cardiovascular: RRR, no murmur appreciated  Gastrointestinal: soft, nondistended, non-tender, bowel sounds appreciated  Extremities: s/p L 5th toe amp and debridement of ulcer plantar aspect R foot   Neurological: AO x3, no focal deficits  Psychological: appropriate mood and behavior  Skin: warm and dry    Discharge Medications:     Your medication list        START taking these medications        Instructions Last Dose Given Next Dose Due   folic acid 1 mg tablet  Commonly known as: Folvite  Start taking on: April 3, 2024      Take 1 tablet (1 mg) by mouth once daily. Do not start before April 3, 2024.       lactulose 20 gram/30 mL oral solution  Start taking on: April 3, 2024      Take 23 mL (15.3333 g) by mouth once daily. Do not start before April 3, 2024.       thiamine 100 mg tablet  Commonly known as: Vitamin B-1  Start taking on: April 3, 2024      Take 1 tablet (100 mg) by mouth once daily. Do not start before April 3, 2024.              CONTINUE taking these medications        Instructions Last Dose Given Next Dose Due   aspirin 81 mg EC tablet           atorvastatin 40 mg tablet  Commonly known as: Lipitor           insulin glargine 100 unit/mL (3 mL) pen  Commonly known as: Lantus           insulin lispro 100 unit/mL injection  Commonly known as: HumaLOG           lisinopril 10 mg tablet           metoprolol tartrate 25 mg tablet  Commonly known as: Lopressor           multivitamin with minerals " iron-free  Commonly known as: Centrum Silver           mupirocin 2 % ointment  Commonly known as: Bactroban           nitroglycerin 0.4 mg SL tablet  Commonly known as: Nitrostat                     Where to Get Your Medications        Information about where to get these medications is not yet available    Ask your nurse or doctor about these medications  folic acid 1 mg tablet  lactulose 20 gram/30 mL oral solution  thiamine 100 mg tablet          Jonatan Sullivan, DO  Internal Medicine PGY3

## 2024-04-02 NOTE — PROGRESS NOTES
TCC note: Met with pt at bedside at approximately 1:00 PM and informed him that he has a written discharge to go to [facility]. Informed him that we will set up a transport time for him and I would let him know when we have that confirmed. Pt has 5:30 transport today. Will follow until discharge. Thao De Luna, MSN, RN, TCC.

## 2024-04-02 NOTE — CARE PLAN
The patient's goals for the shift include to remain safe    The clinical goals for the shift include patient will have decreased pain during shift.

## 2024-04-03 ENCOUNTER — LAB REQUISITION (OUTPATIENT)
Dept: LAB | Facility: HOSPITAL | Age: 54
End: 2024-04-03
Payer: MEDICAID

## 2024-04-03 DIAGNOSIS — E78.5 HYPERLIPIDEMIA, UNSPECIFIED: ICD-10-CM

## 2024-04-03 DIAGNOSIS — I10 ESSENTIAL (PRIMARY) HYPERTENSION: ICD-10-CM

## 2024-04-03 DIAGNOSIS — E55.9 VITAMIN D DEFICIENCY, UNSPECIFIED: ICD-10-CM

## 2024-04-03 DIAGNOSIS — R73.09 OTHER ABNORMAL GLUCOSE: ICD-10-CM

## 2024-04-03 DIAGNOSIS — Z89.429 ACQUIRED ABSENCE OF OTHER TOE(S), UNSPECIFIED SIDE (MULTI): ICD-10-CM

## 2024-04-03 LAB
25(OH)D3 SERPL-MCNC: 12 NG/ML (ref 30–100)
ALBUMIN SERPL BCP-MCNC: 3.4 G/DL (ref 3.4–5)
ALP SERPL-CCNC: 310 U/L (ref 33–120)
ALT SERPL W P-5'-P-CCNC: 74 U/L (ref 10–52)
ANION GAP SERPL CALC-SCNC: 16 MMOL/L (ref 10–20)
AST SERPL W P-5'-P-CCNC: 101 U/L (ref 9–39)
BILIRUB SERPL-MCNC: 2.8 MG/DL (ref 0–1.2)
BUN SERPL-MCNC: 12 MG/DL (ref 6–23)
CALCIUM SERPL-MCNC: 8.8 MG/DL (ref 8.6–10.3)
CHLORIDE SERPL-SCNC: 98 MMOL/L (ref 98–107)
CHOLEST SERPL-MCNC: 164 MG/DL (ref 0–199)
CHOLESTEROL/HDL RATIO: 5.4
CO2 SERPL-SCNC: 26 MMOL/L (ref 21–32)
CREAT SERPL-MCNC: 0.65 MG/DL (ref 0.5–1.3)
EGFRCR SERPLBLD CKD-EPI 2021: >90 ML/MIN/1.73M*2
ERYTHROCYTE [DISTWIDTH] IN BLOOD BY AUTOMATED COUNT: 13.7 % (ref 11.5–14.5)
EST. AVERAGE GLUCOSE BLD GHB EST-MCNC: 157 MG/DL
GLUCOSE SERPL-MCNC: 182 MG/DL (ref 74–99)
HBA1C MFR BLD: 7.1 %
HCT VFR BLD AUTO: 31.9 % (ref 41–52)
HDLC SERPL-MCNC: 30.1 MG/DL
HGB BLD-MCNC: 10.2 G/DL (ref 13.5–17.5)
LDLC SERPL CALC-MCNC: 114 MG/DL
MCH RBC QN AUTO: 34.3 PG (ref 26–34)
MCHC RBC AUTO-ENTMCNC: 32 G/DL (ref 32–36)
MCV RBC AUTO: 107 FL (ref 80–100)
NON HDL CHOLESTEROL: 134 MG/DL (ref 0–149)
NRBC BLD-RTO: 0 /100 WBCS (ref 0–0)
PLATELET # BLD AUTO: 258 X10*3/UL (ref 150–450)
POTASSIUM SERPL-SCNC: 4.7 MMOL/L (ref 3.5–5.3)
PROT SERPL-MCNC: 6.3 G/DL (ref 6.4–8.2)
RBC # BLD AUTO: 2.97 X10*6/UL (ref 4.5–5.9)
SODIUM SERPL-SCNC: 135 MMOL/L (ref 136–145)
TRIGL SERPL-MCNC: 100 MG/DL (ref 0–149)
VLDL: 20 MG/DL (ref 0–40)
WBC # BLD AUTO: 6.4 X10*3/UL (ref 4.4–11.3)

## 2024-04-03 PROCEDURE — 83036 HEMOGLOBIN GLYCOSYLATED A1C: CPT | Mod: OUT | Performed by: INTERNAL MEDICINE

## 2024-04-03 PROCEDURE — 85027 COMPLETE CBC AUTOMATED: CPT | Mod: OUT | Performed by: INTERNAL MEDICINE

## 2024-04-03 PROCEDURE — 82306 VITAMIN D 25 HYDROXY: CPT | Mod: OUT,PARLAB | Performed by: INTERNAL MEDICINE

## 2024-04-03 PROCEDURE — 80061 LIPID PANEL: CPT | Mod: OUT | Performed by: INTERNAL MEDICINE

## 2024-04-03 PROCEDURE — 80053 COMPREHEN METABOLIC PANEL: CPT | Mod: OUT | Performed by: INTERNAL MEDICINE

## 2024-04-09 LAB
LABORATORY COMMENT REPORT: NORMAL
PATH REPORT.FINAL DX SPEC: NORMAL
PATH REPORT.GROSS SPEC: NORMAL
PATH REPORT.MICROSCOPIC SPEC OTHER STN: NORMAL
PATH REPORT.RELEVANT HX SPEC: NORMAL
PATH REPORT.TOTAL CANCER: NORMAL

## 2024-04-10 ENCOUNTER — LAB REQUISITION (OUTPATIENT)
Dept: LAB | Facility: HOSPITAL | Age: 54
End: 2024-04-10
Payer: MEDICAID

## 2024-04-10 DIAGNOSIS — I10 ESSENTIAL (PRIMARY) HYPERTENSION: ICD-10-CM

## 2024-04-10 DIAGNOSIS — N17.9 ACUTE KIDNEY FAILURE, UNSPECIFIED (CMS-HCC): ICD-10-CM

## 2024-04-10 LAB
ALBUMIN SERPL BCP-MCNC: 3.9 G/DL (ref 3.4–5)
ALP SERPL-CCNC: 239 U/L (ref 33–120)
ALT SERPL W P-5'-P-CCNC: 66 U/L (ref 10–52)
ANION GAP SERPL CALC-SCNC: 13 MMOL/L (ref 10–20)
AST SERPL W P-5'-P-CCNC: 73 U/L (ref 9–39)
BILIRUB SERPL-MCNC: 1.3 MG/DL (ref 0–1.2)
BUN SERPL-MCNC: 11 MG/DL (ref 6–23)
CALCIUM SERPL-MCNC: 9.1 MG/DL (ref 8.6–10.3)
CHLORIDE SERPL-SCNC: 97 MMOL/L (ref 98–107)
CO2 SERPL-SCNC: 28 MMOL/L (ref 21–32)
CREAT SERPL-MCNC: 0.61 MG/DL (ref 0.5–1.3)
EGFRCR SERPLBLD CKD-EPI 2021: >90 ML/MIN/1.73M*2
GLUCOSE SERPL-MCNC: 222 MG/DL (ref 74–99)
POTASSIUM SERPL-SCNC: 4.3 MMOL/L (ref 3.5–5.3)
PROT SERPL-MCNC: 7 G/DL (ref 6.4–8.2)
SODIUM SERPL-SCNC: 134 MMOL/L (ref 136–145)

## 2024-04-10 PROCEDURE — 84075 ASSAY ALKALINE PHOSPHATASE: CPT | Mod: OUT | Performed by: INTERNAL MEDICINE

## 2024-04-15 ENCOUNTER — OFFICE VISIT (OUTPATIENT)
Dept: GASTROENTEROLOGY | Facility: HOSPITAL | Age: 54
End: 2024-04-15
Payer: MEDICAID

## 2024-04-15 VITALS
SYSTOLIC BLOOD PRESSURE: 113 MMHG | HEIGHT: 74 IN | BODY MASS INDEX: 27.21 KG/M2 | TEMPERATURE: 96.8 F | HEART RATE: 67 BPM | WEIGHT: 212 LBS | RESPIRATION RATE: 20 BRPM | OXYGEN SATURATION: 98 % | DIASTOLIC BLOOD PRESSURE: 68 MMHG

## 2024-04-15 DIAGNOSIS — K70.30 ALCOHOLIC CIRRHOSIS, UNSPECIFIED WHETHER ASCITES PRESENT (MULTI): ICD-10-CM

## 2024-04-15 PROCEDURE — 99204 OFFICE O/P NEW MOD 45 MIN: CPT | Performed by: NURSE PRACTITIONER

## 2024-04-15 PROCEDURE — 99214 OFFICE O/P EST MOD 30 MIN: CPT | Performed by: NURSE PRACTITIONER

## 2024-04-15 RX ORDER — ERGOCALCIFEROL 1.25 1/1
CAPSULE ORAL
COMMUNITY
Start: 2024-04-04

## 2024-04-15 RX ORDER — IBUPROFEN 200 MG
16 TABLET ORAL
COMMUNITY
Start: 2023-04-18 | End: 2024-04-17

## 2024-04-15 RX ORDER — MULTIVIT-MIN/IRON FUM/FOLIC AC 7.5 MG-4
1 TABLET ORAL DAILY
COMMUNITY
Start: 2019-04-01

## 2024-04-15 RX ORDER — IBUPROFEN 200 MG
TABLET ORAL
COMMUNITY
End: 2024-06-05 | Stop reason: WASHOUT

## 2024-04-15 ASSESSMENT — PAIN SCALES - GENERAL: PAINLEVEL: 2

## 2024-04-15 NOTE — PROGRESS NOTES
"Subjective   Patient ID: Errol Fonseca is a 54 y.o. male who presents for history of alcohol induced cirrhosis.      HPI  54 year old referred for history of cirrhosis secondary to alcohol with recent admission and hospitalization for amputation left 5th digit and right foot plantar ulcer debridement in 3/2024, strep pneumo PNA and alcohol use disorder with acute alcohol withdrawal.  He completed course of antibiotics and was discharged to SNF.  He is here for follow-up and further evaluation regarding liver disease.  He has a longstanding history of substance abuse including illicit drugs in the past and alcohol.  He had been sober for ~ 3 years but recently \"fell off the [employer]Twitsn\".   He had been hiding his alcohol intake from his daughter and was consuming hand  daily.  Prior to that he was consuming 1 bottle of vodka per day.    He has been to rehab and AA in the past.  Prior to being discharged from the hospital, he made contact with his uncle and father to aid in his sobriety.  Currently he remains at SNF for wound care.   His last alcohol use was around 3/29/24.       He denies history of jaundice, icterus, bleeding, confusion or ascites.   No LE edema.        LABS:   Lab Results   Component Value Date    ALBUMIN 3.9 04/10/2024    BILITOT 1.3 (H) 04/10/2024    BILIDIR 1.2 (H) 03/26/2024    ALKPHOS 239 (H) 04/10/2024    ALT 66 (H) 04/10/2024    AST 73 (H) 04/10/2024    PROT 7.0 04/10/2024    LABS:   Lab Results   Component Value Date    WBC 6.4 04/03/2024    HGB 10.2 (L) 04/03/2024    HCT 31.9 (L) 04/03/2024     (H) 04/03/2024     04/03/2024     Lab Results   Component Value Date    INR 1.2 (H) 03/28/2024      US of abdomen 03/28/2024:  Enlarged liver.  Heterogeneity of the echotexture consistent with hepatocellular disease.  Marked fatty infiltration.   No hepatic masses.     Review of Systems   Constitutional:  Negative for appetite change, chills, fever and unexpected weight change. "   HENT:  Negative for mouth sores, nosebleeds, trouble swallowing and voice change.    Eyes:  Negative for visual disturbance.   Respiratory:  Negative for cough, shortness of breath and wheezing.    Cardiovascular:  Negative for chest pain, palpitations and leg swelling.   Gastrointestinal:  Negative for abdominal distention, abdominal pain, blood in stool, constipation, diarrhea, nausea and vomiting.   Genitourinary:  Negative for decreased urine volume, difficulty urinating, dysuria, frequency, hematuria and urgency.   Musculoskeletal:  Negative for gait problem and joint swelling.   Skin:  Negative for color change, pallor and rash.   Neurological:  Negative for dizziness, tremors, weakness, light-headedness, numbness and headaches.   Hematological:  Does not bruise/bleed easily.   Psychiatric/Behavioral:  Negative for agitation, behavioral problems, confusion and sleep disturbance.        Objective   Physical Exam  Constitutional:       General: He is awake.      Appearance: Normal appearance. He is well-developed.   HENT:      Head: Normocephalic and atraumatic.      Right Ear: Hearing normal.      Left Ear: Hearing normal.      Nose: Nose normal.      Mouth/Throat:      Lips: Pink.      Mouth: Mucous membranes are moist.   Eyes:      General: Lids are normal.      Extraocular Movements: Extraocular movements intact.      Conjunctiva/sclera: Conjunctivae normal.      Pupils: Pupils are equal, round, and reactive to light.   Cardiovascular:      Rate and Rhythm: Normal rate and regular rhythm.      Pulses: Normal pulses.      Heart sounds: Normal heart sounds.   Pulmonary:      Effort: Pulmonary effort is normal.      Breath sounds: Normal breath sounds.   Abdominal:      General: Abdomen is flat. Bowel sounds are normal.      Palpations: Abdomen is soft.   Musculoskeletal:      Cervical back: Normal range of motion and neck supple.   Feet:      Right foot:      Skin integrity: Skin integrity normal.      Left  foot:      Skin integrity: Skin integrity normal.      Comments: Dressing intact on surgical site on left foot from amputation and right foot from debridement.    Skin:     General: Skin is warm.   Neurological:      General: No focal deficit present.      Mental Status: He is alert and oriented to person, place, and time.      Cranial Nerves: Cranial nerves 2-12 are intact.      Sensory: Sensation is intact.      Motor: Motor function is intact.      Coordination: Coordination is intact.      Gait: Gait is intact.   Psychiatric:         Attention and Perception: Attention and perception normal.         Mood and Affect: Mood normal.         Speech: Speech normal.         Behavior: Behavior is cooperative.         Thought Content: Thought content normal.         Cognition and Memory: Cognition normal.         Judgment: Judgment normal.         Assessment/Plan   Problem List Items Addressed This Visit             ICD-10-CM    Alcoholic cirrhosis (Multi) K70.30     Discussed natural history of cirrhosis including risk factors, signs of decompensation and management plan.  Reviewed the importance of lifelong abstinence from alcohol, proper lifestyle modifications including diet and exercise.  Follow-up plan includes labs and imaging every 6 months to monitor for decompensation and screen for HCC.    Signs of decompensation include progressive jaundice, cognitive impairment, excessive somnolence, lower extremity edema and increase in abdominal girth.    Will get fibroscan and labs after discharged from SNF to get a proper baseline of his current hepatic function.     IOP and AA highly recommended.    FU in 3 months         Relevant Orders    Liver Elastography (Fibroscan)    ISAC with Reflex to SUZE    Anti-Mitochondrial Antibody    Anti-Smooth Muscle Antibody    CBC and Auto Differential    Comprehensive Metabolic Panel    Hepatitis A Antibody, Total    Hepatitis B Core Antibody, Total    Hepatitis B Surface Antibody     Hepatitis B Surface Antigen    Hepatitis C Antibody    Iron and TIBC    Phosphatidylethanol (PEth), Whole Blood, Quantitative    Ferritin    Bilirubin, Direct    Follow Up In Hepatology            CAMACHO Herrera 04/16/24 8:40 PM

## 2024-04-16 ENCOUNTER — DOCUMENTATION (OUTPATIENT)
Dept: GASTROENTEROLOGY | Facility: HOSPITAL | Age: 54
End: 2024-04-16

## 2024-04-16 ASSESSMENT — ENCOUNTER SYMPTOMS
BRUISES/BLEEDS EASILY: 0
DYSURIA: 0
TROUBLE SWALLOWING: 0
PALPITATIONS: 0
UNEXPECTED WEIGHT CHANGE: 0
WEAKNESS: 0
VOICE CHANGE: 0
COUGH: 0
HEADACHES: 0
WHEEZING: 0
AGITATION: 0
NAUSEA: 0
DIZZINESS: 0
CONSTIPATION: 0
ABDOMINAL PAIN: 0
LIGHT-HEADEDNESS: 0
SLEEP DISTURBANCE: 0
DIFFICULTY URINATING: 0
VOMITING: 0
FREQUENCY: 0
JOINT SWELLING: 0
FEVER: 0
HEMATURIA: 0
DIARRHEA: 0
ABDOMINAL DISTENTION: 0
SHORTNESS OF BREATH: 0
CHILLS: 0
NUMBNESS: 0
COLOR CHANGE: 0
CONFUSION: 0
TREMORS: 0
APPETITE CHANGE: 0
BLOOD IN STOOL: 0

## 2024-04-17 NOTE — ASSESSMENT & PLAN NOTE
Discussed natural history of cirrhosis including risk factors, signs of decompensation and management plan.  Reviewed the importance of lifelong abstinence from alcohol, proper lifestyle modifications including diet and exercise.  Follow-up plan includes labs and imaging every 6 months to monitor for decompensation and screen for HCC.    Signs of decompensation include progressive jaundice, cognitive impairment, excessive somnolence, lower extremity edema and increase in abdominal girth.    Will get fibroscan and labs after discharged from SNF to get a proper baseline of his current hepatic function.     IOP and AA highly recommended.    FU in 3 months

## 2024-04-18 PROBLEM — F17.200 TOBACCO USE DISORDER: Status: ACTIVE | Noted: 2021-12-06

## 2024-04-18 PROBLEM — I10 HYPERTENSION: Status: ACTIVE | Noted: 2023-07-25

## 2024-04-18 PROBLEM — K70.10 ALCOHOLIC HEPATITIS (MULTI): Status: ACTIVE | Noted: 2019-05-19

## 2024-04-18 PROBLEM — Z95.5 S/P RIGHT CORONARY ARTERY (RCA) STENT PLACEMENT: Status: ACTIVE | Noted: 2024-04-18

## 2024-04-18 PROBLEM — I25.110 CORONARY ARTERY DISEASE INVOLVING NATIVE CORONARY ARTERY OF NATIVE HEART WITH UNSTABLE ANGINA PECTORIS (MULTI): Status: ACTIVE | Noted: 2019-06-12

## 2024-04-18 PROBLEM — K43.9 VENTRAL HERNIA WITHOUT OBSTRUCTION OR GANGRENE: Status: ACTIVE | Noted: 2023-07-25

## 2024-04-18 PROBLEM — E83.42 HYPOMAGNESEMIA: Status: ACTIVE | Noted: 2019-05-19

## 2024-04-18 PROBLEM — F33.2 MAJOR DEPRESSIVE DISORDER, RECURRENT EPISODE, SEVERE (MULTI): Status: ACTIVE | Noted: 2021-12-06

## 2024-04-18 PROBLEM — R55 SYNCOPE: Status: ACTIVE | Noted: 2019-05-19

## 2024-04-18 PROBLEM — I60.9 SAH (SUBARACHNOID HEMORRHAGE) (MULTI): Status: ACTIVE | Noted: 2021-11-16

## 2024-04-18 PROBLEM — E78.5 HYPERLIPIDEMIA: Status: ACTIVE | Noted: 2024-04-18

## 2024-04-18 PROBLEM — I25.10 CORONARY ARTERY DISEASE: Status: ACTIVE | Noted: 2024-04-18

## 2024-04-18 PROBLEM — S02.609A CLOSED FRACTURE OF MANDIBLE (MULTI): Status: ACTIVE | Noted: 2021-11-15

## 2024-04-18 PROBLEM — F10.20 ALCOHOL USE DISORDER, SEVERE, DEPENDENCE (MULTI): Status: ACTIVE | Noted: 2019-05-20

## 2024-04-18 PROBLEM — K74.60 HEPATIC CIRRHOSIS (MULTI): Status: ACTIVE | Noted: 2019-07-09
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
## 2024-04-19 ENCOUNTER — OFFICE VISIT (OUTPATIENT)
Dept: PODIATRY | Facility: CLINIC | Age: 54
End: 2024-04-19
Payer: MEDICAID

## 2024-04-19 DIAGNOSIS — Z89.422 STATUS POST AMPUTATION OF LESSER TOE OF LEFT FOOT (MULTI): ICD-10-CM

## 2024-04-19 DIAGNOSIS — L97.512 FOOT ULCER WITH FAT LAYER EXPOSED, RIGHT (MULTI): Primary | ICD-10-CM

## 2024-04-19 PROCEDURE — 99024 POSTOP FOLLOW-UP VISIT: CPT | Performed by: PODIATRIST

## 2024-04-19 RX ORDER — ADHESIVE BANDAGE
BANDAGE TOPICAL DAILY PRN
COMMUNITY

## 2024-04-19 NOTE — PROGRESS NOTES
Chief Complaint   Patient presents with    Foot/ankle Post-op     Patient is here today for a hospital follow up from 3/29/24 surgery amputation 5th toe left foot   Patient is here for follow-up hospital consult and status post amputation of the fifth left digit and wound debridement of the right foot.  Patient has been in rehab facility and is doing better.  He is ambulating with the surgical shoes on both feet.  No new pedal complaints.  Patient states that he was previously in AA for 3 years and already has a support structure in place and is returning to it once he leaves the rehab facility.    PMH, PSx, Medications and allergies reviewed.  ROS negative except for what is stated in HPI.    Physical Exam  Patient alert, oriented, no acute distress    VASC: Palpable DP pulse L, nonpalpable remaining pedal pulses B/L.    CFT brisk all digits.  Feet warm to touch.  (-)hair growth B/L.   No edema noted B/L.    NEURO: Vibratory diminished  1st MPJ B/L, intact medial malleoli B/L.  5.07 [place]-Lucius monofilament absent plantar feet B/L.  Gross sensation is intact B/L    DERM:   Ulcer plantar right foot.  This ulcer has a nice red granular base with scant serous drainage.  Ulcer has a mild hyperkeratotic rim.  No signs of cellulitis noted.  Ulcer measures 1.5 x 1.2 x 0.2 cm.  This is an improvement since his intraoperative measurements.  There is no exposed bone in the wound bed or tendon.    Surgical wound at amputation of the fifth left digit.  This area is well coapted.  Sutures are intact.  There is a dry firm hard scab at the most distal aspect.  No signs of cellulitis noted.  No drainage noted.    Plantar xerosis noted bilaterally.    MUSCULOSKEL: Gross motor is intact B/L.  Amputated fifth left digit.      Assessment and Plan  #1 S/P 5th L digit amputation  This area is progressing nicely  Sutures were removed  Area was dressed with antibiotic bacterial ointment and a Band-Aid  Patient is allowed to get the  foot wet on 4/20/2024  Daily wound care to the left foot: Antibacterial ointment and Band-Aid until area has resolved.  Patient can ambulate as tolerated on the left foot  Patient can return to a tennis shoe on the left foot  Follow-up 2 weeks    #2  Chronic ulceration right foot  Paring of all hyperkeratotic tissue with a 15 blade well-tolerated by patient  Surgicel was placed in the distal lateral aspects for scans room bleeding.  Wound was dressed with antibacterial ointment and bandage  Aperture pad was added to surgical shoe of the right side to further offload the ulceration  This ulcer has improved since hospital evaluation  Patient is allowed to weight-bear as tolerated in the offloading surgical shoe on the right side  Local wound care right foot: Continue with current wound care orders of the right foot.    Follow-up 2 weeks

## 2024-05-06 ENCOUNTER — LAB REQUISITION (OUTPATIENT)
Dept: LAB | Facility: HOSPITAL | Age: 54
End: 2024-05-06
Payer: MEDICAID

## 2024-05-06 DIAGNOSIS — E11.9 TYPE 2 DIABETES MELLITUS WITHOUT COMPLICATIONS (MULTI): ICD-10-CM

## 2024-05-06 DIAGNOSIS — R41.82 ALTERED MENTAL STATUS, UNSPECIFIED: ICD-10-CM

## 2024-05-06 DIAGNOSIS — K74.60 UNSPECIFIED CIRRHOSIS OF LIVER (MULTI): ICD-10-CM

## 2024-05-06 LAB
ALBUMIN SERPL BCP-MCNC: 3.4 G/DL (ref 3.4–5)
ALP SERPL-CCNC: 129 U/L (ref 33–120)
ALT SERPL W P-5'-P-CCNC: 26 U/L (ref 10–52)
ANION GAP SERPL CALC-SCNC: 14 MMOL/L (ref 10–20)
APPEARANCE UR: CLEAR
AST SERPL W P-5'-P-CCNC: 27 U/L (ref 9–39)
BILIRUB SERPL-MCNC: 0.5 MG/DL (ref 0–1.2)
BILIRUB UR STRIP.AUTO-MCNC: NEGATIVE MG/DL
BUN SERPL-MCNC: 13 MG/DL (ref 6–23)
CALCIUM SERPL-MCNC: 8.5 MG/DL (ref 8.6–10.3)
CAOX CRY #/AREA UR COMP ASSIST: ABNORMAL /HPF
CHLORIDE SERPL-SCNC: 99 MMOL/L (ref 98–107)
CO2 SERPL-SCNC: 28 MMOL/L (ref 21–32)
COLOR UR: YELLOW
CREAT SERPL-MCNC: 1.08 MG/DL (ref 0.5–1.3)
EGFRCR SERPLBLD CKD-EPI 2021: 82 ML/MIN/1.73M*2
ERYTHROCYTE [DISTWIDTH] IN BLOOD BY AUTOMATED COUNT: 12.5 % (ref 11.5–14.5)
GLUCOSE SERPL-MCNC: 236 MG/DL (ref 74–99)
GLUCOSE UR STRIP.AUTO-MCNC: ABNORMAL MG/DL
HCT VFR BLD AUTO: 38.7 % (ref 41–52)
HGB BLD-MCNC: 13.3 G/DL (ref 13.5–17.5)
HYALINE CASTS #/AREA URNS AUTO: ABNORMAL /LPF
KETONES UR STRIP.AUTO-MCNC: ABNORMAL MG/DL
LEUKOCYTE ESTERASE UR QL STRIP.AUTO: NEGATIVE
MCH RBC QN AUTO: 34.2 PG (ref 26–34)
MCHC RBC AUTO-ENTMCNC: 34.4 G/DL (ref 32–36)
MCV RBC AUTO: 100 FL (ref 80–100)
NITRITE UR QL STRIP.AUTO: NEGATIVE
NRBC BLD-RTO: 0 /100 WBCS (ref 0–0)
PH UR STRIP.AUTO: 6 [PH]
PLATELET # BLD AUTO: 181 X10*3/UL (ref 150–450)
POTASSIUM SERPL-SCNC: 4.2 MMOL/L (ref 3.5–5.3)
PROT SERPL-MCNC: 5.7 G/DL (ref 6.4–8.2)
PROT UR STRIP.AUTO-MCNC: ABNORMAL MG/DL
RBC # BLD AUTO: 3.89 X10*6/UL (ref 4.5–5.9)
RBC # UR STRIP.AUTO: NEGATIVE /UL
RBC #/AREA URNS AUTO: ABNORMAL /HPF
SODIUM SERPL-SCNC: 137 MMOL/L (ref 136–145)
SP GR UR STRIP.AUTO: 1.02
SQUAMOUS #/AREA URNS AUTO: ABNORMAL /HPF
UROBILINOGEN UR STRIP.AUTO-MCNC: ABNORMAL MG/DL
WBC # BLD AUTO: 8 X10*3/UL (ref 4.4–11.3)
WBC #/AREA URNS AUTO: ABNORMAL /HPF
WBC CLUMPS #/AREA URNS AUTO: ABNORMAL /HPF

## 2024-05-06 PROCEDURE — 85027 COMPLETE CBC AUTOMATED: CPT | Mod: OUT | Performed by: INTERNAL MEDICINE

## 2024-05-06 PROCEDURE — 80053 COMPREHEN METABOLIC PANEL: CPT | Mod: OUT | Performed by: INTERNAL MEDICINE

## 2024-05-06 PROCEDURE — 81001 URINALYSIS AUTO W/SCOPE: CPT | Mod: OUT | Performed by: INTERNAL MEDICINE

## 2024-05-06 PROCEDURE — 87086 URINE CULTURE/COLONY COUNT: CPT | Mod: OUT,PARLAB | Performed by: INTERNAL MEDICINE

## 2024-05-07 LAB — BACTERIA UR CULT: NO GROWTH

## 2024-05-15 ENCOUNTER — OFFICE VISIT (OUTPATIENT)
Dept: PODIATRY | Facility: CLINIC | Age: 54
End: 2024-05-15
Payer: MEDICAID

## 2024-05-15 DIAGNOSIS — Z89.422 STATUS POST AMPUTATION OF LESSER TOE OF LEFT FOOT (MULTI): ICD-10-CM

## 2024-05-15 DIAGNOSIS — L97.512 FOOT ULCER WITH FAT LAYER EXPOSED, RIGHT (MULTI): Primary | ICD-10-CM

## 2024-05-15 PROCEDURE — 99024 POSTOP FOLLOW-UP VISIT: CPT | Performed by: PODIATRIST

## 2024-05-15 NOTE — PROGRESS NOTES
Chief Complaint   Patient presents with    Foot/ankle Post-op     Patient is here today for post op visit 5th toe amputation and debridement right foot      HPI: S/P 5th toe amp L and wound debridement R  DOS 3/29/24  Patient feels improved.  Patient has been ambulating in surgical shoes bilaterally.  He has been keeping the feet dry.  No new pedal complaints.    Physical Exam  Patient alert, oriented, no acute distress    VASC: Palpable DP pulse L, nonpalpable remaining pedal pulses B/L.    CFT brisk all digits.  Feet warm to touch.  (-)hair growth B/L.   No edema noted B/L.    NEURO: Vibratory diminished  1st MPJ B/L, intact medial malleoli B/L.  5.07 [place]-Lucius monofilament absent plantar feet B/L.  Gross sensation is intact B/L    DERM:   Ulcer plantar right foot.  This ulcer has a red raised hypergranular base with scant serous drainage.  Ulcer has a hyperkeratotic rim.  No signs of cellulitis noted.  Ulcer measures 1.1 x 1 cm.  Postdebridement.      Surgical wound at amputation of the fifth left digit is a pinpoint opening very shallow.  No signs of cellulitis noted.  No drainage noted.    Plantar xerosis noted bilaterally.    MUSCULOSKEL: Gross motor is intact B/L.  Amputated fifth left digit.      Procedures   Assessment and Plan  #1 S/P 5th L digit amputation  Patient is allowed to get the foot wet in shower  Daily wound care to the left foot: Antibacterial ointment small amount and Band-Aid until wound has resolved.  Patient can ambulate as tolerated on the left foot  Patient can return to a tennis shoe on the left foot  Follow-up 2 weeks    #2  Chronic ulceration right foot  Paring of all hyperkeratotic tissue with a #15 blade and tissue nipper well-tolerated by patient  Silver nitrate applied to hypergranulation tissue in wound bed  Wound was dressed with antibacterial ointment and bandage  Patient is allowed to weight-bear as tolerated in the offloading surgical shoe on the right side  Local wound  care right foot: Continue with current wound care orders of the right foot, which is cleaning with NSS, applying topical antibacterial ointments and light gauze dressing or border dressing daily.    Follow-up 2 weeks    #3 skin xerosis bilaterally  Apply lotion to the feet daily avoid ulcers  Follow-up 2 weeks

## 2024-05-29 ENCOUNTER — OFFICE VISIT (OUTPATIENT)
Dept: PODIATRY | Facility: CLINIC | Age: 54
End: 2024-05-29
Payer: MEDICAID

## 2024-05-29 DIAGNOSIS — L97.512 FOOT ULCER WITH FAT LAYER EXPOSED, RIGHT (MULTI): ICD-10-CM

## 2024-05-29 DIAGNOSIS — Z89.422 STATUS POST AMPUTATION OF LESSER TOE OF LEFT FOOT (MULTI): Primary | ICD-10-CM

## 2024-05-29 PROCEDURE — 99024 POSTOP FOLLOW-UP VISIT: CPT | Performed by: PODIATRIST

## 2024-05-29 RX ORDER — ACETAMINOPHEN 500 MG
TABLET ORAL
COMMUNITY
Start: 2024-05-22

## 2024-05-29 RX ORDER — NITROGLYCERIN 0.4 MG/1
TABLET SUBLINGUAL
COMMUNITY
Start: 2024-05-22

## 2024-05-29 NOTE — PROGRESS NOTES
Chief Complaint   Patient presents with    Foot/ankle Post-op     Patient is here today for post op visit 5th toe amputation left foot and right foot debridement         HPI: Follow-up ulcer right foot.  Daily local wound care.  Patient is wearing offloaded surgical shoe on the right side.  Patient was discharged from his ECF last week.    S/P 5th toe amp L and wound debridement R  DOS 3/29/24    Physical Exam  Patient alert, oriented, no acute distress    VASC: Palpable DP pulse L, nonpalpable remaining pedal pulses B/L.    CFT brisk all digits.  Feet warm to touch.  (-)hair growth B/L.   No edema noted B/L.    NEURO: Vibratory diminished  1st MPJ B/L, intact medial malleoli B/L.  5.07 [place]-Lucius monofilament absent plantar feet B/L.  Gross sensation is intact B/L    DERM:   Ulcer plantar right foot.  This ulcer has a flat red granular base with scant serous drainage.  Ulcer has a very shallow depth.  Ulcer has a mild hyperkeratotic rim.  No signs of cellulitis noted.    Ulcer measures 0.8 x 0.5 cm. postdebridement.      Surgical wound at amputation of the fifth left digit is well-healed.  Plantar xerosis noted bilaterally, mild amount.    MUSCULOSKEL: Gross motor is intact B/L.  Amputated fifth left digit.         Assessment and Plan  #1 Chronic ulceration right foot  Paring of all hyperkeratotic tissue with a #15 blade without complications.  Continue with local wound care  Continue offloading and surgical shoe  Follow-up 2 weeks     #2 S/P 5th L digit amputation  Well-healed  Activity as tolerated  Patient has returned to regular shoe gear without complications  Follow-up as needed    #3 skin xerosis bilaterally  Apply lotion to the feet daily avoid ulcer  Follow-up 2 weeks

## 2024-06-05 ENCOUNTER — OFFICE VISIT (OUTPATIENT)
Dept: PRIMARY CARE | Facility: CLINIC | Age: 54
End: 2024-06-05
Payer: MEDICAID

## 2024-06-05 VITALS — WEIGHT: 223 LBS | DIASTOLIC BLOOD PRESSURE: 98 MMHG | BODY MASS INDEX: 28.63 KG/M2 | SYSTOLIC BLOOD PRESSURE: 164 MMHG

## 2024-06-05 DIAGNOSIS — Z12.11 COLON CANCER SCREENING: ICD-10-CM

## 2024-06-05 DIAGNOSIS — Z00.00 HEALTHCARE MAINTENANCE: Primary | ICD-10-CM

## 2024-06-05 DIAGNOSIS — I25.110 CORONARY ARTERY DISEASE INVOLVING NATIVE CORONARY ARTERY OF NATIVE HEART WITH UNSTABLE ANGINA PECTORIS (MULTI): ICD-10-CM

## 2024-06-05 DIAGNOSIS — E11.65 TYPE 2 DIABETES MELLITUS WITH HYPERGLYCEMIA, WITH LONG-TERM CURRENT USE OF INSULIN (MULTI): ICD-10-CM

## 2024-06-05 DIAGNOSIS — Z79.4 TYPE 2 DIABETES MELLITUS WITH HYPERGLYCEMIA, WITH LONG-TERM CURRENT USE OF INSULIN (MULTI): ICD-10-CM

## 2024-06-05 DIAGNOSIS — L97.512 FOOT ULCER WITH FAT LAYER EXPOSED, RIGHT (MULTI): ICD-10-CM

## 2024-06-05 DIAGNOSIS — K70.30 ALCOHOLIC CIRRHOSIS OF LIVER WITHOUT ASCITES (MULTI): ICD-10-CM

## 2024-06-05 DIAGNOSIS — Z87.891 FORMER SMOKER: ICD-10-CM

## 2024-06-05 DIAGNOSIS — E78.5 HYPERLIPIDEMIA, UNSPECIFIED HYPERLIPIDEMIA TYPE: ICD-10-CM

## 2024-06-05 DIAGNOSIS — I10 PRIMARY HYPERTENSION: ICD-10-CM

## 2024-06-05 PROBLEM — I60.9 SAH (SUBARACHNOID HEMORRHAGE) (MULTI): Status: RESOLVED | Noted: 2021-11-16 | Resolved: 2024-06-05

## 2024-06-05 PROBLEM — J18.9 PNEUMONIA OF RIGHT LUNG DUE TO INFECTIOUS ORGANISM, UNSPECIFIED PART OF LUNG: Status: RESOLVED | Noted: 2024-03-26 | Resolved: 2024-06-05

## 2024-06-05 PROBLEM — K70.10 ALCOHOLIC HEPATITIS (MULTI): Status: RESOLVED | Noted: 2019-05-19 | Resolved: 2024-06-05

## 2024-06-05 PROBLEM — F33.2 MAJOR DEPRESSIVE DISORDER, RECURRENT EPISODE, SEVERE (MULTI): Status: RESOLVED | Noted: 2021-12-06 | Resolved: 2024-06-05

## 2024-06-05 PROBLEM — F17.200 TOBACCO USE DISORDER: Status: RESOLVED | Noted: 2021-12-06 | Resolved: 2024-06-05

## 2024-06-05 PROBLEM — R55 SYNCOPE: Status: RESOLVED | Noted: 2019-05-19 | Resolved: 2024-06-05

## 2024-06-05 PROBLEM — F10.20 ALCOHOL USE DISORDER, SEVERE, DEPENDENCE (MULTI): Status: RESOLVED | Noted: 2019-05-20 | Resolved: 2024-06-05

## 2024-06-05 PROCEDURE — 3080F DIAST BP >= 90 MM HG: CPT | Performed by: STUDENT IN AN ORGANIZED HEALTH CARE EDUCATION/TRAINING PROGRAM

## 2024-06-05 PROCEDURE — 3077F SYST BP >= 140 MM HG: CPT | Performed by: STUDENT IN AN ORGANIZED HEALTH CARE EDUCATION/TRAINING PROGRAM

## 2024-06-05 PROCEDURE — 99204 OFFICE O/P NEW MOD 45 MIN: CPT | Performed by: STUDENT IN AN ORGANIZED HEALTH CARE EDUCATION/TRAINING PROGRAM

## 2024-06-05 PROCEDURE — 1036F TOBACCO NON-USER: CPT | Performed by: STUDENT IN AN ORGANIZED HEALTH CARE EDUCATION/TRAINING PROGRAM

## 2024-06-05 PROCEDURE — 3051F HG A1C>EQUAL 7.0%<8.0%: CPT | Performed by: STUDENT IN AN ORGANIZED HEALTH CARE EDUCATION/TRAINING PROGRAM

## 2024-06-05 PROCEDURE — 4010F ACE/ARB THERAPY RXD/TAKEN: CPT | Performed by: STUDENT IN AN ORGANIZED HEALTH CARE EDUCATION/TRAINING PROGRAM

## 2024-06-05 PROCEDURE — 3049F LDL-C 100-129 MG/DL: CPT | Performed by: STUDENT IN AN ORGANIZED HEALTH CARE EDUCATION/TRAINING PROGRAM

## 2024-06-05 RX ORDER — NEBULIZER AND COMPRESSOR
EACH MISCELLANEOUS
Qty: 1 EACH | Refills: 0 | Status: SHIPPED | OUTPATIENT
Start: 2024-06-05 | End: 2024-06-05 | Stop reason: SDUPTHER

## 2024-06-05 RX ORDER — METFORMIN HYDROCHLORIDE 500 MG/1
500 TABLET, EXTENDED RELEASE ORAL
Qty: 180 TABLET | Refills: 3 | Status: SHIPPED | OUTPATIENT
Start: 2024-06-05 | End: 2025-06-05

## 2024-06-05 RX ORDER — INSULIN GLARGINE 100 [IU]/ML
30 INJECTION, SOLUTION SUBCUTANEOUS EVERY MORNING
Qty: 9 ML | Refills: 5 | Status: SHIPPED | OUTPATIENT
Start: 2024-06-05 | End: 2024-12-02

## 2024-06-05 RX ORDER — NEBULIZER AND COMPRESSOR
EACH MISCELLANEOUS
Qty: 1 EACH | Refills: 0 | Status: SHIPPED | OUTPATIENT
Start: 2024-06-05

## 2024-06-05 NOTE — PROGRESS NOTES
Subjective   Patient ID: Errol Fonseca is a 54 y.o. male who presents for Establish Care, rehab follow up, and Med Refill.  HPI  Errol is here to establish care.    He was hospitalized an [facility] for left fifth digit OM/gangrene s/p amputation and strep pneumonia. Continues to follow with podiatry for foot ulceration/open wound. Wound has been healing up nicely under their care. Recently discharged from  until 9 days ago. Since being discharged from rehab, he has been seeing blood glucose typically in the 130s-200s. He has not been checking his blood pressure at home, BP was well controlled at rehab. He has not been drinking any alcohol since getting out of rehab. He quit smoking 6 months ago. He is motivated to stay away from alcohol and cigarettes for his children and grandchildren. Denies current depression at this time.    PMHx: alcohol use disorder, liver cirrhosis, hx alcoholic hepatitis, NIDDM 2, HTN, CAD s/p PCI with stent  SurgHx: appendectomy, cardiac catheterization, left fifth digit amputation  FamHx: CVA - mother  SocialHx: Living with his daughter recently, 1 grandchild. 3 children and 5 grandchildren in total. Recently quit smoking and stopped drinking alcohol. No drug use.     Review of Systems  12-point ROS was reviewed and is negative, unless otherwise noted in HPI    Objective   Vitals:    06/05/24 1315   BP: (!) 164/98      Physical Exam  GEN: alert, conversant, NAD  HEENT: PERRL, EOMI, MMM, Tms pearly gray bilaterally  NECK: supple, no LAD appreciated  CHEST: CTAB  CV: S1, S2, RRR, no murmurs appreciated  ABD: soft, NT, ND  EXT: no significant LE edema  SKIN: warm, dry    Assessment/Plan   # Diabetes Mellitus type 2 with hyperglycemia and neuropathy  Most Recent HgbA1c: 7.1%  Continue current management: Lantus 30units daily, Lispro 7U TID with meals +SSI  - add metformin 500mg daily x1 week, increase to twice daily after 1 week, SE profile discussed  Increase dietary efforts and physical  activity.  Routine diabetic retinopathy screening, foot exam/monofilament testing screening: Following with podiatry, referral to ophthalmology to update retinopathy screening    # HTN  Well controlled on recent readings, elevated in office today  Continue current medications: continue lisinopril 10mg daily, metoprolol 25mg BID  Discussed DASH diet and dietary sodium restrictions.   Increase dietary efforts and physical activity.     #CAD  # Dyslipidemia  Stable. Most recent Lipid Panel: 4/2024  Continue with current management: ASA 81mg daily, Atorvastatin 40mg daily  Discussed healthy diet and lifestyle.    #Former smoker  Quit >6 months  1 - 1.5ppd smoker >40 pack year hx  - Advised cessation, counseled cessation tips, offer cessation aids  - Spent >5 minutes counseling smoking cessation  - LDCT ordered today    #alcohol use disorder, former  Quit ~3 months ago  - he is established with AA  - advised continued complete cessation    Health Maintenance:  Vaccines: COVID (x1), Flu (UTD), TDAP (2023), Shingles (advised), Pneumonia (UTD, prevnar 20)  Screening: Colonoscopy (cologuard ordered today), LDCT (ordered today)  Labs: Reviewed recent, obtain at next visit     RTC in 3 months, or sooner PRN    Angel Hurtado, DO

## 2024-06-12 ENCOUNTER — APPOINTMENT (OUTPATIENT)
Dept: PODIATRY | Facility: CLINIC | Age: 54
End: 2024-06-12
Payer: MEDICAID

## 2024-06-12 DIAGNOSIS — L97.512 FOOT ULCER WITH FAT LAYER EXPOSED, RIGHT (MULTI): Primary | ICD-10-CM

## 2024-06-12 PROCEDURE — 11042 DBRDMT SUBQ TIS 1ST 20SQCM/<: CPT | Performed by: PODIATRIST

## 2024-06-12 PROCEDURE — 1036F TOBACCO NON-USER: CPT | Performed by: PODIATRIST

## 2024-06-12 ASSESSMENT — PAIN SCALES - GENERAL: PAIN_LEVEL: INSENSATE

## 2024-06-12 NOTE — PROGRESS NOTES
Chief Complaint   Patient presents with    Foot/ankle Post-op     Patient is here today for a post op visit 5th toe amputation left and debridement right foot.         HPI: Follow-up ulcer right foot.  Daily local wound care.  Patient typically wears offloaded surgical shoe on the right side.    Patient is wearing a regular shoe today.  States he was not walking much.  S/P 5th toe amp L and wound debridement R  DOS 3/29/24    Physical Exam  Patient alert, oriented, no acute distress    VASC: Palpable DP pulse L, nonpalpable remaining pedal pulses B/L.    CFT brisk all digits.  Feet warm to touch.  (-)hair growth B/L.   No edema noted B/L.    NEURO: Vibratory diminished  1st MPJ B/L, intact medial malleoli B/L.  5.07 [place]-Lucius monofilament absent plantar feet B/L.  Gross sensation is intact B/L    DERM:   Ulcer plantar right foot.  This ulcer has a flat red granular base with scant serous drainage.  Ulcer has a very shallow depth.  Ulcer has a hyperkeratotic rim.  No signs of cellulitis noted.    Ulcer measures 0.6 x 0.4 cm postdebridement.      Surgical wound at amputation of the fifth left digit is well-healed.  Plantar xerosis noted bilaterally, mild amount.    MUSCULOSKEL: Gross motor is intact B/L.  Amputated fifth left digit.      Debridement    Performed by: Nelda Villegas DPM  Authorized by: Nelda Villegas DPM      Consent   Consent obtained? verbal  Consent given by: patient    Debridement Details  Performed by: physician  Debridement type: surgical  Level of debridement: subcutaneous tissue    Pre-debridement measurements  Length (cm): 0.4  Width (cm): 0.4  Depth (cm): 0.1  Surface Area (cm^2): 0.16    Post-debridement measurements  Length (cm): 0.6  Width (cm): 0.4  Depth (cm): 0.2  Percent debrided: 100%  Surface Area (cm^2): 0.24  Area Debrided (cm^2): 0.24  Volume (cm^3): 0.05    Tissue and other material debrided: subcutaneous tissue  Devitalized tissue debrided: biofilm and  callus  Instrument(s) utilized: blade and nippers  Bleeding: small  Hemostasis obtained with: pressure  Procedural pain (0-10): insensate  Post-procedural pain: insensate   Response to treatment: procedure was tolerated well          Assessment and Plan  #1 Chronic ulceration right foot  Paring of all hyperkeratotic tissue with a #15 blade and tissue nipper without complications.  Continue with local wound care  Dispensed aperture pad.  If patient is not in his offloading surgical shoe he should be wearing an aperture pad to offload the wound  Continue offloading and surgical shoe  Follow-up 2 weeks     #2 S/P 5th L digit amputation  Well-healed  Activity as tolerated  Patient has returned to regular shoe gear without complications  Follow-up as needed    #3 skin xerosis bilaterally  Apply lotion to the feet daily avoid ulcer  Follow-up 2 weeks

## 2024-06-17 LAB — NONINV COLON CA DNA+OCC BLD SCRN STL QL: NORMAL

## 2024-06-26 ENCOUNTER — APPOINTMENT (OUTPATIENT)
Dept: PODIATRY | Facility: CLINIC | Age: 54
End: 2024-06-26
Payer: MEDICAID

## 2024-06-26 DIAGNOSIS — B35.1 ONYCHOMYCOSIS: ICD-10-CM

## 2024-06-26 DIAGNOSIS — M79.672 PAIN IN BOTH FEET: ICD-10-CM

## 2024-06-26 DIAGNOSIS — M79.671 PAIN IN BOTH FEET: ICD-10-CM

## 2024-06-26 DIAGNOSIS — L97.512 FOOT ULCER WITH FAT LAYER EXPOSED, RIGHT (MULTI): Primary | ICD-10-CM

## 2024-06-26 PROCEDURE — 99213 OFFICE O/P EST LOW 20 MIN: CPT | Performed by: PODIATRIST

## 2024-06-26 PROCEDURE — 1036F TOBACCO NON-USER: CPT | Performed by: PODIATRIST

## 2024-06-26 NOTE — PROGRESS NOTES
Chief Complaint   Patient presents with    Foot/ankle Post-op     Patient is here today for a post op visit 5th toe amputation left and debridement right foot.      HPI: Follow-up ulcer right foot.  Daily local wound care.  Patient is complaining of painful elongated toenails.  Patient is afraid to debride them since losing his fifth left digit.  S/P 5th toe amp L and wound debridement R  DOS 3/29/24    Physical Exam  Patient alert, oriented, no acute distress    VASC: Palpable DP pulse L, nonpalpable remaining pedal pulses B/L.    CFT brisk all digits.  Feet warm to touch.  (-)hair growth B/L.   No edema noted B/L.    NEURO: Vibratory diminished  1st MPJ B/L, intact medial malleoli B/L.  5.07 [place]-Lucius monofilament absent plantar feet B/L.  Gross sensation is intact B/L    DERM:   Ulcer plantar right foot.  This ulcer has a flat red granular base with scant serous drainage.  Ulcer has a very shallow depth.  Ulcer has a hyperkeratotic rim.  No signs of cellulitis noted.    Ulcer measures 0.6 x 0.4 cm postdebridement.    No change from last visit.  Nails are thick, elongated, and incurvated along medial and lateral borders.  Nails are painful to palpation.  The nails are yellow in color.    MUSCULOSKEL: Gross motor is intact B/L.  Amputated fifth left digit.         Assessment and Plan  #1 Chronic ulceration right foot  Paring of all hyperkeratotic tissue with a #15 blade and tissue nipper without complications.  Continue with local wound care and aperture pads  Stressed importance of properly offloading the wound so that it can heal.  Follow-up 2 weeks     #2  Onychomycosis  Debrided all nails in length and thickness without complications  Follow-up 2 months

## 2024-07-10 ENCOUNTER — APPOINTMENT (OUTPATIENT)
Dept: PODIATRY | Facility: CLINIC | Age: 54
End: 2024-07-10
Payer: MEDICAID

## 2024-07-10 DIAGNOSIS — Z89.422 STATUS POST AMPUTATION OF LESSER TOE OF LEFT FOOT (MULTI): ICD-10-CM

## 2024-07-10 DIAGNOSIS — L97.512 FOOT ULCER WITH FAT LAYER EXPOSED, RIGHT (MULTI): Primary | ICD-10-CM

## 2024-07-10 PROCEDURE — 99213 OFFICE O/P EST LOW 20 MIN: CPT | Performed by: PODIATRIST

## 2024-07-10 PROCEDURE — 1036F TOBACCO NON-USER: CPT | Performed by: PODIATRIST

## 2024-07-10 NOTE — PROGRESS NOTES
Chief Complaint   Patient presents with    Foot Wound Check     Patient is here today for a follow up on foot wound right       HPI: Follow-up ulcer right foot.  Daily local wound care.  Patient is using aperture pads daily.  No new pedal complaints.    Physical Exam  Patient alert, oriented, no acute distress    VASC: Palpable DP pulse L, nonpalpable remaining pedal pulses B/L.    CFT brisk all digits.  Feet warm to touch.  (-)hair growth B/L.   No edema noted B/L.    NEURO: Vibratory diminished  1st MPJ B/L, intact medial malleoli B/L.  5.07 [place]-Lucius monofilament absent plantar feet B/L.  Gross sensation is intact B/L    DERM:   Ulcer plantar right foot has improved.  This ulcer has a flat red granular base with scant serous drainage.  Ulcer has a very shallow depth.  Ulcer has a mild hyperkeratotic rim.  No signs of cellulitis noted.    Ulcer measures 0.62x 0.2 cm postdebridement.      MUSCULOSKEL: Gross motor is intact B/L.  Amputated fifth left digit.         Assessment and Plan  #1 Chronic ulceration right foot  Improved  Paring of all hyperkeratotic tissue with a #15 blade without complications.  Continue with local wound care and aperture pads  Follow-up 2-3 weeks

## 2024-07-31 ENCOUNTER — APPOINTMENT (OUTPATIENT)
Dept: PODIATRY | Facility: CLINIC | Age: 54
End: 2024-07-31
Payer: MEDICAID

## 2024-08-27 ENCOUNTER — APPOINTMENT (OUTPATIENT)
Dept: CARDIOLOGY | Facility: HOSPITAL | Age: 54
End: 2024-08-27
Payer: MEDICAID

## 2024-08-27 ENCOUNTER — HOSPITAL ENCOUNTER (EMERGENCY)
Facility: HOSPITAL | Age: 54
Discharge: HOME | End: 2024-08-28
Attending: EMERGENCY MEDICINE
Payer: MEDICAID

## 2024-08-27 DIAGNOSIS — R45.851 SUICIDAL IDEATION: ICD-10-CM

## 2024-08-27 DIAGNOSIS — F10.921 ALCOHOL INTOXICATION WITH DELIRIUM (CMS-HCC): Primary | ICD-10-CM

## 2024-08-27 LAB
ALBUMIN SERPL BCP-MCNC: 4.8 G/DL (ref 3.4–5)
ALP SERPL-CCNC: 87 U/L (ref 33–120)
ALT SERPL W P-5'-P-CCNC: 36 U/L (ref 10–52)
AMPHETAMINES UR QL SCN: ABNORMAL
ANION GAP SERPL CALC-SCNC: 23 MMOL/L (ref 10–20)
APAP SERPL-MCNC: <10 UG/ML
AST SERPL W P-5'-P-CCNC: 45 U/L (ref 9–39)
BARBITURATES UR QL SCN: ABNORMAL
BASOPHILS # BLD AUTO: 0.14 X10*3/UL (ref 0–0.1)
BASOPHILS NFR BLD AUTO: 1.7 %
BENZODIAZ UR QL SCN: ABNORMAL
BILIRUB SERPL-MCNC: 1 MG/DL (ref 0–1.2)
BUN SERPL-MCNC: 10 MG/DL (ref 6–23)
BZE UR QL SCN: ABNORMAL
CALCIUM SERPL-MCNC: 9.3 MG/DL (ref 8.6–10.3)
CANNABINOIDS UR QL SCN: ABNORMAL
CHLORIDE SERPL-SCNC: 96 MMOL/L (ref 98–107)
CO2 SERPL-SCNC: 20 MMOL/L (ref 21–32)
CREAT SERPL-MCNC: 0.9 MG/DL (ref 0.5–1.3)
EGFRCR SERPLBLD CKD-EPI 2021: >90 ML/MIN/1.73M*2
EOSINOPHIL # BLD AUTO: 0.15 X10*3/UL (ref 0–0.7)
EOSINOPHIL NFR BLD AUTO: 1.8 %
ERYTHROCYTE [DISTWIDTH] IN BLOOD BY AUTOMATED COUNT: 12.5 % (ref 11.5–14.5)
ETHANOL SERPL-MCNC: 231 MG/DL
FENTANYL+NORFENTANYL UR QL SCN: ABNORMAL
GLUCOSE SERPL-MCNC: 117 MG/DL (ref 74–99)
HCT VFR BLD AUTO: 45.1 % (ref 41–52)
HGB BLD-MCNC: 15.5 G/DL (ref 13.5–17.5)
IMM GRANULOCYTES # BLD AUTO: 0.03 X10*3/UL (ref 0–0.7)
IMM GRANULOCYTES NFR BLD AUTO: 0.4 % (ref 0–0.9)
LYMPHOCYTES # BLD AUTO: 2.79 X10*3/UL (ref 1.2–4.8)
LYMPHOCYTES NFR BLD AUTO: 33.8 %
MCH RBC QN AUTO: 35.1 PG (ref 26–34)
MCHC RBC AUTO-ENTMCNC: 34.4 G/DL (ref 32–36)
MCV RBC AUTO: 102 FL (ref 80–100)
METHADONE UR QL SCN: ABNORMAL
MONOCYTES # BLD AUTO: 0.48 X10*3/UL (ref 0.1–1)
MONOCYTES NFR BLD AUTO: 5.8 %
NEUTROPHILS # BLD AUTO: 4.66 X10*3/UL (ref 1.2–7.7)
NEUTROPHILS NFR BLD AUTO: 56.5 %
NRBC BLD-RTO: 0 /100 WBCS (ref 0–0)
OPIATES UR QL SCN: ABNORMAL
OXYCODONE+OXYMORPHONE UR QL SCN: ABNORMAL
PCP UR QL SCN: ABNORMAL
PLATELET # BLD AUTO: 206 X10*3/UL (ref 150–450)
POTASSIUM SERPL-SCNC: 4 MMOL/L (ref 3.5–5.3)
PROT SERPL-MCNC: 7.6 G/DL (ref 6.4–8.2)
RBC # BLD AUTO: 4.42 X10*6/UL (ref 4.5–5.9)
SALICYLATES SERPL-MCNC: <3 MG/DL
SODIUM SERPL-SCNC: 135 MMOL/L (ref 136–145)
WBC # BLD AUTO: 8.3 X10*3/UL (ref 4.4–11.3)

## 2024-08-27 PROCEDURE — 85025 COMPLETE CBC W/AUTO DIFF WBC: CPT | Performed by: EMERGENCY MEDICINE

## 2024-08-27 PROCEDURE — 80053 COMPREHEN METABOLIC PANEL: CPT | Performed by: EMERGENCY MEDICINE

## 2024-08-27 PROCEDURE — 80143 DRUG ASSAY ACETAMINOPHEN: CPT | Performed by: EMERGENCY MEDICINE

## 2024-08-27 PROCEDURE — 93005 ELECTROCARDIOGRAM TRACING: CPT

## 2024-08-27 PROCEDURE — 80307 DRUG TEST PRSMV CHEM ANLYZR: CPT | Performed by: EMERGENCY MEDICINE

## 2024-08-27 PROCEDURE — 36415 COLL VENOUS BLD VENIPUNCTURE: CPT | Performed by: EMERGENCY MEDICINE

## 2024-08-27 PROCEDURE — 99285 EMERGENCY DEPT VISIT HI MDM: CPT

## 2024-08-27 SDOH — HEALTH STABILITY: MENTAL HEALTH: BEHAVIORS/MOOD: ANXIOUS;COOPERATIVE

## 2024-08-27 SDOH — SOCIAL STABILITY: SOCIAL NETWORK: EMOTIONAL SUPPORT GIVEN: REASSURE

## 2024-08-27 SDOH — HEALTH STABILITY: MENTAL HEALTH: SUICIDE ASSESSMENT: ADULT (C-SSRS)

## 2024-08-27 SDOH — HEALTH STABILITY: MENTAL HEALTH: BEHAVIORS/MOOD: ANXIOUS;APPEARS INTOXICATED;COOPERATIVE;GUARDED;IMPULSIVE

## 2024-08-27 SDOH — HEALTH STABILITY: MENTAL HEALTH: CONTENT: BLAMING OTHERS

## 2024-08-27 SDOH — HEALTH STABILITY: MENTAL HEALTH: HAVE YOU EVER DONE ANYTHING, STARTED TO DO ANYTHING, OR PREPARED TO DO ANYTHING TO END YOUR LIFE?: NO

## 2024-08-27 SDOH — HEALTH STABILITY: MENTAL HEALTH: NEEDS EXPRESSED: EMOTIONAL

## 2024-08-27 SDOH — HEALTH STABILITY: MENTAL HEALTH: BEHAVIORS/MOOD: SLEEPING

## 2024-08-27 SDOH — HEALTH STABILITY: MENTAL HEALTH: HAVE YOU ACTUALLY HAD ANY THOUGHTS OF KILLING YOURSELF?: NO

## 2024-08-27 SDOH — HEALTH STABILITY: MENTAL HEALTH

## 2024-08-27 SDOH — HEALTH STABILITY: MENTAL HEALTH: HAVE YOU WISHED YOU WERE DEAD OR WISHED YOU COULD GO TO SLEEP AND NOT WAKE UP?: NO

## 2024-08-27 ASSESSMENT — PAIN - FUNCTIONAL ASSESSMENT: PAIN_FUNCTIONAL_ASSESSMENT: 0-10

## 2024-08-27 ASSESSMENT — COLUMBIA-SUICIDE SEVERITY RATING SCALE - C-SSRS
1. IN THE PAST MONTH, HAVE YOU WISHED YOU WERE DEAD OR WISHED YOU COULD GO TO SLEEP AND NOT WAKE UP?: NO
2. HAVE YOU ACTUALLY HAD ANY THOUGHTS OF KILLING YOURSELF?: NO
6. HAVE YOU EVER DONE ANYTHING, STARTED TO DO ANYTHING, OR PREPARED TO DO ANYTHING TO END YOUR LIFE?: NO

## 2024-08-27 ASSESSMENT — PAIN SCALES - GENERAL: PAINLEVEL_OUTOF10: 0 - NO PAIN

## 2024-08-28 VITALS
RESPIRATION RATE: 18 BRPM | OXYGEN SATURATION: 94 % | SYSTOLIC BLOOD PRESSURE: 129 MMHG | BODY MASS INDEX: 26.95 KG/M2 | HEART RATE: 87 BPM | DIASTOLIC BLOOD PRESSURE: 71 MMHG | HEIGHT: 74 IN | WEIGHT: 210 LBS | TEMPERATURE: 97.2 F

## 2024-08-28 SDOH — HEALTH STABILITY: MENTAL HEALTH: BEHAVIORS/MOOD: SLEEPING

## 2024-08-28 SDOH — HEALTH STABILITY: MENTAL HEALTH: DEPRESSION SYMPTOMS: LOSS OF INTEREST;ISOLATIVE

## 2024-08-28 SDOH — ECONOMIC STABILITY: HOUSING INSECURITY: FEELS SAFE LIVING IN HOME: YES

## 2024-08-28 SDOH — HEALTH STABILITY: MENTAL HEALTH: BEHAVIORS/MOOD: CALM;COOPERATIVE

## 2024-08-28 SDOH — HEALTH STABILITY: MENTAL HEALTH: SUICIDAL BEHAVIOR (LIFETIME): NO

## 2024-08-28 SDOH — HEALTH STABILITY: MENTAL HEALTH: ANXIETY SYMPTOMS: NO PROBLEMS REPORTED OR OBSERVED.

## 2024-08-28 SDOH — HEALTH STABILITY: MENTAL HEALTH: WISH TO BE DEAD (PAST 1 MONTH): NO

## 2024-08-28 SDOH — HEALTH STABILITY: MENTAL HEALTH: NON-SPECIFIC ACTIVE SUICIDAL THOUGHTS (PAST 1 MONTH): NO

## 2024-08-28 ASSESSMENT — COLUMBIA-SUICIDE SEVERITY RATING SCALE - C-SSRS
2. HAVE YOU ACTUALLY HAD ANY THOUGHTS OF KILLING YOURSELF?: NO
6. HAVE YOU EVER DONE ANYTHING, STARTED TO DO ANYTHING, OR PREPARED TO DO ANYTHING TO END YOUR LIFE?: NO
1. SINCE LAST CONTACT, HAVE YOU WISHED YOU WERE DEAD OR WISHED YOU COULD GO TO SLEEP AND NOT WAKE UP?: NO

## 2024-08-28 ASSESSMENT — LIFESTYLE VARIABLES
PRESCIPTION_ABUSE_PAST_12_MONTHS: NO
SUBSTANCE_ABUSE_PAST_12_MONTHS: NO

## 2024-08-28 NOTE — ED PROVIDER NOTES
HPI   Chief Complaint   Patient presents with    Suicidal       Patient presents for concern for suicidal ideation.  Patient has history of alcohol abuse.  He been drinking alcohol today although he denies it but family was concerned about this.  He did grab a  stated he was going to cut his wrist.  He had associated bowl with that stated that the blood was just in a drip interval.  Initially prior to that said that he needed a  to cut a rope that is hanging in the rafters.  Patient does have a history of prior suicide attempt.  Patient denies suicidal ideation a history in regards to his statements and actions was obtained from his daughter.              Patient History   Past Medical History:   Diagnosis Date    Acute ischemic heart disease, unspecified (Multi)     Coronary syndrome, acute    Alcoholic hepatitis (Multi) 2019    Cirrhosis of liver (Multi)     Coronary artery disease     Diabetes mellitus (Multi)     Hypertension     Personal history of other diseases of the circulatory system     History of heart block    Personal history of other diseases of the musculoskeletal system and connective tissue     History of arthritis    Personal history of other endocrine, nutritional and metabolic disease     History of obesity    Pneumonia of right lung due to infectious organism, unspecified part of lung 2024    SAH (subarachnoid hemorrhage) (Multi) 2021    Syncope 2019    Tobacco use disorder 2021     Past Surgical History:   Procedure Laterality Date    APPENDECTOMY  2018    Appendectomy    CARDIAC CATHETERIZATION  2018    Cardiac Cath Procedure Outcome:     Family History   Problem Relation Name Age of Onset    Other (cva) Mother       Social History     Tobacco Use    Smoking status: Former     Current packs/day: 0.00     Types: Cigarettes     Quit date: 2024     Years since quittin.6    Smokeless tobacco: Never   Vaping Use    Vaping  status: Never Used   Substance Use Topics    Alcohol use: Not Currently     Comment: none since March 2024    Drug use: Never       Physical Exam   ED Triage Vitals   Temperature Heart Rate Respirations BP   08/27/24 2145 08/27/24 2145 08/27/24 2145 08/27/24 2146   36.2 °C (97.2 °F) (!) 109 18 (!) 148/100      Pulse Ox Temp Source Heart Rate Source Patient Position   08/27/24 2145 08/27/24 2145 08/27/24 2145 --   100 % Temporal Monitor       BP Location FiO2 (%)     -- --             Physical Exam  Vitals and nursing note reviewed.   Constitutional:       General: He is not in acute distress.     Appearance: He is well-developed.   HENT:      Head: Normocephalic and atraumatic.   Eyes:      Conjunctiva/sclera: Conjunctivae normal.   Cardiovascular:      Rate and Rhythm: Normal rate and regular rhythm.      Heart sounds: No murmur heard.  Pulmonary:      Effort: Pulmonary effort is normal. No respiratory distress.      Breath sounds: Normal breath sounds.   Abdominal:      Palpations: Abdomen is soft.      Tenderness: There is no abdominal tenderness.   Musculoskeletal:         General: No swelling.      Cervical back: Neck supple.   Skin:     General: Skin is warm and dry.      Capillary Refill: Capillary refill takes less than 2 seconds.   Neurological:      General: No focal deficit present.      Mental Status: He is alert.   Psychiatric:         Mood and Affect: Mood normal.      Comments: Denies SI           ED Course & [facility]   ED Course as of 08/28/24 0654   Tue Aug 27, 2024   2300 Twelve-lead EKG sinus tachycardia rate of 100.  No no ischemia or injury pattern.  Is interpreted by emergency physician [TM]      ED Course User Index  [TM] Jhonathan Zamarripa MD         Diagnoses as of 08/28/24 0654   Alcohol intoxication with delirium (CMS-HCC)   Suicidal ideation                 No data recorded     [place] Coma Scale Score: 15 (08/27/24 2146 : Gail Laureano RN)                           Medical Decision  Making  Patient made suicidal statements and gestures from the daughter and son-in-law.  Patient had a  and then had a pole that he stated that is where the blood was coming going to.  Also mention a rope in the rafters of the basement.  Patient's alcohol level is 231.  Patient is medically cleared for psychiatric evaluation.  He will be evaluated by emergency psychiatric assessment team.        Procedure  Procedures     Jhonathan Zamarripa MD  08/28/24 0654

## 2024-08-28 NOTE — ED TRIAGE NOTES
Pt presents to ED via EMS from home after a family member found him holding a razor blade to his wrist. Pt saw his ex wife today and was upset, pt denies SI or HI. Denies drugs or etoh. Denies hallucinations or delusions.

## 2024-08-28 NOTE — PROGRESS NOTES
"Transition of care note:  This patient was turned over to me from prior provider 54-year-old male who presents with suicidal ideation patient is intoxicated.  He was evaluated by EPAT they felt comfortable discharging him home.    ED Course as of 08/28/24 0855   Tue Aug 27, 2024   2300 Twelve-lead EKG sinus tachycardia rate of 100.  No no ischemia or injury pattern.  Is interpreted by emergency physician [TM]      ED Course User Index  [TM] Jhonathan Zamarripa MD         Diagnoses as of 08/28/24 0855   Alcohol intoxication with delirium (CMS-HCC)   Suicidal ideation      Visit Vitals  /82   Pulse 96   Temp 36.2 °C (97.2 °F) (Temporal)   Resp 17   Ht 1.88 m (6' 2\")   Wt 95.3 kg (210 lb)   SpO2 (!) 93%   BMI 26.96 kg/m²   Smoking Status Former   BSA 2.23 m²      Labs Reviewed   CBC WITH AUTO DIFFERENTIAL - Abnormal       Result Value    WBC 8.3      nRBC 0.0      RBC 4.42 (*)     Hemoglobin 15.5      Hematocrit 45.1       (*)     MCH 35.1 (*)     MCHC 34.4      RDW 12.5      Platelets 206      Neutrophils % 56.5      Immature Granulocytes %, Automated 0.4      Lymphocytes % 33.8      Monocytes % 5.8      Eosinophils % 1.8      Basophils % 1.7      Neutrophils Absolute 4.66      Immature Granulocytes Absolute, Automated 0.03      Lymphocytes Absolute 2.79      Monocytes Absolute 0.48      Eosinophils Absolute 0.15      Basophils Absolute 0.14 (*)    COMPREHENSIVE METABOLIC PANEL - Abnormal    Glucose 117 (*)     Sodium 135 (*)     Potassium 4.0      Chloride 96 (*)     Bicarbonate 20 (*)     Anion Gap 23 (*)     Urea Nitrogen 10      Creatinine 0.90      eGFR >90      Calcium 9.3      Albumin 4.8      Alkaline Phosphatase 87      Total Protein 7.6      AST 45 (*)     Bilirubin, Total 1.0      ALT 36     DRUG SCREEN,URINE - Abnormal    Amphetamine Screen, Urine Presumptive Positive (*)     Barbiturate Screen, Urine Presumptive Negative      Benzodiazepines Screen, Urine Presumptive Negative      Cannabinoid " Screen, Urine Presumptive Positive (*)     Cocaine Metabolite Screen, Urine Presumptive Negative      Fentanyl Screen, Urine Presumptive Negative      Opiate Screen, Urine Presumptive Negative      Oxycodone Screen, Urine Presumptive Negative      PCP Screen, Urine Presumptive Negative      Methadone Screen, Urine Presumptive Negative      Narrative:     Drug screen results are presumptive and should not be used to assess   compliance with prescribed medication. Contact the performing [facility] laboratory   to add-on definitive confirmatory testing if clinically indicated.    Toxicology screening results are reported qualitatively. The concentration must   be greater than or equal to the cutoff to be reported as positive. The concentration   at which the screening test can detect an individual drug or metabolite varies.   The absence of expected drug(s) and/or drug metabolite(s) may indicate non-compliance,   inappropriate timing of specimen collection relative to drug administration, poor drug   absorption, diluted/adulterated urine, or limitations of testing. For medical purposes   only; not valid for forensic use.    Interpretive questions should be directed to the laboratory medical directors.   ACUTE TOXICOLOGY PANEL, BLOOD - Abnormal    Acetaminophen <10.0      Salicylate  <3      Alcohol 231 (*)          1. Alcohol intoxication with delirium (CMS-HCC)    2. Suicidal ideation

## 2024-08-28 NOTE — PROGRESS NOTES
EPAT - Social Work Psychiatric Assessment    Arrival Details  Mode of Arrival: Ambulance  Admission Source: Home  Admission Type: Involuntary  EPAT Assessment Start Date: 08/28/24  EPAT Assessment Start Time: 0725  Name of : Alma Delia Miller MSW, LSW    History of Present Illness  Admission Reason: Psychiatric Evaulation  HPI: Errol Fonseca is a 54 year old male brought to the ED via EMS for a psychiatric evaluation. Per triage note, “pt presents to ED via EMS from home after a family member found him holding a razor blade to his wrist. Pt saw his ex-wife today and was upset, pt denies SI or HI.” Pt has MH history of depression, is not currently seeing any outpatient providers or taking medications. Pt has no previous inpatient psychiatric hospitalizations. Pt noted to be no risk at triage. Upon arrival , UDS positive for amphetamines and cannabinoids.    SW Readmission Information   Readmission within 30 Days: No    Psychiatric Symptoms  Anxiety Symptoms: No problems reported or observed.  Depression Symptoms: Loss of interest, Isolative  Pebbles Symptoms: No problems reported or observed.    Psychosis Symptoms  Hallucination Type: No problems reported or observed.  Delusion Type: No problems reported or observed.    Additional Symptoms - Adult  Generalized Anxiety Disorder: No problems reported or observed.  Obsessive Compulsive Disorder: No problems reported or observed.  Panic Attack: No problems reported or observed.  Post Traumatic Stress Disorder: No problems reported or observed.  Delirium: No problems reported or observed.    Past Psychiatric History/Meds/Treatments  Past Psychiatric History: Hx of depression, no past hospitalizations, no current outpatient providers  Past Psychiatric Meds/Treatments: none    Current Mental Health Contacts   Name/Phone Number: none  Provider Name/Phone Number: none    Support System: Immediate family    Living Arrangement: Lives with someone (lives  with daughter and son-in-law)    Home Safety  Feels Safe Living in Home: Yes    Income Information  Employment Status for: Patient  Employment Status: Unemployed  Income Source: Unemployed    [place] Service/Education History  Current or Previous  Service: None    Social/Cultural History  Social History: [place] citizen, no guardian, no payee    Legal  Legal Considerations: Patient/ Family Capacity to Make Sound Judgments  Criminal Activity/ Legal Involvement Pertinent to Current Situation/ Hospitalization: None    Drug Screening  Have you used any substances (canabis, cocaine, heroin, hallucinogens, inhalants, etc.) in the past 12 months?: No  Have you used any prescription drugs other than prescribed in the past 12 months?: No  Is a toxicology screen needed?: Yes    Stage of Change  History of Treatment: Inpatient, AA/NA meetings  Treatment Offered: Declined    Psychosocial  Psychosocial (WDL): Within Defined Limits  Behaviors/Mood: Calm, Guarded, Cooperative  Affect: Appropriate to circumstances  Needs Expressed: Denies  Emotional Support Given: Patient counseling    Orientation  Orientation Level: Oriented X4    General Appearance  Motor Activity: Unremarkable  Speech Pattern: Other (Comment) (Unremarkable)  General Attitude: Guarded, Cooperative  Appearance/Hygiene: Unremarkable    Thought Process  Coherency: Circumstantial  Content: Unremarkable  Delusions: Other (Comment) (None)  Perception: Not altered  Hallucination: None  Judgment/Insight: Limited  Confusion: None  Cognition: Appropriate judgement    Sleep Pattern  Sleep Pattern: Sleeps all night    Risk Factors  Self Harm/Suicidal Ideation Plan: Pt denies  Previous Self Harm/Suicidal Plans: Pt denies  Risk Factors: Substance abuse    Violence Risk Assessment  Thoughts of Harm to Others: No    Ability to Assess Risk Screen  Risk Screen - Ability to Assess: Able to be screened  [place] Suicide Severity Rating Scale (Screener/Recent Self-Report)  1.  Wish to be Dead (Past 1 Month): No  2. Non-Specific Active Suicidal Thoughts (Past 1 Month): No  6. Suicidal Behavior (Lifetime): No  Calculated C-SSRS Risk Score (Lifetime/Recent): No Risk Indicated  Step 1: Risk Factors  Current & Past Psychiatric Dx: Mood disorder  Precipitants/Stressors: Substance intoxication or withdrawal  Change in Treatment: Non-compliant or not receiving treatment  Access to Lethal Methods : No  Step 2: Protective Factors   Protective Factors Internal: Ability to cope with stress, Fear of death or the actual act of killing self, Identifies reasons for living  Protective Factors External: Responsibility to children, Supportive social network or family or friends  Step 3: Suicidal Ideation Intensity  How Many Times Have You Had These Thoughts: Less than once a week  When You Have the Thoughts How Long do They Last : Fleeting - few seconds or minutes  Could/Can You Stop Thinking About Killing Yourself or Wanting to Die if You Want to: Does not attempt to control thoughts  Are There Things - Anyone or Anything - That Stopped You From Wanting to Die or Acting on: Does not apply  What Sort of Reasons Did You Have For Thinking About Wanting to Die or Killing Yourself: Does not apply  Total Score: 2  Step 5: Documentation  Risk Level: Other (Comment) (No/low risk)    Psychiatric Impression and Plan of Care  Assessment and Plan:   Upon assessment patient was lying in his hospital bed. Presented with euthymic mood and flat affect. Patient remained calm throughout assessment, appeared guarded but overall was cooperative. Presented A&O x4, demonstrating circumstantial thought process. At the time of assessment patient denies all SI/HI/AVH. Pt denies any history of self-injurious behaviors or previous suicide attempts. Pt is not currently following with any outpatient MH providers or taking psychiatric medications. Pt denies any previous mental health hospitalizations.     Patient states that he is  "unsure as to why he is in the emergency room says “Nothing really is going on, I guess my daughter is just worried about me being suicidal.” Patient states that his daughter is “overbearing and overreacted.” He states that prior to being brought to the ED he was “just talking to my son-in-law and then police and EMS showed up.” Pt denied being under the influence of any substance(s). Patient shares that he recently has seen his ex-wife and it made him “depressed,” due to it being hard to see her. Patient expresses frustration with having to stay in the ED due to “not needing to be here.”     Pt does endorse “occasional” alcohol use. Pt denies any symptoms/history of alcohol withdrawal. Pt denies any history of seizures or DTs. Denies all other recreational tobacco/drug usage.     This writer did attempt to call patient's daughter Cintia Fonseca ([phone]), listed as EC x2 for collateral information, VMs left.     Diagnostic Impressions: Alcohol Use Disorder, Moderate      Psychiatric Impression and Plan for Care: Upon sober evaluation, patient does not meet criteria for inpatient admission, recommendation for discharge and outpatient follow-up for alcohol abuse disorder. Discussed with Ana Zamarripa DO who is in agreement.       Outcome/Disposition  Patient's Perception of Outcome Achieved: \"I am not suicidal, I am safe to return home. My daughter overreacted.\"  Assessment, Recommendations and Risk Level Reviewed with: Ana Zamarripa DO  Contact Name: Cintia Fonseca (attempted to call x2, did not answer/return vms)  Contact Number(s): [phone]  Contact Relationship: EC; daughter  EPAT Assessment Completed Date: 08/28/24  EPAT Assessment Completed Time: 0920  Patient Disposition: Home (home with daughter and son-in-law)      "

## 2024-09-04 ENCOUNTER — APPOINTMENT (OUTPATIENT)
Dept: PRIMARY CARE | Facility: CLINIC | Age: 54
End: 2024-09-04
Payer: MEDICAID

## 2024-09-04 VITALS
WEIGHT: 224 LBS | HEART RATE: 106 BPM | HEIGHT: 74 IN | DIASTOLIC BLOOD PRESSURE: 104 MMHG | SYSTOLIC BLOOD PRESSURE: 143 MMHG | OXYGEN SATURATION: 96 % | BODY MASS INDEX: 28.75 KG/M2

## 2024-09-04 DIAGNOSIS — I10 PRIMARY HYPERTENSION: ICD-10-CM

## 2024-09-04 DIAGNOSIS — Z12.11 COLON CANCER SCREENING: ICD-10-CM

## 2024-09-04 DIAGNOSIS — E55.9 VITAMIN D DEFICIENCY: ICD-10-CM

## 2024-09-04 DIAGNOSIS — F41.9 ANXIETY AND DEPRESSION: Primary | ICD-10-CM

## 2024-09-04 DIAGNOSIS — F32.A ANXIETY AND DEPRESSION: Primary | ICD-10-CM

## 2024-09-04 DIAGNOSIS — G47.00 INSOMNIA, UNSPECIFIED TYPE: ICD-10-CM

## 2024-09-04 DIAGNOSIS — Z79.4 TYPE 2 DIABETES MELLITUS WITH HYPERGLYCEMIA, WITH LONG-TERM CURRENT USE OF INSULIN (MULTI): ICD-10-CM

## 2024-09-04 DIAGNOSIS — E11.65 TYPE 2 DIABETES MELLITUS WITH HYPERGLYCEMIA, WITH LONG-TERM CURRENT USE OF INSULIN (MULTI): ICD-10-CM

## 2024-09-04 PROCEDURE — 4010F ACE/ARB THERAPY RXD/TAKEN: CPT | Performed by: STUDENT IN AN ORGANIZED HEALTH CARE EDUCATION/TRAINING PROGRAM

## 2024-09-04 PROCEDURE — 3080F DIAST BP >= 90 MM HG: CPT | Performed by: STUDENT IN AN ORGANIZED HEALTH CARE EDUCATION/TRAINING PROGRAM

## 2024-09-04 PROCEDURE — 1036F TOBACCO NON-USER: CPT | Performed by: STUDENT IN AN ORGANIZED HEALTH CARE EDUCATION/TRAINING PROGRAM

## 2024-09-04 PROCEDURE — 3049F LDL-C 100-129 MG/DL: CPT | Performed by: STUDENT IN AN ORGANIZED HEALTH CARE EDUCATION/TRAINING PROGRAM

## 2024-09-04 PROCEDURE — 3008F BODY MASS INDEX DOCD: CPT | Performed by: STUDENT IN AN ORGANIZED HEALTH CARE EDUCATION/TRAINING PROGRAM

## 2024-09-04 PROCEDURE — 3077F SYST BP >= 140 MM HG: CPT | Performed by: STUDENT IN AN ORGANIZED HEALTH CARE EDUCATION/TRAINING PROGRAM

## 2024-09-04 PROCEDURE — 99214 OFFICE O/P EST MOD 30 MIN: CPT | Performed by: STUDENT IN AN ORGANIZED HEALTH CARE EDUCATION/TRAINING PROGRAM

## 2024-09-04 PROCEDURE — 3051F HG A1C>EQUAL 7.0%<8.0%: CPT | Performed by: STUDENT IN AN ORGANIZED HEALTH CARE EDUCATION/TRAINING PROGRAM

## 2024-09-04 RX ORDER — ATORVASTATIN CALCIUM 40 MG/1
40 TABLET, FILM COATED ORAL DAILY
Qty: 90 TABLET | Refills: 3 | Status: SHIPPED | OUTPATIENT
Start: 2024-09-04 | End: 2025-09-04

## 2024-09-04 RX ORDER — TRAZODONE HYDROCHLORIDE 50 MG/1
50 TABLET ORAL NIGHTLY PRN
Qty: 90 TABLET | Refills: 3 | Status: SHIPPED | OUTPATIENT
Start: 2024-09-04 | End: 2025-09-04

## 2024-09-04 RX ORDER — INSULIN GLARGINE 100 [IU]/ML
30 INJECTION, SOLUTION SUBCUTANEOUS EVERY MORNING
Qty: 27 ML | Refills: 1 | Status: SHIPPED | OUTPATIENT
Start: 2024-09-04 | End: 2025-03-03

## 2024-09-04 RX ORDER — METOPROLOL TARTRATE 25 MG/1
25 TABLET, FILM COATED ORAL 2 TIMES DAILY
Qty: 180 TABLET | Refills: 3 | Status: SHIPPED | OUTPATIENT
Start: 2024-09-04 | End: 2025-09-04

## 2024-09-04 RX ORDER — INSULIN LISPRO 100 [IU]/ML
7 INJECTION, SOLUTION INTRAVENOUS; SUBCUTANEOUS
Qty: 18.9 ML | Refills: 3 | Status: SHIPPED | OUTPATIENT
Start: 2024-09-04 | End: 2025-09-04

## 2024-09-04 RX ORDER — ERGOCALCIFEROL 1.25 1/1
50000 CAPSULE ORAL WEEKLY
Qty: 12 CAPSULE | Refills: 0 | Status: SHIPPED | OUTPATIENT
Start: 2024-09-04 | End: 2024-12-03

## 2024-09-04 RX ORDER — MULTIVIT-MIN/IRON FUM/FOLIC AC 7.5 MG-4
1 TABLET ORAL DAILY
Qty: 90 TABLET | Refills: 3 | Status: SHIPPED | OUTPATIENT
Start: 2024-09-04 | End: 2025-09-04

## 2024-09-04 RX ORDER — LISINOPRIL 10 MG/1
10 TABLET ORAL DAILY
Qty: 90 TABLET | Refills: 3 | Status: SHIPPED | OUTPATIENT
Start: 2024-09-04 | End: 2025-09-04

## 2024-09-04 RX ORDER — IBUPROFEN 200 MG
CAPSULE ORAL
Qty: 200 STRIP | Refills: 1 | Status: SHIPPED | OUTPATIENT
Start: 2024-09-04

## 2024-09-04 RX ORDER — ACETAMINOPHEN 500 MG
5 TABLET ORAL NIGHTLY
Qty: 90 TABLET | Refills: 3 | Status: SHIPPED | OUTPATIENT
Start: 2024-09-04 | End: 2025-09-04

## 2024-09-04 RX ORDER — PEN NEEDLE, DIABETIC 30 GX3/16"
NEEDLE, DISPOSABLE MISCELLANEOUS
Qty: 200 EACH | Refills: 3 | Status: SHIPPED | OUTPATIENT
Start: 2024-09-04

## 2024-09-04 RX ORDER — ESCITALOPRAM OXALATE 5 MG/1
5 TABLET ORAL DAILY
Qty: 30 TABLET | Refills: 2 | Status: SHIPPED | OUTPATIENT
Start: 2024-09-04 | End: 2024-12-03

## 2024-09-04 RX ORDER — ASPIRIN 81 MG/1
81 TABLET ORAL DAILY
Qty: 90 TABLET | Refills: 3 | Status: SHIPPED | OUTPATIENT
Start: 2024-09-04 | End: 2025-09-04

## 2024-09-04 NOTE — PROGRESS NOTES
Subjective   Patient ID: Errol Fonseca is a 54 y.o. male who presents for Follow-up (Pt is here for F/U and med refill ).  HPI  Errol is here for follow up visit.    Patient reports that he hasn't been sleeping well, will go 2-3 days without sleeping and will not stay asleep for more than 2-3 hours, days without meals as well will have 1 meal. Notices that his blood glucose has been running in the 2-300 range persistently recently.    Mentions that he was previously on trazodone for sleep, would like to go back on trazodone. He has been trying melatonin. Patient is moving in with one of his daughters soon    Brain was recently seen in the emergency department on 8/27/24 for concerns of suicidal ideation. He was drinking alcohol and family states. Today he states that he never threatened to hurt himself or anyone else but his daughter that he is moving out of is the one who called ems for him. States he has been getting depressed a lot recently though. Would like to try medication. No SI/HI.    Review of Systems  12-point ROS was reviewed and is negative, unless otherwise noted in HPI    Objective   Vitals:    09/04/24 1310   BP: (!) 143/104   Pulse: 106   SpO2: 96%        Physical Exam  GEN: alert, conversant, NAD  HEENT: PERRL, EOMI, MMM, Tms pearly gray bilaterally  NECK: supple, no LAD appreciated  CHEST: CTAB  CV: S1, S2, RRR, no murmurs appreciated  ABD: soft, NT, ND  EXT: no significant LE edema  SKIN: warm, dry    Assessment/Plan   # Diabetes Mellitus type 2 with hyperglycemia and neuropathy  Most Recent HgbA1c: 7.1%  Continue current management: Lantus 30units daily, Lispro 7U TID with meals +SSI  - continue metformin 500mg BID  Increase dietary efforts and physical activity.  Routine diabetic retinopathy screening, foot exam/monofilament testing screening: Following with podiatry, referral to ophthalmology to update retinopathy screening    # HTN  Well controlled on recent readings, elevated in office  today  Continue current medications: continue lisinopril 10mg daily, metoprolol 25mg BID  Discussed DASH diet and dietary sodium restrictions.   Increase dietary efforts and physical activity.     #CAD  # Dyslipidemia  Stable. Most recent Lipid Panel: 4/2024  Continue with current management: ASA 81mg daily, Atorvastatin 40mg daily  Discussed healthy diet and lifestyle.    #Former smoker  Quit >6 months  1 - 1.5ppd smoker >40 pack year hx  - LDCT ordered at last visit    #alcohol use disorder  Last drank 3 days ago  - he is established with AA  - advised continued complete cessation    #insomnia  #Depression/Anxiety  - resume trazodone 50mg nightyl  - start lexapro 5mg daily, SE profile discussed    Health Maintenance:  Vaccines: COVID (x1), Flu (UTD), TDAP (2023), Shingles (advised), Pneumonia (UTD, prevnar 20)  Screening: Colonoscopy (cologuard ordered today), LDCT (ordered at last visit)  Labs: Reviewed recent, obtain hgba1c, and urine albumin at next vist     RTC in 3-4 weeks, or sooner PRN    Angel Hurtado, DO

## 2024-09-24 LAB — NONINV COLON CA DNA+OCC BLD SCRN STL QL: NORMAL

## 2024-09-25 ENCOUNTER — LAB (OUTPATIENT)
Dept: LAB | Facility: LAB | Age: 54
End: 2024-09-25
Payer: MEDICAID

## 2024-09-25 ENCOUNTER — APPOINTMENT (OUTPATIENT)
Dept: PRIMARY CARE | Facility: CLINIC | Age: 54
End: 2024-09-25
Payer: MEDICAID

## 2024-09-25 VITALS — DIASTOLIC BLOOD PRESSURE: 111 MMHG | SYSTOLIC BLOOD PRESSURE: 180 MMHG

## 2024-09-25 DIAGNOSIS — Z12.11 COLON CANCER SCREENING: ICD-10-CM

## 2024-09-25 DIAGNOSIS — E11.65 TYPE 2 DIABETES MELLITUS WITH HYPERGLYCEMIA, WITH LONG-TERM CURRENT USE OF INSULIN: Primary | ICD-10-CM

## 2024-09-25 DIAGNOSIS — Z79.4 TYPE 2 DIABETES MELLITUS WITH HYPERGLYCEMIA, WITH LONG-TERM CURRENT USE OF INSULIN: ICD-10-CM

## 2024-09-25 DIAGNOSIS — K43.9 ABDOMINAL WALL HERNIA: ICD-10-CM

## 2024-09-25 DIAGNOSIS — I10 PRIMARY HYPERTENSION: ICD-10-CM

## 2024-09-25 DIAGNOSIS — F32.A ANXIETY AND DEPRESSION: ICD-10-CM

## 2024-09-25 DIAGNOSIS — E11.65 TYPE 2 DIABETES MELLITUS WITH HYPERGLYCEMIA, WITH LONG-TERM CURRENT USE OF INSULIN: ICD-10-CM

## 2024-09-25 DIAGNOSIS — F41.9 ANXIETY AND DEPRESSION: ICD-10-CM

## 2024-09-25 DIAGNOSIS — Z79.4 TYPE 2 DIABETES MELLITUS WITH HYPERGLYCEMIA, WITH LONG-TERM CURRENT USE OF INSULIN: Primary | ICD-10-CM

## 2024-09-25 LAB
CREAT UR-MCNC: 214.5 MG/DL (ref 20–370)
EST. AVERAGE GLUCOSE BLD GHB EST-MCNC: 163 MG/DL
HBA1C MFR BLD: 7.3 %
MICROALBUMIN UR-MCNC: 62.9 MG/L
MICROALBUMIN/CREAT UR: 29.3 UG/MG CREAT

## 2024-09-25 PROCEDURE — 82043 UR ALBUMIN QUANTITATIVE: CPT

## 2024-09-25 PROCEDURE — 99214 OFFICE O/P EST MOD 30 MIN: CPT | Performed by: STUDENT IN AN ORGANIZED HEALTH CARE EDUCATION/TRAINING PROGRAM

## 2024-09-25 PROCEDURE — 3049F LDL-C 100-129 MG/DL: CPT | Performed by: STUDENT IN AN ORGANIZED HEALTH CARE EDUCATION/TRAINING PROGRAM

## 2024-09-25 PROCEDURE — 3080F DIAST BP >= 90 MM HG: CPT | Performed by: STUDENT IN AN ORGANIZED HEALTH CARE EDUCATION/TRAINING PROGRAM

## 2024-09-25 PROCEDURE — 1036F TOBACCO NON-USER: CPT | Performed by: STUDENT IN AN ORGANIZED HEALTH CARE EDUCATION/TRAINING PROGRAM

## 2024-09-25 PROCEDURE — 36415 COLL VENOUS BLD VENIPUNCTURE: CPT

## 2024-09-25 PROCEDURE — 82570 ASSAY OF URINE CREATININE: CPT

## 2024-09-25 PROCEDURE — 3077F SYST BP >= 140 MM HG: CPT | Performed by: STUDENT IN AN ORGANIZED HEALTH CARE EDUCATION/TRAINING PROGRAM

## 2024-09-25 PROCEDURE — 3051F HG A1C>EQUAL 7.0%<8.0%: CPT | Performed by: STUDENT IN AN ORGANIZED HEALTH CARE EDUCATION/TRAINING PROGRAM

## 2024-09-25 PROCEDURE — 83036 HEMOGLOBIN GLYCOSYLATED A1C: CPT

## 2024-09-25 RX ORDER — ESCITALOPRAM OXALATE 10 MG/1
10 TABLET ORAL DAILY
Qty: 90 TABLET | Refills: 1 | Status: SHIPPED | OUTPATIENT
Start: 2024-09-25 | End: 2025-03-24

## 2024-09-25 RX ORDER — BLOOD-GLUCOSE SENSOR
EACH MISCELLANEOUS
Qty: 2 EACH | Refills: 2 | Status: SHIPPED | OUTPATIENT
Start: 2024-09-25

## 2024-09-25 RX ORDER — BLOOD-GLUCOSE,RECEIVER,CONT
EACH MISCELLANEOUS
Qty: 1 EACH | Refills: 0 | Status: SHIPPED | OUTPATIENT
Start: 2024-09-25

## 2024-09-25 RX ORDER — LISINOPRIL AND HYDROCHLOROTHIAZIDE 12.5; 2 MG/1; MG/1
1 TABLET ORAL DAILY
Qty: 90 TABLET | Refills: 3 | Status: SHIPPED | OUTPATIENT
Start: 2024-09-25 | End: 2025-09-25

## 2024-09-25 NOTE — PROGRESS NOTES
Subjective   Patient ID: Errol Fonseca is a 54 y.o. male who presents for Follow-up.  HPI  Errol is here for follow up visit.    He has been taking all of his medications as prescribed. Blood glucose has been under much improved control. No hypoglycemic reactions. He is interested in a continuous glucose monitor so that he can improve glycemic control further. Gets disoriented during hyperglycemic episodes, this has not been happening recently. He has remain sober of alcohol and is motivated to continue.     Review of Systems  12-point ROS was reviewed and is negative, unless otherwise noted in HPI    Objective   Vitals:    09/25/24 1257   BP: (!) 180/111        Physical Exam  GEN: alert, conversant, NAD  HEENT: PERRL, EOMI, MMM, Tms pearly gray bilaterally  NECK: supple, no LAD appreciated  CHEST: CTAB  CV: S1, S2, RRR, no murmurs appreciated  ABD: soft, NT, ND  EXT: no significant LE edema  SKIN: warm, dry    Assessment/Plan   # Diabetes Mellitus type 2 with hyperglycemia and neuropathy  Most Recent HgbA1c: 7.1%  Continue current management: Lantus 30units daily, Lispro 7U TID with meals +SSI  - continue metformin 500mg BID, add ozempic 0.25mg weekly  Increase dietary efforts and physical activity.  Routine diabetic retinopathy screening, foot exam/monofilament testing screening: Following with podiatry, referral to ophthalmology to update retinopathy screening    # HTN  uncontrolled  Continue current medications: switch lisinopril to lisinopril-hydrochlorothiazide combination pill, continue metoprolol 25mg BID  Discussed DASH diet and dietary sodium restrictions.   Increase dietary efforts and physical activity.     #CAD  # Dyslipidemia  Stable. Most recent Lipid Panel: 4/2024  Continue with current management: ASA 81mg daily, Atorvastatin 40mg daily  Discussed healthy diet and lifestyle.    #Former smoker  Quit >6 months  1 - 1.5ppd smoker >40 pack year hx  - LDCT ordered at last visit, will  schedule    #alcohol use disorder  Last drank before our last visit  - he is established with AA  - advised continued complete cessation    #insomnia  #Depression/Anxiety  - continue trazodone 50mg nightyl  - increase lexapro to 10mg daily    Health Maintenance:  Vaccines: COVID (x1), Flu (UTD), TDAP (2023), Shingles (advised), Pneumonia (UTD, prevnar 20)  Screening: Colonoscopy (cologuard ordered again today), LDCT (ordered at last visit)  Labs: Reviewed recent, obtain hgba1c, and urine albumin today     RTC in 3 months, or sooner PRN    Angel Hurtado, DO

## 2024-09-26 ENCOUNTER — TELEPHONE (OUTPATIENT)
Dept: PRIMARY CARE | Facility: CLINIC | Age: 54
End: 2024-09-26
Payer: MEDICAID

## 2024-09-26 DIAGNOSIS — E11.65 TYPE 2 DIABETES MELLITUS WITH HYPERGLYCEMIA, WITH LONG-TERM CURRENT USE OF INSULIN: Primary | ICD-10-CM

## 2024-09-26 DIAGNOSIS — Z79.4 TYPE 2 DIABETES MELLITUS WITH HYPERGLYCEMIA, WITH LONG-TERM CURRENT USE OF INSULIN: Primary | ICD-10-CM

## 2024-09-26 RX ORDER — DULAGLUTIDE 0.75 MG/.5ML
0.75 INJECTION, SOLUTION SUBCUTANEOUS WEEKLY
Qty: 2 ML | Refills: 2 | Status: SHIPPED | OUTPATIENT
Start: 2024-09-26

## 2024-09-26 NOTE — TELEPHONE ENCOUNTER
Result Communication    Resulted Orders   Albumin-Creatinine Ratio, Urine Random   Result Value Ref Range    Albumin, Urine Random 62.9 Not established mg/L    Creatinine, Urine Random 214.5 20.0 - 370.0 mg/dL    Albumin/Creatinine Ratio 29.3 <30.0 ug/mg Creat   Hemoglobin A1C   Result Value Ref Range    Hemoglobin A1C 7.3 (H) See comment %    Estimated Average Glucose 163 Not Established mg/dL    Narrative    Diagnosis of Diabetes-Adults  Non-Diabetic: < or = 5.6%  Increased risk for developing diabetes: 5.7-6.4%  Diagnostic of diabetes: > or = 6.5%           1:02 PM      Called to discuss lab results, l/m to return call to office to discuss in more detail.

## 2024-10-30 DIAGNOSIS — E11.65 TYPE 2 DIABETES MELLITUS WITH HYPERGLYCEMIA, WITH LONG-TERM CURRENT USE OF INSULIN: ICD-10-CM

## 2024-10-30 DIAGNOSIS — Z79.4 TYPE 2 DIABETES MELLITUS WITH HYPERGLYCEMIA, WITH LONG-TERM CURRENT USE OF INSULIN: ICD-10-CM

## 2024-10-30 RX ORDER — BLOOD-GLUCOSE SENSOR
EACH MISCELLANEOUS
Qty: 3 EACH | Refills: 3 | Status: SHIPPED | OUTPATIENT
Start: 2024-10-30

## 2024-11-05 DIAGNOSIS — Z79.4 TYPE 2 DIABETES MELLITUS WITH HYPERGLYCEMIA, WITH LONG-TERM CURRENT USE OF INSULIN: Primary | ICD-10-CM

## 2024-11-05 DIAGNOSIS — E11.65 TYPE 2 DIABETES MELLITUS WITH HYPERGLYCEMIA, WITH LONG-TERM CURRENT USE OF INSULIN: Primary | ICD-10-CM

## 2024-11-05 RX ORDER — HYDROCHLOROTHIAZIDE 12.5 MG/1
CAPSULE ORAL
Qty: 1 EACH | Refills: 0 | Status: SHIPPED | OUTPATIENT
Start: 2024-11-05

## 2024-11-15 ENCOUNTER — OFFICE VISIT (OUTPATIENT)
Dept: URGENT CARE | Age: 54
End: 2024-11-15
Payer: MEDICAID

## 2024-11-15 VITALS
DIASTOLIC BLOOD PRESSURE: 100 MMHG | OXYGEN SATURATION: 98 % | TEMPERATURE: 98 F | BODY MASS INDEX: 28.76 KG/M2 | RESPIRATION RATE: 16 BRPM | WEIGHT: 224 LBS | HEART RATE: 90 BPM | SYSTOLIC BLOOD PRESSURE: 159 MMHG

## 2024-11-15 DIAGNOSIS — E11.621 DIABETIC ULCER OF RIGHT MIDFOOT ASSOCIATED WITH TYPE 2 DIABETES MELLITUS, WITH OTHER ULCER SEVERITY: Primary | ICD-10-CM

## 2024-11-15 DIAGNOSIS — E11.621 TYPE 2 DIABETES MELLITUS WITH FOOT ULCER, WITHOUT LONG-TERM CURRENT USE OF INSULIN: ICD-10-CM

## 2024-11-15 DIAGNOSIS — L03.115 CELLULITIS OF RIGHT FOOT: ICD-10-CM

## 2024-11-15 DIAGNOSIS — L97.509 TYPE 2 DIABETES MELLITUS WITH FOOT ULCER, WITHOUT LONG-TERM CURRENT USE OF INSULIN: ICD-10-CM

## 2024-11-15 DIAGNOSIS — L97.418 DIABETIC ULCER OF RIGHT MIDFOOT ASSOCIATED WITH TYPE 2 DIABETES MELLITUS, WITH OTHER ULCER SEVERITY: Primary | ICD-10-CM

## 2024-11-15 PROCEDURE — 87077 CULTURE AEROBIC IDENTIFY: CPT

## 2024-11-15 PROCEDURE — 87075 CULTR BACTERIA EXCEPT BLOOD: CPT

## 2024-11-15 PROCEDURE — 87070 CULTURE OTHR SPECIMN AEROBIC: CPT

## 2024-11-15 RX ORDER — SULFAMETHOXAZOLE AND TRIMETHOPRIM 800; 160 MG/1; MG/1
1 TABLET ORAL 2 TIMES DAILY
Qty: 14 TABLET | Refills: 0 | Status: SHIPPED | OUTPATIENT
Start: 2024-11-15 | End: 2024-11-22

## 2024-11-15 RX ORDER — CIPROFLOXACIN 500 MG/1
500 TABLET ORAL 2 TIMES DAILY
Qty: 14 TABLET | Refills: 0 | Status: SHIPPED | OUTPATIENT
Start: 2024-11-15 | End: 2024-11-22

## 2024-11-15 NOTE — PATIENT INSTRUCTIONS
IF ANY WORSENING OF YOUR FOOT, OR IF ANY FLULIKE SYMPTOMS, GO TO THE ER IMMEDIATELY FOR ADDITIONAL WORKUP AND TREATMENT

## 2024-11-15 NOTE — PROGRESS NOTES
Subjective   Patient ID: Errol Fonseca is a 54 y.o. male. They present today with a chief complaint of foot infxn    Patient disposition: Home    HISTORY OF PRESENT ILLNESS:    This is an adult male with T2DM, chronic R foot ulcer, and hx of complications of foot ulcer requiring hospital admission. He presents for R ft swelling, redness, pain, foul odor for 2d. Could not get appt to see his podiatrist. Denies f/c/s, generalized weakness, nausea/vomiting. Able to ambulate.    Past Medical History  Allergies as of 11/15/2024    (No Known Allergies)       (Not in a hospital admission)       Past Medical History:   Diagnosis Date    Acute ischemic heart disease, unspecified (Multi)     Coronary syndrome, acute    Alcoholic hepatitis (Multi) 05/19/2019    Cirrhosis of liver (Multi)     Coronary artery disease     Diabetes mellitus (Multi)     Hypertension     Personal history of other diseases of the circulatory system     History of heart block    Personal history of other diseases of the musculoskeletal system and connective tissue     History of arthritis    Personal history of other endocrine, nutritional and metabolic disease     History of obesity    Pneumonia of right lung due to infectious organism, unspecified part of lung 03/26/2024    SAH (subarachnoid hemorrhage) (Multi) 11/16/2021    Syncope 05/19/2019    Tobacco use disorder 12/06/2021       Past Surgical History:   Procedure Laterality Date    APPENDECTOMY  05/03/2018    Appendectomy    CARDIAC CATHETERIZATION  05/02/2018    Cardiac Cath Procedure Outcome:        reports that he quit smoking about 10 months ago. His smoking use included cigarettes. He has never used smokeless tobacco. He reports that he does not currently use alcohol. He reports that he does not use drugs.    Review of Systems    Negative except as documented in the History of Present Illness.                             Objective    Vitals:    11/15/24 1210   BP: (!) 159/100   Pulse:  90   Resp: 16   Temp: 36.7 °C (98 °F)   SpO2: 98%   Weight: 102 kg (224 lb)     No LMP for male patient.      PHYSICAL EXAMINATION:    CONSTITUTIONAL: nontoxic, ambulatory, well-appearing    EYES:  No scleral icterus or orbital trauma noted. No conjunctival injection. PERRLA. EOMI b/l. No nystagmus.    HEENT:  Head and face are unremarkable and atraumatic. Mucous membranes moist. Nares are patent without copious rhinorrhea.  No lymphadenopathy.    CARDIOVASCULAR:  RRR, no m/r/g. Nl S1/S2.     RLE    *Distal pulses intact, capillary refill 1 second in distal digits.    LUNGS:  CTAB, no r/r/w. No dullness to percussion.    ABDOMEN:  Nonobese, nonscaphoid..    SKIN: There is a dime-sized foot ulcer to fascial depth on the R forefoot plantar surface. There is moderate erythema, swelling of the R foot not including the ankle or foreleg.    STRONG wound malodor resembling P aeruginosa present.      LYMPH:  There is no lymphangitis/adenopathy in the RLE    MUSCULOSKELETAL:     RLE    * ROM is FROM wo pain    * Tenderness is absent    * Soft tissue swelling is as above    Gait is nl         NEURO:     RLE    * Motor function is distally    * Sensation is distally         PSYCH: Appropriate mood and affect.         ---------------------------------------------------------------         MDM:  Diabetic foot ulcer with signs of P aeruginosa and cellulitis. Coverage for typical pathogens + Pseudomonas given in the form of Bactrim and Cipro, but pt given strict instructions to go to ED for additional likely IV abx tx and workup if any failure to improve or any flulike sx. VS reassuring today and only BP slightly elevated. Wound culture pending.        Procedures    Diagnostic study results (if any) were reviewed by Magdaleno Pelaez PA-C.    No results found for this visit on 11/15/24.     Assessment/Plan   Allergies, medications, history, and pertinent labs/EKGs/Imaging reviewed by Magdaleno Pelaez PA-C.     Orders and  Diagnoses  There are no diagnoses linked to this encounter.    Medical Admin Record      Follow Up Instructions  No follow-ups on file.    Electronically signed by Magdaleno Pelaez PA-C  12:23 PM

## 2024-11-17 LAB
BACTERIA SPEC CULT: ABNORMAL
GRAM STN SPEC: ABNORMAL

## 2024-11-19 LAB
BACTERIA SPEC CULT: ABNORMAL
GRAM STN SPEC: ABNORMAL

## 2024-11-20 ENCOUNTER — APPOINTMENT (OUTPATIENT)
Dept: VASCULAR MEDICINE | Facility: HOSPITAL | Age: 54
End: 2024-11-20
Payer: MEDICAID

## 2024-11-20 ENCOUNTER — APPOINTMENT (OUTPATIENT)
Dept: RADIOLOGY | Facility: HOSPITAL | Age: 54
End: 2024-11-20
Payer: MEDICAID

## 2024-11-20 ENCOUNTER — APPOINTMENT (OUTPATIENT)
Dept: CARDIOLOGY | Facility: HOSPITAL | Age: 54
End: 2024-11-20
Payer: MEDICAID

## 2024-11-20 ENCOUNTER — HOSPITAL ENCOUNTER (INPATIENT)
Facility: HOSPITAL | Age: 54
End: 2024-11-20
Attending: EMERGENCY MEDICINE | Admitting: SURGERY
Payer: MEDICAID

## 2024-11-20 ENCOUNTER — HOSPITAL ENCOUNTER (EMERGENCY)
Facility: HOSPITAL | Age: 54
Discharge: OTHER INSTITUTION | End: 2024-11-20
Attending: EMERGENCY MEDICINE
Payer: MEDICAID

## 2024-11-20 VITALS
DIASTOLIC BLOOD PRESSURE: 64 MMHG | WEIGHT: 224 LBS | HEART RATE: 80 BPM | HEIGHT: 73 IN | SYSTOLIC BLOOD PRESSURE: 116 MMHG | OXYGEN SATURATION: 97 % | TEMPERATURE: 99.3 F | RESPIRATION RATE: 17 BRPM | BODY MASS INDEX: 29.69 KG/M2

## 2024-11-20 DIAGNOSIS — G89.18 ACUTE POSTOPERATIVE PAIN: ICD-10-CM

## 2024-11-20 DIAGNOSIS — M79.604 PAIN IN RIGHT LEG: ICD-10-CM

## 2024-11-20 DIAGNOSIS — L97.512 FOOT ULCER WITH FAT LAYER EXPOSED, RIGHT (MULTI): ICD-10-CM

## 2024-11-20 DIAGNOSIS — Z79.4 TYPE 2 DIABETES MELLITUS WITH OTHER SPECIFIED COMPLICATION, WITH LONG-TERM CURRENT USE OF INSULIN: ICD-10-CM

## 2024-11-20 DIAGNOSIS — M72.6 NECROTIZING FASCIITIS OF LOWER LEG (MULTI): Primary | ICD-10-CM

## 2024-11-20 DIAGNOSIS — E11.69 TYPE 2 DIABETES MELLITUS WITH OTHER SPECIFIED COMPLICATION, WITH LONG-TERM CURRENT USE OF INSULIN: ICD-10-CM

## 2024-11-20 DIAGNOSIS — M72.6 NECROTIZING FASCIITIS (MULTI): Primary | ICD-10-CM

## 2024-11-20 LAB
ALBUMIN SERPL BCP-MCNC: 3.6 G/DL (ref 3.4–5)
ALP SERPL-CCNC: 139 U/L (ref 33–120)
ALT SERPL W P-5'-P-CCNC: 31 U/L (ref 10–52)
ANION GAP SERPL CALC-SCNC: 16 MMOL/L (ref 10–20)
APTT PPP: 24 SECONDS (ref 27–38)
AST SERPL W P-5'-P-CCNC: 41 U/L (ref 9–39)
BASOPHILS # BLD AUTO: 0.06 X10*3/UL (ref 0–0.1)
BASOPHILS NFR BLD AUTO: 0.3 %
BILIRUB SERPL-MCNC: 1.1 MG/DL (ref 0–1.2)
BUN SERPL-MCNC: 27 MG/DL (ref 6–23)
CALCIUM SERPL-MCNC: 9.2 MG/DL (ref 8.6–10.3)
CHLORIDE SERPL-SCNC: 85 MMOL/L (ref 98–107)
CO2 SERPL-SCNC: 27 MMOL/L (ref 21–32)
CREAT SERPL-MCNC: 1.64 MG/DL (ref 0.5–1.3)
CRP SERPL-MCNC: 23.25 MG/DL
EGFRCR SERPLBLD CKD-EPI 2021: 49 ML/MIN/1.73M*2
EOSINOPHIL # BLD AUTO: 0.01 X10*3/UL (ref 0–0.7)
EOSINOPHIL NFR BLD AUTO: 0.1 %
ERYTHROCYTE [DISTWIDTH] IN BLOOD BY AUTOMATED COUNT: 12.9 % (ref 11.5–14.5)
ERYTHROCYTE [SEDIMENTATION RATE] IN BLOOD BY WESTERGREN METHOD: >130 MM/H (ref 0–20)
GLUCOSE SERPL-MCNC: 299 MG/DL (ref 74–99)
HCT VFR BLD AUTO: 37.4 % (ref 41–52)
HGB BLD-MCNC: 12.8 G/DL (ref 13.5–17.5)
IMM GRANULOCYTES # BLD AUTO: 0.38 X10*3/UL (ref 0–0.7)
IMM GRANULOCYTES NFR BLD AUTO: 2.1 % (ref 0–0.9)
INR PPP: 1.4 (ref 0.9–1.1)
LACTATE SERPL-SCNC: 2.3 MMOL/L (ref 0.4–2)
LACTATE SERPL-SCNC: 3.1 MMOL/L (ref 0.4–2)
LYMPHOCYTES # BLD AUTO: 0.73 X10*3/UL (ref 1.2–4.8)
LYMPHOCYTES NFR BLD AUTO: 4 %
MCH RBC QN AUTO: 33.1 PG (ref 26–34)
MCHC RBC AUTO-ENTMCNC: 34.2 G/DL (ref 32–36)
MCV RBC AUTO: 97 FL (ref 80–100)
MONOCYTES # BLD AUTO: 1.66 X10*3/UL (ref 0.1–1)
MONOCYTES NFR BLD AUTO: 9.1 %
NEUTROPHILS # BLD AUTO: 15.44 X10*3/UL (ref 1.2–7.7)
NEUTROPHILS NFR BLD AUTO: 84.4 %
NRBC BLD-RTO: 0 /100 WBCS (ref 0–0)
PLATELET # BLD AUTO: 193 X10*3/UL (ref 150–450)
POTASSIUM SERPL-SCNC: 3.9 MMOL/L (ref 3.5–5.3)
PROT SERPL-MCNC: 7.7 G/DL (ref 6.4–8.2)
PROTHROMBIN TIME: 16.2 SECONDS (ref 9.8–12.8)
RBC # BLD AUTO: 3.87 X10*6/UL (ref 4.5–5.9)
SODIUM SERPL-SCNC: 124 MMOL/L (ref 136–145)
WBC # BLD AUTO: 18.3 X10*3/UL (ref 4.4–11.3)

## 2024-11-20 PROCEDURE — 86140 C-REACTIVE PROTEIN: CPT | Performed by: EMERGENCY MEDICINE

## 2024-11-20 PROCEDURE — 87040 BLOOD CULTURE FOR BACTERIA: CPT | Mod: PARLAB | Performed by: NURSE PRACTITIONER

## 2024-11-20 PROCEDURE — 73590 X-RAY EXAM OF LOWER LEG: CPT | Mod: RIGHT SIDE | Performed by: RADIOLOGY

## 2024-11-20 PROCEDURE — 0QBN0ZZ EXCISION OF RIGHT METATARSAL, OPEN APPROACH: ICD-10-PCS | Performed by: PODIATRIST

## 2024-11-20 PROCEDURE — 2500000004 HC RX 250 GENERAL PHARMACY W/ HCPCS (ALT 636 FOR OP/ED): Mod: SE

## 2024-11-20 PROCEDURE — 85730 THROMBOPLASTIN TIME PARTIAL: CPT | Performed by: EMERGENCY MEDICINE

## 2024-11-20 PROCEDURE — 99285 EMERGENCY DEPT VISIT HI MDM: CPT | Mod: 25

## 2024-11-20 PROCEDURE — 73590 X-RAY EXAM OF LOWER LEG: CPT | Mod: RT

## 2024-11-20 PROCEDURE — 96361 HYDRATE IV INFUSION ADD-ON: CPT

## 2024-11-20 PROCEDURE — 85025 COMPLETE CBC W/AUTO DIFF WBC: CPT | Performed by: NURSE PRACTITIONER

## 2024-11-20 PROCEDURE — 73706 CT ANGIO LWR EXTR W/O&W/DYE: CPT | Mod: RT

## 2024-11-20 PROCEDURE — 73610 X-RAY EXAM OF ANKLE: CPT | Mod: RIGHT SIDE | Performed by: RADIOLOGY

## 2024-11-20 PROCEDURE — 73610 X-RAY EXAM OF ANKLE: CPT | Mod: RT

## 2024-11-20 PROCEDURE — 96365 THER/PROPH/DIAG IV INF INIT: CPT

## 2024-11-20 PROCEDURE — 0QBQ0ZZ EXCISION OF RIGHT TOE PHALANX, OPEN APPROACH: ICD-10-PCS | Performed by: PODIATRIST

## 2024-11-20 PROCEDURE — 96367 TX/PROPH/DG ADDL SEQ IV INF: CPT

## 2024-11-20 PROCEDURE — 99291 CRITICAL CARE FIRST HOUR: CPT | Performed by: EMERGENCY MEDICINE

## 2024-11-20 PROCEDURE — 99291 CRITICAL CARE FIRST HOUR: CPT | Mod: 25

## 2024-11-20 PROCEDURE — 93005 ELECTROCARDIOGRAM TRACING: CPT

## 2024-11-20 PROCEDURE — 96366 THER/PROPH/DIAG IV INF ADDON: CPT

## 2024-11-20 PROCEDURE — 93971 EXTREMITY STUDY: CPT

## 2024-11-20 PROCEDURE — 73630 X-RAY EXAM OF FOOT: CPT | Mod: RT

## 2024-11-20 PROCEDURE — 36415 COLL VENOUS BLD VENIPUNCTURE: CPT | Performed by: NURSE PRACTITIONER

## 2024-11-20 PROCEDURE — 2550000001 HC RX 255 CONTRASTS: Performed by: EMERGENCY MEDICINE

## 2024-11-20 PROCEDURE — 85610 PROTHROMBIN TIME: CPT | Performed by: EMERGENCY MEDICINE

## 2024-11-20 PROCEDURE — 2500000004 HC RX 250 GENERAL PHARMACY W/ HCPCS (ALT 636 FOR OP/ED): Performed by: EMERGENCY MEDICINE

## 2024-11-20 PROCEDURE — 99285 EMERGENCY DEPT VISIT HI MDM: CPT | Performed by: EMERGENCY MEDICINE

## 2024-11-20 PROCEDURE — 73706 CT ANGIO LWR EXTR W/O&W/DYE: CPT | Mod: RIGHT SIDE | Performed by: RADIOLOGY

## 2024-11-20 PROCEDURE — 71045 X-RAY EXAM CHEST 1 VIEW: CPT

## 2024-11-20 PROCEDURE — 73630 X-RAY EXAM OF FOOT: CPT | Mod: RIGHT SIDE | Performed by: RADIOLOGY

## 2024-11-20 PROCEDURE — 96375 TX/PRO/DX INJ NEW DRUG ADDON: CPT

## 2024-11-20 PROCEDURE — 96374 THER/PROPH/DIAG INJ IV PUSH: CPT

## 2024-11-20 PROCEDURE — 83605 ASSAY OF LACTIC ACID: CPT | Performed by: NURSE PRACTITIONER

## 2024-11-20 PROCEDURE — 84075 ASSAY ALKALINE PHOSPHATASE: CPT | Performed by: NURSE PRACTITIONER

## 2024-11-20 PROCEDURE — 96368 THER/DIAG CONCURRENT INF: CPT

## 2024-11-20 PROCEDURE — 93971 EXTREMITY STUDY: CPT | Performed by: INTERNAL MEDICINE

## 2024-11-20 PROCEDURE — 85652 RBC SED RATE AUTOMATED: CPT | Performed by: EMERGENCY MEDICINE

## 2024-11-20 PROCEDURE — 86900 BLOOD TYPING SEROLOGIC ABO: CPT | Performed by: EMERGENCY MEDICINE

## 2024-11-20 RX ORDER — CLINDAMYCIN PHOSPHATE 900 MG/50ML
900 INJECTION, SOLUTION INTRAVENOUS ONCE
Status: COMPLETED | OUTPATIENT
Start: 2024-11-20 | End: 2024-11-20

## 2024-11-20 RX ORDER — VANCOMYCIN HYDROCHLORIDE 1 G/20ML
INJECTION, POWDER, LYOPHILIZED, FOR SOLUTION INTRAVENOUS DAILY PRN
Status: DISCONTINUED | OUTPATIENT
Start: 2024-11-20 | End: 2024-12-02

## 2024-11-20 RX ORDER — ONDANSETRON HYDROCHLORIDE 2 MG/ML
4 INJECTION, SOLUTION INTRAVENOUS ONCE
Status: COMPLETED | OUTPATIENT
Start: 2024-11-20 | End: 2024-11-20

## 2024-11-20 RX ORDER — HYDROMORPHONE HYDROCHLORIDE 1 MG/ML
0.5 INJECTION, SOLUTION INTRAMUSCULAR; INTRAVENOUS; SUBCUTANEOUS ONCE
Status: COMPLETED | OUTPATIENT
Start: 2024-11-20 | End: 2024-11-20

## 2024-11-20 RX ORDER — HYDROMORPHONE HYDROCHLORIDE 1 MG/ML
INJECTION, SOLUTION INTRAMUSCULAR; INTRAVENOUS; SUBCUTANEOUS
Status: COMPLETED
Start: 2024-11-20 | End: 2024-11-20

## 2024-11-20 RX ORDER — CLINDAMYCIN PHOSPHATE 900 MG/50ML
900 INJECTION, SOLUTION INTRAVENOUS ONCE
Status: COMPLETED | OUTPATIENT
Start: 2024-11-21 | End: 2024-11-21

## 2024-11-20 RX ORDER — MORPHINE SULFATE 4 MG/ML
4 INJECTION, SOLUTION INTRAMUSCULAR; INTRAVENOUS ONCE
Status: COMPLETED | OUTPATIENT
Start: 2024-11-20 | End: 2024-11-20

## 2024-11-20 RX ORDER — VANCOMYCIN/0.9 % SOD CHLORIDE 1.5G/250ML
1500 PLASTIC BAG, INJECTION (ML) INTRAVENOUS EVERY 24 HOURS
Status: DISCONTINUED | OUTPATIENT
Start: 2024-11-21 | End: 2024-11-21

## 2024-11-20 RX ADMIN — HYDROMORPHONE HYDROCHLORIDE 0.5 MG: 1 INJECTION, SOLUTION INTRAMUSCULAR; INTRAVENOUS; SUBCUTANEOUS at 22:59

## 2024-11-20 RX ADMIN — SODIUM CHLORIDE 500 ML: 9 INJECTION, SOLUTION INTRAVENOUS at 22:59

## 2024-11-20 ASSESSMENT — COLUMBIA-SUICIDE SEVERITY RATING SCALE - C-SSRS
1. IN THE PAST MONTH, HAVE YOU WISHED YOU WERE DEAD OR WISHED YOU COULD GO TO SLEEP AND NOT WAKE UP?: NO
1. IN THE PAST MONTH, HAVE YOU WISHED YOU WERE DEAD OR WISHED YOU COULD GO TO SLEEP AND NOT WAKE UP?: NO
2. HAVE YOU ACTUALLY HAD ANY THOUGHTS OF KILLING YOURSELF?: NO
6. HAVE YOU EVER DONE ANYTHING, STARTED TO DO ANYTHING, OR PREPARED TO DO ANYTHING TO END YOUR LIFE?: NO
6. HAVE YOU EVER DONE ANYTHING, STARTED TO DO ANYTHING, OR PREPARED TO DO ANYTHING TO END YOUR LIFE?: NO
2. HAVE YOU ACTUALLY HAD ANY THOUGHTS OF KILLING YOURSELF?: NO

## 2024-11-20 ASSESSMENT — PAIN DESCRIPTION - PROGRESSION: CLINICAL_PROGRESSION: GRADUALLY WORSENING

## 2024-11-20 ASSESSMENT — LIFESTYLE VARIABLES
HAVE YOU EVER FELT YOU SHOULD CUT DOWN ON YOUR DRINKING: NO
HAVE PEOPLE ANNOYED YOU BY CRITICIZING YOUR DRINKING: NO
EVER HAD A DRINK FIRST THING IN THE MORNING TO STEADY YOUR NERVES TO GET RID OF A HANGOVER: NO
TOTAL SCORE: 0
EVER FELT BAD OR GUILTY ABOUT YOUR DRINKING: NO

## 2024-11-20 ASSESSMENT — PAIN - FUNCTIONAL ASSESSMENT: PAIN_FUNCTIONAL_ASSESSMENT: 0-10

## 2024-11-20 ASSESSMENT — PAIN DESCRIPTION - FREQUENCY: FREQUENCY: CONSTANT/CONTINUOUS

## 2024-11-20 ASSESSMENT — PAIN SCALES - GENERAL
PAINLEVEL_OUTOF10: 7
PAINLEVEL_OUTOF10: 7
PAINLEVEL_OUTOF10: 10 - WORST POSSIBLE PAIN
PAINLEVEL_OUTOF10: 10 - WORST POSSIBLE PAIN

## 2024-11-20 ASSESSMENT — PAIN DESCRIPTION - DESCRIPTORS: DESCRIPTORS: ACHING

## 2024-11-20 ASSESSMENT — PAIN DESCRIPTION - ORIENTATION: ORIENTATION: RIGHT

## 2024-11-20 ASSESSMENT — PAIN DESCRIPTION - PAIN TYPE
TYPE: ACUTE PAIN
TYPE: ACUTE PAIN

## 2024-11-20 ASSESSMENT — PAIN DESCRIPTION - LOCATION
LOCATION: FOOT
LOCATION: FOOT

## 2024-11-20 NOTE — ED TRIAGE NOTES
TRIAGE NOTE   I saw the patient as the Clinician in Triage and performed a brief history and physical exam, established acuity, and ordered appropriate tests to develop basic plan of care. Patient will be seen by an [facility], resident and/or physician who will independently evaluate the patient. Please see subsequent provider notes for further details and disposition.     Brief HPI: In brief, Errol Fonseca is a 54 y.o. male significant past medical history for  HTN, HLD, CAD with stent placement, alcoholic cirrhosis and left lesser toe amputation presenting to ED today from home by himself for evaluation of a right lower extremity skin infection patient states over the last week he developed an ulcer on the bottom of the right foot, seen in urgent care and placed on Cipro/Bactrim/15, symptoms have worsened, complaining of increasing erythema, edema and pain.  Erythema traveling up the shin.  No trauma to the right lower extremity.  No history of PE/DVT, recent travel, recent surgery, history of malignancy or use of exogenous hormones.  Denies fever/chills, cough/cold symptoms, chest pain, shortness of breath, nausea/vomiting, abdominal pain, urinary symptoms, change in bowel habits or any other complaints.  Former smoking and EtOH, no IV drugs.  PCP is Dr. Hurtado, podiatrist is Dr. Villegas.    Focused Physical exam:   General: 54-year-old male, awake alert, oriented x 3.  Well-nourished and hydrated.  Nontoxic looking.  Skin: Right lower extremity erythematous with edema, unable to visualize wound in triage.  Cardiac: Regular rate and rhythm.  Pulmonary: Lungs clear bilaterally.  Abdominal: Rounded soft bowel sounds, nontender.  No CVA tenderness.    Plan/MDM:    54 y.o. male significant past medical history for HTN, HLD, CAD with stent placement, alcoholic cirrhosis and left lesser toe amputation is evaluated at the bedside for worsening cellulitis of the right lower extremity despite being placed on Cipro/Bactrim x  5 days.  However the ED, vital signs within normal limits.  Afebrile.  IV established, basic labs including lactate/cultures, right lower extremity venous duplex and x-rays of the right foot and ankle will be obtained in preparation for further evaluation in the main ED.  Patient is agreeable to this plan.    Please see subsequent provider note for further details and disposition

## 2024-11-20 NOTE — ED TRIAGE NOTES
Patient arrives from home with complaints of right foot and leg swelling.  Patient is diabetic and has reoccurring ulcer that has been draining serosanguinous fluid for a while.  Patient was on oral antibiotics however the infection seems worse.

## 2024-11-20 NOTE — ED PROVIDER NOTES
HPI   Chief Complaint   Patient presents with    Leg Swelling    Wound Check     Patient arrives from home with complaints of right foot and leg swelling.  Patient is diabetic and has reoccurring ulcer that has been draining serosanguinous fluid for a while.  Patient was on oral antibiotics however the infection seems worse.        Chief complaint: Wound of leg    History of present illness: Patient is a 54-year-old male presenting to the emergency department with complaints of AV wound in his right lower extremity.  According to the patient, he has a history of diabetes.  Patient states that he has a history of a wound on the bottom of his right foot.  The patient states that he has noticed drainage and a foul smell from his foot.  The patient states that earlier this week, he was started on an antibiotic by mouth.  The patient states that despite this, he is continue to have discomfort in his right lower extremity.  The patient states that he has a feeling of malaise.  Concern, the patient presents to the emergency department for further evaluation.  The patient states that he has had symptoms like this in the past which necessitated amputation of toes on the left-hand side.      History provided by:  Patient   used: No            Patient History   Past Medical History:   Diagnosis Date    Acute ischemic heart disease, unspecified     Coronary syndrome, acute    Alcoholic hepatitis 05/19/2019    Cirrhosis of liver (Multi)     Coronary artery disease     Diabetes mellitus (Multi)     Hypertension     Personal history of other diseases of the circulatory system     History of heart block    Personal history of other diseases of the musculoskeletal system and connective tissue     History of arthritis    Personal history of other endocrine, nutritional and metabolic disease     History of obesity    Pneumonia of right lung due to infectious organism, unspecified part of lung 03/26/2024    SAH  (subarachnoid hemorrhage) (Multi) 2021    Syncope 2019    Tobacco use disorder 2021     Past Surgical History:   Procedure Laterality Date    APPENDECTOMY  2018    Appendectomy    CARDIAC CATHETERIZATION  2018    Cardiac Cath Procedure Outcome:     Family History   Problem Relation Name Age of Onset    Other (cva) Mother       Social History     Tobacco Use    Smoking status: Former     Current packs/day: 0.00     Types: Cigarettes     Quit date: 2024     Years since quittin.8    Smokeless tobacco: Never   Vaping Use    Vaping status: Never Used   Substance Use Topics    Alcohol use: Not Currently     Comment: none since 2024    Drug use: Never       Physical Exam   ED Triage Vitals [24 1153]   Temperature Heart Rate Respirations BP   36.1 °C (97 °F) 88 18 114/57      Pulse Ox Temp Source Heart Rate Source Patient Position   98 % Temporal Monitor Sitting      BP Location FiO2 (%)     Right arm --       Physical Exam  Vitals and nursing note reviewed.   Constitutional:       General: He is not in acute distress.     Appearance: He is well-developed.   HENT:      Head: Normocephalic and atraumatic.   Eyes:      Conjunctiva/sclera: Conjunctivae normal.   Cardiovascular:      Rate and Rhythm: Normal rate and regular rhythm.      Heart sounds: No murmur heard.  Pulmonary:      Effort: Pulmonary effort is normal. No respiratory distress.      Breath sounds: Normal breath sounds.   Abdominal:      Palpations: Abdomen is soft.      Tenderness: There is no abdominal tenderness.   Musculoskeletal:         General: No swelling.      Cervical back: Neck supple.   Feet:      Comments: Patient has erythema swelling of the right lower extremity extending up to the level above the ankle.  There is sloughing of the patient's skin occurring on the dorsum of the foot.  The patient has an open wound located on the ball of his foot.  This is tender to touch.  Skin:     General: Skin is  warm and dry.      Capillary Refill: Capillary refill takes less than 2 seconds.   Neurological:      Mental Status: He is alert.   Psychiatric:         Mood and Affect: Mood normal.           ED Course & MDM                  No data recorded                                 Medical Decision Making  Medical Decision Making: On arrival to the emergency department, the patient was seen in triage.  Orders were placed on the patient.  Once the patient was brought back to the emergency department, I evaluated the patient.  The patient's white cell count was 18.3 the patient's Chem-7 demonstrated a hyperglycemia with a glucose of 299 a creatinine of 1.64 LFTs are within normal limits.  Lactate was 2.3.  The patient's x-rays of the right lower extremities demonstrated no evidence of osteomyelitis however, did demonstrate gas in the dorsal lateral soft tissues of the foot possibly representing gangrene.  X-ray of the patient's right ankle redemonstrated this gas however on my evaluation of the patient's x-ray of the ankle, I felt that there was a possibly a subtle tracing of gas extending up the patient's shin as result I ordered an x-ray of the patient's tib-fib which demonstrated probable gas in the ventral aspect of the calf and ankle.  Given these findings, I ordered a CT angiography of the patient's right lower extremity.  This demonstrated gas throughout the musculature of the extensor compartment of the lower leg and extensively around the subcutaneous tissues of the dorsum of the foot with evidence of a open wound.    Given these findings, I immediately had concerns for necrotizing fasciitis in this patient.  I ordered the patient broad-spectrum antibiotics including vancomycin Zosyn and clindamycin.  Or cultures were all ordered patient was given IV hydration patient was given morphine and Zofran.  I performed preop labs for the patient occluding a PTT which is 24 INR of 1.4.  Sed rate was added on as well as a  C-reactive protein.  CRP is 23.25 sed rate was greater than 130.  Chest x-ray demonstrated no significant acute abnormalities finally, the patient's EKG demonstrated a sinus rhythm with a rate of 89 bpm isoelectric ST segments narrow QRS complexes and a QTc of 467.    Initially spoke with orthopedic surgery regarding his patient's case they feel the patient requires a higher level of care I am in agreement with this.  I immediately spoke with the transfer center at [facility].  I spoke with orthopedic surgery on-call they requested the patient be transferred to the emergency department for evaluation and likely surgical debridement.  I spoke with the emergency physician at [facility] regarding his patient's case they expressed understanding.  The plan was explained to the patient he expressed understanding.  The patient was then transferred to [facility] in guarded condition.    Amount and/or Complexity of Data Reviewed  Labs: ordered. Decision-making details documented in ED Course.  Radiology: ordered and independent interpretation performed. Decision-making details documented in ED Course.  ECG/medicine tests: ordered and independent interpretation performed. Decision-making details documented in ED Course.        Procedure  Critical Care    Performed by: Jeff Mendoza MD  Authorized by: Jeff Mendoza MD    Critical care provider statement:     Critical care time (minutes):  55    Critical care time was exclusive of:  Separately billable procedures and treating other patients    Critical care was necessary to treat or prevent imminent or life-threatening deterioration of the following conditions:  Sepsis    Critical care was time spent personally by me on the following activities:  Development of treatment plan with patient or surrogate, discussions with consultants, evaluation of patient's response to treatment, examination of patient,  obtaining history from patient or surrogate, ordering and performing treatments and interventions, ordering and review of laboratory studies, ordering and review of radiographic studies, pulse oximetry and re-evaluation of patient's condition    Care discussed with: accepting provider at another facility         Jeff Mendoza MD  11/27/24 9060

## 2024-11-21 ENCOUNTER — ANESTHESIA (OUTPATIENT)
Dept: OPERATING ROOM | Facility: HOSPITAL | Age: 54
End: 2024-11-21
Payer: MEDICAID

## 2024-11-21 ENCOUNTER — ANESTHESIA EVENT (OUTPATIENT)
Dept: OPERATING ROOM | Facility: HOSPITAL | Age: 54
End: 2024-11-21
Payer: MEDICAID

## 2024-11-21 PROBLEM — E11.9 TYPE 2 DIABETES MELLITUS, WITH LONG-TERM CURRENT USE OF INSULIN: Status: ACTIVE | Noted: 2024-11-21

## 2024-11-21 PROBLEM — Z79.4 TYPE 2 DIABETES MELLITUS, WITH LONG-TERM CURRENT USE OF INSULIN: Status: ACTIVE | Noted: 2024-11-21

## 2024-11-21 PROBLEM — M72.6 NECROTIZING FASCIITIS OF LOWER LEG (MULTI): Status: ACTIVE | Noted: 2024-11-21

## 2024-11-21 LAB
ABO GROUP (TYPE) IN BLOOD: NORMAL
ALBUMIN SERPL BCP-MCNC: 2.7 G/DL (ref 3.4–5)
ALBUMIN SERPL BCP-MCNC: 2.9 G/DL (ref 3.4–5)
ANION GAP BLDA CALCULATED.4IONS-SCNC: 10 MMO/L (ref 10–25)
ANION GAP BLDA CALCULATED.4IONS-SCNC: 9 MMO/L (ref 10–25)
ANION GAP SERPL CALC-SCNC: 13 MMOL/L (ref 10–20)
ANION GAP SERPL CALC-SCNC: 17 MMOL/L (ref 10–20)
ANTIBODY SCREEN: NORMAL
APTT PPP: 27 SECONDS (ref 27–38)
BASE EXCESS BLDA CALC-SCNC: -1.2 MMOL/L (ref -2–3)
BASE EXCESS BLDA CALC-SCNC: -2 MMOL/L (ref -2–3)
BASE EXCESS BLDA CALC-SCNC: 0.8 MMOL/L (ref -2–3)
BODY TEMPERATURE: 37 DEGREES CELSIUS
BUN SERPL-MCNC: 26 MG/DL (ref 6–23)
BUN SERPL-MCNC: 31 MG/DL (ref 6–23)
CA-I BLDA-SCNC: 1.01 MMOL/L (ref 1.1–1.33)
CA-I BLDA-SCNC: 1.13 MMOL/L (ref 1.1–1.33)
CALCIUM SERPL-MCNC: 7.6 MG/DL (ref 8.6–10.6)
CALCIUM SERPL-MCNC: 7.7 MG/DL (ref 8.6–10.6)
CHLORIDE BLDA-SCNC: 91 MMOL/L (ref 98–107)
CHLORIDE BLDA-SCNC: 95 MMOL/L (ref 98–107)
CHLORIDE SERPL-SCNC: 93 MMOL/L (ref 98–107)
CHLORIDE SERPL-SCNC: 94 MMOL/L (ref 98–107)
CO2 SERPL-SCNC: 24 MMOL/L (ref 21–32)
CO2 SERPL-SCNC: 25 MMOL/L (ref 21–32)
CREAT SERPL-MCNC: 1.34 MG/DL (ref 0.5–1.3)
CREAT SERPL-MCNC: 1.54 MG/DL (ref 0.5–1.3)
EGFRCR SERPLBLD CKD-EPI 2021: 53 ML/MIN/1.73M*2
EGFRCR SERPLBLD CKD-EPI 2021: 63 ML/MIN/1.73M*2
ERYTHROCYTE [DISTWIDTH] IN BLOOD BY AUTOMATED COUNT: 12.4 % (ref 11.5–14.5)
GLUCOSE BLD MANUAL STRIP-MCNC: 151 MG/DL (ref 74–99)
GLUCOSE BLD MANUAL STRIP-MCNC: 222 MG/DL (ref 74–99)
GLUCOSE BLD MANUAL STRIP-MCNC: 250 MG/DL (ref 74–99)
GLUCOSE BLDA-MCNC: 168 MG/DL (ref 74–99)
GLUCOSE BLDA-MCNC: 213 MG/DL (ref 74–99)
GLUCOSE SERPL-MCNC: 158 MG/DL (ref 74–99)
GLUCOSE SERPL-MCNC: 211 MG/DL (ref 74–99)
HCO3 BLDA-SCNC: 23.1 MMOL/L (ref 22–26)
HCO3 BLDA-SCNC: 23.6 MMOL/L (ref 22–26)
HCO3 BLDA-SCNC: 26 MMOL/L (ref 22–26)
HCT VFR BLD AUTO: 25.9 % (ref 41–52)
HCT VFR BLD EST: 30 % (ref 41–52)
HCT VFR BLD EST: 45 % (ref 41–52)
HGB BLD-MCNC: 9.5 G/DL (ref 13.5–17.5)
HGB BLDA-MCNC: 15 G/DL (ref 13.5–17.5)
HGB BLDA-MCNC: 9.9 G/DL (ref 13.5–17.5)
INHALED O2 CONCENTRATION: 21 %
INHALED O2 CONCENTRATION: 28 %
INR PPP: 1.4 (ref 0.9–1.1)
LACTATE BLDA-SCNC: 1 MMOL/L (ref 0.4–2)
LACTATE BLDA-SCNC: 1.4 MMOL/L (ref 0.4–2)
MAGNESIUM SERPL-MCNC: 1.66 MG/DL (ref 1.6–2.4)
MAGNESIUM SERPL-MCNC: 1.77 MG/DL (ref 1.6–2.4)
MCH RBC QN AUTO: 33.8 PG (ref 26–34)
MCHC RBC AUTO-ENTMCNC: 36.7 G/DL (ref 32–36)
MCV RBC AUTO: 92 FL (ref 80–100)
NRBC BLD-RTO: 0 /100 WBCS (ref 0–0)
OXYHGB MFR BLDA: 93 % (ref 94–98)
OXYHGB MFR BLDA: 95.6 % (ref 94–98)
OXYHGB MFR BLDA: 95.7 % (ref 94–98)
PCO2 BLDA: 39 MM HG (ref 38–42)
PCO2 BLDA: 40 MM HG (ref 38–42)
PCO2 BLDA: 43 MM HG (ref 38–42)
PH BLDA: 7.37 PH (ref 7.38–7.42)
PH BLDA: 7.39 PH (ref 7.38–7.42)
PH BLDA: 7.39 PH (ref 7.38–7.42)
PHOSPHATE SERPL-MCNC: 3.2 MG/DL (ref 2.5–4.9)
PHOSPHATE SERPL-MCNC: 3.8 MG/DL (ref 2.5–4.9)
PLATELET # BLD AUTO: 148 X10*3/UL (ref 150–450)
PO2 BLDA: 72 MM HG (ref 85–95)
PO2 BLDA: 82 MM HG (ref 85–95)
PO2 BLDA: 89 MM HG (ref 85–95)
POTASSIUM BLDA-SCNC: 3.2 MMOL/L (ref 3.5–5.3)
POTASSIUM BLDA-SCNC: 3.5 MMOL/L (ref 3.5–5.3)
POTASSIUM SERPL-SCNC: 3.5 MMOL/L (ref 3.5–5.3)
POTASSIUM SERPL-SCNC: 3.7 MMOL/L (ref 3.5–5.3)
PROTHROMBIN TIME: 16.2 SECONDS (ref 9.8–12.8)
RBC # BLD AUTO: 2.81 X10*6/UL (ref 4.5–5.9)
RH FACTOR (ANTIGEN D): NORMAL
SAO2 % BLDA: 95 % (ref 94–100)
SAO2 % BLDA: 98 % (ref 94–100)
SAO2 % BLDA: 98 % (ref 94–100)
SODIUM BLDA-SCNC: 120 MMOL/L (ref 136–145)
SODIUM BLDA-SCNC: 125 MMOL/L (ref 136–145)
SODIUM SERPL-SCNC: 127 MMOL/L (ref 136–145)
SODIUM SERPL-SCNC: 131 MMOL/L (ref 136–145)
VANCOMYCIN SERPL-MCNC: 4.3 UG/ML (ref 5–20)
WBC # BLD AUTO: 20.5 X10*3/UL (ref 4.4–11.3)

## 2024-11-21 PROCEDURE — 2780000003 HC OR 278 NO HCPCS: Performed by: PODIATRIST

## 2024-11-21 PROCEDURE — 2500000001 HC RX 250 WO HCPCS SELF ADMINISTERED DRUGS (ALT 637 FOR MEDICARE OP)

## 2024-11-21 PROCEDURE — 99232 SBSQ HOSP IP/OBS MODERATE 35: CPT

## 2024-11-21 PROCEDURE — 88305 TISSUE EXAM BY PATHOLOGIST: CPT | Performed by: PATHOLOGY

## 2024-11-21 PROCEDURE — 2500000004 HC RX 250 GENERAL PHARMACY W/ HCPCS (ALT 636 FOR OP/ED)

## 2024-11-21 PROCEDURE — 2500000004 HC RX 250 GENERAL PHARMACY W/ HCPCS (ALT 636 FOR OP/ED): Mod: SE

## 2024-11-21 PROCEDURE — 3600000008 HC OR TIME - EACH INCREMENTAL 1 MINUTE - PROCEDURE LEVEL THREE: Performed by: PODIATRIST

## 2024-11-21 PROCEDURE — 3600000003 HC OR TIME - INITIAL BASE CHARGE - PROCEDURE LEVEL THREE: Performed by: PODIATRIST

## 2024-11-21 PROCEDURE — 82947 ASSAY GLUCOSE BLOOD QUANT: CPT

## 2024-11-21 PROCEDURE — 83735 ASSAY OF MAGNESIUM: CPT | Performed by: SURGERY

## 2024-11-21 PROCEDURE — 2500000002 HC RX 250 W HCPCS SELF ADMINISTERED DRUGS (ALT 637 FOR MEDICARE OP, ALT 636 FOR OP/ED): Mod: SE

## 2024-11-21 PROCEDURE — 83735 ASSAY OF MAGNESIUM: CPT

## 2024-11-21 PROCEDURE — 84132 ASSAY OF SERUM POTASSIUM: CPT

## 2024-11-21 PROCEDURE — 2500000004 HC RX 250 GENERAL PHARMACY W/ HCPCS (ALT 636 FOR OP/ED): Mod: SE | Performed by: PODIATRIST

## 2024-11-21 PROCEDURE — 3700000001 HC GENERAL ANESTHESIA TIME - INITIAL BASE CHARGE: Performed by: PODIATRIST

## 2024-11-21 PROCEDURE — 1100000001 HC PRIVATE ROOM DAILY

## 2024-11-21 PROCEDURE — 3E033XZ INTRODUCTION OF VASOPRESSOR INTO PERIPHERAL VEIN, PERCUTANEOUS APPROACH: ICD-10-PCS | Performed by: EMERGENCY MEDICINE

## 2024-11-21 PROCEDURE — 85610 PROTHROMBIN TIME: CPT

## 2024-11-21 PROCEDURE — 0753T DGTZ GLS MCRSCP SLD LEVEL IV: CPT | Mod: TC,SUR,WESLAB | Performed by: PODIATRIST

## 2024-11-21 PROCEDURE — 87205 SMEAR GRAM STAIN: CPT | Performed by: PODIATRIST

## 2024-11-21 PROCEDURE — 37799 UNLISTED PX VASCULAR SURGERY: CPT

## 2024-11-21 PROCEDURE — 85027 COMPLETE CBC AUTOMATED: CPT

## 2024-11-21 PROCEDURE — 2500000002 HC RX 250 W HCPCS SELF ADMINISTERED DRUGS (ALT 637 FOR MEDICARE OP, ALT 636 FOR OP/ED): Performed by: EMERGENCY MEDICINE

## 2024-11-21 PROCEDURE — 2720000007 HC OR 272 NO HCPCS: Performed by: PODIATRIST

## 2024-11-21 PROCEDURE — P9045 ALBUMIN (HUMAN), 5%, 250 ML: HCPCS | Mod: JZ

## 2024-11-21 PROCEDURE — 0J9Q0ZZ DRAINAGE OF RIGHT FOOT SUBCUTANEOUS TISSUE AND FASCIA, OPEN APPROACH: ICD-10-PCS | Performed by: PODIATRIST

## 2024-11-21 PROCEDURE — 83605 ASSAY OF LACTIC ACID: CPT

## 2024-11-21 PROCEDURE — 87102 FUNGUS ISOLATION CULTURE: CPT | Performed by: PODIATRIST

## 2024-11-21 PROCEDURE — 7100000001 HC RECOVERY ROOM TIME - INITIAL BASE CHARGE: Performed by: PODIATRIST

## 2024-11-21 PROCEDURE — 82805 BLOOD GASES W/O2 SATURATION: CPT

## 2024-11-21 PROCEDURE — C1763 CONN TISS, NON-HUMAN: HCPCS | Performed by: PODIATRIST

## 2024-11-21 PROCEDURE — 2500000005 HC RX 250 GENERAL PHARMACY W/O HCPCS: Mod: SE

## 2024-11-21 PROCEDURE — 96375 TX/PRO/DX INJ NEW DRUG ADDON: CPT

## 2024-11-21 PROCEDURE — 2500000004 HC RX 250 GENERAL PHARMACY W/ HCPCS (ALT 636 FOR OP/ED): Mod: JZ,SE

## 2024-11-21 PROCEDURE — 2500000005 HC RX 250 GENERAL PHARMACY W/O HCPCS

## 2024-11-21 PROCEDURE — 2500000005 HC RX 250 GENERAL PHARMACY W/O HCPCS: Performed by: STUDENT IN AN ORGANIZED HEALTH CARE EDUCATION/TRAINING PROGRAM

## 2024-11-21 PROCEDURE — 84295 ASSAY OF SERUM SODIUM: CPT

## 2024-11-21 PROCEDURE — 80202 ASSAY OF VANCOMYCIN: CPT

## 2024-11-21 PROCEDURE — 88311 DECALCIFY TISSUE: CPT | Performed by: PATHOLOGY

## 2024-11-21 PROCEDURE — 36620 INSERTION CATHETER ARTERY: CPT

## 2024-11-21 PROCEDURE — 7100000002 HC RECOVERY ROOM TIME - EACH INCREMENTAL 1 MINUTE: Performed by: PODIATRIST

## 2024-11-21 PROCEDURE — 3700000002 HC GENERAL ANESTHESIA TIME - EACH INCREMENTAL 1 MINUTE: Performed by: PODIATRIST

## 2024-11-21 PROCEDURE — 96365 THER/PROPH/DIAG IV INF INIT: CPT

## 2024-11-21 PROCEDURE — 99291 CRITICAL CARE FIRST HOUR: CPT | Performed by: EMERGENCY MEDICINE

## 2024-11-21 PROCEDURE — 2500000002 HC RX 250 W HCPCS SELF ADMINISTERED DRUGS (ALT 637 FOR MEDICARE OP, ALT 636 FOR OP/ED)

## 2024-11-21 PROCEDURE — 2500000005 HC RX 250 GENERAL PHARMACY W/O HCPCS: Mod: SE | Performed by: PODIATRIST

## 2024-11-21 PROCEDURE — 87075 CULTR BACTERIA EXCEPT BLOOD: CPT | Performed by: PODIATRIST

## 2024-11-21 PROCEDURE — 2500000004 HC RX 250 GENERAL PHARMACY W/ HCPCS (ALT 636 FOR OP/ED): Performed by: STUDENT IN AN ORGANIZED HEALTH CARE EDUCATION/TRAINING PROGRAM

## 2024-11-21 DEVICE — STIMULAN KIT, RAPID CURE, 10CC: Type: IMPLANTABLE DEVICE | Site: FOOT | Status: FUNCTIONAL

## 2024-11-21 RX ORDER — ALBUMIN HUMAN 50 G/1000ML
SOLUTION INTRAVENOUS AS NEEDED
Status: DISCONTINUED | OUTPATIENT
Start: 2024-11-21 | End: 2024-11-21

## 2024-11-21 RX ORDER — ONDANSETRON HYDROCHLORIDE 2 MG/ML
4 INJECTION, SOLUTION INTRAVENOUS ONCE AS NEEDED
Status: DISCONTINUED | OUTPATIENT
Start: 2024-11-21 | End: 2024-11-21 | Stop reason: HOSPADM

## 2024-11-21 RX ORDER — MAGNESIUM SULFATE HEPTAHYDRATE 40 MG/ML
2 INJECTION, SOLUTION INTRAVENOUS ONCE
Status: COMPLETED | OUTPATIENT
Start: 2024-11-21 | End: 2024-11-21

## 2024-11-21 RX ORDER — ACETAMINOPHEN 325 MG/1
975 TABLET ORAL EVERY 6 HOURS SCHEDULED
Status: DISCONTINUED | OUTPATIENT
Start: 2024-11-21 | End: 2024-11-21

## 2024-11-21 RX ORDER — POTASSIUM CHLORIDE 20 MEQ/1
20 TABLET, EXTENDED RELEASE ORAL ONCE
Status: COMPLETED | OUTPATIENT
Start: 2024-11-21 | End: 2024-11-21

## 2024-11-21 RX ORDER — NOREPINEPHRINE BITARTRATE 0.03 MG/ML
INJECTION, SOLUTION INTRAVENOUS CONTINUOUS PRN
Status: DISCONTINUED | OUTPATIENT
Start: 2024-11-21 | End: 2024-11-21

## 2024-11-21 RX ORDER — ALBUTEROL SULFATE 0.83 MG/ML
2.5 SOLUTION RESPIRATORY (INHALATION) ONCE AS NEEDED
Status: DISCONTINUED | OUTPATIENT
Start: 2024-11-21 | End: 2024-11-21 | Stop reason: HOSPADM

## 2024-11-21 RX ORDER — OXYCODONE HYDROCHLORIDE 10 MG/1
10 TABLET ORAL EVERY 6 HOURS PRN
Status: DISCONTINUED | OUTPATIENT
Start: 2024-11-21 | End: 2024-11-28

## 2024-11-21 RX ORDER — NOREPINEPHRINE BITARTRATE/D5W 8 MG/250ML
.01-1 PLASTIC BAG, INJECTION (ML) INTRAVENOUS CONTINUOUS
Status: DISCONTINUED | OUTPATIENT
Start: 2024-11-21 | End: 2024-11-21

## 2024-11-21 RX ORDER — DEXTROSE 50 % IN WATER (D50W) INTRAVENOUS SYRINGE
25
Status: DISCONTINUED | OUTPATIENT
Start: 2024-11-21 | End: 2024-12-03 | Stop reason: HOSPADM

## 2024-11-21 RX ORDER — VANCOMYCIN HYDROCHLORIDE
1250 EVERY 12 HOURS
Status: DISCONTINUED | OUTPATIENT
Start: 2024-11-21 | End: 2024-11-23

## 2024-11-21 RX ORDER — HYDROMORPHONE HYDROCHLORIDE 0.2 MG/ML
0.2 INJECTION INTRAMUSCULAR; INTRAVENOUS; SUBCUTANEOUS
Status: DISCONTINUED | OUTPATIENT
Start: 2024-11-21 | End: 2024-11-30

## 2024-11-21 RX ORDER — VANCOMYCIN HYDROCHLORIDE 1 G/20ML
INJECTION, POWDER, LYOPHILIZED, FOR SOLUTION INTRAVENOUS AS NEEDED
Status: DISCONTINUED | OUTPATIENT
Start: 2024-11-21 | End: 2024-11-21 | Stop reason: HOSPADM

## 2024-11-21 RX ORDER — OXYCODONE HYDROCHLORIDE 5 MG/1
5 TABLET ORAL EVERY 6 HOURS PRN
Status: DISCONTINUED | OUTPATIENT
Start: 2024-11-21 | End: 2024-11-28

## 2024-11-21 RX ORDER — HYDROMORPHONE HYDROCHLORIDE 1 MG/ML
INJECTION, SOLUTION INTRAMUSCULAR; INTRAVENOUS; SUBCUTANEOUS AS NEEDED
Status: DISCONTINUED | OUTPATIENT
Start: 2024-11-21 | End: 2024-11-21

## 2024-11-21 RX ORDER — METFORMIN HYDROCHLORIDE 500 MG/1
500 TABLET, EXTENDED RELEASE ORAL
Status: DISCONTINUED | OUTPATIENT
Start: 2024-11-21 | End: 2024-11-21

## 2024-11-21 RX ORDER — FENTANYL CITRATE 50 UG/ML
INJECTION, SOLUTION INTRAMUSCULAR; INTRAVENOUS AS NEEDED
Status: DISCONTINUED | OUTPATIENT
Start: 2024-11-21 | End: 2024-11-21

## 2024-11-21 RX ORDER — NITROGLYCERIN 0.4 MG/1
0.4 TABLET SUBLINGUAL EVERY 5 MIN PRN
Status: DISCONTINUED | OUTPATIENT
Start: 2024-11-21 | End: 2024-12-03 | Stop reason: HOSPADM

## 2024-11-21 RX ORDER — CLINDAMYCIN PHOSPHATE 600 MG/50ML
600 INJECTION, SOLUTION INTRAVENOUS EVERY 8 HOURS
Status: DISCONTINUED | OUTPATIENT
Start: 2024-11-21 | End: 2024-11-22

## 2024-11-21 RX ORDER — CLINDAMYCIN PHOSPHATE 900 MG/50ML
INJECTION, SOLUTION INTRAVENOUS
Status: COMPLETED
Start: 2024-11-21 | End: 2024-11-21

## 2024-11-21 RX ORDER — POTASSIUM CHLORIDE 14.9 MG/ML
INJECTION INTRAVENOUS AS NEEDED
Status: DISCONTINUED | OUTPATIENT
Start: 2024-11-21 | End: 2024-11-21

## 2024-11-21 RX ORDER — CLINDAMYCIN PHOSPHATE 600 MG/50ML
INJECTION, SOLUTION INTRAVENOUS
Status: DISCONTINUED
Start: 2024-11-21 | End: 2024-11-21 | Stop reason: WASHOUT

## 2024-11-21 RX ORDER — SODIUM CHLORIDE, SODIUM LACTATE, POTASSIUM CHLORIDE, CALCIUM CHLORIDE 600; 310; 30; 20 MG/100ML; MG/100ML; MG/100ML; MG/100ML
INJECTION, SOLUTION INTRAVENOUS CONTINUOUS PRN
Status: DISCONTINUED | OUTPATIENT
Start: 2024-11-21 | End: 2024-11-21

## 2024-11-21 RX ORDER — LIDOCAINE HYDROCHLORIDE 10 MG/ML
0.1 INJECTION, SOLUTION INFILTRATION; PERINEURAL ONCE
Status: DISCONTINUED | OUTPATIENT
Start: 2024-11-21 | End: 2024-11-21 | Stop reason: HOSPADM

## 2024-11-21 RX ORDER — PHENYLEPHRINE HCL IN 0.9% NACL 0.4MG/10ML
SYRINGE (ML) INTRAVENOUS AS NEEDED
Status: DISCONTINUED | OUTPATIENT
Start: 2024-11-21 | End: 2024-11-21

## 2024-11-21 RX ORDER — ESCITALOPRAM OXALATE 10 MG/1
10 TABLET ORAL DAILY
Status: DISCONTINUED | OUTPATIENT
Start: 2024-11-21 | End: 2024-12-03 | Stop reason: HOSPADM

## 2024-11-21 RX ORDER — HYDROMORPHONE HYDROCHLORIDE 0.2 MG/ML
0.2 INJECTION INTRAMUSCULAR; INTRAVENOUS; SUBCUTANEOUS
Status: DISCONTINUED | OUTPATIENT
Start: 2024-11-21 | End: 2024-11-21

## 2024-11-21 RX ORDER — ROCURONIUM BROMIDE 10 MG/ML
INJECTION, SOLUTION INTRAVENOUS AS NEEDED
Status: DISCONTINUED | OUTPATIENT
Start: 2024-11-21 | End: 2024-11-21

## 2024-11-21 RX ORDER — POLYETHYLENE GLYCOL 3350 17 G/17G
17 POWDER, FOR SOLUTION ORAL DAILY
Status: DISCONTINUED | OUTPATIENT
Start: 2024-11-21 | End: 2024-12-03 | Stop reason: HOSPADM

## 2024-11-21 RX ORDER — HYDROMORPHONE HYDROCHLORIDE 0.2 MG/ML
0.2 INJECTION INTRAMUSCULAR; INTRAVENOUS; SUBCUTANEOUS EVERY 5 MIN PRN
Status: DISCONTINUED | OUTPATIENT
Start: 2024-11-21 | End: 2024-11-21 | Stop reason: HOSPADM

## 2024-11-21 RX ORDER — TRAZODONE HYDROCHLORIDE 50 MG/1
50 TABLET ORAL NIGHTLY PRN
Status: DISCONTINUED | OUTPATIENT
Start: 2024-11-21 | End: 2024-12-03 | Stop reason: HOSPADM

## 2024-11-21 RX ORDER — MIDAZOLAM HYDROCHLORIDE 1 MG/ML
INJECTION INTRAMUSCULAR; INTRAVENOUS AS NEEDED
Status: DISCONTINUED | OUTPATIENT
Start: 2024-11-21 | End: 2024-11-21

## 2024-11-21 RX ORDER — LABETALOL HYDROCHLORIDE 5 MG/ML
5 INJECTION, SOLUTION INTRAVENOUS ONCE AS NEEDED
Status: DISCONTINUED | OUTPATIENT
Start: 2024-11-21 | End: 2024-11-21 | Stop reason: HOSPADM

## 2024-11-21 RX ORDER — ACETAMINOPHEN 10 MG/ML
1000 INJECTION, SOLUTION INTRAVENOUS EVERY 6 HOURS SCHEDULED
Status: DISCONTINUED | OUTPATIENT
Start: 2024-11-21 | End: 2024-11-21

## 2024-11-21 RX ORDER — HYDROMORPHONE HYDROCHLORIDE 1 MG/ML
0.5 INJECTION, SOLUTION INTRAMUSCULAR; INTRAVENOUS; SUBCUTANEOUS ONCE
Status: COMPLETED | OUTPATIENT
Start: 2024-11-21 | End: 2024-11-21

## 2024-11-21 RX ORDER — PROPOFOL 10 MG/ML
INJECTION, EMULSION INTRAVENOUS AS NEEDED
Status: DISCONTINUED | OUTPATIENT
Start: 2024-11-21 | End: 2024-11-21

## 2024-11-21 RX ORDER — CALCIUM CHLORIDE INJECTION 100 MG/ML
INJECTION, SOLUTION INTRAVENOUS AS NEEDED
Status: DISCONTINUED | OUTPATIENT
Start: 2024-11-21 | End: 2024-11-21

## 2024-11-21 RX ORDER — METOPROLOL TARTRATE 25 MG/1
25 TABLET, FILM COATED ORAL 2 TIMES DAILY
Status: DISCONTINUED | OUTPATIENT
Start: 2024-11-21 | End: 2024-11-24

## 2024-11-21 RX ORDER — ONDANSETRON HYDROCHLORIDE 2 MG/ML
INJECTION, SOLUTION INTRAVENOUS AS NEEDED
Status: DISCONTINUED | OUTPATIENT
Start: 2024-11-21 | End: 2024-11-21

## 2024-11-21 RX ORDER — INSULIN GLARGINE 100 [IU]/ML
15 INJECTION, SOLUTION SUBCUTANEOUS EVERY 24 HOURS
Status: DISCONTINUED | OUTPATIENT
Start: 2024-11-21 | End: 2024-11-24

## 2024-11-21 RX ORDER — SODIUM CHLORIDE, SODIUM LACTATE, POTASSIUM CHLORIDE, CALCIUM CHLORIDE 600; 310; 30; 20 MG/100ML; MG/100ML; MG/100ML; MG/100ML
50 INJECTION, SOLUTION INTRAVENOUS CONTINUOUS
Status: DISCONTINUED | OUTPATIENT
Start: 2024-11-21 | End: 2024-11-21

## 2024-11-21 RX ORDER — DROPERIDOL 2.5 MG/ML
0.62 INJECTION, SOLUTION INTRAMUSCULAR; INTRAVENOUS ONCE AS NEEDED
Status: DISCONTINUED | OUTPATIENT
Start: 2024-11-21 | End: 2024-11-21 | Stop reason: HOSPADM

## 2024-11-21 RX ORDER — ACETAMINOPHEN 325 MG/1
650 TABLET ORAL EVERY 6 HOURS
Status: DISCONTINUED | OUTPATIENT
Start: 2024-11-21 | End: 2024-12-03 | Stop reason: HOSPADM

## 2024-11-21 RX ORDER — HYDROMORPHONE HYDROCHLORIDE 1 MG/ML
0.4 INJECTION, SOLUTION INTRAMUSCULAR; INTRAVENOUS; SUBCUTANEOUS
Status: DISCONTINUED | OUTPATIENT
Start: 2024-11-21 | End: 2024-11-21

## 2024-11-21 RX ORDER — SODIUM CHLORIDE, SODIUM LACTATE, POTASSIUM CHLORIDE, CALCIUM CHLORIDE 600; 310; 30; 20 MG/100ML; MG/100ML; MG/100ML; MG/100ML
100 INJECTION, SOLUTION INTRAVENOUS CONTINUOUS
Status: ACTIVE | OUTPATIENT
Start: 2024-11-21 | End: 2024-11-22

## 2024-11-21 RX ORDER — FENTANYL CITRATE 50 UG/ML
12.5 INJECTION, SOLUTION INTRAMUSCULAR; INTRAVENOUS EVERY 5 MIN PRN
Status: DISCONTINUED | OUTPATIENT
Start: 2024-11-21 | End: 2024-11-21 | Stop reason: HOSPADM

## 2024-11-21 RX ORDER — DEXTROSE 50 % IN WATER (D50W) INTRAVENOUS SYRINGE
12.5
Status: DISCONTINUED | OUTPATIENT
Start: 2024-11-21 | End: 2024-12-03 | Stop reason: HOSPADM

## 2024-11-21 RX ORDER — ASPIRIN 81 MG/1
81 TABLET ORAL DAILY
Status: DISCONTINUED | OUTPATIENT
Start: 2024-11-21 | End: 2024-12-03 | Stop reason: HOSPADM

## 2024-11-21 RX ORDER — ATORVASTATIN CALCIUM 40 MG/1
40 TABLET, FILM COATED ORAL DAILY
Status: DISCONTINUED | OUTPATIENT
Start: 2024-11-21 | End: 2024-12-03 | Stop reason: HOSPADM

## 2024-11-21 RX ORDER — ENOXAPARIN SODIUM 100 MG/ML
40 INJECTION SUBCUTANEOUS EVERY 24 HOURS
Status: DISCONTINUED | OUTPATIENT
Start: 2024-11-21 | End: 2024-11-29

## 2024-11-21 RX ORDER — LIDOCAINE HYDROCHLORIDE 20 MG/ML
INJECTION, SOLUTION INFILTRATION; PERINEURAL AS NEEDED
Status: DISCONTINUED | OUTPATIENT
Start: 2024-11-21 | End: 2024-11-21

## 2024-11-21 RX ADMIN — ACETAMINOPHEN 650 MG: 325 TABLET, FILM COATED ORAL at 08:17

## 2024-11-21 RX ADMIN — INSULIN HUMAN 4 UNITS: 100 INJECTION, SOLUTION PARENTERAL at 12:03

## 2024-11-21 RX ADMIN — ACETAMINOPHEN 650 MG: 325 TABLET, FILM COATED ORAL at 13:09

## 2024-11-21 RX ADMIN — CLINDAMYCIN PHOSPHATE 600 MG: 600 INJECTION, SOLUTION INTRAVENOUS at 09:09

## 2024-11-21 RX ADMIN — HYDROMORPHONE HYDROCHLORIDE 0.5 MG: 1 INJECTION, SOLUTION INTRAMUSCULAR; INTRAVENOUS; SUBCUTANEOUS at 00:08

## 2024-11-21 RX ADMIN — OXYCODONE HYDROCHLORIDE 10 MG: 10 TABLET ORAL at 08:17

## 2024-11-21 RX ADMIN — Medication 1250 MG: at 20:51

## 2024-11-21 RX ADMIN — ATORVASTATIN CALCIUM 40 MG: 40 TABLET, FILM COATED ORAL at 08:17

## 2024-11-21 RX ADMIN — HYDROMORPHONE HYDROCHLORIDE 0.2 MG: 0.2 INJECTION, SOLUTION INTRAMUSCULAR; INTRAVENOUS; SUBCUTANEOUS at 12:09

## 2024-11-21 RX ADMIN — PIPERACILLIN SODIUM AND TAZOBACTAM SODIUM 3.38 G: 3; .375 INJECTION, SOLUTION INTRAVENOUS at 13:24

## 2024-11-21 RX ADMIN — CLINDAMYCIN PHOSPHATE 600 MG: 600 INJECTION, SOLUTION INTRAVENOUS at 16:33

## 2024-11-21 RX ADMIN — ASPIRIN 81 MG: 81 TABLET, COATED ORAL at 08:17

## 2024-11-21 RX ADMIN — POTASSIUM CHLORIDE 20 MEQ: 1500 TABLET, EXTENDED RELEASE ORAL at 14:43

## 2024-11-21 RX ADMIN — PIPERACILLIN SODIUM AND TAZOBACTAM SODIUM 3.38 G: 3; .375 INJECTION, SOLUTION INTRAVENOUS at 07:58

## 2024-11-21 RX ADMIN — Medication 6 L/MIN: at 03:43

## 2024-11-21 RX ADMIN — INSULIN HUMAN 4 UNITS: 100 INJECTION, SOLUTION PARENTERAL at 20:38

## 2024-11-21 RX ADMIN — SODIUM CHLORIDE, POTASSIUM CHLORIDE, SODIUM LACTATE AND CALCIUM CHLORIDE 50 ML/HR: 600; 310; 30; 20 INJECTION, SOLUTION INTRAVENOUS at 03:43

## 2024-11-21 RX ADMIN — MAGNESIUM SULFATE HEPTAHYDRATE 2 G: 2 INJECTION, SOLUTION INTRAVENOUS at 14:43

## 2024-11-21 RX ADMIN — OXYCODONE HYDROCHLORIDE 10 MG: 10 TABLET ORAL at 20:50

## 2024-11-21 RX ADMIN — Medication 2 L/MIN: at 20:00

## 2024-11-21 RX ADMIN — OXYCODONE HYDROCHLORIDE 10 MG: 10 TABLET ORAL at 15:33

## 2024-11-21 RX ADMIN — ESCITALOPRAM OXALATE 10 MG: 10 TABLET ORAL at 08:17

## 2024-11-21 RX ADMIN — Medication 2 L/MIN: at 08:00

## 2024-11-21 RX ADMIN — CLINDAMYCIN PHOSPHATE 900 MG: 900 INJECTION, SOLUTION INTRAVENOUS at 01:15

## 2024-11-21 RX ADMIN — PIPERACILLIN SODIUM AND TAZOBACTAM SODIUM 3.38 G: 3; .375 INJECTION, SOLUTION INTRAVENOUS at 00:09

## 2024-11-21 RX ADMIN — INSULIN HUMAN 4 UNITS: 100 INJECTION, SOLUTION PARENTERAL at 16:16

## 2024-11-21 RX ADMIN — SODIUM CHLORIDE, POTASSIUM CHLORIDE, SODIUM LACTATE AND CALCIUM CHLORIDE 100 ML/HR: 600; 310; 30; 20 INJECTION, SOLUTION INTRAVENOUS at 08:05

## 2024-11-21 RX ADMIN — INSULIN GLARGINE 15 UNITS: 100 INJECTION, SOLUTION SUBCUTANEOUS at 12:03

## 2024-11-21 RX ADMIN — ACETAMINOPHEN 650 MG: 325 TABLET, FILM COATED ORAL at 20:01

## 2024-11-21 RX ADMIN — PIPERACILLIN SODIUM AND TAZOBACTAM SODIUM 3.38 G: 3; .375 INJECTION, SOLUTION INTRAVENOUS at 20:01

## 2024-11-21 RX ADMIN — Medication 2 L/MIN: at 04:30

## 2024-11-21 SDOH — ECONOMIC STABILITY: FOOD INSECURITY: WITHIN THE PAST 12 MONTHS, THE FOOD YOU BOUGHT JUST DIDN'T LAST AND YOU DIDN'T HAVE MONEY TO GET MORE.: NEVER TRUE

## 2024-11-21 SDOH — SOCIAL STABILITY: SOCIAL INSECURITY: ARE THERE ANY APPARENT SIGNS OF INJURIES/BEHAVIORS THAT COULD BE RELATED TO ABUSE/NEGLECT?: NO

## 2024-11-21 SDOH — SOCIAL STABILITY: SOCIAL INSECURITY: WITHIN THE LAST YEAR, HAVE YOU BEEN AFRAID OF YOUR PARTNER OR EX-PARTNER?: NO

## 2024-11-21 SDOH — SOCIAL STABILITY: SOCIAL INSECURITY
WITHIN THE LAST YEAR, HAVE YOU BEEN KICKED, HIT, SLAPPED, OR OTHERWISE PHYSICALLY HURT BY YOUR PARTNER OR EX-PARTNER?: NO

## 2024-11-21 SDOH — SOCIAL STABILITY: SOCIAL INSECURITY
WITHIN THE LAST YEAR, HAVE YOU BEEN RAPED OR FORCED TO HAVE ANY KIND OF SEXUAL ACTIVITY BY YOUR PARTNER OR EX-PARTNER?: NO

## 2024-11-21 SDOH — SOCIAL STABILITY: SOCIAL INSECURITY: WERE YOU ABLE TO COMPLETE ALL THE BEHAVIORAL HEALTH SCREENINGS?: YES

## 2024-11-21 SDOH — SOCIAL STABILITY: SOCIAL INSECURITY: HAVE YOU HAD THOUGHTS OF HARMING ANYONE ELSE?: NO

## 2024-11-21 SDOH — ECONOMIC STABILITY: INCOME INSECURITY: IN THE PAST 12 MONTHS HAS THE ELECTRIC, GAS, OIL, OR WATER COMPANY THREATENED TO SHUT OFF SERVICES IN YOUR HOME?: NO

## 2024-11-21 SDOH — ECONOMIC STABILITY: FOOD INSECURITY: WITHIN THE PAST 12 MONTHS, YOU WORRIED THAT YOUR FOOD WOULD RUN OUT BEFORE YOU GOT THE MONEY TO BUY MORE.: NEVER TRUE

## 2024-11-21 SDOH — SOCIAL STABILITY: SOCIAL INSECURITY: DO YOU FEEL UNSAFE GOING BACK TO THE PLACE WHERE YOU ARE LIVING?: NO

## 2024-11-21 SDOH — SOCIAL STABILITY: SOCIAL INSECURITY: ABUSE: ADULT

## 2024-11-21 SDOH — SOCIAL STABILITY: SOCIAL INSECURITY: WITHIN THE LAST YEAR, HAVE YOU BEEN HUMILIATED OR EMOTIONALLY ABUSED IN OTHER WAYS BY YOUR PARTNER OR EX-PARTNER?: NO

## 2024-11-21 SDOH — HEALTH STABILITY: MENTAL HEALTH: CURRENT SMOKER: 0

## 2024-11-21 SDOH — SOCIAL STABILITY: SOCIAL INSECURITY: ARE YOU OR HAVE YOU BEEN THREATENED OR ABUSED PHYSICALLY, EMOTIONALLY, OR SEXUALLY BY ANYONE?: NO

## 2024-11-21 SDOH — SOCIAL STABILITY: SOCIAL INSECURITY: DOES ANYONE TRY TO KEEP YOU FROM HAVING/CONTACTING OTHER FRIENDS OR DOING THINGS OUTSIDE YOUR HOME?: NO

## 2024-11-21 SDOH — SOCIAL STABILITY: SOCIAL INSECURITY: HAS ANYONE EVER THREATENED TO HURT YOUR FAMILY OR YOUR PETS?: NO

## 2024-11-21 SDOH — SOCIAL STABILITY: SOCIAL INSECURITY: DO YOU FEEL ANYONE HAS EXPLOITED OR TAKEN ADVANTAGE OF YOU FINANCIALLY OR OF YOUR PERSONAL PROPERTY?: NO

## 2024-11-21 SDOH — SOCIAL STABILITY: SOCIAL INSECURITY: HAVE YOU HAD ANY THOUGHTS OF HARMING ANYONE ELSE?: NO

## 2024-11-21 ASSESSMENT — PAIN SCALES - GENERAL
PAINLEVEL_OUTOF10: 0 - NO PAIN
PAINLEVEL_OUTOF10: 9
PAINLEVEL_OUTOF10: 0 - NO PAIN
PAINLEVEL_OUTOF10: 0 - NO PAIN
PAINLEVEL_OUTOF10: 2
PAINLEVEL_OUTOF10: 0 - NO PAIN
PAINLEVEL_OUTOF10: 0 - NO PAIN
PAINLEVEL_OUTOF10: 2
PAINLEVEL_OUTOF10: 0 - NO PAIN
PAINLEVEL_OUTOF10: 6
PAINLEVEL_OUTOF10: 2
PAINLEVEL_OUTOF10: 0 - NO PAIN
PAINLEVEL_OUTOF10: 7
PAINLEVEL_OUTOF10: 0 - NO PAIN
PAIN_LEVEL: 2
PAINLEVEL_OUTOF10: 0 - NO PAIN
PAINLEVEL_OUTOF10: 8
PAINLEVEL_OUTOF10: 0 - NO PAIN
PAINLEVEL_OUTOF10: 3
PAINLEVEL_OUTOF10: 0 - NO PAIN

## 2024-11-21 ASSESSMENT — PATIENT HEALTH QUESTIONNAIRE - PHQ9
2. FEELING DOWN, DEPRESSED OR HOPELESS: SEVERAL DAYS
1. LITTLE INTEREST OR PLEASURE IN DOING THINGS: NOT AT ALL
SUM OF ALL RESPONSES TO PHQ9 QUESTIONS 1 & 2: 1

## 2024-11-21 ASSESSMENT — ACTIVITIES OF DAILY LIVING (ADL)
ADEQUATE_TO_COMPLETE_ADL: YES
GROOMING: INDEPENDENT
HEARING - RIGHT EAR: FUNCTIONAL
JUDGMENT_ADEQUATE_SAFELY_COMPLETE_DAILY_ACTIVITIES: YES
BATHING: INDEPENDENT
FEEDING YOURSELF: INDEPENDENT
TOILETING: INDEPENDENT
LACK_OF_TRANSPORTATION: NO
PATIENT'S MEMORY ADEQUATE TO SAFELY COMPLETE DAILY ACTIVITIES?: YES
DRESSING YOURSELF: INDEPENDENT
WALKS IN HOME: INDEPENDENT
LACK_OF_TRANSPORTATION: NO
HEARING - LEFT EAR: FUNCTIONAL

## 2024-11-21 ASSESSMENT — COGNITIVE AND FUNCTIONAL STATUS - GENERAL
TOILETING: A LITTLE
DRESSING REGULAR UPPER BODY CLOTHING: A LITTLE
CLIMB 3 TO 5 STEPS WITH RAILING: A LITTLE
WALKING IN HOSPITAL ROOM: A LITTLE
DRESSING REGULAR LOWER BODY CLOTHING: A LITTLE
DAILY ACTIVITIY SCORE: 19
TURNING FROM BACK TO SIDE WHILE IN FLAT BAD: A LITTLE
HELP NEEDED FOR BATHING: A LOT
STANDING UP FROM CHAIR USING ARMS: A LITTLE
MOVING TO AND FROM BED TO CHAIR: A LITTLE
MOVING FROM LYING ON BACK TO SITTING ON SIDE OF FLAT BED WITH BEDRAILS: A LITTLE
MOBILITY SCORE: 18
PATIENT BASELINE BEDBOUND: NO

## 2024-11-21 ASSESSMENT — PAIN - FUNCTIONAL ASSESSMENT

## 2024-11-21 ASSESSMENT — LIFESTYLE VARIABLES
AUDIT-C TOTAL SCORE: 6
SKIP TO QUESTIONS 9-10: 0
HOW OFTEN DO YOU HAVE 6 OR MORE DRINKS ON ONE OCCASION: MONTHLY
AUDIT-C TOTAL SCORE: 6
HOW OFTEN DO YOU HAVE A DRINK CONTAINING ALCOHOL: 2-3 TIMES A WEEK
HOW MANY STANDARD DRINKS CONTAINING ALCOHOL DO YOU HAVE ON A TYPICAL DAY: 3 OR 4

## 2024-11-21 ASSESSMENT — PAIN DESCRIPTION - DESCRIPTORS
DESCRIPTORS: ACHING;DULL;HEAVINESS
DESCRIPTORS: ACHING;DISCOMFORT;HEAVINESS
DESCRIPTORS: ACHING;DISCOMFORT;HEAVINESS

## 2024-11-21 ASSESSMENT — PAIN DESCRIPTION - ORIENTATION: ORIENTATION: RIGHT

## 2024-11-21 NOTE — ANESTHESIA PROCEDURE NOTES
Peripheral IV  Date/Time: 11/21/2024 2:10 AM      Placement  Needle size: 18 G  Laterality: left  Location: hand  Local anesthetic: none  Site prep: alcohol  Technique: anatomical landmarks  Attempts: 1

## 2024-11-21 NOTE — ANESTHESIA PROCEDURE NOTES
Arterial Line:    Date/Time: 11/21/2024 2:29 AM    Staffing  Performed: resident   Authorized by: Justin Ellis MD    Performed by: Khadra Jeffery MD    An arterial line was placed. Procedure performed using surface landmarks.in the OR for the following indication(s): continuous blood pressure monitoring and blood sampling needed.    A 20 gauge (size), 1 and 3/4 inch (length), Angiocath (type) catheter was placed into the Left radial artery, secured by Tegaderm,   Seldinger technique used.  Events:  patient tolerated procedure well with no complications.

## 2024-11-21 NOTE — CONSULTS
Inpatient consult to Podiatry  Consult performed by: Vladislav Xiong DPM  Consult ordered by: Remy Amaya PA-C        Patient was an emergency transfer from [facility] for necrotizing fasciitis. Patient was emergently taken to surgery with podiatry and ACS for management of this as pre-op imaging showed gas up to the knee.     - Okay to resume previous diet    - Pain meds, antibiotics, and DVT prophylaxis per primary team    - Please make patient strict bedrest     - Dress with Dakins-soaked packing, Dakins-soaked gauze, abd pads, webril, ace. Nursing may change or reinforce as needed    - Plan is to return to OR on Friday with podiatry for further debridement    Vladislav Xiong DPM PGY-3       I saw and evaluated the patient. I personally obtained the key and critical portions of the history and physical exam or was physically present for key and critical portions performed by the resident/fellow. I reviewed the resident/fellow's documentation and discussed the patient with the resident/fellow. I agree with the resident/fellow's medical decision making as documented in the note.    Seamus Lino DPM

## 2024-11-21 NOTE — PROGRESS NOTES
[facility]  TRAUMA ICU - PROGRESS NOTE    Patient Name: Errol Fonseca  MRN: [id number]  Admit Date: 1120  : 1970  AGE: 54 y.o.   GENDER: male  ==============================================================================    TODAY'S ASSESSMENT AND PLAN OF CARE:  Errol Fonseca is a 54 y.o. male in the ICU due to: need for hemodynamic monitoring    NEURO/PAIN/SEDATION:   #Acute pain  - Tylenol 650 mg PO Q6H  - Oxycodone 5/10 mg PO Q6H PRN for mod/severe pain + Dilaudid 0.2 mg IV Q3H PRN for breakthrough  #H/o depression  - Continue home escitalopram  RESPIRATORY:   - Wean supplemental O2 as tolerated to maintain SpO2 >92%  - Continuous pulse oximetry  CARDIOVASC:  #Septic shock  - Wean pressors as able to maintain MAP >65  - Continuous cardiac monitoring  #H/o CAD  - Continue home ASA, atorvastatin  - Holding home lisinopril-hydrochlorothiazide and metoprolol  GI:   - Bowel regimen: Miralax daily  :   #JARON - Cr 1.54 from baseline ~0.7  - Strict Is & Os, maintain goal UOP  - Avoiding nephrotoxins  FEN:   - Carb-consistent diet; NPO @ MN for OR  - LR @ 100 ml/hr  HEMATOLOGIC:   - Daily CBC  ENDOCRINE:   #H/o T2DM  - POCT glucose + SSI  - Insulin glargine 15 units Q24H (1/2 home dose while NPO)  - Holding home dulaglutide and metformin  MUSCULOSKELETAL/SKIN:   #Right foot surgical wound  - Wound care per podiatry  - ICU skin protocol  INFECTIOUS DISEASE:   #Necrotizing soft tissue infection of R foot - s/p 2nd digit amputation and debridement with podiatry 2024  - Vanc/Zosyn/clinda  - Return to OR  with podiatry  - Follow-up blood cultures  GI PROPHYLAXIS: Not indicated  DVT PROPHYLAXIS: SCD on LLE, Lovenox 40 mg subcutaneous daily    DISPOSITION: Admit to TICU    Patient seen and discussed with attending, Dr. Whittaker.    Laurel Sorto MD  PGY-1   Trauma ICU  [facility] [id number]  ==============================================================================  CHIEF  "COMPLAINT / OVERNIGHT EVENTS / HPI:   Patient transferred from OSH last night for necrotizing soft tissue infection of right foot. He was taken to OR with podiatry and underwent a 2nd digit amputation and debridement. This morning patient reports feeling \"better than before.\" Endorses adequate pain control. He currently denies nausea, vomiting, chills, shortness of breath, chest pain, or any other acute concerns.    MEDICAL HISTORY / ROS:  Admission history and ROS reviewed. Pertinent changes as follows: None    PHYSICAL EXAM:  Heart Rate:  [77-87]   Temp:  [36 °C (96.8 °F)-36.8 °C (98.2 °F)]   Resp:  [12-20]   BP: ()/(44-69)   SpO2:  [92 %-100 %]   Physical Exam  Constitutional:       General: He is not in acute distress.     Appearance: He is not ill-appearing or toxic-appearing.   HENT:      Head: Normocephalic.      Mouth/Throat:      Mouth: Mucous membranes are moist.      Pharynx: Oropharynx is clear.   Eyes:      Extraocular Movements: Extraocular movements intact.      Conjunctiva/sclera: Conjunctivae normal.   Cardiovascular:      Rate and Rhythm: Normal rate and regular rhythm.   Pulmonary:      Effort: Pulmonary effort is normal. No respiratory distress.      Comments: On 1.5 L NC  Musculoskeletal:      Comments: ACE bandage overlying surgical dressings on right lower leg and foot; erythema extending proximally to knee   Skin:     General: Skin is warm and dry.   Neurological:      General: No focal deficit present.      Mental Status: He is alert and oriented to person, place, and time.   Psychiatric:         Mood and Affect: Mood normal.         Behavior: Behavior normal.         Thought Content: Thought content normal.       IMAGING SUMMARY:   11/20/2024 XR R tibia/foot/ankle: Soft tissue swelling with gas in dorsolateral foot and mid calf and ankle without evidence of osteomyelitis  11/20/2024 US duplex RLE: R groin lymphadenopathy, no acute thrombus  11/20/2024 CTA RLE: Subcutaneous gas " throughout extensor musculature of lower leg and dorsal foot with open wound on ventral aspect of 2nd MTP joint; 3v run-off    LABS:  Results from last 7 days   Lab Units 11/20/24  1239   WBC AUTO x10*3/uL 18.3*   HEMOGLOBIN g/dL 12.8*   HEMATOCRIT % 37.4*   PLATELETS AUTO x10*3/uL 193   NEUTROS PCT AUTO % 84.4   LYMPHS PCT AUTO % 4.0   MONOS PCT AUTO % 9.1   EOS PCT AUTO % 0.1     Results from last 7 days   Lab Units 11/21/24  0434 11/20/24  1838   APTT seconds 27 24*   INR  1.4* 1.4*     Results from last 7 days   Lab Units 11/21/24  0434 11/20/24  1239   SODIUM mmol/L 131* 124*   POTASSIUM mmol/L 3.5 3.9   CHLORIDE mmol/L 94* 85*   CO2 mmol/L 24 27   BUN mg/dL 31* 27*   CREATININE mg/dL 1.54* 1.64*   CALCIUM mg/dL 7.6* 9.2   PROTEIN TOTAL g/dL  --  7.7   BILIRUBIN TOTAL mg/dL  --  1.1   ALK PHOS U/L  --  139*   ALT U/L  --  31   AST U/L  --  41*   GLUCOSE mg/dL 158* 299*     Results from last 7 days   Lab Units 11/20/24  1239   BILIRUBIN TOTAL mg/dL 1.1     Results from last 7 days   Lab Units 11/21/24  0434 11/21/24  0238   POCT PH, ARTERIAL pH 7.37* 7.39   POCT PCO2, ARTERIAL mm Hg 40 39   POCT PO2, ARTERIAL mm Hg 89 82*   POCT HCO3 CALCULATED, ARTERIAL mmol/L 23.1 23.6   POCT BASE EXCESS, ARTERIAL mmol/L -2.0 -1.2     I have reviewed all medications, laboratory results, and imaging pertinent for today's encounter.

## 2024-11-21 NOTE — ED PROVIDER NOTES
History of Present Illness     History provided by: Patient  Limitations to History: None  External Records Reviewed with Brief Summary: Discharge Summary from 11/20/24 which showed patient was evaluated at [facility] for worsening foot infection imaging completed was notable for gas seen throughout the musculature of the extensor compartment of the lower leg and extensively about the subcutaneous tissues of the dorsum of the foot.      HPI:  Errol Fonseca is a 54 y.o. male with hypertension and insulin-dependent type II diabetes and CAD s/p stent placement presenting as a transfer from an outside hospital for concern of right lower extremity foot infection. Patient notes that he went to an urgent care a week ago for concern of the ulcer on the bottom of his foot had been getting larger. He was started on oral antibiotics, ciprofloxacin and Bactrim, but noted that he was having continued swelling and pain of his foot. He presented to [facility] where CT imaging was completed concerning for necrotizing fasciitis. Patient was started on clindamycin, Zosyn, vancomycin and given 2 L of fluids.      Social History: 40 pack year history; quit about 8 months ago  Allergies: NKDA    Physical Exam   Triage vitals:  T 36.8 °C (98.2 °F)  HR 79  /63  RR 18  O2 94 % None (Room air)    General: Awake, alert, in no acute distress  Eyes: Gaze conjugate. EOMI.   HENT: Normo-cephalic, atraumatic. No stridor. Dry mucous membranes.   CV: Regular rate, regular rhythm. Radial pulses 2+ bilaterally. No murmurs.  Resp: Breathing non-labored, speaking in full sentences.  Clear to auscultation bilaterally. No wheezing, rales, rhonchi.   GI: Soft, non-distended, non-tender. No rebound or guarding.   MSK/Extremities: Erythema of RLE extending correction up calf. Tenderness to palpation from foot to knee. Ulcer on dorsum of foot. Edema and sloughing skin of the ventral aspect of the R foot.      Neuro: Alert and oriented x3. Face  symmetric. Speech is fluent.  Gross sensation intact in B/l upper and lower extremities.   Psych: Appropriate mood and affect      Medical Decision Making & ED Course   Medical Decision Makin y.o. male presenting as transfer from outside hospital for concern of right lower extremity foot infection and necrotizing fasciitis. On arrival, patient's vitals are noted to be 94% on room air, mildly hypertensive at 100/63, not tachycardic and afebrile. Patient is alert and oriented. On physical exam his right lower extremity is notable for erythema and edema of the right foot extending to the mid calf. There is sloughing of the skin over the ventral aspect of the foot and and ulceration draining serosanguinous minimal fluid on the door some of the foot. The patient notes it is tender to touch everywhere distal to the right knee and he has decreased range of motion due to pain. CT imaging completed at outside hospital is concerning for necrotizing fasciitis. Additional differential diagnoses include osteomyelitis, sepsis. Patient received 2 L of fluid, vancomycin, Zosyn, clindamycin prior to arrival. His labs were notable for lactate of 2.3. WBC of 18.3 with 15.44 absolute neutrophils, hemoglobin of 12.8. CRP of 23.25. Sedimentation rate > 130. BUN, creatinine are increased from previous labs in August and GFR is decreased. Concern for possible JARON. Patient has already received 2 L of fluids, will give an additional bolus. His sodium is also noted to be 124 with glucose of 299, corrected sodium is 127.     Patient was noted to have several readings with blood pressures systolic of 90s. He remained alert and oriented, was not reporting dizziness or other new symptoms, only reporting discomfort and pain in his right lower extremity, for which he was given pain medication. He was also resting and sleeping during this time. Considered giving additional fluids, but patient had already received 2500 mLs of fluid. Prior to  transfer to the OR, his blood pressure was systolic 140s.    ED Course:  Diagnoses as of 11/21/24 0329   Necrotizing fasciitis of lower leg (Multi)       EKG Independent Interpretation: EKG not obtained. One completed prior to transfer.       The patient was discussed with the following consultants/services:  ACS and podiatry. They plan to take the patient to the OR for further management. Requested   ----      Differential diagnoses considered include but are not limited to: as documented in MDM above.    Independent Result Review and Interpretation: Relevant laboratory and radiographic results were reviewed and independently interpreted by myself.  As necessary, they are commented on in the ED Course.    Chronic conditions affecting the patient's care: As documented above in MDM      Care Considerations: As documented above in MDM      Disposition   As a result of their workup, the patient will require admission to the hospital.  The patient was informed of his diagnosis.  The patient was given the opportunity to ask questions and I answered them. The patient agreed to be admitted to the hospital. Additionally the patient will require surgical intervention. ACS has discussed with the patient his diagnosis and the patient will be taken from the ED to the OR for further management.    Procedures   Procedures    Patient seen and discussed with ED attending physician.    Cordell Martinez DO  Emergency Medicine PGY1     Cordell Martinez DO  Resident  11/21/24 0326       Cordell Martinez DO  Resident  11/21/24 0329

## 2024-11-21 NOTE — ANESTHESIA PROCEDURE NOTES
Airway  Date/Time: 11/21/2024 2:05 AM  Urgency: elective    Airway not difficult    Staffing  Performed: resident   Authorized by: Justin Ellis MD    Performed by: Khadra Jeffery MD  Patient location during procedure: OR    Indications and Patient Condition  Indications for airway management: anesthesia  Spontaneous Ventilation: absent  Sedation level: deep  Preoxygenated: yes  Patient position: sniffing  Mask difficulty assessment: 2 - vent by mask + OA or adjuvant +/- NMBA  Planned trial extubation    Final Airway Details  Final airway type: endotracheal airway      Successful airway: ETT  Cuffed: yes   Successful intubation technique: video laryngoscopy  Facilitating devices/methods: intubating stylet  Endotracheal tube insertion site: oral  Blade: Fred  Blade size: #3  ETT size (mm): 7.5  Cormack-Lehane Classification: grade I - full view of glottis  Placement verified by: capnometry   Measured from: teeth  ETT to teeth (cm): 22  Number of attempts at approach: 1

## 2024-11-21 NOTE — H&P
[facility]  ACUTE CARE SURGERY - HISTORY AND PHYSICAL / CONSULT    Patient Name: Errol Fonseca  MRN: [id number]  Admit Date: 1120  : 1970  AGE: 54 y.o.   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 He is currently on a course of Cipro/Bactrim and has had worsening of symptoms despite antibiotics. Labs are notable for leukocytosis of 18.3, hyponatremia to 124, Cr 1.64, lactate 2.3 (initially 3.2), elevated CRP and ESR. INR 1.4. CT shows minimal gas in the extensor compartment of the right lower leg and a moderate amount of gas in the subcutaneous tissue of the dorsum of the foot. He received vanc/zosyn/clinda at [place]. LRINEC is 11.     Plan:   - Emergent OR for debridement of RLE  - Continue vanc, zosyn, clinda  - IVF resuscitation   - NPO     Patient discussed with attending Dr. Rivera.    Susan Santiago  PGY2 General Surgery   ACS [id number]     ==============================================================================  CHIEF COMPLAINT/REASON FOR CONSULT:  Mr. Fonseca is presenting to the ED today for a wound on his right foot. He first noticed the wound a little over a week ago and went to an urgent care where he was started on Cipro and Bactrim. He is currently on day six of seven of antibiotics and reports worsened pain and worsened appearance of the wound. He now has pain and redness tracking up his calf. He has been chilled over the past week. He has not had anything to eat or drink today.     PAST MEDICAL HISTORY:   PMH:   Past Medical History:   Diagnosis Date    Acute ischemic heart disease, unspecified     Coronary syndrome, acute    Alcoholic hepatitis  2019    Cirrhosis of liver (Multi)     Coronary artery disease     Diabetes mellitus (Multi)     Hypertension     Personal history of other diseases of the circulatory system     History of heart block    Personal history of other diseases of the musculoskeletal system and connective tissue     History of arthritis    Personal history of other endocrine, nutritional and metabolic disease     History of obesity    Pneumonia of right lung due to infectious organism, unspecified part of lung 2024    SAH (subarachnoid hemorrhage) (Multi) 2021    Syncope 2019    Tobacco use disorder 2021       PSH:   Past Surgical History:   Procedure Laterality Date    APPENDECTOMY  2018    Appendectomy    CARDIAC CATHETERIZATION  2018    Cardiac Cath Procedure Outcome:     FH:   Family History   Problem Relation Name Age of Onset    Other (cva) Mother       SOCIAL HISTORY:    Smoking:     Social History     Tobacco Use   Smoking Status Former    Current packs/day: 0.00    Types: Cigarettes    Quit date: 2024    Years since quittin.8   Smokeless Tobacco Never       Alcohol:   Social History     Substance and Sexual Activity   Alcohol Use Not Currently    Comment: none since 2024       Drug use: None     MEDICATIONS:   Prior to Admission medications    Medication Sig Start Date End Date Taking? Authorizing Provider   aspirin 81 mg EC tablet Take 1 tablet (81 mg) by mouth once daily. 24  Angel Hurtado DO   atorvastatin (Lipitor) 40 mg tablet Take 1 tablet (40 mg) by mouth once daily. 24  Angel Hurtado DO   blood sugar diagnostic (Blood Glucose Test) strip Use 4 times daily as instructed 24   Angel Hurtado DO   blood-glucose sensor (FreeStyle Brenda 3 Plus Sensor) device Dispense 1 free style brenda monitoring kit 24   Angel Hurtado DO   ciprofloxacin (Cipro) 500 mg tablet Take 1 tablet (500 mg) by mouth 2 times a day for 7 days. 11/15/24  "11/22/24  Magdaleno Pelaez PA-C   dulaglutide (Trulicity) 0.75 mg/0.5 mL pen injector Inject 0.75 mg under the skin 1 (one) time per week. 9/26/24   Angel Hurtado DO   escitalopram (Lexapro) 10 mg tablet Take 1 tablet (10 mg) by mouth once daily. 9/25/24 3/24/25  Angel Hurtado DO   folic acid (Folvite) 1 mg tablet Take 1 tablet (1 mg) by mouth once daily. Do not start before April 3, 2024. 4/3/24   Jonatan Sullivan, DO   FreeStyle Brenda 3 [place] misc Use as instructed 9/25/24   Angel Hurtado DO   insulin glargine (Lantus) 100 unit/mL (3 mL) pen Inject 30 Units under the skin once daily in the morning. 9/4/24 3/3/25  Angel Hurtado DO   insulin lispro (HumaLOG KwikPen Insulin) 100 unit/mL injection Inject 7 Units under the skin 3 times daily (morning, midday, late afternoon). Take as directed per insulin instructions. 9/4/24 9/4/25  Angel Hurtado DO   lisinopriL-hydrochlorothiazide 20-12.5 mg tablet Take 1 tablet by mouth once daily. 9/25/24 9/25/25  Angel Hurtado DO   melatonin 5 mg tablet Take 1 tablet (5 mg) by mouth once daily at bedtime. 9/4/24 9/4/25  Angel Hurtado DO   metFORMIN XR (Glucophage-XR) 500 mg 24 hr tablet Take 1 tablet (500 mg) by mouth 2 times daily (morning and late afternoon). Do not crush, chew, or split. 6/5/24 6/5/25  Angel Hurtado DO   metoprolol tartrate (Lopressor) 25 mg tablet Take 1 tablet (25 mg) by mouth 2 times a day. 9/4/24 9/4/25  Angel Hurtado DO   miscellaneous medical supply (Blood Pressure Cuff) misc Dispense 1 blood pressure cuff 6/5/24   Angel Hurtado DO   multivitamin with minerals (multivit-min-iron fum-folic ac) tablet Take 1 tablet by mouth once daily. 9/4/24 9/4/25  Angel Hurtado DO   nitroglycerin (Nitrostat) 0.4 mg SL tablet Place 1 tablet (0.4 mg) under the tongue every 5 minutes if needed for chest pain.    Historical Provider, MD   pen needle, diabetic 32 gauge x 5/32\" needle Use 4x daily as instructed 9/4/24   Angel Hurtado DO "   sulfamethoxazole-trimethoprim (Bactrim DS) 800-160 mg tablet Take 1 tablet by mouth 2 times a day for 7 days. 11/15/24 11/22/24  Magdaleno Pelaez PA-C   traZODone (Desyrel) 50 mg tablet Take 1 tablet (50 mg) by mouth as needed at bedtime for sleep. 9/4/24 9/4/25  Angel Hurtado DO   Vitamin D2 1,250 mcg (50,000 unit) capsule Take 1 capsule (50,000 Units) by mouth 1 (one) time per week. 9/4/24 12/3/24  Angel Hurtado DO     ALLERGIES:   No Known Allergies    REVIEW OF SYSTEMS:  Review of Systems   All other systems reviewed and are negative.    PHYSICAL EXAM:  Physical Exam  Constitutional:       Appearance: He is not toxic-appearing.   HENT:      Mouth/Throat:      Mouth: Mucous membranes are dry.   Eyes:      Extraocular Movements: Extraocular movements intact.   Cardiovascular:      Rate and Rhythm: Normal rate.   Pulmonary:      Effort: No respiratory distress.      Comments: On 2L NC   Abdominal:      General: There is no distension.      Palpations: Abdomen is soft.      Tenderness: There is no abdominal tenderness.   Musculoskeletal:      Comments: Appropriate ROM of RLE normal    Skin:     Comments: 1x2cm necrotic wound on anterior plantar surface of wound  Erythema and edema of dorsum of R foot, erythema extends skilled nursing up the calf    Neurological:      General: No focal deficit present.      Mental Status: He is alert.       IMAGING SUMMARY:    CTA RLE 11/20  Gas throughout extensor compartment of lower leg and throughout subcutaneous tissues of dorsum of foot   Patent vasculature with 3 vessel runoff      LABS:  Results from last 7 days   Lab Units 11/20/24  1239   WBC AUTO x10*3/uL 18.3*   HEMOGLOBIN g/dL 12.8*   HEMATOCRIT % 37.4*   PLATELETS AUTO x10*3/uL 193   NEUTROS PCT AUTO % 84.4   LYMPHS PCT AUTO % 4.0   MONOS PCT AUTO % 9.1   EOS PCT AUTO % 0.1     Results from last 7 days   Lab Units 11/20/24  1838   APTT seconds 24*   INR  1.4*     Results from last 7 days   Lab Units 11/20/24  1239    SODIUM mmol/L 124*   POTASSIUM mmol/L 3.9   CHLORIDE mmol/L 85*   CO2 mmol/L 27   BUN mg/dL 27*   CREATININE mg/dL 1.64*   CALCIUM mg/dL 9.2   PROTEIN TOTAL g/dL 7.7   BILIRUBIN TOTAL mg/dL 1.1   ALK PHOS U/L 139*   ALT U/L 31   AST U/L 41*   GLUCOSE mg/dL 299*     Results from last 7 days   Lab Units 11/20/24  1239   BILIRUBIN TOTAL mg/dL 1.1             I have reviewed all laboratory and imaging results ordered/pertinent for this encounter.

## 2024-11-21 NOTE — CONSULTS
"Nutrition Initial Assessment:   Nutrition Assessment    Reason for Assessment: Admission nursing screening    Patient is a 54 y.o. male presenting with diabetes, CAD with PCI on ASA only, HTN, HLD, alcoholic cirrhosis, prior left 5th toe amputation presenting to the ED with worsened pain, edema, and erythema of a right plantar foot wound. S/p emergent debridement in OR with plans to return to OR 11/22.      Nutrition History:  Energy Intake: Fair 50-75 %  Food and Nutrient History: RDN met with pt who reports oral intake has been decreased for ~1 week prior to admission which he attributes to antibiotics and pain causing an upset stomach/decreased appetite. Generally eats well-- has a protein shake he gets at [employer] for breakfast and would like to receive Glucerna inpatient.  Food Allergies/Intolerances:  None  GI Symptoms: Nausea  Oral Problems: None       Anthropometrics:  Height: 185.4 cm (6' 0.99\")   Weight: 102 kg (224 lb 13.9 oz)   BMI (Calculated): 29.67  IBW/kg (Dietitian Calculated): 83.6 kg  Weight History:   Wt Readings from Last 8 Encounters:   11/21/24 102 kg (224 lb 13.9 oz)   11/15/24 102 kg (224 lb)   09/04/24 102 kg (224 lb)   08/27/24 95.3 kg (210 lb)   06/05/24 101 kg (223 lb)   04/15/24 96.2 kg (212 lb)   03/26/24 85.3 kg (188 lb 0.8 oz)   10/10/23 83.9 kg (185 lb)       Nutrition Focused Physical Exam Findings:    Subcutaneous Fat Loss:   Orbital Fat Pads: Well nourished (slightly bulging fat pads)  Buccal Fat Pads: Well nourished (full, rounded cheeks)  Triceps: Well nourished (ample fat tissue)  Muscle Wasting:  Temporalis: Well nourished (well-defined muscle)  Pectoralis (Clavicular Region): Well nourished (clavicle not visible)  Deltoid/Trapezius: Well nourished (rounded appearance at arm, shoulder, neck)  Interosseous: Well nourished (muscle bulges)  Edema:  Edema: none  Physical Findings:  Skin:  (RLE nec fascititis)    Nutrition Significant Labs:  BG POCT trend:   Results from last 7 " days   Lab Units 11/21/24  0358   POCT GLUCOSE mg/dL 151*    , Renal Lab Trend:   Results from last 7 days   Lab Units 11/21/24  0434 11/20/24  1239   POTASSIUM mmol/L 3.5 3.9   PHOSPHORUS mg/dL 3.8  --    SODIUM mmol/L 131* 124*   MAGNESIUM mg/dL 1.77  --    EGFR mL/min/1.73m*2 53* 49*   BUN mg/dL 31* 27*   CREATININE mg/dL 1.54* 1.64*        Nutrition Specific Medications:  Scheduled medications  escitalopram, 10 mg, oral, Daily  insulin glargine, 15 Units, subcutaneous, q24h  insulin regular, 0-10 Units, subcutaneous, q4h  polyethylene glycol, 17 g, oral, Daily    Continuous medications  lactated Ringer's, 100 mL/hr, Last Rate: 100 mL/hr (11/21/24 1000)      I/O:   Last BM Date: 11/20/24; Stool Appearance: Unable to assess (11/21/24 0800)    Dietary Orders (From admission, onward)       Start     Ordered    11/22/24 0001  NPO Diet; Effective midnight  Diet effective midnight         11/21/24 1038    11/21/24 1035  Adult diet Consistent Carb; CCD 60 gm/meal  Diet effective now        Question Answer Comment   Diet type Consistent Carb    Carb diet selection: CCD 60 gm/meal        11/21/24 1038    11/21/24 0719  May Participate in Room Service  ( ROOM SERVICE MAY PARTICIPATE)  Once        Question:  .  Answer:  Yes    11/21/24 0718                     Estimated Needs:   Total Energy Estimated Needs (kCal):  (5982-4576)  Method for Estimating Needs: 25-30 kcal/kg IBW  Total Protein Estimated Needs (g):  (100-120)  Method for Estimating Needs: 1.2-1.4 g/kg IBW  Total Fluid Estimated Needs (mL):  (per team)       Nutrition Diagnosis   Malnutrition Diagnosis  Patient has Malnutrition Diagnosis: No    Nutrition Diagnosis  Patient has Nutrition Diagnosis: Yes  Diagnosis Status (1): New  Nutrition Diagnosis 1: Increased nutrient needs  Related to (1): increased metabolic demand  As Evidenced by (1): necrotizing fascitis of RLE, operative management, infection.       Nutrition Interventions/Recommendations          Nutrition Prescription:  Individualized Nutrition Prescription Provided for : diet + oral supplements        Nutrition Interventions:   Interventions: Medical food supplement, Meals and snacks  Meals and Snacks: Carbohydrate-modified diet  Please increase to a 75 gm/meal diabetic diet to better meet calorie needs  Medical Food Supplement: Commercial beverage  Glucerna daily @ breakfast (220 kcal, 10 g protein each)  Additional Interventions:   Will follow pending changes in plan of care/medical status-- pt reports improved appetite and is agreeable to oral supplements, likely low nutritional risk.      Nutrition Monitoring and Evaluation   Food/Nutrient Related History Monitoring  Monitoring and Evaluation Plan: Amount of food  Amount of Food: Estimated amout of food, Medical food intake  Criteria: >50%         Biochemical Data, Medical Tests and Procedures  Monitoring and Evaluation Plan: Electrolyte/renal panel, Glucose/endocrine profile  Criteria: lytes WNL  Criteria: POC glucose <180 mg/dl              Time Spent (min): 45 minutes

## 2024-11-21 NOTE — PROGRESS NOTES
Occupational Therapy                 Therapy Communication Note    Patient Name: Errol Fonseca  MRN: [id number]  Department: [facility]  Room: 20/20-A  Today's Date: 11/21/2024     Discipline: Occupational Therapy    Missed Visit Reason: Missed Visit Reason: Patient placed on medical hold (859 per ID rounds pending OR tomorrow, will hold OT at this time and reattempt as appropriate)    Missed Time: Attempt    Comment:

## 2024-11-21 NOTE — ANESTHESIA PROCEDURE NOTES
Peripheral IV  Date/Time: 11/21/2024 2:15 AM      Placement  Needle size: 18 G  Laterality: right  Location: hand  Local anesthetic: none  Site prep: alcohol  Technique: anatomical landmarks  Attempts: 1

## 2024-11-21 NOTE — PROGRESS NOTES
11/21/24 1000   Discharge Planning   Living Arrangements Children   Support Systems Children   Type of Residence Private residence   Number of Stairs to Enter Residence 3   Number of Stairs Within Residence 8   Do you have animals or pets at home? No   Who is requesting discharge planning? Patient   Home or Post Acute Services In home services   Type of Home Care Services Home OT;Home PT   Expected Discharge Disposition Home   Does the patient need discharge transport arranged? Yes   Financial Resource Strain   How hard is it for you to pay for the very basics like food, housing, medical care, and heating? Not hard   Housing Stability   In the last 12 months, was there a time when you were not able to pay the mortgage or rent on time? N   In the past 12 months, how many times have you moved where you were living? 1   At any time in the past 12 months, were you homeless or living in a shelter (including now)? N   Transportation Needs   In the past 12 months, has lack of transportation kept you from medical appointments or from getting medications? no   In the past 12 months, has lack of transportation kept you from meetings, work, or from getting things needed for daily living? No   Patient Choice   Provider Choice list and CMS website (https://medicare.gov/care-compare#search) for post-acute Quality and Resource Measure Data were provided and reviewed with: Patient     Met with pt and introduced myself as care coordinator with Care Transitions Team for discharge planning. Daughter Cintia completed assessment. Daughter reports being independent with ADL's for the most part. Pt is independent with ambulation for the Most part but has a unsteady gate. Pt lives with Daughter Shae. Daughter Cintia would like to look into LTC Placement. SNF. Patient had a suicide plan back in Aug of this year but Cintia was able to talk him out of it. Daughter reported no safety concerns at home, she stated one falls in last year.  Pt's address, phone number, and contact information was verified.  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

## 2024-11-21 NOTE — CONSULTS
Vancomycin Dosing by Pharmacy- INITIAL    Errol Fonseca is a 54 y.o. year old male who Pharmacy has been consulted for vancomycin dosing for cellulitis, skin and soft tissue. Based on the patient's indication and renal status this patient will be dosed based on a goal AUC of 400-600.     Renal function is currently declining.    Visit Vitals  BP 94/58   Pulse 87   Temp 36.8 °C (98.2 °F)   Resp 18        Lab Results   Component Value Date    CREATININE 1.64 (H) 2024    CREATININE 0.90 2024    CREATININE 1.08 2024    CREATININE 0.61 04/10/2024        Patient weight is as follows: There were no vitals filed for this visit.    Cultures:  No results found for the encounter in last 14 days.        No intake/output data recorded.  I/O during current shift:  No intake/output data recorded.    Temp (24hrs), Av.8 °C (98.2 °F), Min:36.8 °C (98.2 °F), Max:36.8 °C (98.2 °F)         Assessment/Plan     Patient will not be given a loading dose.  Will initiate vancomycin maintenance,  1500 mg every 24 hours to start  @1930    This dosing regimen is predicted by "[employer]Rx to result in the following pharmacokinetic parameters:  Loading dose: N/A  Regimen: 1500 mg IV every 24 hours.  Start time: 19:27 on 2024  Exposure target: AUC24 (range)400-600 mg/L.hr   XSP06-10: 427 mg/L.hr  AUC24,ss: 481 mg/L.hr  Probability of AUC24 > 400: 70 %  Ctrough,ss: 14.4 mg/L  Probability of Ctrough,ss > 20: 23 %      Follow-up level will be ordered on  @1800, unless clinically indicated sooner.  Will continue to monitor renal function daily while on vancomycin and order serum creatinine at least every 48 hours if not already ordered.  Follow for continued vancomycin needs, clinical response, and signs/symptoms of toxicity.       Ana Guo, SalomonD

## 2024-11-21 NOTE — ANESTHESIA PREPROCEDURE EVALUATION
Patient: Errol Fonseca    Procedure Information       Date/Time: 11/21/24 0100    Procedures:       Debridement right lower extremity (Right)      Lower Extremity I  and  D (Right)    Location: [facility] OR 11 / [facility]    Surgeons: Oni Rivera MD; Seamus Lino DPM            Relevant Problems   Cardiac   (+) Coronary artery disease (S/p PCI, on ASA)   (+) Coronary artery disease involving native coronary artery of native heart with unstable angina pectoris   (+) Hyperlipidemia   (+) Hypertension      Liver   (+) Alcoholic cirrhosis (Multi)   (+) Hepatic cirrhosis (Multi)      Endocrine   (+) Type 2 diabetes mellitus, with long-term current use of insulin      Musculoskeletal   (+) Gangrene of left foot (Multi)       Clinical information reviewed:   Tobacco  Allergies  Meds   Med Hx  Surg Hx   Fam Hx  Soc Hx        NPO Detail:  No data recorded     Physical Exam    Airway  Mallampati: III  TM distance: >3 FB  Neck ROM: full     Cardiovascular - normal exam     Dental    Pulmonary - normal exam     Abdominal - normal exam             Anesthesia Plan    History of general anesthesia?: yes  History of complications of general anesthesia?: no    ASA 3 - emergent     general     The patient is not a current smoker.    intravenous induction   Postoperative administration of opioids is intended.  Trial extubation is planned.  Anesthetic plan and risks discussed with patient.  Use of blood products discussed with patient who consented to blood products.    Plan discussed with resident.

## 2024-11-21 NOTE — CONSULTS
[facility]  ACUTE CARE SURGERY - HISTORY AND PHYSICAL / CONSULT    Patient Name: Errol Fonseca  MRN: [id number]  Admit Date: 1120  : 1970  AGE: 54 y.o.   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 He is currently on a course of Cipro/Bactrim and has had worsening of symptoms despite antibiotics. Labs are notable for leukocytosis of 18.3, hyponatremia to 124, Cr 1.64, lactate 2.3 (initially 3.2), elevated CRP and ESR. INR 1.4. CT shows minimal gas in the extensor compartment of the right lower leg and a moderate amount of gas in the subcutaneous tissue of the dorsum of the foot. He received vanc/zosyn/clinda at [facility]. LRINEC is 11.     Plan:   - Recommend engaging podiatry     Patient discussed with attending Dr. Rivera.    Susan Santiago  PGY2 General Surgery   ACS [id number]     ==============================================================================  CHIEF COMPLAINT/REASON FOR CONSULT:  Mr. Fonseca is presenting to the ED today for a wound on his right foot. He first noticed the wound a little over a week ago and went to an urgent care where he was started on Cipro and Bactrim. He is currently on day six of seven of antibiotics and reports worsened pain and worsened appearance of the wound. He now has pain and redness tracking up his calf. He has been chilled over the past week. He has not had anything to eat or drink today.     PAST MEDICAL HISTORY:   PMH:   Past Medical History:   Diagnosis Date    Acute ischemic heart disease, unspecified     Coronary syndrome, acute    Alcoholic hepatitis 2019    Cirrhosis of liver (Multi)     Coronary artery disease      Diabetes mellitus (Multi)     Hypertension     Personal history of other diseases of the circulatory system     History of heart block    Personal history of other diseases of the musculoskeletal system and connective tissue     History of arthritis    Personal history of other endocrine, nutritional and metabolic disease     History of obesity    Pneumonia of right lung due to infectious organism, unspecified part of lung 2024    SAH (subarachnoid hemorrhage) (Multi) 2021    Syncope 2019    Tobacco use disorder 2021       PSH:   Past Surgical History:   Procedure Laterality Date    APPENDECTOMY  2018    Appendectomy    CARDIAC CATHETERIZATION  2018    Cardiac Cath Procedure Outcome:     FH:   Family History   Problem Relation Name Age of Onset    Other (cva) Mother       SOCIAL HISTORY:    Smoking:     Social History     Tobacco Use   Smoking Status Former    Current packs/day: 0.00    Types: Cigarettes    Quit date: 2024    Years since quittin.8   Smokeless Tobacco Never       Alcohol:   Social History     Substance and Sexual Activity   Alcohol Use Not Currently    Comment: none since 2024       Drug use: None     MEDICATIONS:   Prior to Admission medications    Medication Sig Start Date End Date Taking? Authorizing Provider   aspirin 81 mg EC tablet Take 1 tablet (81 mg) by mouth once daily. 24  Angel Hurtado DO   atorvastatin (Lipitor) 40 mg tablet Take 1 tablet (40 mg) by mouth once daily. 24  Angel Hurtado DO   blood sugar diagnostic (Blood Glucose Test) strip Use 4 times daily as instructed 24   Angel Hurtado DO   blood-glucose sensor (FreeStyle Brenda 3 Plus Sensor) device Dispense 1 free style brenda monitoring kit 24   Angel Hurtado DO   ciprofloxacin (Cipro) 500 mg tablet Take 1 tablet (500 mg) by mouth 2 times a day for 7 days. 11/15/24 11/22/24  Magdaleno Pelaez PA-C   dulaglutide (Trulicity) 0.75 mg/0.5 mL pen  "injector Inject 0.75 mg under the skin 1 (one) time per week. 9/26/24   Angel Hurtado DO   escitalopram (Lexapro) 10 mg tablet Take 1 tablet (10 mg) by mouth once daily. 9/25/24 3/24/25  Angel Hurtado DO   folic acid (Folvite) 1 mg tablet Take 1 tablet (1 mg) by mouth once daily. Do not start before April 3, 2024. 4/3/24   Jonatan Sullivan DO   FreeStyle Brenda 3 Yeso misc Use as instructed 9/25/24   Angel Hurtado DO   insulin glargine (Lantus) 100 unit/mL (3 mL) pen Inject 30 Units under the skin once daily in the morning. 9/4/24 3/3/25  Angel Hurtado DO   insulin lispro (HumaLOG KwikPen Insulin) 100 unit/mL injection Inject 7 Units under the skin 3 times daily (morning, midday, late afternoon). Take as directed per insulin instructions. 9/4/24 9/4/25  Angel Hurtado DO   lisinopriL-hydrochlorothiazide 20-12.5 mg tablet Take 1 tablet by mouth once daily. 9/25/24 9/25/25  Angel Hurtado DO   melatonin 5 mg tablet Take 1 tablet (5 mg) by mouth once daily at bedtime. 9/4/24 9/4/25  Angel Hurtado DO   metFORMIN XR (Glucophage-XR) 500 mg 24 hr tablet Take 1 tablet (500 mg) by mouth 2 times daily (morning and late afternoon). Do not crush, chew, or split. 6/5/24 6/5/25  Angel Hurtado DO   metoprolol tartrate (Lopressor) 25 mg tablet Take 1 tablet (25 mg) by mouth 2 times a day. 9/4/24 9/4/25  Angel Hurtado DO   miscellaneous medical supply (Blood Pressure Cuff) misc Dispense 1 blood pressure cuff 6/5/24   Angel Hurtado DO   multivitamin with minerals (multivit-min-iron fum-folic ac) tablet Take 1 tablet by mouth once daily. 9/4/24 9/4/25  Angel Hurtado DO   nitroglycerin (Nitrostat) 0.4 mg SL tablet Place 1 tablet (0.4 mg) under the tongue every 5 minutes if needed for chest pain.    Historical Provider, MD   pen needle, diabetic 32 gauge x 5/32\" needle Use 4x daily as instructed 9/4/24   Angel Hurtado,    sulfamethoxazole-trimethoprim (Bactrim DS) 800-160 mg tablet Take 1 tablet by mouth " 2 times a day for 7 days. 11/15/24 11/22/24  Magdaleno Pelaez PA-C   traZODone (Desyrel) 50 mg tablet Take 1 tablet (50 mg) by mouth as needed at bedtime for sleep. 9/4/24 9/4/25  Angel Hurtado DO   Vitamin D2 1,250 mcg (50,000 unit) capsule Take 1 capsule (50,000 Units) by mouth 1 (one) time per week. 9/4/24 12/3/24  Angel Hurtado DO     ALLERGIES:   No Known Allergies    REVIEW OF SYSTEMS:  Review of Systems   All other systems reviewed and are negative.    PHYSICAL EXAM:  Physical Exam  Constitutional:       Appearance: He is not toxic-appearing.   HENT:      Mouth/Throat:      Mouth: Mucous membranes are dry.   Eyes:      Extraocular Movements: Extraocular movements intact.   Cardiovascular:      Rate and Rhythm: Normal rate.   Pulmonary:      Effort: No respiratory distress.      Comments: On 2L NC   Abdominal:      General: There is no distension.      Palpations: Abdomen is soft.      Tenderness: There is no abdominal tenderness.   Musculoskeletal:      Comments: ROM of RLE normal    Skin:     Comments: 1x2cm necrotic wound on anterior plantar surface of wound  Erythema and edema of dorsum of R foot, erythema extends residential up the calf    Neurological:      General: No focal deficit present.      Mental Status: He is alert.       IMAGING SUMMARY:    CTA RLE 11/20  Gas throughout extensor compartment of lower leg and throughout subcutaneous tissues of dorsum of foot   Patent vasculature with 3 vessel runoff      LABS:  Results from last 7 days   Lab Units 11/20/24  1239   WBC AUTO x10*3/uL 18.3*   HEMOGLOBIN g/dL 12.8*   HEMATOCRIT % 37.4*   PLATELETS AUTO x10*3/uL 193   NEUTROS PCT AUTO % 84.4   LYMPHS PCT AUTO % 4.0   MONOS PCT AUTO % 9.1   EOS PCT AUTO % 0.1     Results from last 7 days   Lab Units 11/20/24  1838   APTT seconds 24*   INR  1.4*     Results from last 7 days   Lab Units 11/20/24  1239   SODIUM mmol/L 124*   POTASSIUM mmol/L 3.9   CHLORIDE mmol/L 85*   CO2 mmol/L 27   BUN mg/dL 27*    CREATININE mg/dL 1.64*   CALCIUM mg/dL 9.2   PROTEIN TOTAL g/dL 7.7   BILIRUBIN TOTAL mg/dL 1.1   ALK PHOS U/L 139*   ALT U/L 31   AST U/L 41*   GLUCOSE mg/dL 299*     Results from last 7 days   Lab Units 11/20/24  1239   BILIRUBIN TOTAL mg/dL 1.1             I have reviewed all laboratory and imaging results ordered/pertinent for this encounter.

## 2024-11-21 NOTE — ANESTHESIA POSTPROCEDURE EVALUATION
Patient: Errol Fonseca    Procedure Summary       Date: 11/21/24 Room / Location: [facility] OR 11 / [facility]    Anesthesia Start: 0153 Anesthesia Stop: 0352    Procedures:       Lower Extremity I  and  D (Right)      Amputation of 2nd Digit Foot (Right) Diagnosis:       Foot ulcer with fat layer exposed, right (Multi)      (Foot ulcer with fat layer exposed, right (Multi) [L97.512])    Surgeons: Seamus Lino DPM Responsible Provider: Justin Ellis MD    Anesthesia Type: general ASA Status: 3 - Emergent            Anesthesia Type: general    Vitals Value Taken Time   /65 11/21/24 0344   Temp 36 °C (96.8 °F) 11/21/24 0345   Pulse 82 11/21/24 0352   Resp 17 11/21/24 0352   SpO2 100 % 11/21/24 0352   Vitals shown include unfiled device data.    Anesthesia Post Evaluation    Patient location during evaluation: PACU  Patient participation: complete - patient participated  Level of consciousness: awake and alert  Pain score: 2  Pain management: adequate  Multimodal analgesia pain management approach  Airway patency: patent  Two or more strategies used to mitigate risk of obstructive sleep apnea  Cardiovascular status: acceptable and blood pressure returned to baseline  Respiratory status: acceptable, airway suctioned, face mask and spontaneous ventilation  Hydration status: acceptable  Postoperative Nausea and Vomiting: none        No notable events documented.

## 2024-11-21 NOTE — ED TRIAGE NOTES
Transfer from [place] for consult. Pt has nec fascitis on right foot, non weight bearing x 2 days. Per ems pt was hypotensive in 80's.  Pt aox4

## 2024-11-21 NOTE — PROGRESS NOTES
"Errol Fonseca is a 54 y.o. male on day 0 of admission presenting with Foot ulcer with fat layer exposed, right (Multi).    Subjective   Patient was seen at bedside, resting comfortably. He states that he is feeling better after surgery overnight - his fatigue, nausea, and chills are gone. No pain in the right foot. He denies other pedal complaints today.       Objective     Physical Exam:   General: AAOx3, no acute distress, right foot dressing intact    RLE focused exam:  S/P right lower extremity incision and drainage with debridement for necrotizing fasciitis. Multiple open surgical sites with exposed muscle, tendon, and bone. There is necrosis of the exposed extensor tendons on the dorsal foot. RLE erythema has mildly receded and has not progressed up the leg. Mild malodor from the surgical sites. Overall appears stable at this time with no further progression of infection.    Last Recorded Vitals  Blood pressure 98/60, pulse 93, temperature 37 °C (98.6 °F), temperature source Temporal, resp. rate 19, height 1.854 m (6' 0.99\"), weight 102 kg (224 lb 13.9 oz), SpO2 (!) 88%.    Relevant Results    Results for orders placed or performed during the hospital encounter of 11/20/24 (from the past 24 hours)   Type and Screen   Result Value Ref Range    ABO TYPE A     Rh TYPE POS     ANTIBODY SCREEN NEG    Fungal Culture/Smear    Specimen: ABSCESS; Swab   Result Value Ref Range    Fungal Smear No fungal elements seen    Blood Gas Arterial Full Panel Unsolicited   Result Value Ref Range    POCT pH, Arterial 7.39 7.38 - 7.42 pH    POCT pCO2, Arterial 39 38 - 42 mm Hg    POCT pO2, Arterial 82 (L) 85 - 95 mm Hg    POCT SO2, Arterial 98 94 - 100 %    POCT Oxy Hemoglobin, Arterial 95.6 94.0 - 98.0 %    POCT Hematocrit Calculated, Arterial 30.0 (L) 41.0 - 52.0 %    POCT Sodium, Arterial 125 (L) 136 - 145 mmol/L    POCT Potassium, Arterial 3.2 (L) 3.5 - 5.3 mmol/L    POCT Chloride, Arterial 95 (L) 98 - 107 mmol/L    POCT " Ionized Calcium, Arterial 1.01 (L) 1.10 - 1.33 mmol/L    POCT Glucose, Arterial 213 (H) 74 - 99 mg/dL    POCT Lactate, Arterial 1.4 0.4 - 2.0 mmol/L    POCT Base Excess, Arterial -1.2 -2.0 - 3.0 mmol/L    POCT HCO3 Calculated, Arterial 23.6 22.0 - 26.0 mmol/L    POCT Hemoglobin, Arterial 9.9 (L) 13.5 - 17.5 g/dL    POCT Anion Gap, Arterial 10 10 - 25 mmo/L    Patient Temperature 37.0 degrees Celsius   POCT GLUCOSE   Result Value Ref Range    POCT Glucose 151 (H) 74 - 99 mg/dL   BLOOD GAS ARTERIAL FULL PANEL   Result Value Ref Range    POCT pH, Arterial 7.37 (L) 7.38 - 7.42 pH    POCT pCO2, Arterial 40 38 - 42 mm Hg    POCT pO2, Arterial 89 85 - 95 mm Hg    POCT SO2, Arterial 98 94 - 100 %    POCT Oxy Hemoglobin, Arterial 95.7 94.0 - 98.0 %    POCT Hematocrit Calculated, Arterial 45.0 41.0 - 52.0 %    POCT Sodium, Arterial 120 (LL) 136 - 145 mmol/L    POCT Potassium, Arterial 3.5 3.5 - 5.3 mmol/L    POCT Chloride, Arterial 91 (L) 98 - 107 mmol/L    POCT Ionized Calcium, Arterial 1.13 1.10 - 1.33 mmol/L    POCT Glucose, Arterial 168 (H) 74 - 99 mg/dL    POCT Lactate, Arterial 1.0 0.4 - 2.0 mmol/L    POCT Base Excess, Arterial -2.0 -2.0 - 3.0 mmol/L    POCT HCO3 Calculated, Arterial 23.1 22.0 - 26.0 mmol/L    POCT Hemoglobin, Arterial 15.0 13.5 - 17.5 g/dL    POCT Anion Gap, Arterial 9 (L) 10 - 25 mmo/L    Patient Temperature 37.0 degrees Celsius    FiO2 28 %   Renal function panel   Result Value Ref Range    Glucose 158 (H) 74 - 99 mg/dL    Sodium 131 (L) 136 - 145 mmol/L    Potassium 3.5 3.5 - 5.3 mmol/L    Chloride 94 (L) 98 - 107 mmol/L    Bicarbonate 24 21 - 32 mmol/L    Anion Gap 17 10 - 20 mmol/L    Urea Nitrogen 31 (H) 6 - 23 mg/dL    Creatinine 1.54 (H) 0.50 - 1.30 mg/dL    eGFR 53 (L) >60 mL/min/1.73m*2    Calcium 7.6 (L) 8.6 - 10.6 mg/dL    Phosphorus 3.8 2.5 - 4.9 mg/dL    Albumin 2.9 (L) 3.4 - 5.0 g/dL   Coagulation Screen   Result Value Ref Range    Protime 16.2 (H) 9.8 - 12.8 seconds    INR 1.4 (H) 0.9 -  1.1    aPTT 27 27 - 38 seconds   Magnesium   Result Value Ref Range    Magnesium 1.77 1.60 - 2.40 mg/dL   POCT GLUCOSE   Result Value Ref Range    POCT Glucose 222 (H) 74 - 99 mg/dL   CBC   Result Value Ref Range    WBC 20.5 (H) 4.4 - 11.3 x10*3/uL    nRBC 0.0 0.0 - 0.0 /100 WBCs    RBC 2.81 (L) 4.50 - 5.90 x10*6/uL    Hemoglobin 9.5 (L) 13.5 - 17.5 g/dL    Hematocrit 25.9 (L) 41.0 - 52.0 %    MCV 92 80 - 100 fL    MCH 33.8 26.0 - 34.0 pg    MCHC 36.7 (H) 32.0 - 36.0 g/dL    RDW 12.4 11.5 - 14.5 %    Platelets 148 (L) 150 - 450 x10*3/uL   Magnesium   Result Value Ref Range    Magnesium 1.66 1.60 - 2.40 mg/dL   Renal function panel   Result Value Ref Range    Glucose 211 (H) 74 - 99 mg/dL    Sodium 127 (L) 136 - 145 mmol/L    Potassium 3.7 3.5 - 5.3 mmol/L    Chloride 93 (L) 98 - 107 mmol/L    Bicarbonate 25 21 - 32 mmol/L    Anion Gap 13 10 - 20 mmol/L    Urea Nitrogen 26 (H) 6 - 23 mg/dL    Creatinine 1.34 (H) 0.50 - 1.30 mg/dL    eGFR 63 >60 mL/min/1.73m*2    Calcium 7.7 (L) 8.6 - 10.6 mg/dL    Phosphorus 3.2 2.5 - 4.9 mg/dL    Albumin 2.7 (L) 3.4 - 5.0 g/dL   Blood Gas Arterial   Result Value Ref Range    POCT pH, Arterial 7.39 7.38 - 7.42 pH    POCT pCO2, Arterial 43 (H) 38 - 42 mm Hg    POCT pO2, Arterial 72 (L) 85 - 95 mm Hg    POCT SO2, Arterial 95 94 - 100 %    POCT Oxy Hemoglobin, Arterial 93.0 (L) 94.0 - 98.0 %    POCT Base Excess, Arterial 0.8 -2.0 - 3.0 mmol/L    POCT HCO3 Calculated, Arterial 26.0 22.0 - 26.0 mmol/L    Patient Temperature 37.0 degrees Celsius    FiO2 21 %     ECG 12 lead    Result Date: 11/21/2024  Atrial flutter with predominant 3:1 AV block Ventricular premature complex Low voltage, extremity leads    Lower extremity venous duplex right    Result Date: 11/20/2024           [place] [id number] Tel [phone] and Fax [phone]  Vascular Lab Report VASC US LOWER EXTREMITY VENOUS DUPLEX RIGHT  Patient Name:     MAXIME ARANDA    Reading  Physician: [id number] Mae Moore MD Study Date:       11/20/2024          Ordering           [id number] RASHIDA LEE GÉNESIS                                       Physician: MRN/PID:          [id number]            Technologist:      Amada Yen RVT Accession#:       [id number]        Technologist 2: Date of           1970 / 54      Encounter#:        [id number] Birth/Age:        years Gender:           M Admission Status: Emergency           Location           [facility]                                       Performed:  Diagnosis/ICD: Pain in right leg-M79.604 CPT Codes:     63441 Peripheral venous duplex scan for DVT Limited  CONCLUSIONS: Right Lower Venous: No evidence of acute deep vein thrombus visualized in the right lower extremity. Cannot rule out thrombus in non-visualized Peroneal vein due to edema. PT veins visualized in segments. Additional Findings; Lymph nodes Multiple in Right Groin: largest measuring 3.9x1.2x1.6cm Left Lower Venous: The left common femoral vein demonstrates normal spontaneous and respirophasic flow.  Imaging & Doppler Findings:  Right                 Compressible Thrombus        Flow Distal External Iliac     Yes        None   Spontaneous/Phasic CFV                       Yes        None   Spontaneous/Phasic PFV                       Yes        None FV Proximal               Yes        None   Spontaneous/Phasic FV Mid                    Yes        None FV Distal                 Yes        None Popliteal                 Yes        None   Spontaneous/Phasic PTV                       Yes        None  Left        Flow CFV  Spontaneous/Phasic  [id number] Mae Moore MD Electronically signed by [id number] Mae Moore MD on 11/20/2024 at 10:08:02 PM  ** Final **     XR chest 1 view    Result Date: 11/20/2024  Interpreted By:  Morro Conner, STUDY: XR CHEST 1 VIEW;  11/20/2024 7:14 pm   INDICATION: Signs/Symptoms:Pre-op.      COMPARISON: None.   ACCESSION NUMBER(S): [id number]   ORDERING CLINICIAN: KENIA BALLARD   FINDINGS:     The cardiomediastinal silhouette and pulmonary vasculature are within normal limits.   No consolidation, pleural effusion or pneumothorax.   Chronic left posterior rib fractures at rib 6 and 7.       No acute cardiopulmonary process.     MACRO: None.   Signed by: Morro Conner 11/20/2024 7:38 PM Dictation workstation:   [id number]    XR tibia fibula right 2 views    Result Date: 11/20/2024  Interpreted By:  Bharathi Martínez, STUDY: XR TIBIA FIBULA RIGHT 2 VIEWS; ;  11/20/2024 6:19 pm   INDICATION: Signs/Symptoms:Gas.     COMPARISON: None.   ACCESSION NUMBER(S): [id number]   ORDERING CLINICIAN: KENIA BALLARD   FINDINGS: Tibia and fibula are intact without acute fracture or dislocation.   Partially visualized soft tissue gas redemonstrated along the dorsum of the foot. Small amount of probable gas seen ventral aspect of the mid calf and ankle. Correlate with known gas-forming infection.       As above     MACRO: None   Signed by: Bharathi Martínez 11/20/2024 6:50 PM Dictation workstation:   [id number]    CT angio lower extremity right w and or wo IV contrast    Result Date: 11/20/2024  Interpreted By:  Junior Gomez, STUDY: CT ANGIO LOWER EXTREMITY RIGHT W AND OR WO IV CONTRAST; 11/20/2024 6:05 pm   INDICATION: Signs/Symptoms:Infection..   COMPARISON: None.   ACCESSION NUMBER(S): [id number]   ORDERING CLINICIAN: KENIA BALLARD   TECHNIQUE: Contiguous axial CT images were acquired through the chest, abdomen and pelvis and bilateral lower extremities following the administration of 170 mL of Omnipaque 350 intravenous contrast. Sagittal and coronal images were reconstructed. Maximum intensity projection and three dimensional images were constructed at a separate workstation.   FINDINGS: VASCULAR FINDINGS: Distal aorta unremarkable. Bilateral common and external iliac arteries widely patent. Right common femoral  artery patent. Femoral bifurcation intact. Profundus vessels patent. Superficial femoral artery patent throughout. Popliteal artery patent. Three-vessel runoff to the ankle.   Soft tissues: Soft tissues of the thigh appear unremarkable. There is gas seen within the musculature of the anterior compartment and extensively of the dorsum of the foot. Wound along the ventral aspect of the foot at the base of the metatarsophalangeal joint. Few mildly prominent enhancing right inguinal lymph nodes present.       1. Widely patent arterial vasculature of the right lower extremity with three-vessel runoff. 2. Gas seen throughout the musculature of the extensor compartment of the lower leg and extensively about the subcutaneous tissues of the dorsum of the foot. Open wound along the ventral aspect of the foot at the level of the 2nd metatarsal phalangeal joint.   Signed by: Junior Gomez 11/20/2024 6:37 PM Dictation workstation:   [id number]    XR ankle right 3+ views    Result Date: 11/20/2024  STUDY: Right foot and ankle radiographs; 11/20/2024 at 12:52 PM. INDICATION: Wound, evaluate for osteomyelitis. COMPARISON: XR right foot 3/27/2024. ACCESSION NUMBER(S): [id number], [id number] ORDERING CLINICIAN: RASHIDA CAPPS TECHNIQUE:  Three views of the right foot and three views of the right ankle. FINDINGS:  Right Foot:  There is no displaced fracture.  The alignment is anatomic.  There is soft tissue swelling with gas in the dorsolateral soft tissues. There is no bony erosion or periosteal reaction. Right Ankle:  There is no displaced fracture.  The alignment is anatomic.  No soft tissue abnormality is seen. There is no bony erosion or periosteal reaction.    1. No evidence for osteomyelitis. 2. Soft tissue swelling with gas in the dorsolateral soft tissues of the foot. This may represent gangrene. Signed by Elder Morrell MD    XR foot right 3+ views    Result Date: 11/20/2024  STUDY: Right foot and ankle radiographs;  11/20/2024 at 12:52 PM. INDICATION: Wound, evaluate for osteomyelitis. COMPARISON: XR right foot 3/27/2024. ACCESSION NUMBER(S): [id number], [id number] ORDERING CLINICIAN: RASHIDA CAPPS TECHNIQUE:  Three views of the right foot and three views of the right ankle. FINDINGS:  Right Foot:  There is no displaced fracture.  The alignment is anatomic.  There is soft tissue swelling with gas in the dorsolateral soft tissues. There is no bony erosion or periosteal reaction. Right Ankle:  There is no displaced fracture.  The alignment is anatomic.  No soft tissue abnormality is seen. There is no bony erosion or periosteal reaction.    1. No evidence for osteomyelitis. 2. Soft tissue swelling with gas in the dorsolateral soft tissues of the foot. This may represent gangrene. Signed by Elder Morrell MD         Assessment/Plan   Principal Problem:    Foot ulcer with fat layer exposed, right (Multi)  Active Problems:    Type 2 diabetes mellitus, with long-term current use of insulin    Necrotizing fasciitis of lower leg (Multi)    #S/P right lower extremity incision and drainage with debridement (DOS 11/21/24)  #Necrotizing fasciitis  #Sepsis    -Patient was examined and findings were discussed with patient  -Chart, labs, and imaging were reviewed   -Labs show WBC 20.5 today  -Intra-op and blood cultures pending  -Re-dressed RLE with Dakins-soaked gauze, abd pads, kerlix, ace. Nursing may reinforce as needed for strikethrough  -Plan is for return to OR tomorrow 11/22/24 with Dr. Lino for RLE I&D  -Patient is at high risk of needing proximal amputation. I discussed this with him and he is aware. Will have a better idea on salvageability after tomorrow's procedure  -Patient was discussed with attending, Dr. Zoie Xiong, DPM PGY-3     I saw and evaluated the patient. I personally obtained the key and critical portions of the history and physical exam or was physically present for key and critical portions performed by  the resident/fellow. I reviewed the resident/fellow's documentation and discussed the patient with the resident/fellow. I agree with the resident/fellow's medical decision making as documented in the note.    I spent 30 minutes in the professional and overall care of this patient.    Seamus Lino DPM

## 2024-11-21 NOTE — OP NOTE
PODIATRIC OPERATIVE REPORT    LOG ID: [id number]  SURGERY/PROCEDURE DATE: 11/20/2024 - 11/21/2024  OR LOCATION: [facility] OR    SURGEON(S)/PROCEDURALIST(S) AND ASSISTANT(S):  Primary: Seamus Lino DPM  Resident - Assisting: Vladislav Xiong DPM    OTHER OR STAFF:  Circulator: Horace Castro RN; Becca Issa RN  Scrub Person: Elder George RN    SURGERY/PROCEDURE(S):  Lower Extremity I  and  D  11262 - ID INCISION & DRAINAGE ABSCESS SIMPLE/SINGLE    Amputation of 2nd Digit Foot      PRE-OP/PRE-PROCEDURE DIAGNOSIS:   Pre-op Diagnosis      * Foot ulcer with fat layer exposed, right (Multi) [L97.512]    POST-OP/POST-PROCEDURE DIAGNOSIS: Right foot gas gangrene, right lower extremity necrotizing fasciitis    ANESTHESIA:   Anesthesia: * No anesthesia type entered *  ASA: III  Anesthesia Staff: Anesthesiologist: Justin Ellis MD  Anesthesia Resident: Khadra Jeffery MD  Intra-op Medications:   Administrations occurring from 0100 to 0320 on 11/21/24:   Medication Name Total Dose   sodium hypochlorite (Dakin's Quarter Strength) 0.125 % external solution 200 mL   albumin human bottle 5% 250 mL   calcium chloride 10% 0.5 g   fentaNYL (Sublimaze) injection 50 mcg/mL 50 mcg   insulin regular (HumuLIN R,NovoLIN R) 100 Units in sodium chloride 0.9% 100 mL (1 Units/mL) infusion 0.83 Units   insulin regular (HumuLIN R, NovoLIN R) bolus from bag 2 Units   ketamine injection 50 mg/ 5 mL (10 mg/mL) 20 mg   LR infusion Cannot be calculated   lidocaine (Xylocaine) injection 2 % 100 mg   midazolam PF (Versed) injection 1 mg/mL 2 mg   norepinephrine (Levophed) 8 mg / 250 mL NS  (premix) 0.25 mg   norepinephrine (Levophed) 16 mcg/mL IV bolus 120 mcg   ondansetron 2 mg/mL 4 mg   phenylephrine 40 mcg/mL syringe 10 mL 800 mcg   piperacillin-tazobactam (Zosyn) 3.375 g in dextrose (iso) IV 50 mL Cannot be calculated   potassium chloride 20 mEq in 100 mL IV premix 20 mEq   propofol (Diprivan) injection 10 mg/mL 100 mg   rocuronium  (ZeMuron) 50 mg/5 mL injection 100 mg   NaCl 0.9 % bolus Cannot be calculated   sugammadex (Bridion) 200 mg/2 mL injection 400 mg   vancomycin (Vancocin) pharmacy to dose - pharmacy monitoring Cannot be calculated   vancomycin 1,500 mg in NS  mL Cannot be calculated   vasopressin (Vasostrict) 1 unit/mL in NS IV bolus 1 Units   clindamycin (Cleocin) 900 mg in dextrose 5% IV 50 mL Cannot be calculated       ESTIMATED BLOOD LOSS: less than 50 mL    SPECIMENS:   Order Name Source Comment Collection Info Order Time   FUNGAL CULTURE/SMEAR ABSCESS Pre-op diagnosis:  Foot ulcer with fat layer exposed, right (Multi) [L97.512] Collected By: Seamus Lino DPM 11/21/2024  2:41 AM     Release result to MyChart   Immediate        TISSUE/WOUND CULTURE/SMEAR ABSCESS Pre-op diagnosis:  Foot ulcer with fat layer exposed, right (Multi) [L97.512] Collected By: Seamus Lino DPM 11/21/2024  2:41 AM     Release result to MyChart   Immediate            IMPLANTABLE DEVICES:  Nothing was implanted during the procedure    FINDINGS: Intraoperative findings consistent with clinical and radiographic findings.    DRAINS: None    TOURNIQUET TIMES:       COMPLICATIONS: None; patient tolerated the procedure well.       SURGERY/PROCEDURE DETAILS:  INDICATIONS FOR PROCEDURE:   Patient presented to [facility] as an emergent transfer from [facility] for right lower extremity gas and necrotizing fasciitis. [facility] accepted the transfer. Imaging showed gas near the knee. Decision was made for podiatry and [facility] to both take the patient to surgery. Patient was seen in the ED and we discussed that he is at high risk of loss of limb or life and likely requires amputation. Patient was septic and hypotensive and required emergent surgical intervention. It is with this understanding that we proceed.     PRE-PROCEDURE INFORMATION:  The right lower extremity was marked in the ED and the patient verified correct laterality. The patient was brought straight to the OR  and placed on the operating table in the supine position.  A timeout was performed in which identification of the correct patient, procedure, location, and materials was done. The patient was anesthetized. The right lower extremity was then scrubbed, prepped and draped in the usual aseptic manner.     DESCRIPTION OF PROCEDURE:   Attention was directed to the plantar sub 2nd met right foot ulcer. A 10 blade was used to circumscribe and excise the ulcer. Hemostats tracked dorsal so an incision was then made dorsally along the second metatarsal. Significant purulence and malodor was expressed. A deep soft tissue swab was taken. In the fascial place, the soft tissue was freed medially, laterally, proximally, and distally using dissections scissors. Further was infection was noted laterally. An incision was then made laterally on the dorsal foot and again significant purulence and malodor was expressed. The incision was extended to above the ankle joint. The long extensor tendons, abductor digiti minimi muscle, and extenson digitorum brevis muscle belly were all noted to be necrotic and the structures were removed using a 15 blade. Dissecting scissors were used to free all surrounding tissue. An incision was then made over the peroneals which were necrotic with scant purulence. An incision was then made plantarly and explored with no purulence expressed. The Misonix was introduced and was used to ultrasonically debride and irrigate the surgical sites. The probe was passed proximally in the leg and no purulence was noted. All pedal comparments were explored through these incisions and adequately drained. No residual purulence was appreciated. The purulence appeared to be primarily located to the foot, with extension up to the anterior ankle. This was adequately controlled. However, there was fairly extensive necrosis appreciated. All non-viable tissue was surgically excised, including bone and muscle of the forefoot and  we did appreciate some healthy bleeding. At this time it was believed that the infection was adequately controlled and that there was no need to incise more proximally on the leg. 1 gram of vancomycin powder was mixed with saline and antibiotic calcium sulfate, and squirted into the surgical sites. A dressing was then applied consisting of dakins-soaked packing, dakins-soaked gauze, abd pads, webril, ace. Prognosis is very guarded at this time as this infection was very destructive and caused extensive tissue loss. Salvageability will be reassessed during this admission. Source control is the main goal at this time. Source control appears adequately achieved, however, the nature of this infection puts him at risk of progression, hence the need for admission, antibiotics and reassessment.    POSTOPERATIVE INFORMATION: The patient tolerated the above noted procedure and anesthesia well and is likely admitted to ICU after. Patient prognosis is guarded and he remains at high risk of loss of limb or life. Plan for return to OR Friday for further debridement.     Vladislav Xiong DPM PGY-3    SIGNATURE: Vladislav Xiong DPM PATIENT NAME: Errol Fonseca   DATE: November 21, 2024 MRN: [id number]   TIME: 3:30 AM      I was present for the entirety of the procedure(s).     Seamus Lino DPM

## 2024-11-21 NOTE — PROGRESS NOTES
[facility]  TRAUMA ICU - PROGRESS NOTE    Patient Name: Errol Fonseca  MRN: [id number]  Admit Date: 1120  : 1970  AGE: 54 y.o.   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 He is currently on a course of Cipro/Bactrim and has had worsening of symptoms despite antibiotics. Initial Labs were notable for leukocytosis of 18.3, hyponatremia to 124, Cr 1.64, lactate 2.3 (initially 3.2), elevated CRP and ESR. INR 1.4. CT shows minimal gas in the extensor compartment of the right lower leg and a moderate amount of gas in the subcutaneous tissue of the dorsum of the foot. He received vanc/zosyn/clinda at [place]. LRINEC is 11.       MAIN COMPLAINT:   Necrotizing fascitis of right foot     OTHER MEDICAL PROBLEMS:  DMII  CAD with PCI  HTN  HLD  Alcoholic cirrhosis     INCIDENTAL FINDINGS:  N/A    PROCEDURES:  : Debridement of right foot with amputation of 2nd digit of right foot    ==============================================================================  TODAY'S ASSESSMENT AND PLAN OF CARE:  Errol Fonseca is a 54 y.o. male in the ICU due to: having pressor requirements requiring ICU level monitoring     NEURO/PAIN/SEDATION:   - Q4h neuro checks   - Minimize sedating medications  - Delirium prevention measures, control day/night cycle as able   - Analgesia: tylenol 975mg Q6hr     RESPIRATORY:   - Maintain SpO2 >92%, supplemental O2 PRN  - Continuous pulse ox  - Encourage IS use every hour while awake  - Encourage cough and deep breathing  - No current supplemental oxygen requirements     CARDIOVASC:   #CAD with PCI, HTN, HLD  -on home aspirin and  atorvastatin  -holding home lisinopril, metoprolol, and nitroglycerin  - Continuous cardiac monitoring  - Ongoing hemodynamic monitoring  - MAP goal > 65mmHg    GI:   - regular diet  - BR with miralax    :   - Mckoy/External catheter in place, maintain / Voiding spontaneously   - Maintain UOP 0.5-1.0 ml/kg/hr  - Strict I&Os     FEN:   - Carb-controlled diet   - Monitor and replete electrolytes as clinically indicated, Mg > 2 and K > 4  - AM labs     HEMATOLOGIC:   - Monitor for s/sx of anemia  - Trend labs, transfuse as clinically indicated, maintain Hgb > 7     ENDOCRINE:   #DMII  - SSI, BG goal < 180  -holding home insulin and metformin   - Q4h Accuchecks  - Hypoglycemia protocol     MUSCULOSKELETAL/SKIN:   #Necrotizing fascitis of right foot  -Debridement of right foot with amputation of 2nd digit of right foot done by podiatry on 11/21  -dress wound with Dakins-soaked packing, Dakins-soaked gauze, abd pads, webril, ace   -nursing my change or reinforce as needed   -further debridement on 11/22 with podiatry     INFECTIOUS DISEASE:   #Necrotizing fascitis of right foot  - Afebrile  -on vanc, zosyn, and clindamycin for antibiotic coverage   - Continue to monitor WBC count and vitals     GI PROPHYLAXIS:   -not indicated at this time     DVT PROPHYLAXIS:   -lovenox 40 mg once daily   -SCDs    DISPOSITION: Continue TICU care    Patient was discussed with Dr. Rivera    Total face to face time spent with patient/family of 30 minutes, with >50% of the time spent discussing plan of care/management, counseling/educating on disease processes, explaining results of diagnostic testing.     Remy Amaya PA-C  Trauma Surgery  [id number]  ==============================================================================  CHIEF COMPLAINT / OVERNIGHT EVENTS / HPI:   To b completed by day team     MEDICAL HISTORY / ROS:  Admission history and ROS reviewed. Pertinent changes as follows:  none    PHYSICAL EXAM:  Heart Rate:  [77-87]   Temp:   [36.8 °C (98.2 °F)]   Resp:  [16-18]   BP: ()/(44-63)   SpO2:  [94 %-97 %]   Physical Exam (to be completed by day team)    IMAGING SUMMARY:  (summary of new imaging findings, not a copy of dictation)  No new image findings     LABS:  Results from last 7 days   Lab Units 11/20/24  1239   WBC AUTO x10*3/uL 18.3*   HEMOGLOBIN g/dL 12.8*   HEMATOCRIT % 37.4*   PLATELETS AUTO x10*3/uL 193   NEUTROS PCT AUTO % 84.4   LYMPHS PCT AUTO % 4.0   MONOS PCT AUTO % 9.1   EOS PCT AUTO % 0.1     Results from last 7 days   Lab Units 11/20/24  1838   APTT seconds 24*   INR  1.4*     Results from last 7 days   Lab Units 11/20/24  1239   SODIUM mmol/L 124*   POTASSIUM mmol/L 3.9   CHLORIDE mmol/L 85*   CO2 mmol/L 27   BUN mg/dL 27*   CREATININE mg/dL 1.64*   CALCIUM mg/dL 9.2   PROTEIN TOTAL g/dL 7.7   BILIRUBIN TOTAL mg/dL 1.1   ALK PHOS U/L 139*   ALT U/L 31   AST U/L 41*   GLUCOSE mg/dL 299*     Results from last 7 days   Lab Units 11/20/24  1239   BILIRUBIN TOTAL mg/dL 1.1     Results from last 7 days   Lab Units 11/21/24  0238   POCT PH, ARTERIAL pH 7.39   POCT PCO2, ARTERIAL mm Hg 39   POCT PO2, ARTERIAL mm Hg 82*   POCT HCO3 CALCULATED, ARTERIAL mmol/L 23.6   POCT BASE EXCESS, ARTERIAL mmol/L -1.2     I have reviewed all medications, laboratory results, and imaging pertinent for today's encounter.

## 2024-11-21 NOTE — CARE PLAN
Problem: Skin  Goal: Decreased wound size/increased tissue granulation at next dressing change  Outcome: Progressing  Goal: Participates in plan/prevention/treatment measures  Outcome: Progressing  Goal: Prevent/manage excess moisture  Outcome: Progressing  Goal: Prevent/minimize sheer/friction injuries  Outcome: Progressing  Goal: Promote/optimize nutrition  Outcome: Progressing  Goal: Promote skin healing  Outcome: Progressing     Problem: Pain - Adult  Goal: Verbalizes/displays adequate comfort level or baseline comfort level  Outcome: Progressing     Problem: Safety - Adult  Goal: Free from fall injury  Outcome: Progressing     Problem: Discharge Planning  Goal: Discharge to home or other facility with appropriate resources  Outcome: Progressing     Problem: Chronic Conditions and Co-morbidities  Goal: Patient's chronic conditions and co-morbidity symptoms are monitored and maintained or improved  Outcome: Progressing     Problem: Fall/Injury  Goal: Not fall by end of shift  Outcome: Progressing  Goal: Be free from injury by end of the shift  Outcome: Progressing  Goal: Verbalize understanding of personal risk factors for fall in the hospital  Outcome: Progressing  Goal: Verbalize understanding of risk factor reduction measures to prevent injury from fall in the home  Outcome: Progressing  Goal: Use assistive devices by end of the shift  Outcome: Progressing  Goal: Pace activities to prevent fatigue by end of the shift  Outcome: Progressing     Problem: Pain  Goal: Takes deep breaths with improved pain control throughout the shift  Outcome: Progressing  Goal: Turns in bed with improved pain control throughout the shift  Outcome: Progressing  Goal: Walks with improved pain control throughout the shift  Outcome: Progressing  Goal: Performs ADL's with improved pain control throughout shift  Outcome: Progressing  Goal: Participates in PT with improved pain control throughout the shift  Outcome: Progressing  Goal:  Free from opioid side effects throughout the shift  Outcome: Progressing  Goal: Free from acute confusion related to pain meds throughout the shift  Outcome: Progressing

## 2024-11-21 NOTE — PROGRESS NOTES
Subjective:    The patient’s son, Cecilio, requested to speak with this LSW regarding his father’s care. Cecilio expressed a desire for his father to transition to an assisted living facility, citing the challenges of caring for him at home while also managing the needs of several children. Cecilio shared that his father’s lack of income places an additional financial burden on his family.     Cecilio reported that he had previously applied for disability benefits on behalf of his father, but the application was declined. He requested assistance in reapplying for disability to help secure financial support. Cecilio was not aware that assisted living facilities typically require significant monthly rent payments, and he expressed concern about affordability.    Objective:    The patient currently resides with his son, who is experiencing caregiver strain and financial challenges.  The patient has no reported income.  Cecilio disclosed additional barriers to assisted living placement, including the patient’s history of alcoholism and suicidal ideation, which may limit eligibility for certain facilities.  Cecilio remains interested in exploring alternative care options, including long-term care, and plans to reconvene with this LSW for further discussion.    Assessment:    The patient’s current living arrangement is becoming unsustainable due to the caregiver burden on his son, who is managing the needs of his own family in addition to his father’s care. The patient’s lack of income and potential behavioral health concerns, such as a history of alcoholism and suicidal threats, present significant barriers to transitioning to an assisted living facility.    Cecilio is motivated to secure appropriate care for his father but requires education on available options, the disability application process, and eligibility criteria for assisted living or long-term care. This LSW has identified the need for a thorough review of the patient’s medical and social  history to determine if long-term care may be a more suitable option.    Plan:    1. Patient Eligibility Review:    Conduct a detailed review of the patient’s medical chart to assess whether he meets criteria for long-term care placement.    2. Education and Resource Coordination:    Provide Cecilio with detailed information on the financial requirements and eligibility criteria for assisted living facilities.  Explain the steps involved in applying for disability benefits, emphasizing the need for a committed individual to navigate the lengthy process.  Offer guidance on potential resources or  that may assist with a disability reapplication.    3. Barriers to Assisted Living:    Address concerns related to the patient’s history of alcoholism and suicidal ideation, which could impact eligibility for assisted living.  Explore alternative care options, including facilities equipped to manage behavioral health challenges, if applicable.    4. Follow-Up:    Schedule a meeting with Ceiclio tomorrow afternoon to review findings and discuss next steps for discharge planning and care placement.  Ensure Cecilio is provided with referrals and support for ongoing case management after the patient’s discharge from the hospital.

## 2024-11-22 ENCOUNTER — ANESTHESIA EVENT (OUTPATIENT)
Dept: OPERATING ROOM | Facility: HOSPITAL | Age: 54
End: 2024-11-22
Payer: MEDICAID

## 2024-11-22 ENCOUNTER — ANESTHESIA (OUTPATIENT)
Dept: OPERATING ROOM | Facility: HOSPITAL | Age: 54
End: 2024-11-22
Payer: MEDICAID

## 2024-11-22 LAB
ALBUMIN SERPL BCP-MCNC: 2.5 G/DL (ref 3.4–5)
ALP SERPL-CCNC: 144 U/L (ref 33–120)
ALT SERPL W P-5'-P-CCNC: 36 U/L (ref 10–52)
ANION GAP SERPL CALC-SCNC: 13 MMOL/L (ref 10–20)
AST SERPL W P-5'-P-CCNC: 61 U/L (ref 9–39)
BASOPHILS # BLD AUTO: 0.09 X10*3/UL (ref 0–0.1)
BASOPHILS NFR BLD AUTO: 0.5 %
BILIRUB SERPL-MCNC: 1 MG/DL (ref 0–1.2)
BUN SERPL-MCNC: 19 MG/DL (ref 6–23)
CALCIUM SERPL-MCNC: 7.6 MG/DL (ref 8.6–10.6)
CHLORIDE SERPL-SCNC: 92 MMOL/L (ref 98–107)
CO2 SERPL-SCNC: 28 MMOL/L (ref 21–32)
CREAT SERPL-MCNC: 1.02 MG/DL (ref 0.5–1.3)
EGFRCR SERPLBLD CKD-EPI 2021: 87 ML/MIN/1.73M*2
EOSINOPHIL # BLD AUTO: 0.07 X10*3/UL (ref 0–0.7)
EOSINOPHIL NFR BLD AUTO: 0.4 %
ERYTHROCYTE [DISTWIDTH] IN BLOOD BY AUTOMATED COUNT: 12.6 % (ref 11.5–14.5)
FUNGUS SPEC CULT: NORMAL
FUNGUS SPEC FUNGUS STN: NORMAL
GLUCOSE BLD MANUAL STRIP-MCNC: 113 MG/DL (ref 74–99)
GLUCOSE BLD MANUAL STRIP-MCNC: 175 MG/DL (ref 74–99)
GLUCOSE BLD MANUAL STRIP-MCNC: 182 MG/DL (ref 74–99)
GLUCOSE BLD MANUAL STRIP-MCNC: 183 MG/DL (ref 74–99)
GLUCOSE BLD MANUAL STRIP-MCNC: 216 MG/DL (ref 74–99)
GLUCOSE SERPL-MCNC: 177 MG/DL (ref 74–99)
HCT VFR BLD AUTO: 27.5 % (ref 41–52)
HGB BLD-MCNC: 9.4 G/DL (ref 13.5–17.5)
IMM GRANULOCYTES # BLD AUTO: 0.27 X10*3/UL (ref 0–0.7)
IMM GRANULOCYTES NFR BLD AUTO: 1.4 % (ref 0–0.9)
LYMPHOCYTES # BLD AUTO: 1.2 X10*3/UL (ref 1.2–4.8)
LYMPHOCYTES NFR BLD AUTO: 6.2 %
MAGNESIUM SERPL-MCNC: 1.68 MG/DL (ref 1.6–2.4)
MCH RBC QN AUTO: 32.6 PG (ref 26–34)
MCHC RBC AUTO-ENTMCNC: 34.2 G/DL (ref 32–36)
MCV RBC AUTO: 96 FL (ref 80–100)
MONOCYTES # BLD AUTO: 1.26 X10*3/UL (ref 0.1–1)
MONOCYTES NFR BLD AUTO: 6.5 %
NEUTROPHILS # BLD AUTO: 16.39 X10*3/UL (ref 1.2–7.7)
NEUTROPHILS NFR BLD AUTO: 85 %
NRBC BLD-RTO: 0 /100 WBCS (ref 0–0)
PHOSPHATE SERPL-MCNC: 2.8 MG/DL (ref 2.5–4.9)
PLATELET # BLD AUTO: 183 X10*3/UL (ref 150–450)
POTASSIUM SERPL-SCNC: 3.6 MMOL/L (ref 3.5–5.3)
PROT SERPL-MCNC: 4.7 G/DL (ref 6.4–8.2)
RBC # BLD AUTO: 2.88 X10*6/UL (ref 4.5–5.9)
SODIUM SERPL-SCNC: 129 MMOL/L (ref 136–145)
WBC # BLD AUTO: 19.3 X10*3/UL (ref 4.4–11.3)

## 2024-11-22 PROCEDURE — 7100000002 HC RECOVERY ROOM TIME - EACH INCREMENTAL 1 MINUTE: Performed by: PODIATRIST

## 2024-11-22 PROCEDURE — 82947 ASSAY GLUCOSE BLOOD QUANT: CPT

## 2024-11-22 PROCEDURE — 2500000005 HC RX 250 GENERAL PHARMACY W/O HCPCS

## 2024-11-22 PROCEDURE — 1100000001 HC PRIVATE ROOM DAILY

## 2024-11-22 PROCEDURE — 0HBMXZZ EXCISION OF RIGHT FOOT SKIN, EXTERNAL APPROACH: ICD-10-PCS | Performed by: PODIATRIST

## 2024-11-22 PROCEDURE — 2500000004 HC RX 250 GENERAL PHARMACY W/ HCPCS (ALT 636 FOR OP/ED)

## 2024-11-22 PROCEDURE — 2500000002 HC RX 250 W HCPCS SELF ADMINISTERED DRUGS (ALT 637 FOR MEDICARE OP, ALT 636 FOR OP/ED)

## 2024-11-22 PROCEDURE — 99140 ANES COMP EMERGENCY COND: CPT | Performed by: ANESTHESIOLOGY

## 2024-11-22 PROCEDURE — 85025 COMPLETE CBC W/AUTO DIFF WBC: CPT

## 2024-11-22 PROCEDURE — 2720000007 HC OR 272 NO HCPCS: Performed by: PODIATRIST

## 2024-11-22 PROCEDURE — 3600000008 HC OR TIME - EACH INCREMENTAL 1 MINUTE - PROCEDURE LEVEL THREE: Performed by: PODIATRIST

## 2024-11-22 PROCEDURE — 99255 IP/OBS CONSLTJ NEW/EST HI 80: CPT | Performed by: STUDENT IN AN ORGANIZED HEALTH CARE EDUCATION/TRAINING PROGRAM

## 2024-11-22 PROCEDURE — 3700000001 HC GENERAL ANESTHESIA TIME - INITIAL BASE CHARGE: Performed by: PODIATRIST

## 2024-11-22 PROCEDURE — A27880 PR AMPUTATION LOW LEG THRU TIB/FIB: Performed by: ANESTHESIOLOGY

## 2024-11-22 PROCEDURE — 84075 ASSAY ALKALINE PHOSPHATASE: CPT

## 2024-11-22 PROCEDURE — 2500000002 HC RX 250 W HCPCS SELF ADMINISTERED DRUGS (ALT 637 FOR MEDICARE OP, ALT 636 FOR OP/ED): Performed by: PHYSICIAN ASSISTANT

## 2024-11-22 PROCEDURE — 2500000005 HC RX 250 GENERAL PHARMACY W/O HCPCS: Performed by: PHYSICIAN ASSISTANT

## 2024-11-22 PROCEDURE — 2500000004 HC RX 250 GENERAL PHARMACY W/ HCPCS (ALT 636 FOR OP/ED): Performed by: PHYSICIAN ASSISTANT

## 2024-11-22 PROCEDURE — 2500000004 HC RX 250 GENERAL PHARMACY W/ HCPCS (ALT 636 FOR OP/ED): Performed by: STUDENT IN AN ORGANIZED HEALTH CARE EDUCATION/TRAINING PROGRAM

## 2024-11-22 PROCEDURE — 2500000001 HC RX 250 WO HCPCS SELF ADMINISTERED DRUGS (ALT 637 FOR MEDICARE OP): Performed by: PHYSICIAN ASSISTANT

## 2024-11-22 PROCEDURE — 99253 IP/OBS CNSLTJ NEW/EST LOW 45: CPT | Performed by: STUDENT IN AN ORGANIZED HEALTH CARE EDUCATION/TRAINING PROGRAM

## 2024-11-22 PROCEDURE — 27598 AMPUTATE LOWER LEG AT KNEE: CPT | Performed by: STUDENT IN AN ORGANIZED HEALTH CARE EDUCATION/TRAINING PROGRAM

## 2024-11-22 PROCEDURE — 2500000002 HC RX 250 W HCPCS SELF ADMINISTERED DRUGS (ALT 637 FOR MEDICARE OP, ALT 636 FOR OP/ED): Performed by: STUDENT IN AN ORGANIZED HEALTH CARE EDUCATION/TRAINING PROGRAM

## 2024-11-22 PROCEDURE — 2780000003 HC OR 278 NO HCPCS: Performed by: PODIATRIST

## 2024-11-22 PROCEDURE — 7100000001 HC RECOVERY ROOM TIME - INITIAL BASE CHARGE: Performed by: PODIATRIST

## 2024-11-22 PROCEDURE — 2500000001 HC RX 250 WO HCPCS SELF ADMINISTERED DRUGS (ALT 637 FOR MEDICARE OP)

## 2024-11-22 PROCEDURE — 37799 UNLISTED PX VASCULAR SURGERY: CPT

## 2024-11-22 PROCEDURE — S4991 NICOTINE PATCH NONLEGEND: HCPCS | Performed by: PHYSICIAN ASSISTANT

## 2024-11-22 PROCEDURE — 3600000003 HC OR TIME - INITIAL BASE CHARGE - PROCEDURE LEVEL THREE: Performed by: PODIATRIST

## 2024-11-22 PROCEDURE — 3700000002 HC GENERAL ANESTHESIA TIME - EACH INCREMENTAL 1 MINUTE: Performed by: PODIATRIST

## 2024-11-22 PROCEDURE — 99231 SBSQ HOSP IP/OBS SF/LOW 25: CPT | Performed by: EMERGENCY MEDICINE

## 2024-11-22 PROCEDURE — 99231 SBSQ HOSP IP/OBS SF/LOW 25: CPT | Performed by: SURGERY

## 2024-11-22 PROCEDURE — 83735 ASSAY OF MAGNESIUM: CPT

## 2024-11-22 PROCEDURE — 84100 ASSAY OF PHOSPHORUS: CPT

## 2024-11-22 RX ORDER — OXYCODONE HYDROCHLORIDE 5 MG/1
5 TABLET ORAL EVERY 4 HOURS PRN
Status: DISCONTINUED | OUTPATIENT
Start: 2024-11-22 | End: 2024-11-23 | Stop reason: HOSPADM

## 2024-11-22 RX ORDER — FENTANYL CITRATE 50 UG/ML
INJECTION, SOLUTION INTRAMUSCULAR; INTRAVENOUS AS NEEDED
Status: DISCONTINUED | OUTPATIENT
Start: 2024-11-22 | End: 2024-11-22

## 2024-11-22 RX ORDER — IBUPROFEN 200 MG
1 TABLET ORAL DAILY
Status: DISCONTINUED | OUTPATIENT
Start: 2024-11-22 | End: 2024-12-03 | Stop reason: HOSPADM

## 2024-11-22 RX ORDER — POTASSIUM CHLORIDE 14.9 MG/ML
20 INJECTION INTRAVENOUS ONCE
Status: COMPLETED | OUTPATIENT
Start: 2024-11-22 | End: 2024-11-22

## 2024-11-22 RX ORDER — HYDROMORPHONE HYDROCHLORIDE 0.2 MG/ML
0.2 INJECTION INTRAMUSCULAR; INTRAVENOUS; SUBCUTANEOUS EVERY 5 MIN PRN
Status: DISCONTINUED | OUTPATIENT
Start: 2024-11-22 | End: 2024-11-23 | Stop reason: HOSPADM

## 2024-11-22 RX ORDER — CALCIUM CARBONATE 200(500)MG
500 TABLET,CHEWABLE ORAL ONCE
Status: COMPLETED | OUTPATIENT
Start: 2024-11-22 | End: 2024-11-22

## 2024-11-22 RX ORDER — MAGNESIUM SULFATE HEPTAHYDRATE 40 MG/ML
2 INJECTION, SOLUTION INTRAVENOUS ONCE
Status: COMPLETED | OUTPATIENT
Start: 2024-11-22 | End: 2024-11-22

## 2024-11-22 RX ORDER — SODIUM CHLORIDE, SODIUM LACTATE, POTASSIUM CHLORIDE, CALCIUM CHLORIDE 600; 310; 30; 20 MG/100ML; MG/100ML; MG/100ML; MG/100ML
100 INJECTION, SOLUTION INTRAVENOUS CONTINUOUS
Status: DISCONTINUED | OUTPATIENT
Start: 2024-11-22 | End: 2024-11-23 | Stop reason: HOSPADM

## 2024-11-22 RX ORDER — LIDOCAINE HYDROCHLORIDE 10 MG/ML
0.1 INJECTION, SOLUTION INFILTRATION; PERINEURAL ONCE
Status: DISCONTINUED | OUTPATIENT
Start: 2024-11-22 | End: 2024-11-23 | Stop reason: HOSPADM

## 2024-11-22 RX ORDER — LIDOCAINE HYDROCHLORIDE 20 MG/ML
INJECTION, SOLUTION INFILTRATION; PERINEURAL AS NEEDED
Status: DISCONTINUED | OUTPATIENT
Start: 2024-11-22 | End: 2024-11-22

## 2024-11-22 RX ORDER — POTASSIUM CHLORIDE 20 MEQ/1
20 TABLET, EXTENDED RELEASE ORAL ONCE
Status: COMPLETED | OUTPATIENT
Start: 2024-11-22 | End: 2024-11-22

## 2024-11-22 RX ORDER — OXYCODONE HYDROCHLORIDE 5 MG/1
10 TABLET ORAL EVERY 4 HOURS PRN
Status: DISCONTINUED | OUTPATIENT
Start: 2024-11-22 | End: 2024-11-23 | Stop reason: HOSPADM

## 2024-11-22 RX ORDER — ONDANSETRON HYDROCHLORIDE 2 MG/ML
INJECTION, SOLUTION INTRAVENOUS AS NEEDED
Status: DISCONTINUED | OUTPATIENT
Start: 2024-11-22 | End: 2024-11-22

## 2024-11-22 RX ORDER — SODIUM CHLORIDE, SODIUM LACTATE, POTASSIUM CHLORIDE, CALCIUM CHLORIDE 600; 310; 30; 20 MG/100ML; MG/100ML; MG/100ML; MG/100ML
100 INJECTION, SOLUTION INTRAVENOUS CONTINUOUS
Status: DISCONTINUED | OUTPATIENT
Start: 2024-11-22 | End: 2024-11-22

## 2024-11-22 RX ORDER — HYDROMORPHONE HYDROCHLORIDE 1 MG/ML
INJECTION, SOLUTION INTRAMUSCULAR; INTRAVENOUS; SUBCUTANEOUS AS NEEDED
Status: DISCONTINUED | OUTPATIENT
Start: 2024-11-22 | End: 2024-11-22

## 2024-11-22 RX ORDER — PROPOFOL 10 MG/ML
INJECTION, EMULSION INTRAVENOUS AS NEEDED
Status: DISCONTINUED | OUTPATIENT
Start: 2024-11-22 | End: 2024-11-22

## 2024-11-22 RX ORDER — PHENYLEPHRINE HCL IN 0.9% NACL 0.4MG/10ML
SYRINGE (ML) INTRAVENOUS AS NEEDED
Status: DISCONTINUED | OUTPATIENT
Start: 2024-11-22 | End: 2024-11-22

## 2024-11-22 RX ORDER — ONDANSETRON HYDROCHLORIDE 2 MG/ML
4 INJECTION, SOLUTION INTRAVENOUS ONCE AS NEEDED
Status: DISCONTINUED | OUTPATIENT
Start: 2024-11-22 | End: 2024-11-23 | Stop reason: HOSPADM

## 2024-11-22 RX ORDER — ROCURONIUM BROMIDE 10 MG/ML
INJECTION, SOLUTION INTRAVENOUS AS NEEDED
Status: DISCONTINUED | OUTPATIENT
Start: 2024-11-22 | End: 2024-11-22

## 2024-11-22 RX ORDER — MIDAZOLAM HYDROCHLORIDE 1 MG/ML
INJECTION INTRAMUSCULAR; INTRAVENOUS AS NEEDED
Status: DISCONTINUED | OUTPATIENT
Start: 2024-11-22 | End: 2024-11-22

## 2024-11-22 RX ADMIN — Medication 2 L/MIN: at 07:57

## 2024-11-22 RX ADMIN — HYDROMORPHONE HYDROCHLORIDE 0.2 MG: 0.2 INJECTION, SOLUTION INTRAMUSCULAR; INTRAVENOUS; SUBCUTANEOUS at 23:24

## 2024-11-22 RX ADMIN — POTASSIUM CHLORIDE 20 MEQ: 14.9 INJECTION, SOLUTION INTRAVENOUS at 05:28

## 2024-11-22 RX ADMIN — ACETAMINOPHEN 650 MG: 325 TABLET, FILM COATED ORAL at 01:08

## 2024-11-22 RX ADMIN — ATORVASTATIN CALCIUM 40 MG: 40 TABLET, FILM COATED ORAL at 08:00

## 2024-11-22 RX ADMIN — CALCIUM CARBONATE (ANTACID) CHEW TAB 500 MG 500 MG: 500 CHEW TAB at 04:35

## 2024-11-22 RX ADMIN — CLINDAMYCIN PHOSPHATE 600 MG: 600 INJECTION, SOLUTION INTRAVENOUS at 01:51

## 2024-11-22 RX ADMIN — ACETAMINOPHEN 650 MG: 325 TABLET, FILM COATED ORAL at 07:49

## 2024-11-22 RX ADMIN — Medication 1 L/MIN: at 20:12

## 2024-11-22 RX ADMIN — HYDROMORPHONE HYDROCHLORIDE 0.2 MG: 0.2 INJECTION, SOLUTION INTRAMUSCULAR; INTRAVENOUS; SUBCUTANEOUS at 07:50

## 2024-11-22 RX ADMIN — Medication 1250 MG: at 09:00

## 2024-11-22 RX ADMIN — INSULIN HUMAN 4 UNITS: 100 INJECTION, SOLUTION PARENTERAL at 00:04

## 2024-11-22 RX ADMIN — INSULIN HUMAN 2 UNITS: 100 INJECTION, SOLUTION PARENTERAL at 03:48

## 2024-11-22 RX ADMIN — HYDROMORPHONE HYDROCHLORIDE 0.5 MG: 1 INJECTION, SOLUTION INTRAMUSCULAR; INTRAVENOUS; SUBCUTANEOUS at 21:55

## 2024-11-22 RX ADMIN — HYDROMORPHONE HYDROCHLORIDE 0.5 MG: 1 INJECTION, SOLUTION INTRAMUSCULAR; INTRAVENOUS; SUBCUTANEOUS at 22:23

## 2024-11-22 RX ADMIN — ASPIRIN 81 MG: 81 TABLET, COATED ORAL at 08:00

## 2024-11-22 RX ADMIN — OXYCODONE HYDROCHLORIDE 10 MG: 10 TABLET ORAL at 12:34

## 2024-11-22 RX ADMIN — OXYCODONE HYDROCHLORIDE 10 MG: 10 TABLET ORAL at 03:40

## 2024-11-22 RX ADMIN — ESCITALOPRAM OXALATE 10 MG: 10 TABLET ORAL at 08:00

## 2024-11-22 RX ADMIN — INSULIN GLARGINE 15 UNITS: 100 INJECTION, SOLUTION SUBCUTANEOUS at 12:34

## 2024-11-22 RX ADMIN — ACETAMINOPHEN 650 MG: 325 TABLET, FILM COATED ORAL at 12:34

## 2024-11-22 RX ADMIN — PIPERACILLIN SODIUM AND TAZOBACTAM SODIUM 3.38 G: 3; .375 INJECTION, SOLUTION INTRAVENOUS at 07:50

## 2024-11-22 RX ADMIN — PIPERACILLIN SODIUM AND TAZOBACTAM SODIUM 3.38 G: 3; .375 INJECTION, SOLUTION INTRAVENOUS at 01:51

## 2024-11-22 RX ADMIN — MAGNESIUM SULFATE HEPTAHYDRATE 2 G: 40 INJECTION, SOLUTION INTRAVENOUS at 06:35

## 2024-11-22 RX ADMIN — PIPERACILLIN SODIUM AND TAZOBACTAM SODIUM 3.38 G: 3; .375 INJECTION, SOLUTION INTRAVENOUS at 20:05

## 2024-11-22 RX ADMIN — POTASSIUM CHLORIDE 20 MEQ: 1500 TABLET, EXTENDED RELEASE ORAL at 05:28

## 2024-11-22 RX ADMIN — NICOTINE 1 PATCH: 14 PATCH, EXTENDED RELEASE TRANSDERMAL at 15:04

## 2024-11-22 RX ADMIN — INSULIN HUMAN 2 UNITS: 100 INJECTION, SOLUTION PARENTERAL at 09:11

## 2024-11-22 RX ADMIN — PIPERACILLIN SODIUM AND TAZOBACTAM SODIUM 3.38 G: 3; .375 INJECTION, SOLUTION INTRAVENOUS at 15:04

## 2024-11-22 RX ADMIN — Medication 6 L/MIN: at 21:45

## 2024-11-22 RX ADMIN — SODIUM CHLORIDE, POTASSIUM CHLORIDE, SODIUM LACTATE AND CALCIUM CHLORIDE 100 ML/HR: 600; 310; 30; 20 INJECTION, SOLUTION INTRAVENOUS at 06:46

## 2024-11-22 ASSESSMENT — PAIN - FUNCTIONAL ASSESSMENT

## 2024-11-22 ASSESSMENT — PAIN SCALES - GENERAL
PAINLEVEL_OUTOF10: 7
PAINLEVEL_OUTOF10: 7
PAINLEVEL_OUTOF10: 8
PAINLEVEL_OUTOF10: 7
PAINLEVEL_OUTOF10: 8
PAINLEVEL_OUTOF10: 7
PAINLEVEL_OUTOF10: 0 - NO PAIN
PAINLEVEL_OUTOF10: 6
PAINLEVEL_OUTOF10: 5 - MODERATE PAIN
PAINLEVEL_OUTOF10: 7
PAINLEVEL_OUTOF10: 7
PAINLEVEL_OUTOF10: 0 - NO PAIN
PAINLEVEL_OUTOF10: 2

## 2024-11-22 ASSESSMENT — COGNITIVE AND FUNCTIONAL STATUS - GENERAL
STANDING UP FROM CHAIR USING ARMS: A LOT
CLIMB 3 TO 5 STEPS WITH RAILING: A LOT
WALKING IN HOSPITAL ROOM: A LOT
MOBILITY SCORE: 16
MOVING TO AND FROM BED TO CHAIR: A LOT

## 2024-11-22 ASSESSMENT — PAIN DESCRIPTION - DESCRIPTORS
DESCRIPTORS: THROBBING
DESCRIPTORS: ACHING;THROBBING

## 2024-11-22 ASSESSMENT — PAIN DESCRIPTION - LOCATION: LOCATION: FOOT

## 2024-11-22 ASSESSMENT — PAIN DESCRIPTION - ORIENTATION: ORIENTATION: RIGHT

## 2024-11-22 NOTE — PROGRESS NOTES
Vancomycin Dosing by Pharmacy- FOLLOW UP    Errol Fonseca is a 54 y.o. year old male who Pharmacy has been consulted for vancomycin dosing for cellulitis, skin and soft tissue. Based on the patient's indication and renal status this patient is being dosed based on a goal AUC of 400-600.     Renal function is currently elevated from baseline but improving over last 24 hours.    Current vancomycin dose: 1500 mg given every 24 hours    Estimated vancomycin AUC on current dose: 338 mg/L.hr     Visit Vitals  BP 94/61   Pulse 80   Temp 36.7 °C (98.1 °F) (Temporal)   Resp (!) 33        Lab Results   Component Value Date    CREATININE 1.34 (H) 2024    CREATININE 1.54 (H) 2024    CREATININE 1.64 (H) 2024    CREATININE 0.90 2024        Patient weight is as follows:   Vitals:    24 1127   Weight: 102 kg (224 lb 13.9 oz)       Cultures:  No results found for the encounter in last 14 days.       I/O last 3 completed shifts:  In: 3347.8 (32.8 mL/kg) [P.O.:50; I.V.:1697.8 (16.6 mL/kg); IV Piggyback:1600]  Out:  (20.1 mL/kg) [Urine: (0.6 mL/kg/hr)]  Weight: 102 kg   I/O during current shift:  No intake/output data recorded.    Temp (24hrs), Av.5 °C (97.7 °F), Min:36 °C (96.8 °F), Max:37 °C (98.6 °F)      Assessment/Plan    Below goal AUC. Orders placed for new vancomcyin regimen of 1250mg every 12 hours to begin at .     This dosing regimen is predicted by [employer]eds[employer] to result in the following pharmacokinetic parameters:  <<<<<PASTE [employer] DATA HERE>>>>>  Regimen: 1250 mg IV every 12 hours.  Start time: 19:51 on 2024  Exposure target: AUC24 (range)400-600 mg/L.hr   [id number]: 453 mg/L.hr  AUC24,ss: 554 mg/L.hr  Probability of AUC24 > 400: 95 %  Ctrough,ss: 18.7 mg/L  Probability of Ctrough,ss > 20: 42 %    The next level will be obtained on  at 0500. May be obtained sooner if clinically indicated.   Will continue to monitor renal function daily while on vancomycin and  order serum creatinine at least every 48 hours if not already ordered.  Follow for continued vancomycin needs, clinical response, and signs/symptoms of toxicity.       Jr Payne, PharmD

## 2024-11-22 NOTE — PROGRESS NOTES
Physical Therapy                 Therapy Communication Note    Patient Name: Errol Fonseca  MRN: [id number]  Department: [facility]  Room: 17/17-A  Today's Date: 11/22/2024     Discipline: Physical Therapy    Missed Visit Reason: Missed Visit Reason: Patient placed on medical hold (Plan for RTOR today with Dr. Lino for RLE I&D)    Missed Time: Attempt    Lore Reina, PT

## 2024-11-22 NOTE — PROGRESS NOTES
[facility]  TRAUMA ICU - PROGRESS NOTE    Patient Name: Errol Fonseca  MRN: [id number]  Admit Date: 1120  : 1970  AGE: 54 y.o.   GENDER: male  ==============================================================================    TODAY'S ASSESSMENT AND PLAN OF CARE:  Errol Fonseca is a 54 y.o. male in the ICU due to: need for hemodynamic monitoring    NEURO/PAIN/SEDATION:   #Acute pain  - Tylenol 650 mg PO Q6H  - Oxycodone 5/10 mg PO Q6H PRN for mod/severe pain + Dilaudid 0.2 mg IV Q3H PRN for breakthrough  #H/o depression  - Continue home escitalopram  RESPIRATORY:   - Wean supplemental O2 as tolerated to maintain SpO2 >92%  - Continuous pulse oximetry  CARDIOVASC:  #Septic shock - improving, no pressor requirements x24h  - Maintain MAP >65  - Continuous cardiac monitoring  #H/o CAD  - Continue home ASA, atorvastatin  - Holding home lisinopril-hydrochlorothiazide and metoprolol  GI:   - Bowel regimen: Miralax daily  :   #JAORN - Improving, Cr 1.02 today from 1.24 (baseline ~0.7)  - Strict Is & Os, maintain goal UOP  - Avoiding nephrotoxins  FEN:  - NPO for OR  - LR @ 100 ml/hr, will continue until current bag is complete  HEMATOLOGIC:   - Daily CBC  ENDOCRINE:   #H/o T2DM  - Q4H POCT glucose + SSI  - Insulin glargine 15 units Q24H (1/2 home dose while NPO)  - Holding home dulaglutide and metformin  MUSCULOSKELETAL/SKIN:   #Right foot surgical wound  - Wound care per podiatry  - ICU skin protocol  INFECTIOUS DISEASE:   #Necrotizing soft tissue infection of R foot - s/p 2nd digit amputation and debridement with podiatry 2024  - Continue vanc/Zosyn  - Discontinuing clindamycin  - Return to OR today with podiatry  - Follow-up blood cultures: preliminary NGTD  - Follow-up wound cultures: mixed G+  GI PROPHYLAXIS: Not indicated  DVT PROPHYLAXIS: SCD on LLE, Lovenox 40 mg subcutaneous daily  LDA: PIV x3  DISPOSITION: Transfer to [facility]    Patient seen and discussed with  "attending, Dr. Whittaker.    Laurel Sorto MD  PGY-1   Trauma ICU  [facility] [id number]  ==============================================================================  CHIEF COMPLAINT / OVERNIGHT EVENTS / HPI:   No acute events overnight. No pressor requirements since 08:30 yesterday. This morning patient endorses adequate pain control. Denies fevers, chills, nausea, vomiting, shortness of breath, chest pain, or any other acute concerns.    MEDICAL HISTORY / ROS:  Admission history and ROS reviewed. Pertinent changes as follows: None    PHYSICAL EXAM:  Heart Rate:  []   Temp:  [36.2 °C (97.2 °F)-37 °C (98.6 °F)]   Resp:  [11-50]   BP: ()/(58-87)   Height:  [185.4 cm (6' 0.99\")]   Weight:  [102 kg (224 lb 13.9 oz)]   SpO2:  [87 %-100 %]   Physical Exam  Constitutional:       General: He is not in acute distress.     Appearance: He is not toxic-appearing.   HENT:      Head: Normocephalic.      Mouth/Throat:      Mouth: Mucous membranes are moist.   Eyes:      Conjunctiva/sclera: Conjunctivae normal.   Cardiovascular:      Rate and Rhythm: Normal rate and regular rhythm.   Pulmonary:      Effort: Pulmonary effort is normal. No respiratory distress.      Comments: On RA  Musculoskeletal:      Comments: ACE bandage overlying surgical dressings on right lower leg and foot   Skin:     General: Skin is warm and dry.      Coloration: Skin is not pale.      Findings: Erythema (Erythema extending up R lower leg to just below knee) present.   Neurological:      General: No focal deficit present.      Mental Status: He is alert and oriented to person, place, and time.   Psychiatric:         Mood and Affect: Mood normal.       IMAGING SUMMARY: No new imaging.    LABS:  Results from last 7 days   Lab Units 11/22/24  0337 11/21/24  1205 11/20/24  1239   WBC AUTO x10*3/uL 19.3* 20.5* 18.3*   HEMOGLOBIN g/dL 9.4* 9.5* 12.8*   HEMATOCRIT % 27.5* 25.9* 37.4*   PLATELETS AUTO x10*3/uL 183 148* 193   NEUTROS PCT AUTO % 85.0  --  84.4 "   LYMPHS PCT AUTO % 6.2  --  4.0   MONOS PCT AUTO % 6.5  --  9.1   EOS PCT AUTO % 0.4  --  0.1     Results from last 7 days   Lab Units 11/21/24  0434 11/20/24  1838   APTT seconds 27 24*   INR  1.4* 1.4*     Results from last 7 days   Lab Units 11/22/24  0337 11/21/24  1205 11/21/24  0434 11/20/24  1239   SODIUM mmol/L 129* 127* 131* 124*   POTASSIUM mmol/L 3.6 3.7 3.5 3.9   CHLORIDE mmol/L 92* 93* 94* 85*   CO2 mmol/L 28 25 24 27   BUN mg/dL 19 26* 31* 27*   CREATININE mg/dL 1.02 1.34* 1.54* 1.64*   CALCIUM mg/dL 7.6* 7.7* 7.6* 9.2   PROTEIN TOTAL g/dL 4.7*  --   --  7.7   BILIRUBIN TOTAL mg/dL 1.0  --   --  1.1   ALK PHOS U/L 144*  --   --  139*   ALT U/L 36  --   --  31   AST U/L 61*  --   --  41*   GLUCOSE mg/dL 177* 211* 158* 299*     Results from last 7 days   Lab Units 11/22/24  0337 11/20/24  1239   BILIRUBIN TOTAL mg/dL 1.0 1.1     Results from last 7 days   Lab Units 11/21/24  1206 11/21/24  0434 11/21/24  0238   POCT PH, ARTERIAL pH 7.39 7.37* 7.39   POCT PCO2, ARTERIAL mm Hg 43* 40 39   POCT PO2, ARTERIAL mm Hg 72* 89 82*   POCT HCO3 CALCULATED, ARTERIAL mmol/L 26.0 23.1 23.6   POCT BASE EXCESS, ARTERIAL mmol/L 0.8 -2.0 -1.2     I have reviewed all medications, laboratory results, and imaging pertinent for today's encounter.

## 2024-11-22 NOTE — PROGRESS NOTES
[facility]  ACUTE CARE SURGERY - PROGRESS NOTE    Patient Name: Errol Fonseca  MRN: [id number]  Admit Date: 1120  : 1970  AGE: 54 y.o.   GENDER: male  ==============================================================================  TODAY'S ASSESSMENT AND PLAN OF CARE:  54M presents with 2 weeks of LE pain and swelling. Leukocytosis, MRSA, CT showing gas up to knee. Transferred to [facility] for concerns of RLE Necrotizing Fascitis.     : LE I&D (ACS), amputation 2nd toe (podiatry)    : OR with podiatry    - multimodal pain  - ISS  - cnt home asa, atorvastatin  - holding home lisinopril-hydrochlorothiazide and metoprolol  - bowel reg  - strict I/Os  - IVF  - Trending daily CBCs  - Holding home metformin and dulaglutide ==> SSI and glargine  - Podiatry OR for foot wound, cnt abx   - DVT ppx    Pt d/w attending  -Ben Cox md/pravin pgy1      Subjective   ==============================================================================  CHIEF COMPLAINT / EVENTS LAST 24HRS / HPI:  No acute events overnight. Patient seen and evaluated this AM during team rounds.      MEDICAL HISTORY / ROS:   Admission history reviewed. Pertinent changes as follows:  None.    A 12-point review of systems was performed, and was negative except as above.     Objective   Vital Signs past 24h:  Heart Rate:  []   Temp:  [36.2 °C (97.2 °F)-36.7 °C (98.1 °F)]   Resp:  [13-50]   BP: ()/(61-87)   SpO2:  [91 %-100 %]     I/O past 24h:  I/O last 2 completed shifts:  In: 2847.8 (27.9 mL/kg) [P.O.:250; I.V.:2297.8 (22.5 mL/kg); IV Piggyback:300]  Out: 3750 (36.8 mL/kg) [Urine:3750 (1.5 mL/kg/hr)]  Weight: 102 kg      PHYSICAL EXAM:  Neuro/gen: no acute distress  Card: RRR  Pulm: RA normal effort  Abd: soft, nontender, nondistended  Extremeties: Right lower leg s/p OR amp wrapped in ace and kerlex with minimal strikethrough,  with cellulitic changes up to mid tibia    Labs past  24h:  Results for orders placed or performed during the hospital encounter of 11/20/24 (from the past 24 hours)   POCT GLUCOSE   Result Value Ref Range    POCT Glucose 250 (H) 74 - 99 mg/dL   POCT GLUCOSE   Result Value Ref Range    POCT Glucose 216 (H) 74 - 99 mg/dL   CBC and Auto Differential   Result Value Ref Range    WBC 19.3 (H) 4.4 - 11.3 x10*3/uL    nRBC 0.0 0.0 - 0.0 /100 WBCs    RBC 2.88 (L) 4.50 - 5.90 x10*6/uL    Hemoglobin 9.4 (L) 13.5 - 17.5 g/dL    Hematocrit 27.5 (L) 41.0 - 52.0 %    MCV 96 80 - 100 fL    MCH 32.6 26.0 - 34.0 pg    MCHC 34.2 32.0 - 36.0 g/dL    RDW 12.6 11.5 - 14.5 %    Platelets 183 150 - 450 x10*3/uL    Neutrophils % 85.0 40.0 - 80.0 %    Immature Granulocytes %, Automated 1.4 (H) 0.0 - 0.9 %    Lymphocytes % 6.2 13.0 - 44.0 %    Monocytes % 6.5 2.0 - 10.0 %    Eosinophils % 0.4 0.0 - 6.0 %    Basophils % 0.5 0.0 - 2.0 %    Neutrophils Absolute 16.39 (H) 1.20 - 7.70 x10*3/uL    Immature Granulocytes Absolute, Automated 0.27 0.00 - 0.70 x10*3/uL    Lymphocytes Absolute 1.20 1.20 - 4.80 x10*3/uL    Monocytes Absolute 1.26 (H) 0.10 - 1.00 x10*3/uL    Eosinophils Absolute 0.07 0.00 - 0.70 x10*3/uL    Basophils Absolute 0.09 0.00 - 0.10 x10*3/uL   Comprehensive Metabolic Panel   Result Value Ref Range    Glucose 177 (H) 74 - 99 mg/dL    Sodium 129 (L) 136 - 145 mmol/L    Potassium 3.6 3.5 - 5.3 mmol/L    Chloride 92 (L) 98 - 107 mmol/L    Bicarbonate 28 21 - 32 mmol/L    Anion Gap 13 10 - 20 mmol/L    Urea Nitrogen 19 6 - 23 mg/dL    Creatinine 1.02 0.50 - 1.30 mg/dL    eGFR 87 >60 mL/min/1.73m*2    Calcium 7.6 (L) 8.6 - 10.6 mg/dL    Albumin 2.5 (L) 3.4 - 5.0 g/dL    Alkaline Phosphatase 144 (H) 33 - 120 U/L    Total Protein 4.7 (L) 6.4 - 8.2 g/dL    AST 61 (H) 9 - 39 U/L    Bilirubin, Total 1.0 0.0 - 1.2 mg/dL    ALT 36 10 - 52 U/L   Phosphorus   Result Value Ref Range    Phosphorus 2.8 2.5 - 4.9 mg/dL   Magnesium   Result Value Ref Range    Magnesium 1.68 1.60 - 2.40 mg/dL   POCT  GLUCOSE   Result Value Ref Range    POCT Glucose 182 (H) 74 - 99 mg/dL   POCT GLUCOSE   Result Value Ref Range    POCT Glucose 183 (H) 74 - 99 mg/dL   POCT GLUCOSE   Result Value Ref Range    POCT Glucose 175 (H) 74 - 99 mg/dL       Imaging within past 24h:  XR chest 1 view    Result Date: 11/20/2024  Interpreted By:  Morro Conner, STUDY: XR CHEST 1 VIEW;  11/20/2024 7:14 pm   INDICATION: Signs/Symptoms:Pre-op.     COMPARISON: None.   ACCESSION NUMBER(S): [id number]   ORDERING CLINICIAN: KENAI BALLARD   FINDINGS:     The cardiomediastinal silhouette and pulmonary vasculature are within normal limits.   No consolidation, pleural effusion or pneumothorax.   Chronic left posterior rib fractures at rib 6 and 7.       No acute cardiopulmonary process.     MACRO: None.   Signed by: Morro Conner 11/20/2024 7:38 PM Dictation workstation:   [id number]    XR tibia fibula right 2 views    Result Date: 11/20/2024  Interpreted By:  Bharathi Martínez, STUDY: XR TIBIA FIBULA RIGHT 2 VIEWS; ;  11/20/2024 6:19 pm   INDICATION: Signs/Symptoms:Gas.     COMPARISON: None.   ACCESSION NUMBER(S): [id number]   ORDERING CLINICIAN: KENIA BALLARD   FINDINGS: Tibia and fibula are intact without acute fracture or dislocation.   Partially visualized soft tissue gas redemonstrated along the dorsum of the foot. Small amount of probable gas seen ventral aspect of the mid calf and ankle. Correlate with known gas-forming infection.       As above     MACRO: None   Signed by: Bharathi Martínez 11/20/2024 6:50 PM Dictation workstation:   [id number]     I have reviewed the imaging above as it pertains to the patient's surgical concerns and agree with the radiologist's interpretation.     Patient's exam, labs, and findings discussed with Dr. Hobbs, who agrees with the plan as described above.    Ben Cox MD  PGY-1 General Surgery  Acute Care Surgery [id number]

## 2024-11-22 NOTE — CARE PLAN
Problem: Skin  Goal: Decreased wound size/increased tissue granulation at next dressing change  Outcome: Progressing  Goal: Participates in plan/prevention/treatment measures  Outcome: Progressing  Goal: Prevent/manage excess moisture  Outcome: Progressing  Goal: Prevent/minimize sheer/friction injuries  Outcome: Progressing  Goal: Promote/optimize nutrition  Outcome: Progressing  Goal: Promote skin healing  Outcome: Progressing     Problem: Pain - Adult  Goal: Verbalizes/displays adequate comfort level or baseline comfort level  Outcome: Progressing     Problem: Safety - Adult  Goal: Free from fall injury  Outcome: Progressing     Problem: Discharge Planning  Goal: Discharge to home or other facility with appropriate resources  Outcome: Progressing     Problem: Chronic Conditions and Co-morbidities  Goal: Patient's chronic conditions and co-morbidity symptoms are monitored and maintained or improved  Outcome: Progressing     Problem: Fall/Injury  Goal: Not fall by end of shift  Outcome: Progressing  Goal: Be free from injury by end of the shift  Outcome: Progressing  Goal: Verbalize understanding of personal risk factors for fall in the hospital  Outcome: Progressing  Goal: Verbalize understanding of risk factor reduction measures to prevent injury from fall in the home  Outcome: Progressing  Goal: Use assistive devices by end of the shift  Outcome: Progressing  Goal: Pace activities to prevent fatigue by end of the shift  Outcome: Progressing     Problem: Pain  Goal: Takes deep breaths with improved pain control throughout the shift  Outcome: Progressing  Goal: Turns in bed with improved pain control throughout the shift  Outcome: Progressing  Goal: Walks with improved pain control throughout the shift  Outcome: Progressing  Goal: Performs ADL's with improved pain control throughout shift  Outcome: Progressing  Goal: Participates in PT with improved pain control throughout the shift  Outcome: Progressing  Goal:  Free from opioid side effects throughout the shift  Outcome: Progressing  Goal: Free from acute confusion related to pain meds throughout the shift  Outcome: Progressing     Problem: Diabetes  Goal: Achieve decreasing blood glucose levels by end of shift  Outcome: Progressing  Goal: Increase stability of blood glucose readings by end of shift  Outcome: Progressing  Goal: Decrease in ketones present in urine by end of shift  Outcome: Progressing  Goal: Maintain electrolyte levels within acceptable range throughout shift  Outcome: Progressing  Goal: Maintain glucose levels >70mg/dl to <250mg/dl throughout shift  Outcome: Progressing  Goal: No changes in neurological exam by end of shift  Outcome: Progressing  Goal: Learn about and adhere to nutrition recommendations by end of shift  Outcome: Progressing  Goal: Vital signs within normal range for age by end of shift  Outcome: Progressing  Goal: Increase self care and/or family involovement by end of shift  Outcome: Progressing  Goal: Receive DSME education by end of shift  Outcome: Progressing

## 2024-11-23 LAB
ALBUMIN SERPL BCP-MCNC: 2.7 G/DL (ref 3.4–5)
ANION GAP SERPL CALC-SCNC: 16 MMOL/L (ref 10–20)
ATRIAL RATE: 90 BPM
B-LACTAMASE ORGANISM ISLT: POSITIVE
BACTERIA SPEC CULT: ABNORMAL
BACTERIA SPEC CULT: ABNORMAL
BUN SERPL-MCNC: 16 MG/DL (ref 6–23)
CALCIUM SERPL-MCNC: 8.9 MG/DL (ref 8.6–10.6)
CHLORIDE SERPL-SCNC: 91 MMOL/L (ref 98–107)
CO2 SERPL-SCNC: 29 MMOL/L (ref 21–32)
CREAT SERPL-MCNC: 0.88 MG/DL (ref 0.5–1.3)
EGFRCR SERPLBLD CKD-EPI 2021: >90 ML/MIN/1.73M*2
ERYTHROCYTE [DISTWIDTH] IN BLOOD BY AUTOMATED COUNT: 13 % (ref 11.5–14.5)
GLUCOSE BLD MANUAL STRIP-MCNC: 153 MG/DL (ref 74–99)
GLUCOSE BLD MANUAL STRIP-MCNC: 248 MG/DL (ref 74–99)
GLUCOSE BLD MANUAL STRIP-MCNC: 272 MG/DL (ref 74–99)
GLUCOSE BLD MANUAL STRIP-MCNC: 272 MG/DL (ref 74–99)
GLUCOSE SERPL-MCNC: 211 MG/DL (ref 74–99)
GRAM STN SPEC: ABNORMAL
GRAM STN SPEC: ABNORMAL
HCT VFR BLD AUTO: 27.5 % (ref 41–52)
HGB BLD-MCNC: 9.3 G/DL (ref 13.5–17.5)
MAGNESIUM SERPL-MCNC: 1.58 MG/DL (ref 1.6–2.4)
MCH RBC QN AUTO: 33.3 PG (ref 26–34)
MCHC RBC AUTO-ENTMCNC: 33.8 G/DL (ref 32–36)
MCV RBC AUTO: 99 FL (ref 80–100)
NRBC BLD-RTO: 0 /100 WBCS (ref 0–0)
P AXIS: 19 DEGREES
PHOSPHATE SERPL-MCNC: 4.2 MG/DL (ref 2.5–4.9)
PLATELET # BLD AUTO: 253 X10*3/UL (ref 150–450)
POTASSIUM SERPL-SCNC: 5 MMOL/L (ref 3.5–5.3)
PR INTERVAL: 175 MS
Q ONSET: 255 MS
QRS COUNT: 15 BEATS
QRS DURATION: 113 MS
QT INTERVAL: 383 MS
QTC CALCULATION(BAZETT): 467 MS
QTC FREDERICIA: 436 MS
R AXIS: 20 DEGREES
RBC # BLD AUTO: 2.79 X10*6/UL (ref 4.5–5.9)
SODIUM SERPL-SCNC: 131 MMOL/L (ref 136–145)
T AXIS: 60 DEGREES
T OFFSET: 447 MS
VANCOMYCIN SERPL-MCNC: 5.5 UG/ML (ref 5–20)
VENTRICULAR RATE: 89 BPM
WBC # BLD AUTO: 14.1 X10*3/UL (ref 4.4–11.3)

## 2024-11-23 PROCEDURE — 85027 COMPLETE CBC AUTOMATED: CPT

## 2024-11-23 PROCEDURE — 97530 THERAPEUTIC ACTIVITIES: CPT | Mod: GP

## 2024-11-23 PROCEDURE — 3700000002 HC GENERAL ANESTHESIA TIME - EACH INCREMENTAL 1 MINUTE: Performed by: STUDENT IN AN ORGANIZED HEALTH CARE EDUCATION/TRAINING PROGRAM

## 2024-11-23 PROCEDURE — 2500000001 HC RX 250 WO HCPCS SELF ADMINISTERED DRUGS (ALT 637 FOR MEDICARE OP): Performed by: PHYSICIAN ASSISTANT

## 2024-11-23 PROCEDURE — 88307 TISSUE EXAM BY PATHOLOGIST: CPT | Mod: TC,SUR | Performed by: STUDENT IN AN ORGANIZED HEALTH CARE EDUCATION/TRAINING PROGRAM

## 2024-11-23 PROCEDURE — 97165 OT EVAL LOW COMPLEX 30 MIN: CPT | Mod: GO

## 2024-11-23 PROCEDURE — 97530 THERAPEUTIC ACTIVITIES: CPT | Mod: GO

## 2024-11-23 PROCEDURE — 3600000008 HC OR TIME - EACH INCREMENTAL 1 MINUTE - PROCEDURE LEVEL THREE: Performed by: STUDENT IN AN ORGANIZED HEALTH CARE EDUCATION/TRAINING PROGRAM

## 2024-11-23 PROCEDURE — 0Y6F0ZZ DETACHMENT AT RIGHT KNEE REGION, OPEN APPROACH: ICD-10-PCS | Performed by: STUDENT IN AN ORGANIZED HEALTH CARE EDUCATION/TRAINING PROGRAM

## 2024-11-23 PROCEDURE — 1200000002 HC GENERAL ROOM WITH TELEMETRY DAILY

## 2024-11-23 PROCEDURE — 36415 COLL VENOUS BLD VENIPUNCTURE: CPT

## 2024-11-23 PROCEDURE — 7100000002 HC RECOVERY ROOM TIME - EACH INCREMENTAL 1 MINUTE: Performed by: STUDENT IN AN ORGANIZED HEALTH CARE EDUCATION/TRAINING PROGRAM

## 2024-11-23 PROCEDURE — 3600000003 HC OR TIME - INITIAL BASE CHARGE - PROCEDURE LEVEL THREE: Performed by: STUDENT IN AN ORGANIZED HEALTH CARE EDUCATION/TRAINING PROGRAM

## 2024-11-23 PROCEDURE — 88307 TISSUE EXAM BY PATHOLOGIST: CPT | Performed by: PATHOLOGY

## 2024-11-23 PROCEDURE — 2500000004 HC RX 250 GENERAL PHARMACY W/ HCPCS (ALT 636 FOR OP/ED): Performed by: STUDENT IN AN ORGANIZED HEALTH CARE EDUCATION/TRAINING PROGRAM

## 2024-11-23 PROCEDURE — 2720000007 HC OR 272 NO HCPCS: Performed by: STUDENT IN AN ORGANIZED HEALTH CARE EDUCATION/TRAINING PROGRAM

## 2024-11-23 PROCEDURE — 80069 RENAL FUNCTION PANEL: CPT

## 2024-11-23 PROCEDURE — 2500000004 HC RX 250 GENERAL PHARMACY W/ HCPCS (ALT 636 FOR OP/ED): Performed by: PHYSICIAN ASSISTANT

## 2024-11-23 PROCEDURE — 2500000004 HC RX 250 GENERAL PHARMACY W/ HCPCS (ALT 636 FOR OP/ED)

## 2024-11-23 PROCEDURE — 2500000002 HC RX 250 W HCPCS SELF ADMINISTERED DRUGS (ALT 637 FOR MEDICARE OP, ALT 636 FOR OP/ED): Performed by: PHYSICIAN ASSISTANT

## 2024-11-23 PROCEDURE — 83735 ASSAY OF MAGNESIUM: CPT

## 2024-11-23 PROCEDURE — 82947 ASSAY GLUCOSE BLOOD QUANT: CPT

## 2024-11-23 PROCEDURE — 2500000005 HC RX 250 GENERAL PHARMACY W/O HCPCS: Performed by: STUDENT IN AN ORGANIZED HEALTH CARE EDUCATION/TRAINING PROGRAM

## 2024-11-23 PROCEDURE — 99231 SBSQ HOSP IP/OBS SF/LOW 25: CPT | Performed by: SURGERY

## 2024-11-23 PROCEDURE — 2500000001 HC RX 250 WO HCPCS SELF ADMINISTERED DRUGS (ALT 637 FOR MEDICARE OP): Performed by: STUDENT IN AN ORGANIZED HEALTH CARE EDUCATION/TRAINING PROGRAM

## 2024-11-23 PROCEDURE — 7100000001 HC RECOVERY ROOM TIME - INITIAL BASE CHARGE: Performed by: STUDENT IN AN ORGANIZED HEALTH CARE EDUCATION/TRAINING PROGRAM

## 2024-11-23 PROCEDURE — 97161 PT EVAL LOW COMPLEX 20 MIN: CPT | Mod: GP

## 2024-11-23 PROCEDURE — 3700000001 HC GENERAL ANESTHESIA TIME - INITIAL BASE CHARGE: Performed by: STUDENT IN AN ORGANIZED HEALTH CARE EDUCATION/TRAINING PROGRAM

## 2024-11-23 PROCEDURE — 80202 ASSAY OF VANCOMYCIN: CPT | Performed by: PHYSICIAN ASSISTANT

## 2024-11-23 PROCEDURE — 2500000005 HC RX 250 GENERAL PHARMACY W/O HCPCS

## 2024-11-23 RX ORDER — PHENYLEPHRINE HYDROCHLORIDE 10 MG/ML
INJECTION INTRAVENOUS AS NEEDED
Status: DISCONTINUED | OUTPATIENT
Start: 2024-11-23 | End: 2024-11-23

## 2024-11-23 RX ORDER — LABETALOL HYDROCHLORIDE 5 MG/ML
5 INJECTION, SOLUTION INTRAVENOUS ONCE AS NEEDED
Status: DISCONTINUED | OUTPATIENT
Start: 2024-11-23 | End: 2024-11-23 | Stop reason: HOSPADM

## 2024-11-23 RX ORDER — METOPROLOL TARTRATE 1 MG/ML
INJECTION, SOLUTION INTRAVENOUS AS NEEDED
Status: DISCONTINUED | OUTPATIENT
Start: 2024-11-23 | End: 2024-11-23

## 2024-11-23 RX ORDER — HYDROMORPHONE HYDROCHLORIDE 0.2 MG/ML
0.2 INJECTION INTRAMUSCULAR; INTRAVENOUS; SUBCUTANEOUS EVERY 5 MIN PRN
Status: DISCONTINUED | OUTPATIENT
Start: 2024-11-23 | End: 2024-11-23 | Stop reason: HOSPADM

## 2024-11-23 RX ORDER — ALBUTEROL SULFATE 0.83 MG/ML
2.5 SOLUTION RESPIRATORY (INHALATION) ONCE AS NEEDED
Status: DISCONTINUED | OUTPATIENT
Start: 2024-11-23 | End: 2024-11-23 | Stop reason: HOSPADM

## 2024-11-23 RX ORDER — OXYCODONE HYDROCHLORIDE 5 MG/1
10 TABLET ORAL EVERY 4 HOURS PRN
Status: DISCONTINUED | OUTPATIENT
Start: 2024-11-23 | End: 2024-11-23 | Stop reason: HOSPADM

## 2024-11-23 RX ORDER — LIDOCAINE HYDROCHLORIDE 20 MG/ML
INJECTION, SOLUTION INFILTRATION; PERINEURAL AS NEEDED
Status: DISCONTINUED | OUTPATIENT
Start: 2024-11-23 | End: 2024-11-23

## 2024-11-23 RX ORDER — ACETAMINOPHEN 325 MG/1
975 TABLET ORAL EVERY 6 HOURS PRN
Status: DISCONTINUED | OUTPATIENT
Start: 2024-11-23 | End: 2024-11-23 | Stop reason: HOSPADM

## 2024-11-23 RX ORDER — VANCOMYCIN/0.9 % SOD CHLORIDE 1.5G/250ML
1500 PLASTIC BAG, INJECTION (ML) INTRAVENOUS EVERY 12 HOURS
Status: DISCONTINUED | OUTPATIENT
Start: 2024-11-23 | End: 2024-11-28

## 2024-11-23 RX ORDER — SODIUM CHLORIDE 0.9 G/100ML
IRRIGANT IRRIGATION AS NEEDED
Status: DISCONTINUED | OUTPATIENT
Start: 2024-11-23 | End: 2024-11-23 | Stop reason: HOSPADM

## 2024-11-23 RX ORDER — FENTANYL CITRATE 50 UG/ML
INJECTION, SOLUTION INTRAMUSCULAR; INTRAVENOUS AS NEEDED
Status: DISCONTINUED | OUTPATIENT
Start: 2024-11-23 | End: 2024-11-23

## 2024-11-23 RX ORDER — ONDANSETRON HYDROCHLORIDE 2 MG/ML
4 INJECTION, SOLUTION INTRAVENOUS ONCE AS NEEDED
Status: DISCONTINUED | OUTPATIENT
Start: 2024-11-23 | End: 2024-11-23 | Stop reason: HOSPADM

## 2024-11-23 RX ORDER — ONDANSETRON HYDROCHLORIDE 2 MG/ML
INJECTION, SOLUTION INTRAVENOUS AS NEEDED
Status: DISCONTINUED | OUTPATIENT
Start: 2024-11-23 | End: 2024-11-23

## 2024-11-23 RX ORDER — HYDROMORPHONE HYDROCHLORIDE 1 MG/ML
INJECTION, SOLUTION INTRAMUSCULAR; INTRAVENOUS; SUBCUTANEOUS AS NEEDED
Status: DISCONTINUED | OUTPATIENT
Start: 2024-11-23 | End: 2024-11-23

## 2024-11-23 RX ORDER — CEFAZOLIN 1 G/1
INJECTION, POWDER, FOR SOLUTION INTRAVENOUS AS NEEDED
Status: DISCONTINUED | OUTPATIENT
Start: 2024-11-23 | End: 2024-11-23

## 2024-11-23 RX ORDER — PROPOFOL 10 MG/ML
INJECTION, EMULSION INTRAVENOUS AS NEEDED
Status: DISCONTINUED | OUTPATIENT
Start: 2024-11-23 | End: 2024-11-23

## 2024-11-23 RX ORDER — GABAPENTIN 300 MG/1
300 CAPSULE ORAL 3 TIMES DAILY
Status: DISCONTINUED | OUTPATIENT
Start: 2024-11-23 | End: 2024-11-24

## 2024-11-23 RX ORDER — OXYCODONE HYDROCHLORIDE 5 MG/1
5 TABLET ORAL EVERY 4 HOURS PRN
Status: DISCONTINUED | OUTPATIENT
Start: 2024-11-23 | End: 2024-11-23 | Stop reason: HOSPADM

## 2024-11-23 RX ORDER — SODIUM CHLORIDE, SODIUM LACTATE, POTASSIUM CHLORIDE, CALCIUM CHLORIDE 600; 310; 30; 20 MG/100ML; MG/100ML; MG/100ML; MG/100ML
100 INJECTION, SOLUTION INTRAVENOUS CONTINUOUS
Status: DISCONTINUED | OUTPATIENT
Start: 2024-11-23 | End: 2024-11-23 | Stop reason: HOSPADM

## 2024-11-23 RX ORDER — SUCCINYLCHOLINE CHLORIDE 20 MG/ML
INJECTION INTRAMUSCULAR; INTRAVENOUS AS NEEDED
Status: DISCONTINUED | OUTPATIENT
Start: 2024-11-23 | End: 2024-11-23

## 2024-11-23 RX ORDER — METHOCARBAMOL 100 MG/ML
1000 INJECTION, SOLUTION INTRAMUSCULAR; INTRAVENOUS EVERY 8 HOURS SCHEDULED
Status: DISCONTINUED | OUTPATIENT
Start: 2024-11-23 | End: 2024-11-24

## 2024-11-23 RX ORDER — HYDRALAZINE HYDROCHLORIDE 20 MG/ML
5 INJECTION INTRAMUSCULAR; INTRAVENOUS EVERY 30 MIN PRN
Status: DISCONTINUED | OUTPATIENT
Start: 2024-11-23 | End: 2024-11-23 | Stop reason: HOSPADM

## 2024-11-23 RX ADMIN — GABAPENTIN 300 MG: 300 CAPSULE ORAL at 14:59

## 2024-11-23 RX ADMIN — OXYCODONE HYDROCHLORIDE 10 MG: 10 TABLET ORAL at 05:41

## 2024-11-23 RX ADMIN — ASPIRIN 81 MG: 81 TABLET, COATED ORAL at 09:35

## 2024-11-23 RX ADMIN — LIDOCAINE HYDROCHLORIDE 100 MG: 20 INJECTION, SOLUTION INFILTRATION; PERINEURAL at 00:08

## 2024-11-23 RX ADMIN — PHENYLEPHRINE HYDROCHLORIDE 120 MCG: 10 INJECTION INTRAVENOUS at 01:14

## 2024-11-23 RX ADMIN — PHENYLEPHRINE HYDROCHLORIDE 120 MCG: 10 INJECTION INTRAVENOUS at 00:22

## 2024-11-23 RX ADMIN — HYDROMORPHONE HYDROCHLORIDE 0.5 MG: 1 INJECTION, SOLUTION INTRAMUSCULAR; INTRAVENOUS; SUBCUTANEOUS at 01:45

## 2024-11-23 RX ADMIN — CEFAZOLIN 2 G: 1 INJECTION, POWDER, FOR SOLUTION INTRAMUSCULAR; INTRAVENOUS at 00:15

## 2024-11-23 RX ADMIN — PHENYLEPHRINE HYDROCHLORIDE 120 MCG: 10 INJECTION INTRAVENOUS at 00:48

## 2024-11-23 RX ADMIN — SUCCINYLCHOLINE CHLORIDE 60 MG: 20 INJECTION, SOLUTION INTRAMUSCULAR; INTRAVENOUS at 00:08

## 2024-11-23 RX ADMIN — TRAZODONE HYDROCHLORIDE 50 MG: 50 TABLET ORAL at 22:10

## 2024-11-23 RX ADMIN — PROPOFOL 30 MG: 10 INJECTION, EMULSION INTRAVENOUS at 01:02

## 2024-11-23 RX ADMIN — HYDROMORPHONE HYDROCHLORIDE 0.2 MG: 0.2 INJECTION, SOLUTION INTRAMUSCULAR; INTRAVENOUS; SUBCUTANEOUS at 08:37

## 2024-11-23 RX ADMIN — OXYCODONE HYDROCHLORIDE 10 MG: 10 TABLET ORAL at 15:19

## 2024-11-23 RX ADMIN — PHENYLEPHRINE HYDROCHLORIDE 80 MCG: 10 INJECTION INTRAVENOUS at 01:06

## 2024-11-23 RX ADMIN — OXYCODONE HYDROCHLORIDE 10 MG: 10 TABLET ORAL at 09:34

## 2024-11-23 RX ADMIN — METHOCARBAMOL 1000 MG: 100 INJECTION INTRAMUSCULAR; INTRAVENOUS at 22:04

## 2024-11-23 RX ADMIN — FENTANYL CITRATE 50 MCG: 50 INJECTION, SOLUTION INTRAMUSCULAR; INTRAVENOUS at 00:27

## 2024-11-23 RX ADMIN — ATORVASTATIN CALCIUM 40 MG: 40 TABLET, FILM COATED ORAL at 09:35

## 2024-11-23 RX ADMIN — GABAPENTIN 300 MG: 300 CAPSULE ORAL at 11:59

## 2024-11-23 RX ADMIN — SODIUM CHLORIDE, SODIUM LACTATE, POTASSIUM CHLORIDE, AND CALCIUM CHLORIDE: 600; 310; 30; 20 INJECTION, SOLUTION INTRAVENOUS at 00:08

## 2024-11-23 RX ADMIN — HYDROMORPHONE HYDROCHLORIDE 1 MG: 1 INJECTION, SOLUTION INTRAMUSCULAR; INTRAVENOUS; SUBCUTANEOUS at 00:58

## 2024-11-23 RX ADMIN — ESCITALOPRAM OXALATE 10 MG: 10 TABLET ORAL at 09:36

## 2024-11-23 RX ADMIN — Medication 2 L/MIN: at 01:49

## 2024-11-23 RX ADMIN — PROPOFOL 30 MG: 10 INJECTION, EMULSION INTRAVENOUS at 00:56

## 2024-11-23 RX ADMIN — GABAPENTIN 300 MG: 300 CAPSULE ORAL at 22:05

## 2024-11-23 RX ADMIN — PIPERACILLIN SODIUM AND TAZOBACTAM SODIUM 3.38 G: 3; .375 INJECTION, SOLUTION INTRAVENOUS at 17:56

## 2024-11-23 RX ADMIN — PROPOFOL 30 MG: 10 INJECTION, EMULSION INTRAVENOUS at 01:07

## 2024-11-23 RX ADMIN — METHOCARBAMOL 1000 MG: 100 INJECTION INTRAMUSCULAR; INTRAVENOUS at 15:15

## 2024-11-23 RX ADMIN — PIPERACILLIN SODIUM AND TAZOBACTAM SODIUM 3.38 G: 3; .375 INJECTION, SOLUTION INTRAVENOUS at 03:58

## 2024-11-23 RX ADMIN — METOPROLOL TARTRATE 5 MG: 5 INJECTION, SOLUTION INTRAVENOUS at 00:08

## 2024-11-23 RX ADMIN — Medication 1250 MG: at 09:38

## 2024-11-23 RX ADMIN — INSULIN HUMAN 6 UNITS: 100 INJECTION, SOLUTION PARENTERAL at 13:00

## 2024-11-23 RX ADMIN — ONDANSETRON 4 MG: 2 INJECTION INTRAMUSCULAR; INTRAVENOUS at 01:23

## 2024-11-23 RX ADMIN — ACETAMINOPHEN 650 MG: 325 TABLET, FILM COATED ORAL at 09:34

## 2024-11-23 RX ADMIN — PROPOFOL 80 MG: 10 INJECTION, EMULSION INTRAVENOUS at 00:08

## 2024-11-23 RX ADMIN — PROPOFOL 30 MG: 10 INJECTION, EMULSION INTRAVENOUS at 01:17

## 2024-11-23 RX ADMIN — ACETAMINOPHEN 650 MG: 325 TABLET, FILM COATED ORAL at 13:01

## 2024-11-23 RX ADMIN — INSULIN GLARGINE 15 UNITS: 100 INJECTION, SOLUTION SUBCUTANEOUS at 11:58

## 2024-11-23 RX ADMIN — ACETAMINOPHEN 650 MG: 325 TABLET, FILM COATED ORAL at 22:05

## 2024-11-23 RX ADMIN — PHENYLEPHRINE HYDROCHLORIDE 80 MCG: 10 INJECTION INTRAVENOUS at 01:10

## 2024-11-23 RX ADMIN — FENTANYL CITRATE 50 MCG: 50 INJECTION, SOLUTION INTRAMUSCULAR; INTRAVENOUS at 00:31

## 2024-11-23 RX ADMIN — HYDROMORPHONE HYDROCHLORIDE 0.2 MG: 0.2 INJECTION, SOLUTION INTRAMUSCULAR; INTRAVENOUS; SUBCUTANEOUS at 18:05

## 2024-11-23 RX ADMIN — INSULIN HUMAN 4 UNITS: 100 INJECTION, SOLUTION PARENTERAL at 17:13

## 2024-11-23 RX ADMIN — Medication 1250 MG: at 10:08

## 2024-11-23 RX ADMIN — PIPERACILLIN SODIUM AND TAZOBACTAM SODIUM 3.38 G: 3; .375 INJECTION, SOLUTION INTRAVENOUS at 12:02

## 2024-11-23 RX ADMIN — ENOXAPARIN SODIUM 40 MG: 100 INJECTION SUBCUTANEOUS at 05:29

## 2024-11-23 RX ADMIN — INSULIN HUMAN 6 UNITS: 100 INJECTION, SOLUTION PARENTERAL at 22:11

## 2024-11-23 RX ADMIN — PHENYLEPHRINE HYDROCHLORIDE 80 MCG: 10 INJECTION INTRAVENOUS at 00:58

## 2024-11-23 ASSESSMENT — COGNITIVE AND FUNCTIONAL STATUS - GENERAL
CLIMB 3 TO 5 STEPS WITH RAILING: TOTAL
MOBILITY SCORE: 12
HELP NEEDED FOR BATHING: A LOT
DRESSING REGULAR LOWER BODY CLOTHING: A LOT
DAILY ACTIVITIY SCORE: 20
CLIMB 3 TO 5 STEPS WITH RAILING: TOTAL
MOBILITY SCORE: 13
HELP NEEDED FOR BATHING: A LOT
DAILY ACTIVITIY SCORE: 19
WALKING IN HOSPITAL ROOM: TOTAL
HELP NEEDED FOR BATHING: A LITTLE
DRESSING REGULAR LOWER BODY CLOTHING: A LITTLE
WALKING IN HOSPITAL ROOM: TOTAL
DRESSING REGULAR UPPER BODY CLOTHING: A LITTLE
TURNING FROM BACK TO SIDE WHILE IN FLAT BAD: A LITTLE
MOBILITY SCORE: 13
STANDING UP FROM CHAIR USING ARMS: TOTAL
DRESSING REGULAR LOWER BODY CLOTHING: A LITTLE
TOILETING: A LITTLE
MOVING TO AND FROM BED TO CHAIR: A LOT
DAILY ACTIVITIY SCORE: 19
STANDING UP FROM CHAIR USING ARMS: A LOT
CLIMB 3 TO 5 STEPS WITH RAILING: TOTAL
TOILETING: A LITTLE
MOVING TO AND FROM BED TO CHAIR: A LOT
TOILETING: A LITTLE
WALKING IN HOSPITAL ROOM: TOTAL
MOVING FROM LYING ON BACK TO SITTING ON SIDE OF FLAT BED WITH BEDRAILS: A LITTLE
DRESSING REGULAR UPPER BODY CLOTHING: A LITTLE
STANDING UP FROM CHAIR USING ARMS: TOTAL
MOVING TO AND FROM BED TO CHAIR: A LOT

## 2024-11-23 ASSESSMENT — PAIN SCALES - GENERAL
PAINLEVEL_OUTOF10: 4
PAINLEVEL_OUTOF10: 4
PAINLEVEL_OUTOF10: 7
PAINLEVEL_OUTOF10: 7
PAINLEVEL_OUTOF10: 6
PAINLEVEL_OUTOF10: 4
PAINLEVEL_OUTOF10: 5 - MODERATE PAIN
PAINLEVEL_OUTOF10: 8
PAINLEVEL_OUTOF10: 4
PAINLEVEL_OUTOF10: 6
PAINLEVEL_OUTOF10: 6
PAINLEVEL_OUTOF10: 8

## 2024-11-23 ASSESSMENT — PAIN - FUNCTIONAL ASSESSMENT
PAIN_FUNCTIONAL_ASSESSMENT: 0-10

## 2024-11-23 ASSESSMENT — PAIN DESCRIPTION - ORIENTATION: ORIENTATION: RIGHT

## 2024-11-23 ASSESSMENT — ACTIVITIES OF DAILY LIVING (ADL)
BATHING_ASSISTANCE: MODERATE
ADL_ASSISTANCE: INDEPENDENT

## 2024-11-23 ASSESSMENT — PAIN DESCRIPTION - LOCATION: LOCATION: LEG

## 2024-11-23 NOTE — ANESTHESIA PROCEDURE NOTES
Airway  Date/Time: 11/23/2024 12:11 AM  Urgency: elective    Airway not difficult    Staffing  Performed: resident   Authorized by: William Red MD    Performed by: Mary Olivier DO  Patient location during procedure: OR    Indications and Patient Condition  Indications for airway management: anesthesia  Spontaneous Ventilation: absent  Sedation level: deep  Preoxygenated: yes  Patient position: sniffing  Mask difficulty assessment: 2 - vent by mask + OA or adjuvant +/- NMBA  Planned trial extubation    Final Airway Details  Final airway type: endotracheal airway      Successful airway: ETT  Cuffed: yes   Successful intubation technique: direct laryngoscopy  Facilitating devices/methods: intubating stylet  Endotracheal tube insertion site: oral  Blade: Fred  Blade size: #3  ETT size (mm): 7.5  Cormack-Lehane Classification: grade I - full view of glottis  Placement verified by: capnometry   Measured from: teeth  ETT to teeth (cm): 22  Number of attempts at approach: 1

## 2024-11-23 NOTE — SIGNIFICANT EVENT
Patient underwent right BKA overnight after surgery with podiatry last night. Vascular surgery is managing the BKA. Podiatry to sign off. Patient sees Dr. Villegas at [facility] and will continue to follow with her for left foot care.    Vladislav Xiong, CALE PGY-3

## 2024-11-23 NOTE — ANESTHESIA PREPROCEDURE EVALUATION
Patient: Errol Fonseca    Procedure Information       Date/Time: 11/22/24 2000    Procedure: Debridement Lower Extremity (Right)    [facility]    Surgeons: Seamus Lino DPM            Relevant Problems   Anesthesia (within normal limits)      Cardiac   (+) Coronary artery disease   (+) Coronary artery disease involving native coronary artery of native heart with unstable angina pectoris   (+) Hyperlipidemia   (+) Hypertension      Liver   (+) Alcoholic cirrhosis (Multi)   (+) Hepatic cirrhosis (Multi)      Endocrine   (+) Type 2 diabetes mellitus, with long-term current use of insulin      Musculoskeletal   (+) Foot ulcer with fat layer exposed, right (Multi)      ID   (+) Necrotizing fasciitis of lower leg (Multi)      Circulatory   (+) S/P right coronary artery (RCA) stent placement       Clinical information reviewed:   Tobacco  Allergies  Meds   Med Hx  Surg Hx   Fam Hx  Soc Hx        NPO Detail:  No data recorded     Physical Exam    Airway  Mallampati: III  TM distance: >3 FB     Cardiovascular    Dental    Pulmonary    Abdominal          Anesthesia Plan    History of general anesthesia?: yes  History of complications of general anesthesia?: no    ASA 3     general     intravenous induction   Postoperative administration of opioids is intended.  Trial extubation is planned.  Anesthetic plan and risks discussed with patient.  Use of blood products discussed with patient who.    Plan discussed with resident.

## 2024-11-23 NOTE — PROGRESS NOTES
[facility]  ACUTE CARE SURGERY - PROGRESS NOTE    Patient Name: Errol Fonseca  MRN: [id number]  Admit Date: 1120  : 1970  AGE: 54 y.o.   GENDER: male  ==============================================================================  TODAY'S ASSESSMENT AND PLAN OF CARE:  54M presents with 2 weeks of RLE pain and swelling. Leukocytosis, MRSA, CT showing gas up to knee. Transferred to [facility] for concerns of RLE Necrotizing Fascitis.     : LE I&D (ACS), amputation 2nd toe (podiatry)    : OR with podiatry  : OR with vascular surgery    - multimodal pain  - ISS  - cnt home asa, atorvastatin  - holding home lisinopril-hydrochlorothiazide and metoprolol  - bowel reg  - strict I/Os  - IVF  - Trending daily CBCs  - Holding home metformin and dulaglutide ==> SSI and glargine  - POD 0 R guillotine knee disarticulation  - DVT ppx  - Will pursue a Transfer to medicine     Patient discussed with attending physician, Dr. Hobbs.     Amaya Boucher MD  General Surgery PGY-1  Acute Care Surgery [id number]       Subjective   ==============================================================================  CHIEF COMPLAINT / EVENTS LAST 24HRS / HPI:  Overnight, patient went to the OR with vascular surgery for a AKA of his RLE, pending formalization. He tolerated the procedure well and is back on the floor.     MEDICAL HISTORY / ROS:   Admission history reviewed. Pertinent changes as follows:  None.    A 12-point review of systems was performed, and was negative except as above.     Objective   Vital Signs past 24h:  Heart Rate:  [74-90]   Temp:  [35.9 °C (96.7 °F)-36.9 °C (98.4 °F)]   Resp:  [10-22]   BP: (108-162)/(56-85)   SpO2:  [94 %-100 %]     I/O past 24h:  I/O last 2 completed shifts:  In: 2177.1 (21.3 mL/kg) [P.O.:110; I.V.:617.1 (6 mL/kg); IV Piggyback:1450]  Out: 1665 (16.3 mL/kg) [Urine:1645 (0.7 mL/kg/hr); Blood:20]  Weight: 102 kg      PHYSICAL EXAM:  Neuro/gen: no  acute distress  Card: RRR  Pulm: RA normal effort  Abd: soft, nontender, nondistended  Extremeties: Right lower extremity stump s/p OR amp wrapped in ace and kerlex with minimal strikethrough. Appropriately tender.    Labs past 24h:  Results for orders placed or performed during the hospital encounter of 11/20/24 (from the past 24 hours)   POCT GLUCOSE   Result Value Ref Range    POCT Glucose 113 (H) 74 - 99 mg/dL   POCT GLUCOSE   Result Value Ref Range    POCT Glucose 153 (H) 74 - 99 mg/dL   Vancomycin   Result Value Ref Range    Vancomycin 5.5 5.0 - 20.0 ug/mL   CBC   Result Value Ref Range    WBC 14.1 (H) 4.4 - 11.3 x10*3/uL    nRBC 0.0 0.0 - 0.0 /100 WBCs    RBC 2.79 (L) 4.50 - 5.90 x10*6/uL    Hemoglobin 9.3 (L) 13.5 - 17.5 g/dL    Hematocrit 27.5 (L) 41.0 - 52.0 %    MCV 99 80 - 100 fL    MCH 33.3 26.0 - 34.0 pg    MCHC 33.8 32.0 - 36.0 g/dL    RDW 13.0 11.5 - 14.5 %    Platelets 253 150 - 450 x10*3/uL   Magnesium   Result Value Ref Range    Magnesium 1.58 (L) 1.60 - 2.40 mg/dL   Renal function panel   Result Value Ref Range    Glucose 211 (H) 74 - 99 mg/dL    Sodium 131 (L) 136 - 145 mmol/L    Potassium 5.0 3.5 - 5.3 mmol/L    Chloride 91 (L) 98 - 107 mmol/L    Bicarbonate 29 21 - 32 mmol/L    Anion Gap 16 10 - 20 mmol/L    Urea Nitrogen 16 6 - 23 mg/dL    Creatinine 0.88 0.50 - 1.30 mg/dL    eGFR >90 >60 mL/min/1.73m*2    Calcium 8.9 8.6 - 10.6 mg/dL    Phosphorus 4.2 2.5 - 4.9 mg/dL    Albumin 2.7 (L) 3.4 - 5.0 g/dL       Imaging within past 24h:  No results found.    I have reviewed the imaging above as it pertains to the patient's surgical concerns and agree with the radiologist's interpretation.     Patient's exam, labs, and findings discussed with Dr. Hobbs, who agrees with the plan as described above.    Amaya Boucher MD  PGY-1 General Surgery  Acute Care Surgery [id number]

## 2024-11-23 NOTE — BRIEF OP NOTE
Date: 2024  OR Location: [facility] OR    Name: Errol Fonseca, : 1970, Age: 54 y.o., MRN: [id number], Sex: male    Diagnosis  Pre-op Diagnosis      * Necrotizing fasciitis of lower leg (Multi) [M72.6] Post-op Diagnosis     * Necrotizing fasciitis of lower leg (Multi) [M72.6]     Procedures  Guillotine right knee disarticulation      Surgeons      * Oliverio Sánchez - Primary    Resident/Fellow/Other Assistant:  Surgeons and Role:     * Martina Beck MD - Fellow    Staff:   Surgical Assistant:   Circulator: Tricia Hodgson Person: Bahman    Anesthesia Staff: Anesthesiologist: William Red MD  Anesthesia Resident: Mary Olivier DO    Procedure Summary  Anesthesia: General  ASA: III  Estimated Blood Loss: 50mL  Intra-op Medications:   Administrations occurring from 24 7295 to 24 0225:   Medication Name Total Dose   sodium chloride 0.9 % irrigation solution 1,000 mL   ceFAZolin (Ancef) vial 1 g 2 g   fentaNYL (Sublimaze) injection 50 mcg/mL 100 mcg   HYDROmorphone (Dilaudid) injection 1 mg/mL 1 mg   LR bolus Cannot be calculated   lidocaine (Xylocaine) 2 % 100 mg   metoprolol 5 mg/5 mL 5 mg   ondansetron (Zofran) 2 mg/mL injection 4 mg   phenylephrine (Russell-Synephrine) injection 600 mcg   propofol (Diprivan) injection 10 mg/mL 200 mg   succinylcholine (Anectine) 20 mg/mL injection 60 mg              Anesthesia Record               Intraprocedure I/O Totals          Intake    LR bolus 400.00 mL    Total Intake 400 mL          Specimen:   ID Type Source Tests Collected by Time   1 : RIGHT LEG, BELOW THE KNEE Tissue LEG AMPUTATION BELOW THE KNEE RIGHT SURGICAL PATHOLOGY EXAM Oliverio Sánchez MD 2024 0143                  Findings: necrotic muscle into the calf    Complications:  None; patient tolerated the procedure well.     Disposition: PACU - hemodynamically stable.  Condition: stable  Specimens Collected:   ID Type Source Tests Collected by Time   1 : RIGHT LEG, BELOW THE KNEE  Tissue LEG AMPUTATION BELOW THE KNEE RIGHT SURGICAL PATHOLOGY EXAM Oliverio Sánchez MD 11/23/2024 0143     Attending Attestation:     Oliverio Sánchez  Phone Number: [phone]

## 2024-11-23 NOTE — PROGRESS NOTES
Vancomycin Dosing by Pharmacy- FOLLOW UP    Errol Fonseca is a 54 y.o. year old male who Pharmacy has been consulted for vancomycin dosing for cellulitis, skin and soft tissue. Based on the patient's indication and renal status this patient is being dosed based on a goal AUC of 400-600.     Renal function is currently improving.    Current vancomycin dose: 1250 mg given every 12 hours    Estimated vancomycin AUC on current dose: 396 mg/L.hr     Visit Vitals  /72 (BP Location: Left arm, Patient Position: Lying)   Pulse 82   Temp 35.9 °C (96.7 °F) (Temporal)   Resp 16        Lab Results   Component Value Date    CREATININE 0.88 2024    CREATININE 1.02 2024    CREATININE 1.34 (H) 2024    CREATININE 1.54 (H) 2024        Patient weight is as follows:   Vitals:    24 1127   Weight: 102 kg (224 lb 13.9 oz)       Cultures:  Susceptibility data for the encounter in last 14 days.  Collected Specimen Info Organism   24 Swab from ABSCESS Mixed Gram-Positive Bacteria         I/O last 3 completed shifts:  In: 3827.1 (37.5 mL/kg) [P.O.:310; I.V.:1817.1 (17.8 mL/kg); IV Piggyback:1700]  Out: 3765 (36.9 mL/kg) [Urine:3745 (1 mL/kg/hr); Blood:20]  Weight: 102 kg   I/O during current shift:  No intake/output data recorded.    Temp (24hrs), Av.4 °C (97.6 °F), Min:35.9 °C (96.7 °F), Max:36.9 °C (98.4 °F)      Assessment/Plan    Below goal AUC. Orders placed for new vancomcyin regimen of 1500 mg every 12 hours to begin at 2000.     This dosing regimen is predicted by [employer] to result in the following pharmacokinetic parameters:  Regimen: 1500 mg IV every 12 hours.  Start time: 22:08 on 2024  Exposure target: AUC24 (range)400-600 mg/L.hr   [id number]: 482 mg/L.hr  AUC24,ss: 470 mg/L.hr  Probability of AUC24 > 400: 86 %  Ctrough,ss: 13.4 mg/L  Probability of Ctrough,ss > 20: 1 %  The next level will be obtained on  at AM. May be obtained sooner if clinically indicated.   Will  continue to monitor renal function daily while on vancomycin and order serum creatinine at least every 48 hours if not already ordered.  Follow for continued vancomycin needs, clinical response, and signs/symptoms of toxicity.       Raine Leija, PharmD

## 2024-11-23 NOTE — CARE PLAN
The patient's goals for the shift include      The clinical goals for the shift include pt will have pain controlled

## 2024-11-23 NOTE — TRANSFER OF CARE
[facility] that were determined to be non-salvageable by podiatry. He was admitted initially 11/20 with concern for NSTI stemming from 1 week old R foot wound. At that time underwent index debridement of foot wounds with podiatry. His take back with podiatry 11/22 was when the need for BKA was discovered. Vascular surgery was involved for this procedure. He was transferred to [facility] from ICU post-operatively. He is doing well with a multimodal pain regimen and has been HDS. Remains on vancomycin/zosyn. Care is now transferred from general surgery to vascular surgery team.              9.3     14.1>-----<253              27.5   131  91  16                  ----------------<211     5.0  29  0.88          Ca 8.9 Phos 4.2 Mg 1.58       ALT 36 AST 61 AlkPhos 144 tBili 1.0          Vitals:    11/23/24 1658   BP: 117/72   Pulse: 71   Resp: 17   Temp: 36.4 °C (97.5 °F)   SpO2: 97%      Remy Corral MD  General Surgery, pgy4  ACS [id number]    Patient discussed with attending.

## 2024-11-23 NOTE — OP NOTE
Debridement Lower Extremity (R) Operative Note     Date: 2024  OR Location: [facility] OR    Name: Errol Fonseca, : 1970, Age: 54 y.o., MRN: [id number], Sex: male    Diagnosis  Pre-op Diagnosis      * Necrotizing fasciitis of lower leg (Multi) [M72.6] Post-op Diagnosis     * Necrotizing fasciitis of lower leg (Multi) [M72.6]     Procedures  Debridement Lower Extremity  50478 - NH DEBRIDEMENT BONE 1ST 20 SQ CM/<      Surgeons      * Seamus Lino - Primary    Resident/Fellow/Other Assistant:  Surgeons and Role:     * Kenny Rosenbaum DPM - Resident - Assisting    Staff:   Circulator: Aleida  Scrub Person: Chantale العراقي Scrub: Bahman العراقي Circulator: Tricia    Anesthesia Staff: Anesthesiologist: Morro Lee MD  Anesthesia Resident: Neo Vigil MD; Mary Olivier DO    Procedure Summary  Anesthesia: General  ASA: III  Estimated Blood Loss: 20 mL  Intra-op Medications:   Administrations occurring from  to  on 24:   Medication Name Total Dose   insulin regular (HumuLIN R,NovoLIN R) injection 0-10 Units Cannot be calculated   nicotine (Nicoderm CQ) 14 mg/24 hr patch 1 patch Cannot be calculated   oxygen (O2) therapy 113 L   piperacillin-tazobactam (Zosyn) 3.375 g in dextrose (iso) IV 50 mL 3.375 g   dexAMETHasone (Decadron) 4 mg/mL 4 mg   fentaNYL (Sublimaze) injection 50 mcg/mL 100 mcg   HYDROmorphone (Dilaudid) injection 0.5 mg 0.5 mg   HYDROmorphone (Dilaudid) injection 1 mg/mL 0.4 mg   LR bolus Cannot be calculated   lidocaine (Xylocaine) injection 2 % 100 mg   midazolam PF (Versed) injection 1 mg/mL 2 mg   ondansetron 2 mg/mL 4 mg   oxygen (O2) therapy 120 L   phenylephrine 40 mcg/mL syringe 10 mL 1,080 mcg   propofol (Diprivan) injection 10 mg/mL 200 mg   rocuronium (ZeMuron) 50 mg/5 mL injection 50 mg   sugammadex (Bridion) 200 mg/2 mL injection 200 mg              Anesthesia Record               Intraprocedure I/O Totals          Intake    LR bolus 400.00 mL    Total  Intake 400 mL          Specimen: No specimens collected              Drains and/or Catheters: * None in log *    Tourniquet Times: N/A        Implants: Nothing was implanted during the procedure    Findings: Intraoperative findings consistent with clinical and radiographic findings.      Surgery/Procedure Details:     INDICATIONS FOR PROCEDURE:   The patient initially presented on 10/20/2024 to [facility] as an emergent transfer from [facility] for right lower extremity gas gangrene and necrotizing fasciitis.  Emergent incision and drainage was performed at that time.  Over the following two days the patient's condition worsened, prompting a decision to proceed with further debridement of necrotic tissue in the right lower extremity.    PRE-PROCEDURE INFORMATION:   In pre-op holding area, the operative extremity was clearly marked and the patient verified correct laterality of the marking.  The patient was brought to the operating room and placed on the operating table in the supine position.  A timeout was performed in which identification of the correct patient, procedure, location, and materials was done.  The operative extremity was then scrubbed, prepped and draped in the usual aseptic manner.     DESCRIPTION OF PROCEDURE:   Attention was then directed to the right dorsal foot. Utilizing a # 10 blade, sharp excisional debridement and was performed to remove necrotic and non-viable skin and tissue. Upon dissection, moderate purulence and malodor were expressed.  The exposed extensor digitorum longus tendon noted to be necrotic and was resected utilizing # 15 blade. Further dissection revealed additional necrosis laterally on the dorsal aspect of the foot. A stab incision was made in the area, followed by extension to above the ankle joint. Dissecting scissors and # 15 blade were utilized to release all surrounding tissue and excised nonviable tissue. The area was inspected and any areas of tracking, especially along  the course of tendons, were also drained and irrigated appropriately. Misonix ultrasonic debridement was used to further debride and irrigate the wound. At this time, it was determined that the leg was not salvageable. The amount of tissue necrosis is too extensive and the infection has continued to progress proximally. Patient needs a more proximal amputation and I believe this should be done fairly timely. Unfortunately, the level of amputation is beyond my scope of practice, so we will consult the vascular surgery team to assess and take over the care. The wound was dressed with dakins-soaked 4x4 gauze, Kerlix, Webril, ABDs, and an ACE wrap. Normal capillary perfusion was noted to the distal aspect of the operative extremity.     POSTOPERATIVE INFORMATION:   The patient tolerated the procedure and anesthesia well. The patient was returned to the PACU in stable condition.  Due to the extensive necrosis and gas gangrene, the foot is deemed nonsalvageable at this time.  Vascular surgery was consulted for consideration of a BKA.      Complications:  None; patient tolerated the procedure well.    Disposition: PACU - hemodynamically stable.  Condition: stable     Additional Details:     Attending Attestation: I was present and scrubbed for the entire procedure.    Seamus Lino  Phone Number: [phone]

## 2024-11-23 NOTE — CONSULTS
Inpatient consult to Medicine  Consult performed by: Nelda Montano MD  Consult ordered by: Eliz Seewll DO  Reason for consult: request transfer to medicine service          Reason For Consult  Request transfer to medicine     History Of Present Illness  Errol Fonseca is a 54 y.o. 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 Patient doing well post op with adequate pain control on current regimen. He reports he has significantly reduced his alcohol intake and hasn't had any since last weekend. He reports he quit smoking two years ago and only drinks beer on social occasions. He follows with a PCP and hepatology.      Past Medical History  He has a past medical history of Acute ischemic heart disease, unspecified, Alcoholic hepatitis (05/19/2019), Cirrhosis of liver (Multi), Coronary artery disease, Diabetes mellitus (Multi), Hypertension, Personal history of other diseases of the circulatory system, Personal history of other diseases of the musculoskeletal system and connective tissue, Personal history of other endocrine, nutritional and metabolic disease, Pneumonia of right lung due to infectious organism, unspecified part of lung (03/26/2024), SAH (subarachnoid hemorrhage) (Multi) (11/16/2021), Syncope (05/19/2019), and Tobacco use disorder (12/06/2021).    Surgical History  He has a past surgical history that includes Cardiac catheterization (05/02/2018) and Appendectomy (05/03/2018).     Social History  He reports that he quit smoking about 10 months ago. His smoking use included cigarettes. He has never used smokeless tobacco. He reports that he does not currently use alcohol. He reports that he does not use drugs.    Family  History  Family History   Problem Relation Name Age of Onset    Other (cva) Mother          Allergies  Patient has no known allergies.    Review of Systems  Denies chest pain, SOB, nausea     Physical Exam  General: NAD, resting comfortably in bed  HEENT: missing teeth, MMM, PERRL, EOMI, sclera anicteric  Neuro: Aox3, normal speech, NFD, moving all extremities  CV: RRR  Pulm: CTAB, no wheezing or crackles  GI: soft, nondistended, nontender, normal bowel sounds  Ext: BKA of RLE wrapped in ACE bandage, no edema of ANTIONE, 2+ DP pulse LLE       Last Recorded Vitals  /66 (BP Location: Left arm, Patient Position: Lying)   Pulse 82   Temp 36.2 °C (97.1 °F) (Temporal)   Resp 16   Wt 102 kg (224 lb 13.9 oz)   SpO2 95%     Relevant Results  See imaging and labs      Assessment/Plan   Errol Fonseca is a 54 y.o. 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 Patient remains inpatient awaiting formalization surgery with vascular surgery with surgical date TBD. Patient's medical comorbidities are stable and are currently being adequately managed by ACS team. There is no clear benefit to transfer the patient to a medicine service at this time.     Recommendations:   -Pt to remain on surgical service to await formalization surgery    Medicine will sign off. Please contact with any further questions. Thank you for including us in this patient's care.     Nelda Montano MD

## 2024-11-23 NOTE — PROGRESS NOTES
Physical Therapy    Physical Therapy Evaluation & Treatment    Patient Name: Errol Fonseca  MRN: [id number]  Department: [place] [address]  Room: [facility]8034  Today's Date: 11/23/2024   Time Calculation  Start Time: 1227  Stop Time: 1251  Time Calculation (min): 24 min    Assessment/Plan   PT Assessment  PT Assessment Results: Decreased strength, Decreased endurance, Impaired balance, Decreased mobility, Pain, Orthopedic restrictions, Impaired sensation  Rehab Prognosis: Excellent  Barriers to Discharge: Functional mobility  Evaluation/Treatment Tolerance: Patient tolerated treatment well  Medical Staff Made Aware: Yes  Strengths: Premorbid level of function, Attitude of self  Barriers to Participation: Comorbidities  End of Session Communication: Bedside nurse  Assessment Comment: Previously independent 54 y.o. male s/p R knee disarticulation. Pt able to transfer to standing with assist of 2 and RW. Pt is limited by pain, decreased balance, and weakness. Pt is a great candidate for High Intensity Rehab after DC to address these deficits.  End of Session Patient Position: Bed, 3 rail up, Alarm off, not on at start of session   IP OR SWING BED PT PLAN  Inpatient or Swing Bed: Inpatient  PT Plan  Treatment/Interventions: Bed mobility, Transfer training, Gait training, Stair training, Balance training, Neuromuscular re-education, Strengthening, Endurance training, Range of motion, Therapeutic activity, Therapeutic exercise  PT Plan: Ongoing PT  PT Frequency: 5 times per week  PT Discharge Recommendations: High intensity level of continued care  Equipment Recommended upon Discharge: Wheeled walker  PT Recommended Transfer Status: Assist x2, Assistive device  PT - OK to Discharge: Yes      Subjective     General Visit Information:  General  Reason for Referral: R knee disarticulation  Past Medical History Relevant to Rehab: DM, CAD s/p PCI, HTN, HLD, alcoholic cirrhosis, prior left 5th toe amputation  Missed Visit: No  Missed  Visit Reason:  (--)  Family/Caregiver Present: No  Co-Treatment: OT  Co-Treatment Reason: To optimize pt functional mobility and safety  Prior to Session Communication: Bedside nurse  Patient Position Received: Bed, 3 rail up, Alarm off, not on at start of session  Preferred Learning Style: auditory, verbal, visual  General Comment: Pt plesant and agreeable to PT session  Home Living:  Home Living  Type of Home: House  Lives With: Adult children, Grandchildren (Daughter, son in law, 3 grandchildren)  Home Adaptive Equipment: None  Home Layout: Multi-level, Stairs to alternate level with rails  Alternate Level Stairs-Rails: Right  Alternate Level Stairs-Number of Steps: 3+8  Home Access: Stairs to enter with rails  Entrance Stairs-Rails: Right  Entrance Stairs-Number of Steps: 3  Bathroom Shower/Tub: Walk-in shower  Bathroom Toilet: Standard  Bathroom Equipment: None  Prior Level of Function:  Prior Function Per Pt/Caregiver Report  Level of [place]: Independent with ADLs and functional transfers, Independent with homemaking with ambulation  Ambulatory Assistance: Independent  Prior Function Comments: 1 fall, (+) drives  Precautions:  Precautions  Hearing/Visual Limitations: Glasses for reading  LE Weight Bearing Status: Right Non-Weight Bearing (NWBing thorugh residual limb)  Medical Precautions: Fall precautions    Vital Signs (Past 2hrs)        Date/Time Vitals Session Patient Position Pulse Resp SpO2 BP MAP (mmHg)    11/23/24 1201 --  --  82  16  95 %  117/66  83                        Objective   Pain:  Pain Assessment  Pain Assessment: 0-10  0-10 (Numeric) Pain Score: 6  Pain Type: Surgical pain, Phantom pain  Pain Location: Leg  Pain Orientation: Right  Pain Interventions: Ambulation/increased activity, Repositioned  Cognition:  Cognition  Overall Cognitive Status: Within Functional Limits  Orientation Level: Oriented X4  Insight: Within function limits  Impulsive: Within functional limits    General  Assessments:       Activity Tolerance  Endurance: Tolerates 10 - 20 min exercise with multiple rests  Early Mobility/Exercise Safety Screen: Proceed with mobilization - No exclusion criteria met    Sensation  Sensation Comment: Phantom pains in RLE      Coordination  Movements are Fluid and Coordinated: Yes    Postural Control  Postural Control: Within Functional Limits    Static Sitting Balance  Static Sitting-Balance Support: Feet supported (LLE supported)  Static Sitting-Level of Assistance: Close supervision    Static Standing Balance  Static Standing-Balance Support: Bilateral upper extremity supported  Static Standing-Level of Assistance: Minimum assistance (Min A +2)  Functional Assessments:       Bed Mobility  Bed Mobility: Yes  Bed Mobility 1  Bed Mobility 1: Supine to sitting  Level of Assistance 1: Close supervision, Minimal verbal cues  Bed Mobility Comments 1: HOB elevated, bed rails  Bed Mobility 2  Bed Mobility  2: Sitting to supine  Level of Assistance 2: Close supervision    Transfers  Transfer: Yes  Transfer 1  Transfer From 1: Bed to  Transfer to 1: Stand  Technique 1: Sit to stand  Transfer Device 1: Walker  Transfer Level of Assistance 1: Moderate assistance, +2, Moderate verbal cues  Transfers 2  Transfer From 2: Stand to  Transfer to 2: Bed  Technique 2: Stand to sit  Transfer Device 2: Walker  Transfer Level of Assistance 2: Moderate assistance, +2, Moderate verbal cues    Ambulation/Gait Training  Ambulation/Gait Training Performed: Yes  Ambulation/Gait Training 1  Surface 1: Level tile  Device 1: Rolling walker  Assistance 1: Minimum assistance, Minimal verbal cues (Min A +2)  Quality of Gait 1:  (Hopping on LLE)  Comments/Distance (ft) 1: 3 hops forward, 3 hops backward    Stairs  Stairs: No    Extremity/Trunk Assessments:     RLE   RLE : Exceptions to WFL  Strength RLE  R Hip Flexion: 3/5  LLE   LLE : Within Functional Limits  Treatments:    Treatment for increased time spent educating pt  regarding course of healing with amputation.  Increased time in standing with weight shift to adjust to single leg support.     Outcome Measures:  [facility] Basic Mobility  Turning from your back to your side while in a flat bed without using bedrails: A little  Moving from lying on your back to sitting on the side of a flat bed without using bedrails: A little  Moving to and from bed to chair (including a wheelchair): A lot  Standing up from a chair using your arms (e.g. wheelchair or bedside chair): A lot  To walk in hospital room: Total  Climbing 3-5 steps with railing: Total  Basic Mobility - Total Score: 12    Encounter Problems       Encounter Problems (Active)       Balance       STG - Maintains dynamic standing balance with upper extremity support and SBA (Progressing)       Start:  11/23/24    Expected End:  12/07/24       INTERVENTIONS:  1. Practice standing with minimal support.  2. Educate patient about standing tolerance.  3. Educate patient about independence with gait, transfers, and ADL's.  4. Educate patient about use of assistive device.  5. Educate patient about self-directed care.            Mobility       LTG - Patient will ambulate household distance with LRD and SBA (Progressing)       Start:  11/23/24    Expected End:  12/07/24            LTG - Patient will navigate 3 steps with CGA and rails/device (Progressing)       Start:  11/23/24    Expected End:  12/07/24               PT Transfers       STG - Patient will perform bed mobility independently (Progressing)       Start:  11/23/24    Expected End:  12/07/24            STG - Patient will transfer sit to and from stand with SBA and LRD (Progressing)       Start:  11/23/24    Expected End:  12/07/24               Pain - Adult          Safety       LTG - Patient will adhere to hip precautions during ADL's and transfers       Start:  11/21/24            LTG - Patient will demonstrate safety requirements appropriate to situation/environment        Start:  11/21/24            LTG - Patient will utilize safety techniques       Start:  11/21/24            STG - Patient locks brakes on wheelchair       Start:  11/21/24            STG - Patient uses call light consistently to request assistance with transfers       Start:  11/21/24            STG - Patient uses gait belt during all transfers       Start:  11/21/24            Goal 1       Start:  11/21/24            Goal 2       Start:  11/21/24            Goal 3       Start:  11/21/24                   Education Documentation  Precautions, taught by Elizabeth Roa PT at 11/23/2024  1:46 PM.  Learner: Patient  Readiness: Eager  Method: Explanation, Demonstration  Response: Verbalizes Understanding, Demonstrated Understanding    Body Mechanics, taught by Elizabeth Roa PT at 11/23/2024  1:46 PM.  Learner: Patient  Readiness: Eager  Method: Explanation, Demonstration  Response: Verbalizes Understanding, Demonstrated Understanding    Mobility Training, taught by Elizabeth Roa PT at 11/23/2024  1:46 PM.  Learner: Patient  Readiness: Eager  Method: Explanation, Demonstration  Response: Verbalizes Understanding, Demonstrated Understanding    Education Comments  No comments found.

## 2024-11-23 NOTE — CONSULTS
VASCULAR SURGERY CONSULT NOTE    Assessment/Plan   Errol Fonseca is 54 y.o. 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 Patient taken to the OR by podiatry twice for debridement but during second debridement infection found to have spread and foot deemed non-salvagable. Vascular consulted from PACU for urgent BKA.    Plan:  Will return to OR tonight for right BKA  Continue antibiotics  PRN pain control    D/w attending, Dr. Princess Beck MD  Vascular Surgery PGY6    Subjective   HPI:  Errol Fonseca is 54 y.o. 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 Labs notable for WBC 18.3, hyponatremia to 124, Cr 1.64, lactate 3.2. CT obtained that demonstrated gas in the dorsum of the foot and tracking up the extensor compartment towards the knee. Patient was admitted to the ICU, started on antibiotics and podiatry consulted. Went to the OR with podiatry on 11/21 for incision and drainage with amputation of 2nd toe. Patient returned to the OR with podiatry on 11/22 for further debridement and it was noted that the infection had spread to the shin and the foot was non-salvageable. Podiatry placed a dressing and vascular was consulted post-operatively for urgent below knee amputation.   Patient seen and evaluated in PACU. He is awake and alert, oriented x3. Reports pain in his right lower extremity with numbness of his toes.  Right leg dressing taken down. Significant soft tissue defect on dorsum of foot from debridement with exposed tendon. Erythema and blistering extending to mid-shin with tenderness to palpation.      PMH: DM, CAD s/p PCI, HTN, HLD, alcoholic cirrhosis, prior left 5th toe ampuation    PSH:    11/21 right foot incision and drainage, 2nd toe amp with podiatry  11/22 right foot debridement with podiatry    Home Meds:  No current facility-administered medications on file prior to encounter.     Current Outpatient Medications on File Prior to Encounter   Medication Sig Dispense Refill    aspirin 81 mg EC tablet Take 1 tablet (81 mg) by mouth once daily. 90 tablet 3    atorvastatin (Lipitor) 40 mg tablet Take 1 tablet (40 mg) by mouth once daily. 90 tablet 3    blood sugar diagnostic (Blood Glucose Test) strip Use 4 times daily as instructed 200 strip 1    blood-glucose sensor (FreeStyle Brenda 3 Plus Sensor) device Dispense 1 free style brenda monitoring kit 1 each 0    ciprofloxacin (Cipro) 500 mg tablet Take 1 tablet (500 mg) by mouth 2 times a day for 7 days. 14 tablet 0    dulaglutide (Trulicity) 0.75 mg/0.5 mL pen injector Inject 0.75 mg under the skin 1 (one) time per week. 2 mL 2    escitalopram (Lexapro) 10 mg tablet Take 1 tablet (10 mg) by mouth once daily. 90 tablet 1    folic acid (Folvite) 1 mg tablet Take 1 tablet (1 mg) by mouth once daily. Do not start before April 3, 2024.      FreeStyle Brenda 3 Parkin misc Use as instructed 1 each 0    insulin glargine (Lantus) 100 unit/mL (3 mL) pen Inject 30 Units under the skin once daily in the morning. 27 mL 1    insulin lispro (HumaLOG KwikPen Insulin) 100 unit/mL injection Inject 7 Units under the skin 3 times daily (morning, midday, late afternoon). Take as directed per insulin instructions. 18.9 mL 3    lisinopriL-hydrochlorothiazide 20-12.5 mg tablet Take 1 tablet by mouth once daily. 90 tablet 3    melatonin 5 mg tablet Take 1 tablet (5 mg) by mouth once daily at bedtime. 90 tablet 3    metFORMIN XR (Glucophage-XR) 500 mg 24 hr tablet Take 1 tablet (500 mg) by mouth 2 times daily (morning and late afternoon). Do not crush, chew, or split. 180 tablet 3    metoprolol tartrate (Lopressor) 25 mg tablet Take 1 tablet (25 mg) by mouth 2 times a day. 180  "tablet 3    miscellaneous medical supply (Blood Pressure Cuff) misc Dispense 1 blood pressure cuff 1 each 0    multivitamin with minerals (multivit-min-iron fum-folic ac) tablet Take 1 tablet by mouth once daily. 90 tablet 3    nitroglycerin (Nitrostat) 0.4 mg SL tablet Place 1 tablet (0.4 mg) under the tongue every 5 minutes if needed for chest pain.      pen needle, diabetic 32 gauge x 5/32\" needle Use 4x daily as instructed 200 each 3    sulfamethoxazole-trimethoprim (Bactrim DS) 800-160 mg tablet Take 1 tablet by mouth 2 times a day for 7 days. 14 tablet 0    traZODone (Desyrel) 50 mg tablet Take 1 tablet (50 mg) by mouth as needed at bedtime for sleep. 90 tablet 3    Vitamin D2 1,250 mcg (50,000 unit) capsule Take 1 capsule (50,000 Units) by mouth 1 (one) time per week. 12 capsule 0        Allergies:  No Known Allergies    SH/FH: No significant history reported    ROS: 12 system negative except HPI    Objective   Vitals:  Heart Rate:  []   Temp:  [36.2 °C (97.2 °F)-36.7 °C (98.1 °F)]   Resp:  [11-50]   BP: ()/(64-87)   SpO2:  [91 %-100 %]     Exam:  Constitutional: No acute distress, resting comfortably  Neuro:  AOx3, grossly intact  ENMT: moist mucous membranes  Head/neck: atraumatic  CV: no tachycardia  Pulm: non-labored on NC  GI: soft, non-tender, non-distended  Skin: warm and dry  Musculoskeletal: moving all extremities  Extremities: Right lower extremity with significant soft tissue defect on dorsum of foot from debridement with exposed tendon and bone, able to wiggle big toe, absent sensation in 3-5th digits, erythema and blistering extending to mid shin with tenderness to palpation    Labs:  Results from last 7 days   Lab Units 11/22/24  0337 11/21/24  1205 11/20/24  1239   WBC AUTO x10*3/uL 19.3* 20.5* 18.3*   HEMOGLOBIN g/dL 9.4* 9.5* 12.8*   PLATELETS AUTO x10*3/uL 183 148* 193      Results from last 7 days   Lab Units 11/22/24  0337 11/21/24  1205 11/21/24  0434   SODIUM mmol/L 129* 127* " 131*   POTASSIUM mmol/L 3.6 3.7 3.5   CHLORIDE mmol/L 92* 93* 94*   CO2 mmol/L 28 25 24   BUN mg/dL 19 26* 31*   CREATININE mg/dL 1.02 1.34* 1.54*   GLUCOSE mg/dL 177* 211* 158*   MAGNESIUM mg/dL 1.68 1.66 1.77   PHOSPHORUS mg/dL 2.8 3.2 3.8      Results from last 7 days   Lab Units 11/21/24  0434 11/20/24  1838   INR  1.4* 1.4*   PROTIME seconds 16.2* 16.2*   APTT seconds 27 24*           Imaging:  Reviewed independently by vascular team:  11/20 CTA lower extremity   1. Widely patent arterial vasculature of the right lower extremity  with three-vessel runoff.  2. Gas seen throughout the musculature of the extensor compartment of  the lower leg and extensively about the subcutaneous tissues of the  dorsum of the foot. Open wound along the ventral aspect of the foot  at the level of the 2nd metatarsal phalangeal joint.

## 2024-11-23 NOTE — SIGNIFICANT EVENT
Patient went to surgery with podiatry today for a second incision and drainage of the right lower extremity for necrotizing fasciitis. In surgery, the foot was deemed non-salvageable due to significant progression of infection with necrosis of majors muscles and tendons in the foot. Consult placed to vascular surgery for urgent below-knee amputation.    Post-op, patient is okay to resume all previous medications per primary team.     Vladislav Xiong DPM PGY-3

## 2024-11-23 NOTE — H&P
H&P reviewed, full consult note from vascular surgery written 11/22    Erorl Fonseca is 54 y.o. 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 Patient taken to the OR by podiatry twice for debridement but during second debridement infection found to have spread and foot deemed non-salvagable. Vascular consulted from PACU for urgent BKA.     Plan:  Will return to OR tonight for right BKA  Continue antibiotics  PRN pain control     D/w attending, Dr. Princess Beck MD  Vascular Surgery PGY6

## 2024-11-23 NOTE — ANESTHESIA POSTPROCEDURE EVALUATION
Patient: Errol Fonseca    Procedure Summary       Date: 11/22/24 Room / Location: [facility] OR 16 / [facility] OR    Anesthesia Start: 2358 Anesthesia Stop: 11/23/24 0144    Procedure: Leg Amputation Below Knee (Right: Leg Lower) Diagnosis:       Necrotizing fasciitis of lower leg (Multi)      (Necrotizing fasciitis of lower leg (Multi) [M72.6])    Surgeons: Oliverio Sánchez MD Responsible Provider: William Red MD    Anesthesia Type: general ASA Status: 3 - Emergent            Anesthesia Type: general    Vitals Value Taken Time   /63 11/23/24 0139   Temp 36.4 °C (97.5 °F) 11/23/24 0139   Pulse 75 11/23/24 0141   Resp 19 11/23/24 0141   SpO2 100 % 11/23/24 0141   Vitals shown include unfiled device data.    Anesthesia Post Evaluation    Patient location during evaluation: PACU  Patient participation: complete - patient participated  Level of consciousness: awake and alert  Pain management: satisfactory to patient  Multimodal analgesia pain management approach  Airway patency: patent  Two or more strategies used to mitigate risk of obstructive sleep apnea  Cardiovascular status: acceptable and blood pressure returned to baseline  Respiratory status: acceptable  Hydration status: acceptable  Postoperative Nausea and Vomiting: none        No notable events documented.

## 2024-11-23 NOTE — NURSING NOTE
Pt doesn't want to be poked for a glucose test because he is wearing a dexcom and he feels like we should go off the dexcom. RN notified.

## 2024-11-23 NOTE — SIGNIFICANT EVENT
Vascular Surgery Post-Operative Plan    S/p right guillotine knee disarticulation    Plan:  Continue antibiotics  PRN pain control  Okay for diet from vascular standpoint  Vascular to do first dressing change  Wound: right stump with kerlex, ABD, ACE wrap    Martina Beck MD  Vascular Surgery PGY6

## 2024-11-23 NOTE — PROGRESS NOTES
Occupational Therapy    Evaluation and Treatment    Patient Name: Errol Fonseca  MRN: [id number]  Today's Date: 11/23/2024  [facility]  Time Calculation  Start Time: 1227  Stop Time: 1251  Time Calculation (min): 24 min    Assessment  IP OT Assessment  OT Assessment: Pt presents with impaired functional mobility, transfers, balance, endurance, ADLs/IADLs and phantom pain/sensations. Pt has safe home setup with 1 flight of stairs to manage, limited access to DME/AE, good social support, good insight, and is functioning below baseline. Pt tolerated mobility training well demonstrating good understanding while maintaining NWB precautions. Pt verbalized understanding of all education and training. Pt is likely to benefit from skilled OT services at a HIGH intensity to address noted deficits and training associated with new loss of limb.  Prognosis: Good  Barriers to Discharge: None  Evaluation/Treatment Tolerance: Patient tolerated treatment well  Medical Staff Made Aware: Yes  End of Session Communication: Bedside nurse  End of Session Patient Position: Bed, 3 rail up, Alarm off, not on at start of session  Plan:  Inpatient Plan  Treatment Interventions: ADL retraining, Functional transfer training, UE strengthening/ROM, Endurance training, Patient/family training, Equipment evaluation/education, Compensatory technique education  OT Frequency: 4 times per week  OT Discharge Recommendations: High intensity level of continued care  Equipment Recommended upon Discharge: Other (comment), Wheeled walker (Shower chair, TBD at next level of care)  OT Recommended Transfer Status: Assist of 2  OT - OK to Discharge: Yes  OT Assessment  OT Assessment Results: Decreased ADL status, Decreased endurance, Decreased functional mobility, Decreased IADLs  Prognosis: Good  Barriers to Discharge: None  Evaluation/Treatment Tolerance: Patient tolerated treatment well  Medical Staff Made Aware: Yes  Strengths: Ability to acquire  knowledge, Attitude of self, Capable of completing ADLs semi/independent, Coping skills, Insight into problems, Premorbid level of function, Support of Caregivers  Barriers to Participation: Comorbidities    Subjective   Current Problem:  1. Necrotizing fasciitis of lower leg (Multi)  Case Request Operating Room: Debridement Lower Extremity    Case Request Operating Room: Debridement Lower Extremity    Case Request Operating Room: Leg Amputation Below Knee    Case Request Operating Room: Leg Amputation Below Knee    Surgical Pathology Exam    Surgical Pathology Exam      2. Foot ulcer with fat layer exposed, right (Multi)  Case Request Operating Room: Debridement right lower extremity    Case Request Operating Room: Debridement right lower extremity    Fungal Culture/Smear    Fungal Culture/Smear    Tissue/Wound Culture/Smear    Tissue/Wound Culture/Smear    Surgical Pathology Exam    Surgical Pathology Exam        General:  Reason for Referral: R knee disarticulation  Past Medical History Relevant to Rehab: DM, CAD s/p PCI, HTN, HLD, alcoholic cirrhosis, prior left 5th toe amputation  Co-Treatment: PT  Co-Treatment Reason: To optimize pt functional mobility and safety as he requires +2 assist and has new loss of limb  Prior to Session Communication: Bedside nurse  Patient Position Received: Bed, 3 rail up, Alarm off, not on at start of session  Family/Caregiver Present: No  General Comment: Pt supine in bed upon arrival. Pleasant and agreeable to OT eval and tx. Pt reporting phantom pain/sensations. Pt also reporting coping well with new amputation of RLE   Precautions:  LE Weight Bearing Status: Right Non-Weight Bearing (NWBing thorugh residual limb)  Medical Precautions: Fall precautions    Pain:  Pain Assessment  Pain Assessment: 0-10  0-10 (Numeric) Pain Score: 6  Pain Type: Acute pain, Phantom pain, Surgical pain  Pain Location: Leg  Pain Orientation: Right  Pain Interventions: Repositioned,  Ambulation/increased activity, Relaxation technique, Rest  Response to Interventions: Increase in pain (resting comfortably in bed upon departure)    Objective   Cognition:  Overall Cognitive Status: Within Functional Limits  Orientation Level: Oriented X4  Insight: Within function limits  Impulsive: Within functional limits  Processing Speed: Within funtional limits    Home Living:  Type of Home: House  Lives With: Adult children, Grandchildren (Daughter, son in law, 3 grandchildren (15yo, 12yo, 3yo))  Home Adaptive Equipment: None  Home Layout: Multi-level, Stairs to alternate level with rails  Alternate Level Stairs-Rails: Right  Alternate Level Stairs-Number of Steps: 3+8  Home Access: Stairs to enter with rails  Entrance Stairs-Rails: Right  Entrance Stairs-Number of Steps: 3  Bathroom Shower/Tub: Walk-in shower  Bathroom Toilet: Standard  Bathroom Equipment: Hand-held shower hose  Home Living Comments: Pt resides on lower level of home where his only bathroom is located (only other bathroom on second level which he does not access (Pt reporting he may be finding new living situation once recovered)   Prior Function:  Level of [place]: Independent with ADLs and functional transfers, Independent with homemaking with ambulation  ADL Assistance: Independent  Homemaking Assistance: Independent  Ambulatory Assistance: Independent  Hand Dominance: Left  IADL History:  Homemaking Responsibilities: Yes  Homemaking Comments: Homemaking tasks shared with family  Current License: Yes  Mode of Transportation: Car  Occupation: Retired  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, 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  Leisure and Hobbies: Camping, fishing, boating  ADL:  Eating Assistance: Independent  Grooming Assistance: Independent  Bathing Assistance: Moderate  Bathing Deficit: Left lower leg including foot, Use of adaptive equipment, Increased time to complete ,  Buttocks  UE Dressing Assistance: Independent  LE Dressing Assistance: Moderate  LE Dressing Deficit: Don/doff L shoe, Thread LLE into underwear, Thread LLE into pants, Don/doff L sock, Pull up over hips, Increased time to complete  Toileting Assistance with Device: Minimal  Toileting Deficit: Clothing management up, Clothing management down  ADL Comments: anticipated  Activity Tolerance:  Endurance: Tolerates 10 - 20 min exercise with multiple rests  Balance:  Dynamic Sitting Balance  Dynamic Sitting-Balance Support: Feet supported (LLE supported, UE support PRN and intermittently)  Dynamic Sitting-Level of Assistance: Close supervision, Contact guard  Dynamic Sitting-Balance: Lateral lean, Forward lean, Reaching for objects (posterior lean)  Dynamic Sitting-Comments: mostly SBA-CGA intermittently (mainly for posterior lean)  Dynamic Standing Balance  Dynamic Standing-Balance Support: Bilateral upper extremity supported  Dynamic Standing-Level of Assistance: Minimum assistance  Dynamic Standing-Balance:  (weight shifting, hops)  Dynamic Standing-Comments: MinAx2 and VCs for FWW use  Static Sitting Balance  Static Sitting-Balance Support: Feet supported (LLE supported)  Static Sitting-Level of Assistance: Close supervision  Static Standing Balance  Static Standing-Balance Support: Bilateral upper extremity supported  Static Standing-Level of Assistance: Minimum assistance  Static Standing-Comment/Number of Minutes: MinAx2 and verbal and tactile cues for balance/posture  Bed Mobility/Transfers: Bed Mobility/Transfers: Bed Mobility  Bed Mobility: Yes  Bed Mobility 1  Bed Mobility 1: Supine to sitting, Sitting to supine  Level of Assistance 1: Close supervision, Minimal verbal cues  Bed Mobility Comments 1: HOB elevated, use of rails, VCs for strategy  Functional Mobility  Functional Mobility Performed: Yes  Functional Mobility 1  Comments 1: Pt took 3 hops forward and back using FWW and MinAx2 and VCs for strategy and  FWW safety   and Transfers  Transfer: Yes  Transfer 1  Transfer From 1: Bed to, Stand to  Transfer to 1: Stand, Bed  Technique 1: Sit to stand, Stand to sit  Transfer Device 1: Walker  Transfer Level of Assistance 1: Moderate assistance, +2, Moderate verbal cues  Trials/Comments 1: VCs for FWW safety and sequencing  IADL's:   Homemaking Responsibilities: Yes  Homemaking Comments: Homemaking tasks shared with family  Current License: Yes  Mode of Transportation: Car  Occupation: Retired  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, 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  Leisure and Hobbies: Camping, fishing, boating  Vision: Vision - Basic Assessment  Current Vision: Wears glasses only for reading   and Vision - Complex Assessment  Ocular Range of Motion: Within Functional Limits  Tracking: WFL  Sensation:  Light Touch: No apparent deficits (Denies N/T in BUE, phantom sensations reported in RLE)  Strength:  Strength Comments: BUE WFL as demo'd during functional transfers and ROM screen  Perception:  Inattention/Neglect: Appears intact  Initiation: Appears intact  Motor Planning: Appears intact  Perseveration: Not present  Coordination:  Movements are Fluid and Coordinated: Yes  Coordination Comment: opposition intact   Hand Function:  Hand Function  Gross Grasp: Functional  Coordination: Functional  Extremities:   RUE   RUE : Within Functional Limits, LUE   LUE: Within Functional Limits,  , and        Outcome Measures: [facility] Daily Activity  Putting on and taking off regular lower body clothing: A lot  Bathing (including washing, rinsing, drying): A lot  Putting on and taking off regular upper body clothing: None  Toileting, which includes using toilet, bedpan or urinal: A little  Taking care of personal grooming such as brushing teeth: None  Eating Meals: None  Daily Activity - Total Score: 19         ,     OT Adult Other Outcome Measures  4AT: -    Education  Documentation  Body Mechanics, taught by Omar Ponce OT at 11/23/2024  2:10 PM.  Learner: Patient  Readiness: Acceptance  Method: Explanation, Demonstration  Response: Verbalizes Understanding, Demonstrated Understanding  Comment: compensatory strategies for ADLs, mobility, transfers. NWB precautions. DME/AE available to maintain independence. benefits of OT, rehab. FWW safety/use    Precautions, taught by Omar Ponce OT at 11/23/2024  2:10 PM.  Learner: Patient  Readiness: Acceptance  Method: Explanation, Demonstration  Response: Verbalizes Understanding, Demonstrated Understanding  Comment: compensatory strategies for ADLs, mobility, transfers. NWB precautions. DME/AE available to maintain independence. benefits of OT, rehab. FWW safety/use    ADL Training, taught by Omar Ponce OT at 11/23/2024  2:10 PM.  Learner: Patient  Readiness: Acceptance  Method: Explanation, Demonstration  Response: Verbalizes Understanding, Demonstrated Understanding  Comment: compensatory strategies for ADLs, mobility, transfers. NWB precautions. DME/AE available to maintain independence. benefits of OT, rehab. FWW safety/use    Education Comments  No comments found.      Goals:   Encounter Problems       Encounter Problems (Active)       ADLs       Pt will complete LB dressing with modified independence while seated and/or standing and AE as needed.        Start:  11/23/24    Expected End:  12/07/24            Pt will complete UB /LB bathing tasks with modified independence while seated and AE as needed.        Start:  11/23/24    Expected End:  12/07/24            Pt will complete toilet hygiene while seated /standing with IND level of assistance.         Start:  11/23/24    Expected End:  12/07/24               BALANCE       Pt will maintain dynamic standing balance during ADL task with modified independent level of assistance in order to demonstrate decreased risk of falling and improved postural  control.       Start:  11/23/24    Expected End:  12/07/24               COGNITION/SAFETY       Patient will recall and adhere to weight bearing and /or ROM restrictions with all ADL and functional mobility in order to promote healing and safety with functional tasks       Start:  11/23/24    Expected End:  12/07/24               EXERCISE/STRENGTHENING       Patient will be educated on BUE HEP for increased ADL performance.       Start:  11/23/24    Expected End:  12/07/24               MOBILITY       Patient will perform Functional mobility max Household distances/Community Distances with contact guard assist level of assistance and least restrictive device in order to improve safety and functional mobility.       Start:  11/23/24    Expected End:  12/07/24               TRANSFERS       Patient will complete sit to stand transfer with modified independent level of assistance and least restrictive device in order to improve safety and prepare for out of bed mobility.       Start:  11/23/24    Expected End:  12/07/24                   Treatment Completed on Evaluation    Activities of Daily Living:      LE Dressing  LE Dressing: Yes  LE Dressing Comments: Pt educated on compensatory strategy for LB dressing- figure four technique or use of bed to support LLE- demo'd understanding with SBA    Therapy/Activity:     Therapeutic Activity  Therapeutic Activity Performed: Yes  Therapeutic Activity 1: Functional mobility and transfer training as noted requiring skilled assistance and cueing for safety and training  Therapeutic Activity 2: Educated on WB precautions, DME/AE to maintain independence, rehab prognosis, benefits of OT, compensatory strategies for LB ADLs    11/23/24 at 2:15 PM   Omar Ponce OT   Rehab Office: [phone]

## 2024-11-23 NOTE — PROGRESS NOTES
VASCULAR SURGERY PROGRESS NOTE  Assessment/Plan   Errol Fonseca is 54 y.o. 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 Patient taken to the OR by podiatry twice for debridement but during second debridement infection found to have spread and foot deemed non-salvagable. Vascular consulted from PACU for urgent BKA .    Plan:  Continue antibiotics  Pain control  Will change dressing later this afternoon  Okay for diet, DVT ppx    D/w attending, Dr. Princess Higgins MD  General Surgery PGY-3  Vascular Surgery [id number]      Subjective   No issues. Says he feels much better since amputation.     Objective   Vitals:  Heart Rate:  [74-90]   Temp:  [35.9 °C (96.7 °F)-36.9 °C (98.4 °F)]   Resp:  [10-22]   BP: (108-162)/(56-85)   SpO2:  [94 %-100 %]     Exam:  Constitutional: No acute distress, resting comfortably  Neuro:  AOx3, grossly intact  ENMT: moist mucous membranes  CV: no tachycardia  Pulm: non-labored on RA  Skin: warm and dry  Musculoskeletal: moving all extremities  Extremities: s/p R guillotine amp, wrapped with minimal serous seepage    Labs:  Results from last 7 days   Lab Units 11/23/24  0727 11/22/24  0337 11/21/24  1205   WBC AUTO x10*3/uL 14.1* 19.3* 20.5*   HEMOGLOBIN g/dL 9.3* 9.4* 9.5*   PLATELETS AUTO x10*3/uL 253 183 148*      Results from last 7 days   Lab Units 11/23/24  0727 11/22/24  0337 11/21/24  1205   SODIUM mmol/L 131* 129* 127*   POTASSIUM mmol/L 5.0 3.6 3.7   CHLORIDE mmol/L 91* 92* 93*   CO2 mmol/L 29 28 25   BUN mg/dL 16 19 26*   CREATININE mg/dL 0.88 1.02 1.34*   GLUCOSE mg/dL 211* 177* 211*   MAGNESIUM mg/dL 1.58* 1.68 1.66   PHOSPHORUS mg/dL 4.2 2.8 3.2      Results from last 7 days   Lab Units 11/21/24  0434 11/20/24  1838   INR  1.4* 1.4*   PROTIME seconds 16.2* 16.2*   APTT seconds 27 24*

## 2024-11-23 NOTE — ANESTHESIA POSTPROCEDURE EVALUATION
Patient: Errol Fonseca    Procedure Summary       Date: 11/22/24 Room / Location: [facility] OR 12 / Virtual [facility] OR    Anesthesia Start: 2020 Anesthesia Stop: 2154    Procedure: Debridement Lower Extremity (Right: Foot) Diagnosis:       Necrotizing fasciitis of lower leg (Multi)      (Necrotizing fasciitis of lower leg (Multi) [M72.6])    Surgeons: Seamus Lino DPM Responsible Provider: Morro Lee MD    Anesthesia Type: general ASA Status: 3            Anesthesia Type: general    Vitals Value Taken Time   /85 11/22/24 2154   Temp 36.2 11/22/24 2154   Pulse 90 11/22/24 2151   Resp 11 11/22/24 2151   SpO2 100 % 11/22/24 2151   Vitals shown include unfiled device data.    Anesthesia Post Evaluation    Patient location during evaluation: PACU  Patient participation: complete - patient participated  Level of consciousness: awake and alert  Pain management: adequate  Airway patency: patent  Cardiovascular status: acceptable and hemodynamically stable  Respiratory status: acceptable and face mask  Hydration status: acceptable  Postoperative Nausea and Vomiting: none        There were no known notable events for this encounter.

## 2024-11-23 NOTE — ANESTHESIA PREPROCEDURE EVALUATION
Patient: Errol Fonseca    Procedure Information       Date/Time: 11/22/24 5729    Procedure: Leg Amputation Below Knee (Right)    [facility]    Surgeons: Oliverio Sánchez MD            Relevant Problems   Cardiac   (+) Coronary artery disease   (+) Coronary artery disease involving native coronary artery of native heart with unstable angina pectoris   (+) Hyperlipidemia   (+) Hypertension      Liver   (+) Alcoholic cirrhosis (Multi)   (+) Hepatic cirrhosis (Multi)      Endocrine   (+) Type 2 diabetes mellitus, with long-term current use of insulin      ID   (+) Necrotizing fasciitis of lower leg (Multi)       Clinical information reviewed:   Tobacco  Allergies  Meds   Med Hx  Surg Hx   Fam Hx  Soc Hx        NPO Detail:  No data recorded     PHYSICAL EXAM    Anesthesia Plan    History of general anesthesia?: yes  History of complications of general anesthesia?: no    ASA 3 - emergent     general     intravenous induction   Anesthetic plan and risks discussed with patient.    Plan discussed with resident.

## 2024-11-23 NOTE — ANESTHESIA PROCEDURE NOTES
Airway  Date/Time: 11/22/2024 8:37 PM  Urgency: elective    Airway not difficult    Staffing  Performed: resident   Authorized by: Morro Lee MD    Performed by: Mary Olivier DO  Patient location during procedure: OR    Indications and Patient Condition  Indications for airway management: anesthesia  Spontaneous Ventilation: absent  Sedation level: deep  Preoxygenated: yes  Patient position: sniffing  Mask difficulty assessment: 2 - vent by mask + OA or adjuvant +/- NMBA  Planned trial extubation    Final Airway Details  Final airway type: endotracheal airway      Successful airway: ETT  Cuffed: yes   Successful intubation technique: direct laryngoscopy  Facilitating devices/methods: intubating stylet  Endotracheal tube insertion site: oral  Blade: Fred  Blade size: #4  ETT size (mm): 7.5  Cormack-Lehane Classification: grade I - full view of glottis  Placement verified by: capnometry   Measured from: teeth  ETT to teeth (cm): 22  Number of attempts at approach: 1

## 2024-11-24 VITALS
HEART RATE: 80 BPM | SYSTOLIC BLOOD PRESSURE: 154 MMHG | WEIGHT: 224.87 LBS | TEMPERATURE: 96.5 F | HEIGHT: 73 IN | RESPIRATION RATE: 16 BRPM | BODY MASS INDEX: 29.8 KG/M2 | OXYGEN SATURATION: 94 % | DIASTOLIC BLOOD PRESSURE: 88 MMHG

## 2024-11-24 LAB
BACTERIA BLD CULT: NORMAL
BACTERIA BLD CULT: NORMAL
GLUCOSE BLD MANUAL STRIP-MCNC: 238 MG/DL (ref 74–99)
GLUCOSE BLD MANUAL STRIP-MCNC: 270 MG/DL (ref 74–99)
GLUCOSE BLD MANUAL STRIP-MCNC: 285 MG/DL (ref 74–99)
GLUCOSE BLD MANUAL STRIP-MCNC: 288 MG/DL (ref 74–99)
GLUCOSE BLD MANUAL STRIP-MCNC: 295 MG/DL (ref 74–99)
GLUCOSE BLD MANUAL STRIP-MCNC: 301 MG/DL (ref 74–99)

## 2024-11-24 PROCEDURE — 2500000004 HC RX 250 GENERAL PHARMACY W/ HCPCS (ALT 636 FOR OP/ED): Performed by: SURGERY

## 2024-11-24 PROCEDURE — 2500000001 HC RX 250 WO HCPCS SELF ADMINISTERED DRUGS (ALT 637 FOR MEDICARE OP): Performed by: STUDENT IN AN ORGANIZED HEALTH CARE EDUCATION/TRAINING PROGRAM

## 2024-11-24 PROCEDURE — 2500000002 HC RX 250 W HCPCS SELF ADMINISTERED DRUGS (ALT 637 FOR MEDICARE OP, ALT 636 FOR OP/ED): Performed by: PHYSICIAN ASSISTANT

## 2024-11-24 PROCEDURE — 2500000004 HC RX 250 GENERAL PHARMACY W/ HCPCS (ALT 636 FOR OP/ED): Performed by: PHYSICIAN ASSISTANT

## 2024-11-24 PROCEDURE — 2500000002 HC RX 250 W HCPCS SELF ADMINISTERED DRUGS (ALT 637 FOR MEDICARE OP, ALT 636 FOR OP/ED): Performed by: STUDENT IN AN ORGANIZED HEALTH CARE EDUCATION/TRAINING PROGRAM

## 2024-11-24 PROCEDURE — 82947 ASSAY GLUCOSE BLOOD QUANT: CPT

## 2024-11-24 PROCEDURE — 2500000004 HC RX 250 GENERAL PHARMACY W/ HCPCS (ALT 636 FOR OP/ED): Performed by: STUDENT IN AN ORGANIZED HEALTH CARE EDUCATION/TRAINING PROGRAM

## 2024-11-24 PROCEDURE — 2500000005 HC RX 250 GENERAL PHARMACY W/O HCPCS: Performed by: PHYSICIAN ASSISTANT

## 2024-11-24 PROCEDURE — 2500000001 HC RX 250 WO HCPCS SELF ADMINISTERED DRUGS (ALT 637 FOR MEDICARE OP): Performed by: PHYSICIAN ASSISTANT

## 2024-11-24 PROCEDURE — 2500000001 HC RX 250 WO HCPCS SELF ADMINISTERED DRUGS (ALT 637 FOR MEDICARE OP)

## 2024-11-24 PROCEDURE — 1200000002 HC GENERAL ROOM WITH TELEMETRY DAILY

## 2024-11-24 RX ORDER — METHOCARBAMOL 500 MG/1
500 TABLET, FILM COATED ORAL EVERY 6 HOURS SCHEDULED
Status: DISCONTINUED | OUTPATIENT
Start: 2024-11-24 | End: 2024-12-03 | Stop reason: HOSPADM

## 2024-11-24 RX ORDER — MAGNESIUM SULFATE HEPTAHYDRATE 40 MG/ML
4 INJECTION, SOLUTION INTRAVENOUS ONCE
Status: COMPLETED | OUTPATIENT
Start: 2024-11-24 | End: 2024-11-24

## 2024-11-24 RX ORDER — METOPROLOL TARTRATE 25 MG/1
12.5 TABLET, FILM COATED ORAL 2 TIMES DAILY
Status: DISCONTINUED | OUTPATIENT
Start: 2024-11-24 | End: 2024-11-28

## 2024-11-24 RX ORDER — DOCUSATE SODIUM 100 MG/1
100 CAPSULE, LIQUID FILLED ORAL 2 TIMES DAILY
Status: DISCONTINUED | OUTPATIENT
Start: 2024-11-24 | End: 2024-12-03 | Stop reason: HOSPADM

## 2024-11-24 RX ORDER — INSULIN GLARGINE 100 [IU]/ML
20 INJECTION, SOLUTION SUBCUTANEOUS EVERY 24 HOURS
Status: DISCONTINUED | OUTPATIENT
Start: 2024-11-25 | End: 2024-12-01

## 2024-11-24 RX ORDER — GABAPENTIN 400 MG/1
400 CAPSULE ORAL 3 TIMES DAILY
Status: DISCONTINUED | OUTPATIENT
Start: 2024-11-24 | End: 2024-12-03 | Stop reason: HOSPADM

## 2024-11-24 RX ORDER — INSULIN LISPRO 100 [IU]/ML
5 INJECTION, SOLUTION INTRAVENOUS; SUBCUTANEOUS
Status: DISCONTINUED | OUTPATIENT
Start: 2024-11-24 | End: 2024-12-02

## 2024-11-24 RX ORDER — INSULIN LISPRO 100 [IU]/ML
0-5 INJECTION, SOLUTION INTRAVENOUS; SUBCUTANEOUS
Status: DISCONTINUED | OUTPATIENT
Start: 2024-11-24 | End: 2024-11-25

## 2024-11-24 RX ORDER — LOPERAMIDE HYDROCHLORIDE 2 MG/1
2 CAPSULE ORAL ONCE
Status: COMPLETED | OUTPATIENT
Start: 2024-11-24 | End: 2024-11-24

## 2024-11-24 RX ADMIN — INSULIN LISPRO 4 UNITS: 100 INJECTION, SOLUTION INTRAVENOUS; SUBCUTANEOUS at 16:46

## 2024-11-24 RX ADMIN — Medication 1500 MG: at 11:30

## 2024-11-24 RX ADMIN — INSULIN GLARGINE 15 UNITS: 100 INJECTION, SOLUTION SUBCUTANEOUS at 11:30

## 2024-11-24 RX ADMIN — METOPROLOL TARTRATE 12.5 MG: 25 TABLET, FILM COATED ORAL at 20:58

## 2024-11-24 RX ADMIN — ACETAMINOPHEN 650 MG: 325 TABLET, FILM COATED ORAL at 04:40

## 2024-11-24 RX ADMIN — ACETAMINOPHEN 650 MG: 325 TABLET, FILM COATED ORAL at 20:58

## 2024-11-24 RX ADMIN — GABAPENTIN 400 MG: 400 CAPSULE ORAL at 20:58

## 2024-11-24 RX ADMIN — ATORVASTATIN CALCIUM 40 MG: 40 TABLET, FILM COATED ORAL at 08:38

## 2024-11-24 RX ADMIN — GABAPENTIN 300 MG: 300 CAPSULE ORAL at 08:38

## 2024-11-24 RX ADMIN — PIPERACILLIN SODIUM AND TAZOBACTAM SODIUM 3.38 G: 3; .375 INJECTION, SOLUTION INTRAVENOUS at 20:58

## 2024-11-24 RX ADMIN — INSULIN HUMAN 6 UNITS: 100 INJECTION, SOLUTION PARENTERAL at 00:45

## 2024-11-24 RX ADMIN — ENOXAPARIN SODIUM 40 MG: 100 INJECTION SUBCUTANEOUS at 04:40

## 2024-11-24 RX ADMIN — ACETAMINOPHEN 650 MG: 325 TABLET, FILM COATED ORAL at 06:50

## 2024-11-24 RX ADMIN — Medication 1500 MG: at 00:46

## 2024-11-24 RX ADMIN — OXYCODONE HYDROCHLORIDE 10 MG: 10 TABLET ORAL at 22:45

## 2024-11-24 RX ADMIN — Medication 1 L/MIN: at 08:47

## 2024-11-24 RX ADMIN — METHOCARBAMOL 1000 MG: 100 INJECTION INTRAMUSCULAR; INTRAVENOUS at 06:50

## 2024-11-24 RX ADMIN — ASPIRIN 81 MG: 81 TABLET, COATED ORAL at 08:38

## 2024-11-24 RX ADMIN — METHOCARBAMOL TABLETS 500 MG: 500 TABLET, COATED ORAL at 18:08

## 2024-11-24 RX ADMIN — METHOCARBAMOL 1000 MG: 100 INJECTION INTRAMUSCULAR; INTRAVENOUS at 14:22

## 2024-11-24 RX ADMIN — MAGNESIUM SULFATE HEPTAHYDRATE 4 G: 40 INJECTION, SOLUTION INTRAVENOUS at 12:43

## 2024-11-24 RX ADMIN — HYDROMORPHONE HYDROCHLORIDE 0.2 MG: 0.2 INJECTION, SOLUTION INTRAMUSCULAR; INTRAVENOUS; SUBCUTANEOUS at 10:10

## 2024-11-24 RX ADMIN — LOPERAMIDE HYDROCHLORIDE 2 MG: 2 CAPSULE ORAL at 05:07

## 2024-11-24 RX ADMIN — INSULIN LISPRO 5 UNITS: 100 INJECTION, SOLUTION INTRAVENOUS; SUBCUTANEOUS at 16:46

## 2024-11-24 RX ADMIN — HYDROMORPHONE HYDROCHLORIDE 0.2 MG: 0.2 INJECTION, SOLUTION INTRAMUSCULAR; INTRAVENOUS; SUBCUTANEOUS at 00:45

## 2024-11-24 RX ADMIN — GABAPENTIN 400 MG: 400 CAPSULE ORAL at 14:12

## 2024-11-24 RX ADMIN — DOCUSATE SODIUM 100 MG: 100 CAPSULE, LIQUID FILLED ORAL at 20:58

## 2024-11-24 RX ADMIN — OXYCODONE HYDROCHLORIDE 10 MG: 10 TABLET ORAL at 04:40

## 2024-11-24 RX ADMIN — ESCITALOPRAM OXALATE 10 MG: 10 TABLET ORAL at 08:38

## 2024-11-24 RX ADMIN — INSULIN HUMAN 6 UNITS: 100 INJECTION, SOLUTION PARENTERAL at 12:46

## 2024-11-24 RX ADMIN — ACETAMINOPHEN 650 MG: 325 TABLET, FILM COATED ORAL at 11:30

## 2024-11-24 RX ADMIN — OXYCODONE HYDROCHLORIDE 10 MG: 10 TABLET ORAL at 16:50

## 2024-11-24 RX ADMIN — PIPERACILLIN SODIUM AND TAZOBACTAM SODIUM 3.38 G: 3; .375 INJECTION, SOLUTION INTRAVENOUS at 08:41

## 2024-11-24 RX ADMIN — PIPERACILLIN SODIUM AND TAZOBACTAM SODIUM 3.38 G: 3; .375 INJECTION, SOLUTION INTRAVENOUS at 14:12

## 2024-11-24 RX ADMIN — INSULIN HUMAN 9 UNITS: 100 INJECTION, SOLUTION PARENTERAL at 08:37

## 2024-11-24 RX ADMIN — INSULIN HUMAN 6 UNITS: 100 INJECTION, SOLUTION PARENTERAL at 04:39

## 2024-11-24 RX ADMIN — OXYCODONE HYDROCHLORIDE 10 MG: 10 TABLET ORAL at 11:31

## 2024-11-24 ASSESSMENT — COGNITIVE AND FUNCTIONAL STATUS - GENERAL
STANDING UP FROM CHAIR USING ARMS: TOTAL
WALKING IN HOSPITAL ROOM: TOTAL
STANDING UP FROM CHAIR USING ARMS: TOTAL
HELP NEEDED FOR BATHING: A LITTLE
CLIMB 3 TO 5 STEPS WITH RAILING: TOTAL
TOILETING: A LITTLE
TOILETING: A LOT
CLIMB 3 TO 5 STEPS WITH RAILING: TOTAL
MOVING TO AND FROM BED TO CHAIR: A LOT
STANDING UP FROM CHAIR USING ARMS: TOTAL
DAILY ACTIVITIY SCORE: 20
MOBILITY SCORE: 13
WALKING IN HOSPITAL ROOM: TOTAL
MOBILITY SCORE: 12
MOBILITY SCORE: 12
MOVING TO AND FROM BED TO CHAIR: TOTAL
DAILY ACTIVITIY SCORE: 17
DRESSING REGULAR UPPER BODY CLOTHING: A LITTLE
DRESSING REGULAR LOWER BODY CLOTHING: A LITTLE
HELP NEEDED FOR BATHING: A LOT
MOVING TO AND FROM BED TO CHAIR: TOTAL
DRESSING REGULAR LOWER BODY CLOTHING: TOTAL
WALKING IN HOSPITAL ROOM: TOTAL
CLIMB 3 TO 5 STEPS WITH RAILING: TOTAL

## 2024-11-24 ASSESSMENT — PAIN DESCRIPTION - ORIENTATION
ORIENTATION: RIGHT
ORIENTATION: RIGHT

## 2024-11-24 ASSESSMENT — PAIN SCALES - GENERAL
PAINLEVEL_OUTOF10: 7
PAINLEVEL_OUTOF10: 5 - MODERATE PAIN
PAINLEVEL_OUTOF10: 5 - MODERATE PAIN
PAINLEVEL_OUTOF10: 7
PAINLEVEL_OUTOF10: 9
PAINLEVEL_OUTOF10: 7

## 2024-11-24 ASSESSMENT — PAIN DESCRIPTION - LOCATION
LOCATION: LEG
LOCATION: LEG

## 2024-11-24 ASSESSMENT — PAIN - FUNCTIONAL ASSESSMENT
PAIN_FUNCTIONAL_ASSESSMENT: 0-10
PAIN_FUNCTIONAL_ASSESSMENT: 0-10

## 2024-11-24 ASSESSMENT — PAIN DESCRIPTION - DESCRIPTORS
DESCRIPTORS: THROBBING
DESCRIPTORS: THROBBING

## 2024-11-24 NOTE — PROGRESS NOTES
VASCULAR SURGERY PROGRESS NOTE  Assessment/Plan   Errol Fonseca is 54 y.o. 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 Patient taken to the OR by podiatry twice for debridement but during second debridement infection found to have spread and foot deemed non-salvagable. Vascular consulted from PACU for urgent BKA .    Plan:  Multimodal pain control, increasing gabapentin  ASA, atorvastatin, metoprolol  Regular diet, bowel regimen  No IVF  Continue antibiotics  BID dressing changes  SSI  DVT ppx    D/w attending, Dr. Princess Higgins MD  General Surgery PGY-3  Vascular Surgery [id number]      Subjective   No issues. Has pain but controlled.    Objective   Vitals:  Heart Rate:  [70-91]   Temp:  [36 °C (96.8 °F)-36.6 °C (97.9 °F)]   Resp:  [16-17]   BP: (101-135)/(68-85)   SpO2:  [93 %-98 %]     Exam:  Constitutional: No acute distress, resting comfortably  Neuro:  AOx3, grossly intact  ENMT: moist mucous membranes  CV: no tachycardia  Pulm: non-labored on RA  Skin: warm and dry  Musculoskeletal: moving all extremities  Extremities: s/p R guillotine amp, dressing changes, wound appears healthy without any drainage, rewrapped    Labs:  Results from last 7 days   Lab Units 11/23/24  0727 11/22/24  0337 11/21/24  1205   WBC AUTO x10*3/uL 14.1* 19.3* 20.5*   HEMOGLOBIN g/dL 9.3* 9.4* 9.5*   PLATELETS AUTO x10*3/uL 253 183 148*      Results from last 7 days   Lab Units 11/23/24  0727 11/22/24  0337 11/21/24  1205   SODIUM mmol/L 131* 129* 127*   POTASSIUM mmol/L 5.0 3.6 3.7   CHLORIDE mmol/L 91* 92* 93*   CO2 mmol/L 29 28 25   BUN mg/dL 16 19 26*   CREATININE mg/dL 0.88 1.02 1.34*   GLUCOSE mg/dL 211* 177* 211*   MAGNESIUM mg/dL 1.58* 1.68 1.66   PHOSPHORUS mg/dL 4.2 2.8 3.2      Results from last 7 days   Lab Units 11/21/24  0434 11/20/24  1838   INR  1.4* 1.4*    PROTIME seconds 16.2* 16.2*   APTT seconds 27 24*

## 2024-11-24 NOTE — CARE PLAN
The patient's goals for the shift include      The clinical goals for the shift include pain control throughout shift      Problem: Skin  Goal: Decreased wound size/increased tissue granulation at next dressing change  Outcome: Progressing  Goal: Participates in plan/prevention/treatment measures  Outcome: Progressing  Goal: Prevent/manage excess moisture  Outcome: Progressing  Goal: Prevent/minimize sheer/friction injuries  Outcome: Progressing  Goal: Promote/optimize nutrition  Outcome: Progressing  Goal: Promote skin healing  Outcome: Progressing     Problem: Pain - Adult  Goal: Verbalizes/displays adequate comfort level or baseline comfort level  Outcome: Progressing     Problem: Safety - Adult  Goal: Free from fall injury  Outcome: Progressing     Problem: Discharge Planning  Goal: Discharge to home or other facility with appropriate resources  Outcome: Progressing     Problem: Chronic Conditions and Co-morbidities  Goal: Patient's chronic conditions and co-morbidity symptoms are monitored and maintained or improved  Outcome: Progressing     Problem: Fall/Injury  Goal: Not fall by end of shift  Outcome: Progressing  Goal: Be free from injury by end of the shift  Outcome: Progressing  Goal: Verbalize understanding of personal risk factors for fall in the hospital  Outcome: Progressing  Goal: Verbalize understanding of risk factor reduction measures to prevent injury from fall in the home  Outcome: Progressing  Goal: Use assistive devices by end of the shift  Outcome: Progressing  Goal: Pace activities to prevent fatigue by end of the shift  Outcome: Progressing     Problem: Pain  Goal: Takes deep breaths with improved pain control throughout the shift  Outcome: Progressing  Goal: Turns in bed with improved pain control throughout the shift  Outcome: Progressing  Goal: Walks with improved pain control throughout the shift  Outcome: Progressing  Goal: Performs ADL's with improved pain control throughout  shift  Outcome: Progressing  Goal: Participates in PT with improved pain control throughout the shift  Outcome: Progressing  Goal: Free from opioid side effects throughout the shift  Outcome: Progressing  Goal: Free from acute confusion related to pain meds throughout the shift  Outcome: Progressing     Problem: Diabetes  Goal: Achieve decreasing blood glucose levels by end of shift  Outcome: Progressing  Goal: Increase stability of blood glucose readings by end of shift  Outcome: Progressing  Goal: Decrease in ketones present in urine by end of shift  Outcome: Progressing  Goal: Maintain electrolyte levels within acceptable range throughout shift  Outcome: Progressing  Goal: Maintain glucose levels >70mg/dl to <250mg/dl throughout shift  Outcome: Progressing  Goal: No changes in neurological exam by end of shift  Outcome: Progressing  Goal: Learn about and adhere to nutrition recommendations by end of shift  Outcome: Progressing  Goal: Vital signs within normal range for age by end of shift  Outcome: Progressing  Goal: Increase self care and/or family involovement by end of shift  Outcome: Progressing  Goal: Receive DSME education by end of shift  Outcome: Progressing

## 2024-11-24 NOTE — CARE PLAN
The patient's goals for the shift include  pain management     The clinical goals for the shift include pain control      Problem: Skin  Goal: Decreased wound size/increased tissue granulation at next dressing change  Outcome: Progressing  Goal: Participates in plan/prevention/treatment measures  Outcome: Progressing  Goal: Prevent/manage excess moisture  Outcome: Progressing  Goal: Prevent/minimize sheer/friction injuries  Outcome: Progressing  Goal: Promote/optimize nutrition  Outcome: Progressing  Goal: Promote skin healing  Outcome: Progressing     Problem: Pain - Adult  Goal: Verbalizes/displays adequate comfort level or baseline comfort level  Outcome: Progressing     Problem: Safety - Adult  Goal: Free from fall injury  Outcome: Progressing     Problem: Discharge Planning  Goal: Discharge to home or other facility with appropriate resources  Outcome: Progressing     Problem: Chronic Conditions and Co-morbidities  Goal: Patient's chronic conditions and co-morbidity symptoms are monitored and maintained or improved  Outcome: Progressing     Problem: Fall/Injury  Goal: Not fall by end of shift  Outcome: Progressing  Goal: Be free from injury by end of the shift  Outcome: Progressing  Goal: Verbalize understanding of personal risk factors for fall in the hospital  Outcome: Progressing  Goal: Verbalize understanding of risk factor reduction measures to prevent injury from fall in the home  Outcome: Progressing  Goal: Use assistive devices by end of the shift  Outcome: Progressing  Goal: Pace activities to prevent fatigue by end of the shift  Outcome: Progressing     Problem: Pain  Goal: Takes deep breaths with improved pain control throughout the shift  Outcome: Progressing  Goal: Turns in bed with improved pain control throughout the shift  Outcome: Progressing  Goal: Walks with improved pain control throughout the shift  Outcome: Progressing  Goal: Performs ADL's with improved pain control throughout  shift  Outcome: Progressing  Goal: Participates in PT with improved pain control throughout the shift  Outcome: Progressing  Goal: Free from opioid side effects throughout the shift  Outcome: Progressing  Goal: Free from acute confusion related to pain meds throughout the shift  Outcome: Progressing     Problem: Diabetes  Goal: Achieve decreasing blood glucose levels by end of shift  Outcome: Progressing  Goal: Increase stability of blood glucose readings by end of shift  Outcome: Progressing  Goal: Decrease in ketones present in urine by end of shift  Outcome: Progressing  Goal: Maintain electrolyte levels within acceptable range throughout shift  Outcome: Progressing  Goal: Maintain glucose levels >70mg/dl to <250mg/dl throughout shift  Outcome: Progressing  Goal: No changes in neurological exam by end of shift  Outcome: Progressing  Goal: Learn about and adhere to nutrition recommendations by end of shift  Outcome: Progressing  Goal: Vital signs within normal range for age by end of shift  Outcome: Progressing  Goal: Increase self care and/or family involovement by end of shift  Outcome: Progressing  Goal: Receive DSME education by end of shift  Outcome: Progressing

## 2024-11-25 LAB
ALBUMIN SERPL BCP-MCNC: 2.5 G/DL (ref 3.4–5)
ANION GAP SERPL CALC-SCNC: 11 MMOL/L (ref 10–20)
BUN SERPL-MCNC: 11 MG/DL (ref 6–23)
CALCIUM SERPL-MCNC: 8 MG/DL (ref 8.6–10.6)
CHLORIDE SERPL-SCNC: 96 MMOL/L (ref 98–107)
CO2 SERPL-SCNC: 32 MMOL/L (ref 21–32)
CREAT SERPL-MCNC: 0.8 MG/DL (ref 0.5–1.3)
EGFRCR SERPLBLD CKD-EPI 2021: >90 ML/MIN/1.73M*2
ERYTHROCYTE [DISTWIDTH] IN BLOOD BY AUTOMATED COUNT: 13 % (ref 11.5–14.5)
FUNGUS SPEC CULT: NORMAL
FUNGUS SPEC FUNGUS STN: NORMAL
GLUCOSE BLD MANUAL STRIP-MCNC: 162 MG/DL (ref 74–99)
GLUCOSE BLD MANUAL STRIP-MCNC: 222 MG/DL (ref 74–99)
GLUCOSE BLD MANUAL STRIP-MCNC: 233 MG/DL (ref 74–99)
GLUCOSE BLD MANUAL STRIP-MCNC: 240 MG/DL (ref 74–99)
GLUCOSE SERPL-MCNC: 227 MG/DL (ref 74–99)
HCT VFR BLD AUTO: 27.9 % (ref 41–52)
HGB BLD-MCNC: 9.4 G/DL (ref 13.5–17.5)
MAGNESIUM SERPL-MCNC: 1.75 MG/DL (ref 1.6–2.4)
MCH RBC QN AUTO: 33 PG (ref 26–34)
MCHC RBC AUTO-ENTMCNC: 33.7 G/DL (ref 32–36)
MCV RBC AUTO: 98 FL (ref 80–100)
NRBC BLD-RTO: 0 /100 WBCS (ref 0–0)
PHOSPHATE SERPL-MCNC: 3.1 MG/DL (ref 2.5–4.9)
PLATELET # BLD AUTO: 336 X10*3/UL (ref 150–450)
POTASSIUM SERPL-SCNC: 4.7 MMOL/L (ref 3.5–5.3)
RBC # BLD AUTO: 2.85 X10*6/UL (ref 4.5–5.9)
SODIUM SERPL-SCNC: 134 MMOL/L (ref 136–145)
VANCOMYCIN SERPL-MCNC: 16.4 UG/ML (ref 5–20)
WBC # BLD AUTO: 8.3 X10*3/UL (ref 4.4–11.3)

## 2024-11-25 PROCEDURE — 2500000004 HC RX 250 GENERAL PHARMACY W/ HCPCS (ALT 636 FOR OP/ED): Performed by: STUDENT IN AN ORGANIZED HEALTH CARE EDUCATION/TRAINING PROGRAM

## 2024-11-25 PROCEDURE — S4991 NICOTINE PATCH NONLEGEND: HCPCS | Performed by: STUDENT IN AN ORGANIZED HEALTH CARE EDUCATION/TRAINING PROGRAM

## 2024-11-25 PROCEDURE — 80202 ASSAY OF VANCOMYCIN: CPT | Performed by: STUDENT IN AN ORGANIZED HEALTH CARE EDUCATION/TRAINING PROGRAM

## 2024-11-25 PROCEDURE — 82947 ASSAY GLUCOSE BLOOD QUANT: CPT

## 2024-11-25 PROCEDURE — 2500000001 HC RX 250 WO HCPCS SELF ADMINISTERED DRUGS (ALT 637 FOR MEDICARE OP): Performed by: STUDENT IN AN ORGANIZED HEALTH CARE EDUCATION/TRAINING PROGRAM

## 2024-11-25 PROCEDURE — 2500000002 HC RX 250 W HCPCS SELF ADMINISTERED DRUGS (ALT 637 FOR MEDICARE OP, ALT 636 FOR OP/ED)

## 2024-11-25 PROCEDURE — 80069 RENAL FUNCTION PANEL: CPT | Performed by: STUDENT IN AN ORGANIZED HEALTH CARE EDUCATION/TRAINING PROGRAM

## 2024-11-25 PROCEDURE — 2500000002 HC RX 250 W HCPCS SELF ADMINISTERED DRUGS (ALT 637 FOR MEDICARE OP, ALT 636 FOR OP/ED): Performed by: STUDENT IN AN ORGANIZED HEALTH CARE EDUCATION/TRAINING PROGRAM

## 2024-11-25 PROCEDURE — 85027 COMPLETE CBC AUTOMATED: CPT | Performed by: STUDENT IN AN ORGANIZED HEALTH CARE EDUCATION/TRAINING PROGRAM

## 2024-11-25 PROCEDURE — 2500000004 HC RX 250 GENERAL PHARMACY W/ HCPCS (ALT 636 FOR OP/ED)

## 2024-11-25 PROCEDURE — 83735 ASSAY OF MAGNESIUM: CPT | Performed by: STUDENT IN AN ORGANIZED HEALTH CARE EDUCATION/TRAINING PROGRAM

## 2024-11-25 PROCEDURE — 97530 THERAPEUTIC ACTIVITIES: CPT | Mod: GP,CQ

## 2024-11-25 PROCEDURE — 1200000002 HC GENERAL ROOM WITH TELEMETRY DAILY

## 2024-11-25 PROCEDURE — 36415 COLL VENOUS BLD VENIPUNCTURE: CPT | Performed by: STUDENT IN AN ORGANIZED HEALTH CARE EDUCATION/TRAINING PROGRAM

## 2024-11-25 RX ORDER — MAGNESIUM SULFATE HEPTAHYDRATE 40 MG/ML
2 INJECTION, SOLUTION INTRAVENOUS ONCE
Status: COMPLETED | OUTPATIENT
Start: 2024-11-25 | End: 2024-11-25

## 2024-11-25 RX ORDER — INSULIN LISPRO 100 [IU]/ML
0-10 INJECTION, SOLUTION INTRAVENOUS; SUBCUTANEOUS
Status: DISCONTINUED | OUTPATIENT
Start: 2024-11-25 | End: 2024-12-01

## 2024-11-25 RX ADMIN — INSULIN LISPRO 5 UNITS: 100 INJECTION, SOLUTION INTRAVENOUS; SUBCUTANEOUS at 08:50

## 2024-11-25 RX ADMIN — NICOTINE 1 PATCH: 14 PATCH, EXTENDED RELEASE TRANSDERMAL at 08:49

## 2024-11-25 RX ADMIN — GABAPENTIN 400 MG: 400 CAPSULE ORAL at 14:19

## 2024-11-25 RX ADMIN — METOPROLOL TARTRATE 12.5 MG: 25 TABLET, FILM COATED ORAL at 08:48

## 2024-11-25 RX ADMIN — METHOCARBAMOL TABLETS 500 MG: 500 TABLET, COATED ORAL at 06:49

## 2024-11-25 RX ADMIN — ASPIRIN 81 MG: 81 TABLET, COATED ORAL at 08:48

## 2024-11-25 RX ADMIN — INSULIN LISPRO 2 UNITS: 100 INJECTION, SOLUTION INTRAVENOUS; SUBCUTANEOUS at 12:36

## 2024-11-25 RX ADMIN — HYDROMORPHONE HYDROCHLORIDE 0.2 MG: 0.2 INJECTION, SOLUTION INTRAMUSCULAR; INTRAVENOUS; SUBCUTANEOUS at 09:53

## 2024-11-25 RX ADMIN — PIPERACILLIN SODIUM AND TAZOBACTAM SODIUM 3.38 G: 3; .375 INJECTION, SOLUTION INTRAVENOUS at 03:53

## 2024-11-25 RX ADMIN — HYDROMORPHONE HYDROCHLORIDE 0.2 MG: 0.2 INJECTION, SOLUTION INTRAMUSCULAR; INTRAVENOUS; SUBCUTANEOUS at 23:30

## 2024-11-25 RX ADMIN — HYDROMORPHONE HYDROCHLORIDE 0.2 MG: 0.2 INJECTION, SOLUTION INTRAMUSCULAR; INTRAVENOUS; SUBCUTANEOUS at 03:15

## 2024-11-25 RX ADMIN — METHOCARBAMOL TABLETS 500 MG: 500 TABLET, COATED ORAL at 12:36

## 2024-11-25 RX ADMIN — INSULIN LISPRO 5 UNITS: 100 INJECTION, SOLUTION INTRAVENOUS; SUBCUTANEOUS at 18:21

## 2024-11-25 RX ADMIN — GABAPENTIN 400 MG: 400 CAPSULE ORAL at 21:01

## 2024-11-25 RX ADMIN — OXYCODONE HYDROCHLORIDE 10 MG: 10 TABLET ORAL at 04:45

## 2024-11-25 RX ADMIN — INSULIN LISPRO 2 UNITS: 100 INJECTION, SOLUTION INTRAVENOUS; SUBCUTANEOUS at 18:20

## 2024-11-25 RX ADMIN — PIPERACILLIN SODIUM AND TAZOBACTAM SODIUM 3.38 G: 3; .375 INJECTION, SOLUTION INTRAVENOUS at 08:49

## 2024-11-25 RX ADMIN — OXYCODONE HYDROCHLORIDE 10 MG: 10 TABLET ORAL at 12:36

## 2024-11-25 RX ADMIN — GABAPENTIN 400 MG: 400 CAPSULE ORAL at 08:49

## 2024-11-25 RX ADMIN — ACETAMINOPHEN 650 MG: 325 TABLET, FILM COATED ORAL at 06:49

## 2024-11-25 RX ADMIN — INSULIN LISPRO 2 UNITS: 100 INJECTION, SOLUTION INTRAVENOUS; SUBCUTANEOUS at 08:50

## 2024-11-25 RX ADMIN — METHOCARBAMOL TABLETS 500 MG: 500 TABLET, COATED ORAL at 18:20

## 2024-11-25 RX ADMIN — HYDROMORPHONE HYDROCHLORIDE 0.2 MG: 0.2 INJECTION, SOLUTION INTRAMUSCULAR; INTRAVENOUS; SUBCUTANEOUS at 17:40

## 2024-11-25 RX ADMIN — PIPERACILLIN SODIUM AND TAZOBACTAM SODIUM 3.38 G: 3; .375 INJECTION, SOLUTION INTRAVENOUS at 14:19

## 2024-11-25 RX ADMIN — INSULIN LISPRO 5 UNITS: 100 INJECTION, SOLUTION INTRAVENOUS; SUBCUTANEOUS at 12:37

## 2024-11-25 RX ADMIN — METOPROLOL TARTRATE 12.5 MG: 25 TABLET, FILM COATED ORAL at 21:00

## 2024-11-25 RX ADMIN — ENOXAPARIN SODIUM 40 MG: 100 INJECTION SUBCUTANEOUS at 04:44

## 2024-11-25 RX ADMIN — ACETAMINOPHEN 650 MG: 325 TABLET, FILM COATED ORAL at 18:20

## 2024-11-25 RX ADMIN — ACETAMINOPHEN 650 MG: 325 TABLET, FILM COATED ORAL at 12:36

## 2024-11-25 RX ADMIN — Medication 1500 MG: at 12:35

## 2024-11-25 RX ADMIN — MAGNESIUM SULFATE HEPTAHYDRATE 2 G: 40 INJECTION, SOLUTION INTRAVENOUS at 14:22

## 2024-11-25 RX ADMIN — ACETAMINOPHEN 650 MG: 325 TABLET, FILM COATED ORAL at 01:09

## 2024-11-25 RX ADMIN — OXYCODONE HYDROCHLORIDE 10 MG: 10 TABLET ORAL at 18:47

## 2024-11-25 RX ADMIN — Medication 1500 MG: at 01:13

## 2024-11-25 RX ADMIN — ATORVASTATIN CALCIUM 40 MG: 40 TABLET, FILM COATED ORAL at 08:48

## 2024-11-25 RX ADMIN — ESCITALOPRAM OXALATE 10 MG: 10 TABLET ORAL at 08:48

## 2024-11-25 RX ADMIN — METHOCARBAMOL TABLETS 500 MG: 500 TABLET, COATED ORAL at 01:09

## 2024-11-25 RX ADMIN — DOCUSATE SODIUM 100 MG: 100 CAPSULE, LIQUID FILLED ORAL at 21:01

## 2024-11-25 RX ADMIN — PIPERACILLIN SODIUM AND TAZOBACTAM SODIUM 3.38 G: 3; .375 INJECTION, SOLUTION INTRAVENOUS at 21:00

## 2024-11-25 RX ADMIN — INSULIN GLARGINE 20 UNITS: 100 INJECTION, SOLUTION SUBCUTANEOUS at 12:38

## 2024-11-25 ASSESSMENT — PAIN SCALES - GENERAL
PAINLEVEL_OUTOF10: 5 - MODERATE PAIN
PAINLEVEL_OUTOF10: 7
PAINLEVEL_OUTOF10: 6
PAINLEVEL_OUTOF10: 7
PAINLEVEL_OUTOF10: 6
PAINLEVEL_OUTOF10: 5 - MODERATE PAIN
PAINLEVEL_OUTOF10: 8

## 2024-11-25 ASSESSMENT — COGNITIVE AND FUNCTIONAL STATUS - GENERAL
WALKING IN HOSPITAL ROOM: A LITTLE
WALKING IN HOSPITAL ROOM: A LITTLE
TURNING FROM BACK TO SIDE WHILE IN FLAT BAD: A LITTLE
STANDING UP FROM CHAIR USING ARMS: A LITTLE
STANDING UP FROM CHAIR USING ARMS: A LOT
HELP NEEDED FOR BATHING: A LITTLE
DRESSING REGULAR UPPER BODY CLOTHING: A LITTLE
TURNING FROM BACK TO SIDE WHILE IN FLAT BAD: A LITTLE
TURNING FROM BACK TO SIDE WHILE IN FLAT BAD: A LITTLE
WALKING IN HOSPITAL ROOM: TOTAL
DAILY ACTIVITIY SCORE: 21
CLIMB 3 TO 5 STEPS WITH RAILING: TOTAL
MOVING TO AND FROM BED TO CHAIR: A LOT
MOVING FROM LYING ON BACK TO SITTING ON SIDE OF FLAT BED WITH BEDRAILS: A LITTLE
DRESSING REGULAR LOWER BODY CLOTHING: A LITTLE
MOBILITY SCORE: 12
TOILETING: A LITTLE
CLIMB 3 TO 5 STEPS WITH RAILING: TOTAL
MOBILITY SCORE: 17
CLIMB 3 TO 5 STEPS WITH RAILING: TOTAL
MOVING TO AND FROM BED TO CHAIR: A LITTLE
MOBILITY SCORE: 17
MOVING TO AND FROM BED TO CHAIR: A LITTLE
STANDING UP FROM CHAIR USING ARMS: A LITTLE
HELP NEEDED FOR BATHING: A LITTLE
DRESSING REGULAR LOWER BODY CLOTHING: A LITTLE
DAILY ACTIVITIY SCORE: 20
TOILETING: A LITTLE

## 2024-11-25 ASSESSMENT — PAIN DESCRIPTION - LOCATION
LOCATION: LEG
LOCATION: LEG

## 2024-11-25 ASSESSMENT — PAIN DESCRIPTION - ORIENTATION
ORIENTATION: RIGHT
ORIENTATION: RIGHT

## 2024-11-25 ASSESSMENT — PAIN - FUNCTIONAL ASSESSMENT
PAIN_FUNCTIONAL_ASSESSMENT: 0-10

## 2024-11-25 ASSESSMENT — PAIN DESCRIPTION - DESCRIPTORS
DESCRIPTORS: THROBBING;BURNING
DESCRIPTORS: THROBBING
DESCRIPTORS: ACHING;THROBBING

## 2024-11-25 NOTE — CARE PLAN
The patient's goals for the shift include      The clinical goals for the shift include pain control      Problem: Skin  Goal: Decreased wound size/increased tissue granulation at next dressing change  Outcome: Progressing  Goal: Participates in plan/prevention/treatment measures  Outcome: Progressing  Goal: Prevent/manage excess moisture  Outcome: Progressing  Goal: Prevent/minimize sheer/friction injuries  Outcome: Progressing  Goal: Promote/optimize nutrition  Outcome: Progressing  Goal: Promote skin healing  Outcome: Progressing     Problem: Pain - Adult  Goal: Verbalizes/displays adequate comfort level or baseline comfort level  Outcome: Progressing     Problem: Safety - Adult  Goal: Free from fall injury  Outcome: Progressing     Problem: Discharge Planning  Goal: Discharge to home or other facility with appropriate resources  Outcome: Progressing     Problem: Chronic Conditions and Co-morbidities  Goal: Patient's chronic conditions and co-morbidity symptoms are monitored and maintained or improved  Outcome: Progressing     Problem: Fall/Injury  Goal: Not fall by end of shift  Outcome: Progressing  Goal: Be free from injury by end of the shift  Outcome: Progressing  Goal: Verbalize understanding of personal risk factors for fall in the hospital  Outcome: Progressing  Goal: Verbalize understanding of risk factor reduction measures to prevent injury from fall in the home  Outcome: Progressing  Goal: Use assistive devices by end of the shift  Outcome: Progressing  Goal: Pace activities to prevent fatigue by end of the shift  Outcome: Progressing     Problem: Pain  Goal: Takes deep breaths with improved pain control throughout the shift  Outcome: Progressing  Goal: Turns in bed with improved pain control throughout the shift  Outcome: Progressing  Goal: Walks with improved pain control throughout the shift  Outcome: Progressing  Goal: Performs ADL's with improved pain control throughout shift  Outcome:  Progressing  Goal: Participates in PT with improved pain control throughout the shift  Outcome: Progressing  Goal: Free from opioid side effects throughout the shift  Outcome: Progressing  Goal: Free from acute confusion related to pain meds throughout the shift  Outcome: Progressing     Problem: Diabetes  Goal: Achieve decreasing blood glucose levels by end of shift  Outcome: Progressing  Goal: Increase stability of blood glucose readings by end of shift  Outcome: Progressing  Goal: Decrease in ketones present in urine by end of shift  Outcome: Progressing  Goal: Maintain electrolyte levels within acceptable range throughout shift  Outcome: Progressing  Goal: Maintain glucose levels >70mg/dl to <250mg/dl throughout shift  Outcome: Progressing  Goal: No changes in neurological exam by end of shift  Outcome: Progressing  Goal: Learn about and adhere to nutrition recommendations by end of shift  Outcome: Progressing  Goal: Vital signs within normal range for age by end of shift  Outcome: Progressing  Goal: Increase self care and/or family involovement by end of shift  Outcome: Progressing  Goal: Receive DSME education by end of shift  Outcome: Progressing

## 2024-11-25 NOTE — PROGRESS NOTES
VASCULAR SURGERY PROGRESS NOTE  Assessment/Plan   Errol Fonseca is 54 y.o. 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 Patient taken to the OR by podiatry twice for debridement but during second debridement infection found to have spread and foot deemed non-salvagable. Vascular consulted from PACU for urgent BKA .   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    Patient progressing well postoperatively, with pain well-controlled, tolerating diet.  Patient to be consented for formalization of right amputation Friday, 11/29/2024 with Dr. Lopez.  Patient be consented.  Dressing to be changed at bedside today by vascular surgery team.      Plan:  Patient be added onto the OR on Friday, 11/29/2024.  Consent to be obtained at bedside.  Multimodal pain control with Tylenol, oxycodone, Dilaudid, gabapentin.  ASA, atorvastatin, metoprolol  Regular diet, bowel regimen  No IVF  Continue Zosyn, vancomycin.  BID dressing changes  SSI, Lantus 20 units daily lispro 5 units 3 times daily before meals.  Lovenox DVT Ppx  Nicotine patch.  Lexapro, trazodone.  PT OT consults ordered.    D/w attending, Dr. Princess Meza MD  PGY-1 General Surgery  Vascular Surgery [phone]        Subjective   Patient seen at bedside this a.m.  Patient conversational, alert, denotes pain well-controlled, slept well, denies nausea, vomiting overnight.    Objective   Vitals:  Heart Rate:  [70-80]   Temp:  [35.8 °C (96.5 °F)-36.5 °C (97.7 °F)]   Resp:  [16]   BP: (115-154)/(68-88)   SpO2:  [93 %-97 %]     Exam:  Constitutional: No acute distress, resting comfortably  Neuro:  AOx3, grossly intact  ENMT: moist mucous membranes  CV: no tachycardia  Pulm: non-labored on RA  GI: soft, non-tender, non-distended  Skin: warm and dry  Musculoskeletal: moving all  extremities  Extremities: Right wrapped at amputation site, without strikethrough, without pain to palpation. LLE neurovascular intact      Labs:  Results from last 7 days   Lab Units 11/23/24  0727 11/22/24  0337 11/21/24  1205   WBC AUTO x10*3/uL 14.1* 19.3* 20.5*   HEMOGLOBIN g/dL 9.3* 9.4* 9.5*   PLATELETS AUTO x10*3/uL 253 183 148*      Results from last 7 days   Lab Units 11/23/24  0727 11/22/24  0337 11/21/24  1205   SODIUM mmol/L 131* 129* 127*   POTASSIUM mmol/L 5.0 3.6 3.7   CHLORIDE mmol/L 91* 92* 93*   CO2 mmol/L 29 28 25   BUN mg/dL 16 19 26*   CREATININE mg/dL 0.88 1.02 1.34*   GLUCOSE mg/dL 211* 177* 211*   MAGNESIUM mg/dL 1.58* 1.68 1.66   PHOSPHORUS mg/dL 4.2 2.8 3.2      Results from last 7 days   Lab Units 11/21/24  0434 11/20/24  1838   INR  1.4* 1.4*   PROTIME seconds 16.2* 16.2*   APTT seconds 27 24*

## 2024-11-25 NOTE — PROGRESS NOTES
Physical Therapy    Physical Therapy Treatment    Patient Name: Errol Fonseca  MRN: [id number]  Department: [place] [address]  Room: [facility]8034  Today's Date: 11/25/2024  Time Calculation  Start Time: 1009  Stop Time: 1038  Time Calculation (min): 29 min         Assessment/Plan   PT Assessment  PT Assessment Results: Decreased strength, Decreased endurance, Impaired balance, Decreased mobility  Rehab Prognosis: Excellent  Barriers to Discharge: Functional mobility  Evaluation/Treatment Tolerance: Patient tolerated treatment well  Medical Staff Made Aware: Yes  Strengths: Attitude of self  End of Session Communication: Bedside nurse  Assessment Comment: Pt offers excellent effort with therapy, is progressing well with transfers and standing at 2WW. Remains appropriate for high intensity therapy  End of Session Patient Position: Up in chair, Alarm off, not on at start of session     PT Plan  Treatment/Interventions: Bed mobility, Transfer training, Gait training, Stair training, Balance training, Neuromuscular re-education, Strengthening, Endurance training, Range of motion, Therapeutic activity, Therapeutic exercise  PT Plan: Ongoing PT  PT Frequency: 5 times per week  PT Discharge Recommendations: High intensity level of continued care  Equipment Recommended upon Discharge: Wheeled walker  PT Recommended Transfer Status: Assist x2, Assistive device  PT - OK to Discharge: Yes      General Visit Information:   PT  Visit  PT Received On: 11/25/24  Response to Previous Treatment: Patient with no complaints from previous session.  General  Prior to Session Communication: Bedside nurse  Patient Position Received: Bed, 3 rail up, Alarm off, not on at start of session  General Comment: Pt supine in bed, very pleasant and motivated  Per handoff with RN, pt is appropriate for therapy, vitals are stable and pain is controlled. Other concerns prior to tx are: none    Subjective   Precautions:  Precautions  LE Weight Bearing Status:  Right Non-Weight Bearing  Medical Precautions: Fall precautions  Precautions Comment: R knee disarticulation    Objective   Pain:  Pain Assessment  Pain Assessment: 0-10  0-10 (Numeric) Pain Score: 5 - Moderate pain  Pain Type: Surgical pain, Phantom pain  Pain Location: Leg  Pain Orientation: Right  Pain Interventions:  (Pt medicated prior to therapy)  Cognition:  Cognition  Overall Cognitive Status: Within Functional Limits  Orientation Level: Oriented X4    Treatments:  Therapeutic Exercise  Therapeutic Exercise Performed: Yes  Therapeutic Exercise Activity 1: Standing R LE only hip ext 1x5    Therapeutic Activity  Therapeutic Activity Performed: Yes  Therapeutic Activity 1: Static stand at 2WW 2x1min with Sharri    Bed Mobility  Bed Mobility: Yes  Bed Mobility 1  Bed Mobility 1: Supine to sitting  Level of Assistance 1: Close supervision       Transfers  Transfer: Yes  Transfer 1  Transfer From 1: Sit to, Stand to  Transfer to 1: Sit  Technique 1: Sit to stand, Stand to sit  Transfer Device 1: Walker, Gait belt  Transfer Level of Assistance 1: Maximum assistance, Maximum verbal cues  Trials/Comments 1: 2x from EOB  Transfers 2  Transfer From 2: Sit to, Stand to  Transfer to 2: Sit  Technique 2: Sit to stand, Stand to sit  Transfer Device 2: Walker, Gait belt  Transfer Level of Assistance 2: Moderate assistance  Trials/Comments 2: 1x from chair with arms  Transfers 3  Transfer From 3: Bed to  Transfer to 3: Chair with arms  Technique 3: Stand pivot  Transfer Device 3: Walker, Gait belt  Transfer Level of Assistance 3: Minimum assistance    Outcome Measures:     [facility] Basic Mobility  Turning from your back to your side while in a flat bed without using bedrails: A little  Moving from lying on your back to sitting on the side of a flat bed without using bedrails: A little  Moving to and from bed to chair (including a wheelchair): A lot  Standing up from a chair using your arms (e.g. wheelchair or bedside chair): A  lot  To walk in hospital room: Total  Climbing 3-5 steps with railing: Total  Basic Mobility - Total Score: 12    Education Documentation  Precautions, taught by Nancy Huitron PTA at 11/25/2024 11:14 AM.  Learner: Patient  Readiness: Eager  Method: Explanation, Demonstration  Response: Verbalizes Understanding, Demonstrated Understanding  Comment: precautions,  PT POC, safe transfer techniques    Body Mechanics, taught by Nancy Huitron PTA at 11/25/2024 11:14 AM.  Learner: Patient  Readiness: Eager  Method: Explanation, Demonstration  Response: Verbalizes Understanding, Demonstrated Understanding  Comment: precautions,  PT POC, safe transfer techniques    Mobility Training, taught by Nancy Huitron PTA at 11/25/2024 11:14 AM.  Learner: Patient  Readiness: Eager  Method: Explanation, Demonstration  Response: Verbalizes Understanding, Demonstrated Understanding  Comment: precautions,  PT POC, safe transfer techniques    Education Comments  No comments found.        OP EDUCATION:       Encounter Problems       Encounter Problems (Active)       Balance       STG - Maintains dynamic standing balance with upper extremity support and SBA (Progressing)       Start:  11/23/24    Expected End:  12/07/24       INTERVENTIONS:  1. Practice standing with minimal support.  2. Educate patient about standing tolerance.  3. Educate patient about independence with gait, transfers, and ADL's.  4. Educate patient about use of assistive device.  5. Educate patient about self-directed care.            Mobility       LTG - Patient will ambulate household distance with LRD and SBA (Progressing)       Start:  11/23/24    Expected End:  12/07/24            LTG - Patient will navigate 3 steps with CGA and rails/device (Progressing)       Start:  11/23/24    Expected End:  12/07/24               PT Transfers       STG - Patient will perform bed mobility independently (Progressing)       Start:  11/23/24    Expected End:  12/07/24             STG - Patient will transfer sit to and from stand with SBA and LRD (Progressing)       Start:  11/23/24    Expected End:  12/07/24                   GISELLE Mary

## 2024-11-25 NOTE — PROGRESS NOTES
Vancomycin Dosing by Pharmacy- FOLLOW UP    Errol Fonseca is a 54 y.o. year old male who Pharmacy has been consulted for vancomycin dosing for cellulitis, skin and soft tissue. Based on the patient's indication and renal status this patient is being dosed based on a goal AUC of 400-600.     Renal function is currently improving.    Current vancomycin dose: 1500 mg given every 12 hours    Estimated vancomycin AUC on current dose: 453 mg/L.hr     Visit Vitals  /77 (BP Location: Right arm, Patient Position: Lying)   Pulse 78   Temp 36.4 °C (97.5 °F) (Temporal)   Resp 18        Lab Results   Component Value Date    CREATININE 0.80 2024    CREATININE 0.88 2024    CREATININE 1.02 2024    CREATININE 1.34 (H) 2024        Patient weight is as follows:   Vitals:    24 1127   Weight: 102 kg (224 lb 13.9 oz)       Cultures:  Susceptibility data for the encounter in last 14 days.  Collected Specimen Info Organism   24 Swab from ABSCESS Mixed Anaerobic Bacteria     Mixed Gram-Positive Bacteria         I/O last 3 completed shifts:  In: 2360 (23.1 mL/kg) [P.O.:1360; IV Piggyback:1000]  Out: 4020 (39.4 mL/kg) [Urine:4020 (1.1 mL/kg/hr)]  Weight: 102 kg   I/O during current shift:  No intake/output data recorded.    Temp (24hrs), Av.2 °C (97.2 °F), Min:35.8 °C (96.5 °F), Max:36.5 °C (97.7 °F)      Assessment/Plan    Within goal AUC range. Continue current vancomycin regimen.    This dosing regimen is predicted by InsightRx to result in the following pharmacokinetic parameters:  Loading dose: N/A  Regimen: 1500 mg IV every 12 hours.  Start time: 13:13 on 2024  Exposure target: AUC24 (range)400-600 mg/L.hr   HNP43-13: 453 mg/L.hr  AUC24,ss: 457 mg/L.hr  Probability of AUC24 > 400: 87 %  Ctrough,ss: 12.5 mg/L    The next level will be obtained on  at 8am. May be obtained sooner if clinically indicated.   Will continue to monitor renal function daily while on vancomycin and  order serum creatinine at least every 48 hours if not already ordered.  Follow for continued vancomycin needs, clinical response, and signs/symptoms of toxicity.       Sanchez Aguayo

## 2024-11-26 LAB
ALBUMIN SERPL BCP-MCNC: 2.8 G/DL (ref 3.4–5)
ANION GAP SERPL CALC-SCNC: 12 MMOL/L (ref 10–20)
BUN SERPL-MCNC: 10 MG/DL (ref 6–23)
CALCIUM SERPL-MCNC: 8.8 MG/DL (ref 8.6–10.6)
CHLORIDE SERPL-SCNC: 95 MMOL/L (ref 98–107)
CO2 SERPL-SCNC: 32 MMOL/L (ref 21–32)
CREAT SERPL-MCNC: 0.83 MG/DL (ref 0.5–1.3)
EGFRCR SERPLBLD CKD-EPI 2021: >90 ML/MIN/1.73M*2
ERYTHROCYTE [DISTWIDTH] IN BLOOD BY AUTOMATED COUNT: 12.6 % (ref 11.5–14.5)
GLUCOSE BLD MANUAL STRIP-MCNC: 168 MG/DL (ref 74–99)
GLUCOSE BLD MANUAL STRIP-MCNC: 169 MG/DL (ref 74–99)
GLUCOSE BLD MANUAL STRIP-MCNC: 172 MG/DL (ref 74–99)
GLUCOSE BLD MANUAL STRIP-MCNC: 177 MG/DL (ref 74–99)
GLUCOSE SERPL-MCNC: 139 MG/DL (ref 74–99)
HCT VFR BLD AUTO: 27.8 % (ref 41–52)
HGB BLD-MCNC: 9.4 G/DL (ref 13.5–17.5)
MAGNESIUM SERPL-MCNC: 1.83 MG/DL (ref 1.6–2.4)
MCH RBC QN AUTO: 33 PG (ref 26–34)
MCHC RBC AUTO-ENTMCNC: 33.8 G/DL (ref 32–36)
MCV RBC AUTO: 98 FL (ref 80–100)
NRBC BLD-RTO: 0 /100 WBCS (ref 0–0)
PHOSPHATE SERPL-MCNC: 3.4 MG/DL (ref 2.5–4.9)
PLATELET # BLD AUTO: 352 X10*3/UL (ref 150–450)
POTASSIUM SERPL-SCNC: 4.1 MMOL/L (ref 3.5–5.3)
RBC # BLD AUTO: 2.85 X10*6/UL (ref 4.5–5.9)
SODIUM SERPL-SCNC: 135 MMOL/L (ref 136–145)
WBC # BLD AUTO: 8.4 X10*3/UL (ref 4.4–11.3)

## 2024-11-26 PROCEDURE — 2500000004 HC RX 250 GENERAL PHARMACY W/ HCPCS (ALT 636 FOR OP/ED): Performed by: STUDENT IN AN ORGANIZED HEALTH CARE EDUCATION/TRAINING PROGRAM

## 2024-11-26 PROCEDURE — 2500000002 HC RX 250 W HCPCS SELF ADMINISTERED DRUGS (ALT 637 FOR MEDICARE OP, ALT 636 FOR OP/ED): Performed by: STUDENT IN AN ORGANIZED HEALTH CARE EDUCATION/TRAINING PROGRAM

## 2024-11-26 PROCEDURE — 2500000001 HC RX 250 WO HCPCS SELF ADMINISTERED DRUGS (ALT 637 FOR MEDICARE OP): Performed by: STUDENT IN AN ORGANIZED HEALTH CARE EDUCATION/TRAINING PROGRAM

## 2024-11-26 PROCEDURE — 2500000002 HC RX 250 W HCPCS SELF ADMINISTERED DRUGS (ALT 637 FOR MEDICARE OP, ALT 636 FOR OP/ED)

## 2024-11-26 PROCEDURE — S4991 NICOTINE PATCH NONLEGEND: HCPCS | Performed by: STUDENT IN AN ORGANIZED HEALTH CARE EDUCATION/TRAINING PROGRAM

## 2024-11-26 PROCEDURE — 97110 THERAPEUTIC EXERCISES: CPT | Mod: GP,CQ

## 2024-11-26 PROCEDURE — 36415 COLL VENOUS BLD VENIPUNCTURE: CPT | Performed by: STUDENT IN AN ORGANIZED HEALTH CARE EDUCATION/TRAINING PROGRAM

## 2024-11-26 PROCEDURE — 82947 ASSAY GLUCOSE BLOOD QUANT: CPT

## 2024-11-26 PROCEDURE — 83735 ASSAY OF MAGNESIUM: CPT | Performed by: STUDENT IN AN ORGANIZED HEALTH CARE EDUCATION/TRAINING PROGRAM

## 2024-11-26 PROCEDURE — 85027 COMPLETE CBC AUTOMATED: CPT | Performed by: STUDENT IN AN ORGANIZED HEALTH CARE EDUCATION/TRAINING PROGRAM

## 2024-11-26 PROCEDURE — 84132 ASSAY OF SERUM POTASSIUM: CPT | Performed by: STUDENT IN AN ORGANIZED HEALTH CARE EDUCATION/TRAINING PROGRAM

## 2024-11-26 PROCEDURE — 1200000002 HC GENERAL ROOM WITH TELEMETRY DAILY

## 2024-11-26 PROCEDURE — 97530 THERAPEUTIC ACTIVITIES: CPT | Mod: GP,CQ

## 2024-11-26 RX ADMIN — METHOCARBAMOL TABLETS 500 MG: 500 TABLET, COATED ORAL at 06:26

## 2024-11-26 RX ADMIN — METHOCARBAMOL TABLETS 500 MG: 500 TABLET, COATED ORAL at 12:04

## 2024-11-26 RX ADMIN — OXYCODONE HYDROCHLORIDE 10 MG: 10 TABLET ORAL at 20:24

## 2024-11-26 RX ADMIN — Medication 1500 MG: at 00:37

## 2024-11-26 RX ADMIN — METOPROLOL TARTRATE 12.5 MG: 25 TABLET, FILM COATED ORAL at 08:43

## 2024-11-26 RX ADMIN — HYDROMORPHONE HYDROCHLORIDE 0.2 MG: 0.2 INJECTION, SOLUTION INTRAMUSCULAR; INTRAVENOUS; SUBCUTANEOUS at 17:28

## 2024-11-26 RX ADMIN — HYDROMORPHONE HYDROCHLORIDE 0.2 MG: 0.2 INJECTION, SOLUTION INTRAMUSCULAR; INTRAVENOUS; SUBCUTANEOUS at 08:54

## 2024-11-26 RX ADMIN — Medication 1500 MG: at 12:04

## 2024-11-26 RX ADMIN — INSULIN LISPRO 5 UNITS: 100 INJECTION, SOLUTION INTRAVENOUS; SUBCUTANEOUS at 17:03

## 2024-11-26 RX ADMIN — PIPERACILLIN SODIUM AND TAZOBACTAM SODIUM 3.38 G: 3; .375 INJECTION, SOLUTION INTRAVENOUS at 08:43

## 2024-11-26 RX ADMIN — PIPERACILLIN SODIUM AND TAZOBACTAM SODIUM 3.38 G: 3; .375 INJECTION, SOLUTION INTRAVENOUS at 20:24

## 2024-11-26 RX ADMIN — HYDROMORPHONE HYDROCHLORIDE 0.2 MG: 0.2 INJECTION, SOLUTION INTRAMUSCULAR; INTRAVENOUS; SUBCUTANEOUS at 21:45

## 2024-11-26 RX ADMIN — METOPROLOL TARTRATE 12.5 MG: 25 TABLET, FILM COATED ORAL at 21:02

## 2024-11-26 RX ADMIN — METHOCARBAMOL TABLETS 500 MG: 500 TABLET, COATED ORAL at 00:37

## 2024-11-26 RX ADMIN — INSULIN LISPRO 5 UNITS: 100 INJECTION, SOLUTION INTRAVENOUS; SUBCUTANEOUS at 12:06

## 2024-11-26 RX ADMIN — INSULIN LISPRO 2 UNITS: 100 INJECTION, SOLUTION INTRAVENOUS; SUBCUTANEOUS at 12:05

## 2024-11-26 RX ADMIN — NICOTINE 1 PATCH: 14 PATCH, EXTENDED RELEASE TRANSDERMAL at 08:43

## 2024-11-26 RX ADMIN — PIPERACILLIN SODIUM AND TAZOBACTAM SODIUM 3.38 G: 3; .375 INJECTION, SOLUTION INTRAVENOUS at 15:10

## 2024-11-26 RX ADMIN — ASPIRIN 81 MG: 81 TABLET, COATED ORAL at 08:43

## 2024-11-26 RX ADMIN — INSULIN LISPRO 2 UNITS: 100 INJECTION, SOLUTION INTRAVENOUS; SUBCUTANEOUS at 07:40

## 2024-11-26 RX ADMIN — ESCITALOPRAM OXALATE 10 MG: 10 TABLET ORAL at 08:43

## 2024-11-26 RX ADMIN — ACETAMINOPHEN 650 MG: 325 TABLET, FILM COATED ORAL at 06:40

## 2024-11-26 RX ADMIN — INSULIN LISPRO 5 UNITS: 100 INJECTION, SOLUTION INTRAVENOUS; SUBCUTANEOUS at 07:41

## 2024-11-26 RX ADMIN — INSULIN GLARGINE 20 UNITS: 100 INJECTION, SOLUTION SUBCUTANEOUS at 12:06

## 2024-11-26 RX ADMIN — PIPERACILLIN SODIUM AND TAZOBACTAM SODIUM 3.38 G: 3; .375 INJECTION, SOLUTION INTRAVENOUS at 03:00

## 2024-11-26 RX ADMIN — METHOCARBAMOL TABLETS 500 MG: 500 TABLET, COATED ORAL at 18:28

## 2024-11-26 RX ADMIN — ATORVASTATIN CALCIUM 40 MG: 40 TABLET, FILM COATED ORAL at 08:43

## 2024-11-26 RX ADMIN — OXYCODONE HYDROCHLORIDE 10 MG: 10 TABLET ORAL at 12:28

## 2024-11-26 RX ADMIN — ACETAMINOPHEN 650 MG: 325 TABLET, FILM COATED ORAL at 12:04

## 2024-11-26 RX ADMIN — HYDROMORPHONE HYDROCHLORIDE 0.2 MG: 0.2 INJECTION, SOLUTION INTRAMUSCULAR; INTRAVENOUS; SUBCUTANEOUS at 02:38

## 2024-11-26 RX ADMIN — DOCUSATE SODIUM 100 MG: 100 CAPSULE, LIQUID FILLED ORAL at 21:03

## 2024-11-26 RX ADMIN — OXYCODONE HYDROCHLORIDE 10 MG: 10 TABLET ORAL at 00:37

## 2024-11-26 RX ADMIN — INSULIN LISPRO 2 UNITS: 100 INJECTION, SOLUTION INTRAVENOUS; SUBCUTANEOUS at 17:03

## 2024-11-26 RX ADMIN — GABAPENTIN 400 MG: 400 CAPSULE ORAL at 17:02

## 2024-11-26 RX ADMIN — ACETAMINOPHEN 650 MG: 325 TABLET, FILM COATED ORAL at 18:28

## 2024-11-26 RX ADMIN — GABAPENTIN 400 MG: 400 CAPSULE ORAL at 08:43

## 2024-11-26 RX ADMIN — GABAPENTIN 400 MG: 400 CAPSULE ORAL at 21:02

## 2024-11-26 RX ADMIN — OXYCODONE HYDROCHLORIDE 10 MG: 10 TABLET ORAL at 06:26

## 2024-11-26 RX ADMIN — ACETAMINOPHEN 650 MG: 325 TABLET, FILM COATED ORAL at 00:37

## 2024-11-26 RX ADMIN — ENOXAPARIN SODIUM 40 MG: 100 INJECTION SUBCUTANEOUS at 06:26

## 2024-11-26 ASSESSMENT — COGNITIVE AND FUNCTIONAL STATUS - GENERAL
TOILETING: A LOT
MOVING FROM LYING ON BACK TO SITTING ON SIDE OF FLAT BED WITH BEDRAILS: A LITTLE
STANDING UP FROM CHAIR USING ARMS: A LITTLE
TURNING FROM BACK TO SIDE WHILE IN FLAT BAD: A LITTLE
STANDING UP FROM CHAIR USING ARMS: A LOT
STANDING UP FROM CHAIR USING ARMS: A LITTLE
DRESSING REGULAR UPPER BODY CLOTHING: A LITTLE
HELP NEEDED FOR BATHING: A LITTLE
MOBILITY SCORE: 18
MOVING TO AND FROM BED TO CHAIR: A LITTLE
TURNING FROM BACK TO SIDE WHILE IN FLAT BAD: A LITTLE
CLIMB 3 TO 5 STEPS WITH RAILING: TOTAL
WALKING IN HOSPITAL ROOM: A LITTLE
MOBILITY SCORE: 14
CLIMB 3 TO 5 STEPS WITH RAILING: TOTAL
WALKING IN HOSPITAL ROOM: TOTAL
DAILY ACTIVITIY SCORE: 18
WALKING IN HOSPITAL ROOM: A LOT
MOVING FROM LYING ON BACK TO SITTING ON SIDE OF FLAT BED WITH BEDRAILS: A LITTLE
MOVING TO AND FROM BED TO CHAIR: A LITTLE
CLIMB 3 TO 5 STEPS WITH RAILING: TOTAL
DRESSING REGULAR LOWER BODY CLOTHING: A LOT
MOBILITY SCORE: 14
MOVING TO AND FROM BED TO CHAIR: A LITTLE

## 2024-11-26 ASSESSMENT — PAIN DESCRIPTION - ORIENTATION: ORIENTATION: RIGHT

## 2024-11-26 ASSESSMENT — PAIN - FUNCTIONAL ASSESSMENT
PAIN_FUNCTIONAL_ASSESSMENT: 0-10

## 2024-11-26 ASSESSMENT — PAIN SCALES - GENERAL
PAINLEVEL_OUTOF10: 8
PAINLEVEL_OUTOF10: 6
PAINLEVEL_OUTOF10: 7
PAINLEVEL_OUTOF10: 8
PAINLEVEL_OUTOF10: 5 - MODERATE PAIN
PAINLEVEL_OUTOF10: 7
PAINLEVEL_OUTOF10: 6
PAINLEVEL_OUTOF10: 7
PAINLEVEL_OUTOF10: 6

## 2024-11-26 ASSESSMENT — PAIN DESCRIPTION - DESCRIPTORS
DESCRIPTORS: OTHER (COMMENT);THROBBING
DESCRIPTORS: THROBBING

## 2024-11-26 ASSESSMENT — PAIN DESCRIPTION - LOCATION
LOCATION: LEG
LOCATION: LEG

## 2024-11-26 NOTE — PROGRESS NOTES
Physical Therapy    Physical Therapy Treatment    Patient Name: Errol Fonseca  MRN: [id number]  Department: [place] [address]  Room: [facility]8034  Today's Date: 11/26/2024  Time Calculation  Start Time: 0902  Stop Time: 0935  Time Calculation (min): 33 min         Assessment/Plan   PT Assessment  PT Assessment Results: Decreased strength, Decreased endurance, Impaired balance, Decreased mobility, Orthopedic restrictions  Rehab Prognosis: Excellent  Barriers to Discharge: Functional mobility  Evaluation/Treatment Tolerance: Patient tolerated treatment well  Medical Staff Made Aware: Yes  Strengths: Attitude of self  End of Session Communication: Bedside nurse, PCT/NA/CTA  Assessment Comment: Pt offers excellet effort with PT. Demos imapired strength, balance and functional mobility. Remains appropriate for high intensity therapy as per PT eval  End of Session Patient Position: Up in chair, Alarm off, not on at start of session     PT Plan  Treatment/Interventions: Bed mobility, Transfer training, Gait training, Stair training, Balance training, Neuromuscular re-education, Strengthening, Endurance training, Range of motion, Therapeutic activity, Therapeutic exercise  PT Plan: Ongoing PT  PT Frequency: 5 times per week  PT Discharge Recommendations: High intensity level of continued care  Equipment Recommended upon Discharge: Wheeled walker  PT Recommended Transfer Status: Assist x2, Assistive device  PT - OK to Discharge: Yes      General Visit Information:   PT  Visit  PT Received On: 11/26/24  Response to Previous Treatment: Patient with no complaints from previous session.  General  Prior to Session Communication: Bedside nurse  Patient Position Received: Bed, 3 rail up, Alarm off, not on at start of session  General Comment: Pt supine in bed, pleasant and very motivated    Subjective   Precautions:  Precautions  LE Weight Bearing Status: Right Non-Weight Bearing  Medical Precautions: Fall precautions  Precautions Comment:  R knee disarticulation    Vital Signs (Past 2hrs)        Date/Time Vitals Session Patient Position Pulse Resp SpO2 BP MAP (mmHg)               11/26/24 0902 --  Sitting  80  --  99 %  135/81  --            Objective   Pain:  Pain Assessment  Pain Assessment: 0-10  0-10 (Numeric) Pain Score: 6  Pain Type: Surgical pain, Phantom pain  Pain Location: Leg  Pain Orientation: Right  Pain Interventions:  (Pt received pain medication shortly before therapy)  Cognition:  Cognition  Overall Cognitive Status: Within Functional Limits  Orientation Level: Oriented X4    Treatments:  Therapeutic Exercise  Therapeutic Exercise Performed: Yes  Therapeutic Exercise Activity 1: L sidelying for R LE hip ext, hip abd 2x10, supine L LE only bridging 2x10, standing L LE only heel raises 1x10    Therapeutic Activity  Therapeutic Activity Performed: Yes  Therapeutic Activity 1: Static stand at 2WW 2x1min with Sharri    Bed Mobility  Bed Mobility: Yes  Bed Mobility 1  Bed Mobility 1: Rolling left, Supine to sitting  Level of Assistance 1: Close supervision       Transfers  Transfer: Yes  Transfer 1  Transfer From 1: Bed to  Transfer to 1: Stand  Technique 1: Sit to stand  Transfer Device 1: Walker, Gait belt  Transfer Level of Assistance 1: Moderate assistance  Trials/Comments 1: from EOB  Transfers 2  Transfer From 2: Sit to, Stand to  Transfer to 2: Sit  Technique 2: Sit to stand, Stand to sit  Transfer Device 2: Walker, Gait belt  Transfer Level of Assistance 2: Minimum assistance  Trials/Comments 2: 5x repeated from chair  Transfers 3  Transfer From 3: Bed to  Transfer to 3: Chair with arms  Technique 3: Stand pivot  Transfer Device 3: Walker, Gait belt  Transfer Level of Assistance 3: Minimum assistance  Trials/Comments 3: cues for sequencing and alignment, occ post lean with Sharri to correct    Outcome Measures:     [facility] Basic Mobility  Turning from your back to your side while in a flat bed without using bedrails: A little  Moving from  lying on your back to sitting on the side of a flat bed without using bedrails: A little  Moving to and from bed to chair (including a wheelchair): A little  Standing up from a chair using your arms (e.g. wheelchair or bedside chair): A little  To walk in hospital room: Total  Climbing 3-5 steps with railing: Total  Basic Mobility - Total Score: 14    Education Documentation  Precautions, taught by Nancy Huitron PTA at 11/26/2024 10:04 AM.  Learner: Patient  Readiness: Acceptance  Method: Explanation, Demonstration  Response: Verbalizes Understanding, Demonstrated Understanding  Comment: precautions, supine HEP, safe transfer technique    Body Mechanics, taught by Nancy Huitron PTA at 11/26/2024 10:04 AM.  Learner: Patient  Readiness: Acceptance  Method: Explanation, Demonstration  Response: Verbalizes Understanding, Demonstrated Understanding  Comment: precautions, supine HEP, safe transfer technique    Mobility Training, taught by Nancy Huitron PTA at 11/26/2024 10:04 AM.  Learner: Patient  Readiness: Acceptance  Method: Explanation, Demonstration  Response: Verbalizes Understanding, Demonstrated Understanding  Comment: precautions, supine HEP, safe transfer technique    Education Comments  No comments found.        OP EDUCATION:       Encounter Problems       Encounter Problems (Active)       Balance       STG - Maintains dynamic standing balance with upper extremity support and SBA (Progressing)       Start:  11/23/24    Expected End:  12/07/24       INTERVENTIONS:  1. Practice standing with minimal support.  2. Educate patient about standing tolerance.  3. Educate patient about independence with gait, transfers, and ADL's.  4. Educate patient about use of assistive device.  5. Educate patient about self-directed care.            Mobility       LTG - Patient will ambulate household distance with LRD and SBA (Progressing)       Start:  11/23/24    Expected End:  12/07/24            LTG - Patient will  navigate 3 steps with CGA and rails/device (Progressing)       Start:  11/23/24    Expected End:  12/07/24               PT Transfers       STG - Patient will perform bed mobility independently (Progressing)       Start:  11/23/24    Expected End:  12/07/24            STG - Patient will transfer sit to and from stand with SBA and LRD (Progressing)       Start:  11/23/24    Expected End:  12/07/24                   GISELLE Mary

## 2024-11-26 NOTE — PROGRESS NOTES
VASCULAR SURGERY PROGRESS NOTE  Assessment/Plan   Errol Fonseca is 54 y.o. 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 Patient taken to the OR by podiatry twice for debridement but during second debridement infection found to have spread and foot deemed non-salvagable. Vascular consulted from PACU for urgent BKA .   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    Patient continues to progress well postoperatively, tolerating diet, pending formalization of amputation Friday, 11/29/2024 with Dr. Lopez.  Consent to  be obtained tomorrow.    Plan:  Multimodal pain control with Tylenol, oxycodone, Dilaudid, gabapentin.  ASA, atorvastatin, metoprolol  Regular diet, bowel regimen  No IVF  Continue Zosyn, vancomycin.  BID dressing changes  SSI, Lantus 20 units daily lispro 5 units 3 times daily before meals.  Lovenox DVT Ppx  Nicotine patch.  Lexapro, trazodone.  PT OT consults ordered.    D/w attending, Dr. Princess Meza MD  PGY-1 General Surgery  Vascular Surgery      Subjective   Patient seen at bedside, resting comfortably, awake, alert, conversational with examiner.  Patient noting increased pain in right lower extremity amputation site, with improvement with administration of pain medication.  Patient otherwise resting comfortably.    Objective   Vitals:  Heart Rate:  [69-81]   Temp:  [35.1 °C (95.2 °F)-36.3 °C (97.3 °F)]   Resp:  [16-18]   BP: (118-171)/(69-91)   SpO2:  [95 %-99 %]     Exam:  Constitutional: No acute distress, resting comfortably  Neuro:  AOx3, grossly intact  ENMT: moist mucous membranes  CV: no tachycardia  Pulm: non-labored on RA  GI: soft, non-tender, non-distended  Skin: warm and dry  Musculoskeletal: moving all extremities  Extremities: Right lower extremity amputation site appears clean, dry, intact with  minimal strikethrough on dressings.  Left lower extremity warm, well-perfused.      Labs:  Results from last 7 days   Lab Units 11/26/24  0911 11/25/24  0824 11/23/24  0727   WBC AUTO x10*3/uL 8.4 8.3 14.1*   HEMOGLOBIN g/dL 9.4* 9.4* 9.3*   PLATELETS AUTO x10*3/uL 352 336 253      Results from last 7 days   Lab Units 11/26/24  0911 11/25/24  0824 11/23/24  0727   SODIUM mmol/L 135* 134* 131*   POTASSIUM mmol/L 4.1 4.7 5.0   CHLORIDE mmol/L 95* 96* 91*   CO2 mmol/L 32 32 29   BUN mg/dL 10 11 16   CREATININE mg/dL 0.83 0.80 0.88   GLUCOSE mg/dL 139* 227* 211*   MAGNESIUM mg/dL 1.83 1.75 1.58*   PHOSPHORUS mg/dL 3.4 3.1 4.2      Results from last 7 days   Lab Units 11/21/24  0434 11/20/24  1838   INR  1.4* 1.4*   PROTIME seconds 16.2* 16.2*   APTT seconds 27 24*

## 2024-11-26 NOTE — PROGRESS NOTES
Transitional Care Coordinator Note: Discharge dispo: Patient evaluated by therapy team rec High Intensity. TCC informed and educated patient on therapy recs. Patient expressed understanding and agreeable to discharge plan to AR. TCC provided verbal list of [facility] near patient's home. Patient expressed understanding and selected [facility] as [facility]. TCC built and sent referral to [facility], pending response. Page sent to Vascular team for updates and ADOD, pending response.       Javier Mack RN BSN   Transitional Care Coordinator

## 2024-11-27 LAB
ALBUMIN SERPL BCP-MCNC: 3 G/DL (ref 3.4–5)
ANION GAP SERPL CALC-SCNC: 9 MMOL/L (ref 10–20)
BUN SERPL-MCNC: 13 MG/DL (ref 6–23)
CALCIUM SERPL-MCNC: 8.8 MG/DL (ref 8.6–10.6)
CHLORIDE SERPL-SCNC: 95 MMOL/L (ref 98–107)
CO2 SERPL-SCNC: 30 MMOL/L (ref 21–32)
CREAT SERPL-MCNC: 0.87 MG/DL (ref 0.5–1.3)
EGFRCR SERPLBLD CKD-EPI 2021: >90 ML/MIN/1.73M*2
ERYTHROCYTE [DISTWIDTH] IN BLOOD BY AUTOMATED COUNT: 13.2 % (ref 11.5–14.5)
GLUCOSE BLD MANUAL STRIP-MCNC: 180 MG/DL (ref 74–99)
GLUCOSE BLD MANUAL STRIP-MCNC: 206 MG/DL (ref 74–99)
GLUCOSE BLD MANUAL STRIP-MCNC: 229 MG/DL (ref 74–99)
GLUCOSE BLD MANUAL STRIP-MCNC: 237 MG/DL (ref 74–99)
GLUCOSE SERPL-MCNC: 258 MG/DL (ref 74–99)
HCT VFR BLD AUTO: 30.4 % (ref 41–52)
HGB BLD-MCNC: 9.7 G/DL (ref 13.5–17.5)
MAGNESIUM SERPL-MCNC: 1.74 MG/DL (ref 1.6–2.4)
MCH RBC QN AUTO: 32.3 PG (ref 26–34)
MCHC RBC AUTO-ENTMCNC: 31.9 G/DL (ref 32–36)
MCV RBC AUTO: 101 FL (ref 80–100)
NRBC BLD-RTO: 0 /100 WBCS (ref 0–0)
PHOSPHATE SERPL-MCNC: 3.6 MG/DL (ref 2.5–4.9)
PLATELET # BLD AUTO: 354 X10*3/UL (ref 150–450)
POTASSIUM SERPL-SCNC: 5.2 MMOL/L (ref 3.5–5.3)
RBC # BLD AUTO: 3 X10*6/UL (ref 4.5–5.9)
SODIUM SERPL-SCNC: 129 MMOL/L (ref 136–145)
WBC # BLD AUTO: 9.5 X10*3/UL (ref 4.4–11.3)

## 2024-11-27 PROCEDURE — 82947 ASSAY GLUCOSE BLOOD QUANT: CPT

## 2024-11-27 PROCEDURE — 1200000002 HC GENERAL ROOM WITH TELEMETRY DAILY

## 2024-11-27 PROCEDURE — 2500000002 HC RX 250 W HCPCS SELF ADMINISTERED DRUGS (ALT 637 FOR MEDICARE OP, ALT 636 FOR OP/ED)

## 2024-11-27 PROCEDURE — 36415 COLL VENOUS BLD VENIPUNCTURE: CPT | Performed by: STUDENT IN AN ORGANIZED HEALTH CARE EDUCATION/TRAINING PROGRAM

## 2024-11-27 PROCEDURE — 2500000001 HC RX 250 WO HCPCS SELF ADMINISTERED DRUGS (ALT 637 FOR MEDICARE OP): Performed by: STUDENT IN AN ORGANIZED HEALTH CARE EDUCATION/TRAINING PROGRAM

## 2024-11-27 PROCEDURE — 80069 RENAL FUNCTION PANEL: CPT | Performed by: STUDENT IN AN ORGANIZED HEALTH CARE EDUCATION/TRAINING PROGRAM

## 2024-11-27 PROCEDURE — 2500000004 HC RX 250 GENERAL PHARMACY W/ HCPCS (ALT 636 FOR OP/ED): Performed by: STUDENT IN AN ORGANIZED HEALTH CARE EDUCATION/TRAINING PROGRAM

## 2024-11-27 PROCEDURE — 2500000002 HC RX 250 W HCPCS SELF ADMINISTERED DRUGS (ALT 637 FOR MEDICARE OP, ALT 636 FOR OP/ED): Performed by: STUDENT IN AN ORGANIZED HEALTH CARE EDUCATION/TRAINING PROGRAM

## 2024-11-27 PROCEDURE — 85027 COMPLETE CBC AUTOMATED: CPT | Performed by: STUDENT IN AN ORGANIZED HEALTH CARE EDUCATION/TRAINING PROGRAM

## 2024-11-27 PROCEDURE — S4991 NICOTINE PATCH NONLEGEND: HCPCS | Performed by: STUDENT IN AN ORGANIZED HEALTH CARE EDUCATION/TRAINING PROGRAM

## 2024-11-27 PROCEDURE — 83735 ASSAY OF MAGNESIUM: CPT | Performed by: STUDENT IN AN ORGANIZED HEALTH CARE EDUCATION/TRAINING PROGRAM

## 2024-11-27 RX ADMIN — PIPERACILLIN SODIUM AND TAZOBACTAM SODIUM 3.38 G: 3; .375 INJECTION, SOLUTION INTRAVENOUS at 06:41

## 2024-11-27 RX ADMIN — NICOTINE 1 PATCH: 14 PATCH, EXTENDED RELEASE TRANSDERMAL at 08:06

## 2024-11-27 RX ADMIN — GABAPENTIN 400 MG: 400 CAPSULE ORAL at 17:08

## 2024-11-27 RX ADMIN — DOCUSATE SODIUM 100 MG: 100 CAPSULE, LIQUID FILLED ORAL at 08:06

## 2024-11-27 RX ADMIN — INSULIN LISPRO 5 UNITS: 100 INJECTION, SOLUTION INTRAVENOUS; SUBCUTANEOUS at 17:08

## 2024-11-27 RX ADMIN — METOPROLOL TARTRATE 12.5 MG: 25 TABLET, FILM COATED ORAL at 20:13

## 2024-11-27 RX ADMIN — HYDROMORPHONE HYDROCHLORIDE 0.2 MG: 0.2 INJECTION, SOLUTION INTRAMUSCULAR; INTRAVENOUS; SUBCUTANEOUS at 00:52

## 2024-11-27 RX ADMIN — HYDROMORPHONE HYDROCHLORIDE 0.2 MG: 0.2 INJECTION, SOLUTION INTRAMUSCULAR; INTRAVENOUS; SUBCUTANEOUS at 07:37

## 2024-11-27 RX ADMIN — OXYCODONE HYDROCHLORIDE 10 MG: 10 TABLET ORAL at 20:19

## 2024-11-27 RX ADMIN — INSULIN LISPRO 5 UNITS: 100 INJECTION, SOLUTION INTRAVENOUS; SUBCUTANEOUS at 11:23

## 2024-11-27 RX ADMIN — ACETAMINOPHEN 650 MG: 325 TABLET, FILM COATED ORAL at 00:52

## 2024-11-27 RX ADMIN — ASPIRIN 81 MG: 81 TABLET, COATED ORAL at 08:06

## 2024-11-27 RX ADMIN — METHOCARBAMOL TABLETS 500 MG: 500 TABLET, COATED ORAL at 00:52

## 2024-11-27 RX ADMIN — ACETAMINOPHEN 650 MG: 325 TABLET, FILM COATED ORAL at 13:37

## 2024-11-27 RX ADMIN — ENOXAPARIN SODIUM 40 MG: 100 INJECTION SUBCUTANEOUS at 04:28

## 2024-11-27 RX ADMIN — ESCITALOPRAM OXALATE 10 MG: 10 TABLET ORAL at 08:06

## 2024-11-27 RX ADMIN — Medication 1500 MG: at 12:02

## 2024-11-27 RX ADMIN — DOCUSATE SODIUM 100 MG: 100 CAPSULE, LIQUID FILLED ORAL at 20:19

## 2024-11-27 RX ADMIN — HYDROMORPHONE HYDROCHLORIDE 0.2 MG: 0.2 INJECTION, SOLUTION INTRAMUSCULAR; INTRAVENOUS; SUBCUTANEOUS at 17:45

## 2024-11-27 RX ADMIN — GABAPENTIN 400 MG: 400 CAPSULE ORAL at 08:06

## 2024-11-27 RX ADMIN — INSULIN LISPRO 5 UNITS: 100 INJECTION, SOLUTION INTRAVENOUS; SUBCUTANEOUS at 08:13

## 2024-11-27 RX ADMIN — GABAPENTIN 400 MG: 400 CAPSULE ORAL at 20:16

## 2024-11-27 RX ADMIN — OXYCODONE HYDROCHLORIDE 10 MG: 10 TABLET ORAL at 12:01

## 2024-11-27 RX ADMIN — INSULIN LISPRO 4 UNITS: 100 INJECTION, SOLUTION INTRAVENOUS; SUBCUTANEOUS at 08:13

## 2024-11-27 RX ADMIN — METHOCARBAMOL TABLETS 500 MG: 500 TABLET, COATED ORAL at 17:08

## 2024-11-27 RX ADMIN — ACETAMINOPHEN 650 MG: 325 TABLET, FILM COATED ORAL at 06:11

## 2024-11-27 RX ADMIN — INSULIN LISPRO 4 UNITS: 100 INJECTION, SOLUTION INTRAVENOUS; SUBCUTANEOUS at 17:08

## 2024-11-27 RX ADMIN — METHOCARBAMOL TABLETS 500 MG: 500 TABLET, COATED ORAL at 06:10

## 2024-11-27 RX ADMIN — ATORVASTATIN CALCIUM 40 MG: 40 TABLET, FILM COATED ORAL at 08:06

## 2024-11-27 RX ADMIN — INSULIN LISPRO 2 UNITS: 100 INJECTION, SOLUTION INTRAVENOUS; SUBCUTANEOUS at 12:06

## 2024-11-27 RX ADMIN — PIPERACILLIN SODIUM AND TAZOBACTAM SODIUM 3.38 G: 3; .375 INJECTION, SOLUTION INTRAVENOUS at 17:12

## 2024-11-27 RX ADMIN — Medication 1500 MG: at 00:52

## 2024-11-27 RX ADMIN — INSULIN GLARGINE 20 UNITS: 100 INJECTION, SOLUTION SUBCUTANEOUS at 11:23

## 2024-11-27 RX ADMIN — OXYCODONE HYDROCHLORIDE 10 MG: 10 TABLET ORAL at 04:28

## 2024-11-27 RX ADMIN — METHOCARBAMOL TABLETS 500 MG: 500 TABLET, COATED ORAL at 11:22

## 2024-11-27 RX ADMIN — METOPROLOL TARTRATE 12.5 MG: 25 TABLET, FILM COATED ORAL at 08:06

## 2024-11-27 RX ADMIN — PIPERACILLIN SODIUM AND TAZOBACTAM SODIUM 3.38 G: 3; .375 INJECTION, SOLUTION INTRAVENOUS at 11:22

## 2024-11-27 RX ADMIN — ACETAMINOPHEN 650 MG: 325 TABLET, FILM COATED ORAL at 20:13

## 2024-11-27 ASSESSMENT — COGNITIVE AND FUNCTIONAL STATUS - GENERAL
TOILETING: A LOT
CLIMB 3 TO 5 STEPS WITH RAILING: TOTAL
MOVING TO AND FROM BED TO CHAIR: A LITTLE
MOVING FROM LYING ON BACK TO SITTING ON SIDE OF FLAT BED WITH BEDRAILS: A LITTLE
DRESSING REGULAR UPPER BODY CLOTHING: A LITTLE
HELP NEEDED FOR BATHING: A LITTLE
WALKING IN HOSPITAL ROOM: A LOT
STANDING UP FROM CHAIR USING ARMS: A LOT
TURNING FROM BACK TO SIDE WHILE IN FLAT BAD: A LITTLE
DAILY ACTIVITIY SCORE: 18
DRESSING REGULAR LOWER BODY CLOTHING: A LOT
MOBILITY SCORE: 14

## 2024-11-27 ASSESSMENT — PAIN DESCRIPTION - LOCATION: LOCATION: LEG

## 2024-11-27 ASSESSMENT — PAIN SCALES - GENERAL
PAINLEVEL_OUTOF10: 7
PAINLEVEL_OUTOF10: 8
PAINLEVEL_OUTOF10: 7
PAINLEVEL_OUTOF10: 3

## 2024-11-27 ASSESSMENT — PAIN - FUNCTIONAL ASSESSMENT: PAIN_FUNCTIONAL_ASSESSMENT: 0-10

## 2024-11-27 NOTE — PROGRESS NOTES
VASCULAR SURGERY PROGRESS NOTE  Assessment/Plan   Errol Fonseca is 54 y.o. 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 Patient taken to the OR by podiatry twice for debridement but during second debridement infection found to have spread and foot deemed non-salvagable. Vascular consulted from PACU for urgent BKA .   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    Patient continues to progress well postoperatively, dressing changes without evidence of purulence or infection.  Patient awaiting BKA formalization scheduled for Friday, 11/29/2024.    Plan:  Multimodal pain control with Tylenol, oxycodone, Dilaudid, gabapentin.  ASA, atorvastatin, metoprolol.  Holding home nitroglycerin.  Regular diet, bowel regimen  No IVF  Continue Zosyn, vancomycin.  BID dressing changes  SSI, Lantus 20 units daily lispro 5 units 3 times daily before meals.  Lovenox DVT Ppx  Nicotine patch.  Lexapro, trazodone.  PT/OT consults ordered.    D/w attending, Dr. Princess Meza MD  PGY-1 General Surgery  Vascular Surgery [id number]        Subjective   Patient seen at bedside this morning, no acute events overnight.  Patient endorses pain well-controlled, tolerating diet.    Objective   Vitals:  Heart Rate:  [64-82]   Temp:  [36 °C (96.8 °F)-36.6 °C (97.9 °F)]   Resp:  [17-18]   BP: (118-171)/(62-89)   SpO2:  [90 %-99 %]     Exam:  Constitutional: No acute distress, resting comfortably  Neuro:  AOx3, grossly intact  ENMT: moist mucous membranes  CV: no tachycardia  Pulm: non-labored on room air  GI: soft, non-tender, non-distended  Skin: warm and dry  Musculoskeletal: moving all extremities  Extremities: Right lower extremity with guillotine BKA amputation.  Dressing change, without purulence, visible signs of infection.  Left lower extremity, bilateral upper  extremities warm well-perfused, neurovascularly intact.      Labs:  Results from last 7 days   Lab Units 11/26/24  0911 11/25/24  0824 11/23/24  0727   WBC AUTO x10*3/uL 8.4 8.3 14.1*   HEMOGLOBIN g/dL 9.4* 9.4* 9.3*   PLATELETS AUTO x10*3/uL 352 336 253      Results from last 7 days   Lab Units 11/26/24  0911 11/25/24  0824 11/23/24  0727   SODIUM mmol/L 135* 134* 131*   POTASSIUM mmol/L 4.1 4.7 5.0   CHLORIDE mmol/L 95* 96* 91*   CO2 mmol/L 32 32 29   BUN mg/dL 10 11 16   CREATININE mg/dL 0.83 0.80 0.88   GLUCOSE mg/dL 139* 227* 211*   MAGNESIUM mg/dL 1.83 1.75 1.58*   PHOSPHORUS mg/dL 3.4 3.1 4.2      Results from last 7 days   Lab Units 11/21/24  0434 11/20/24  1838   INR  1.4* 1.4*   PROTIME seconds 16.2* 16.2*   APTT seconds 27 24*

## 2024-11-27 NOTE — PROGRESS NOTES
Physical Therapy                 Therapy Communication Note    Patient Name: Errol Fonseca  MRN: [id number]  Department: [place] [address]  Room: [facility]8034  Today's Date: 11/27/2024     Discipline: Physical Therapy    Missed Visit Reason: Missed Visit Reason: Patient refused (Pt's son in town and visiting, pt declining therapy at this time)    Missed Time: Attempt 1442    Comment:

## 2024-11-28 LAB
ALBUMIN SERPL BCP-MCNC: 3 G/DL (ref 3.4–5)
ANION GAP SERPL CALC-SCNC: 11 MMOL/L (ref 10–20)
BUN SERPL-MCNC: 12 MG/DL (ref 6–23)
CALCIUM SERPL-MCNC: 9 MG/DL (ref 8.6–10.6)
CHLORIDE SERPL-SCNC: 97 MMOL/L (ref 98–107)
CO2 SERPL-SCNC: 29 MMOL/L (ref 21–32)
CREAT SERPL-MCNC: 0.76 MG/DL (ref 0.5–1.3)
EGFRCR SERPLBLD CKD-EPI 2021: >90 ML/MIN/1.73M*2
ERYTHROCYTE [DISTWIDTH] IN BLOOD BY AUTOMATED COUNT: 13.2 % (ref 11.5–14.5)
GLUCOSE BLD MANUAL STRIP-MCNC: 162 MG/DL (ref 74–99)
GLUCOSE BLD MANUAL STRIP-MCNC: 217 MG/DL (ref 74–99)
GLUCOSE BLD MANUAL STRIP-MCNC: 278 MG/DL (ref 74–99)
GLUCOSE SERPL-MCNC: 215 MG/DL (ref 74–99)
HCT VFR BLD AUTO: 29 % (ref 41–52)
HGB BLD-MCNC: 9.7 G/DL (ref 13.5–17.5)
MAGNESIUM SERPL-MCNC: 1.79 MG/DL (ref 1.6–2.4)
MCH RBC QN AUTO: 33.4 PG (ref 26–34)
MCHC RBC AUTO-ENTMCNC: 33.4 G/DL (ref 32–36)
MCV RBC AUTO: 100 FL (ref 80–100)
NRBC BLD-RTO: 0 /100 WBCS (ref 0–0)
PHOSPHATE SERPL-MCNC: 3.2 MG/DL (ref 2.5–4.9)
PLATELET # BLD AUTO: 419 X10*3/UL (ref 150–450)
POTASSIUM SERPL-SCNC: 4.1 MMOL/L (ref 3.5–5.3)
RBC # BLD AUTO: 2.9 X10*6/UL (ref 4.5–5.9)
SODIUM SERPL-SCNC: 133 MMOL/L (ref 136–145)
VANCOMYCIN SERPL-MCNC: 18.8 UG/ML (ref 5–20)
WBC # BLD AUTO: 8.3 X10*3/UL (ref 4.4–11.3)

## 2024-11-28 PROCEDURE — 2500000004 HC RX 250 GENERAL PHARMACY W/ HCPCS (ALT 636 FOR OP/ED): Performed by: STUDENT IN AN ORGANIZED HEALTH CARE EDUCATION/TRAINING PROGRAM

## 2024-11-28 PROCEDURE — 1200000002 HC GENERAL ROOM WITH TELEMETRY DAILY

## 2024-11-28 PROCEDURE — 2500000002 HC RX 250 W HCPCS SELF ADMINISTERED DRUGS (ALT 637 FOR MEDICARE OP, ALT 636 FOR OP/ED): Performed by: STUDENT IN AN ORGANIZED HEALTH CARE EDUCATION/TRAINING PROGRAM

## 2024-11-28 PROCEDURE — 2500000001 HC RX 250 WO HCPCS SELF ADMINISTERED DRUGS (ALT 637 FOR MEDICARE OP): Performed by: STUDENT IN AN ORGANIZED HEALTH CARE EDUCATION/TRAINING PROGRAM

## 2024-11-28 PROCEDURE — S4991 NICOTINE PATCH NONLEGEND: HCPCS | Performed by: STUDENT IN AN ORGANIZED HEALTH CARE EDUCATION/TRAINING PROGRAM

## 2024-11-28 PROCEDURE — 82947 ASSAY GLUCOSE BLOOD QUANT: CPT

## 2024-11-28 PROCEDURE — 2500000002 HC RX 250 W HCPCS SELF ADMINISTERED DRUGS (ALT 637 FOR MEDICARE OP, ALT 636 FOR OP/ED)

## 2024-11-28 PROCEDURE — 80202 ASSAY OF VANCOMYCIN: CPT | Performed by: STUDENT IN AN ORGANIZED HEALTH CARE EDUCATION/TRAINING PROGRAM

## 2024-11-28 PROCEDURE — 85027 COMPLETE CBC AUTOMATED: CPT | Performed by: STUDENT IN AN ORGANIZED HEALTH CARE EDUCATION/TRAINING PROGRAM

## 2024-11-28 PROCEDURE — 80069 RENAL FUNCTION PANEL: CPT | Performed by: STUDENT IN AN ORGANIZED HEALTH CARE EDUCATION/TRAINING PROGRAM

## 2024-11-28 PROCEDURE — 83735 ASSAY OF MAGNESIUM: CPT | Performed by: STUDENT IN AN ORGANIZED HEALTH CARE EDUCATION/TRAINING PROGRAM

## 2024-11-28 RX ORDER — OXYCODONE HYDROCHLORIDE 5 MG/1
5 TABLET ORAL EVERY 4 HOURS PRN
Status: DISCONTINUED | OUTPATIENT
Start: 2024-11-28 | End: 2024-12-03 | Stop reason: HOSPADM

## 2024-11-28 RX ORDER — OXYCODONE HYDROCHLORIDE 10 MG/1
10 TABLET ORAL EVERY 4 HOURS PRN
Status: DISCONTINUED | OUTPATIENT
Start: 2024-11-28 | End: 2024-12-03 | Stop reason: HOSPADM

## 2024-11-28 RX ORDER — METOPROLOL TARTRATE 25 MG/1
25 TABLET, FILM COATED ORAL 2 TIMES DAILY
Status: DISCONTINUED | OUTPATIENT
Start: 2024-11-28 | End: 2024-12-03 | Stop reason: HOSPADM

## 2024-11-28 RX ORDER — LISINOPRIL 20 MG/1
20 TABLET ORAL DAILY
Status: DISCONTINUED | OUTPATIENT
Start: 2024-11-28 | End: 2024-12-03 | Stop reason: HOSPADM

## 2024-11-28 RX ADMIN — GABAPENTIN 400 MG: 400 CAPSULE ORAL at 20:42

## 2024-11-28 RX ADMIN — HYDROMORPHONE HYDROCHLORIDE 0.2 MG: 0.2 INJECTION, SOLUTION INTRAMUSCULAR; INTRAVENOUS; SUBCUTANEOUS at 10:58

## 2024-11-28 RX ADMIN — Medication 1500 MG: at 00:41

## 2024-11-28 RX ADMIN — METHOCARBAMOL TABLETS 500 MG: 500 TABLET, COATED ORAL at 06:00

## 2024-11-28 RX ADMIN — LISINOPRIL 20 MG: 20 TABLET ORAL at 08:00

## 2024-11-28 RX ADMIN — INSULIN LISPRO 2 UNITS: 100 INJECTION, SOLUTION INTRAVENOUS; SUBCUTANEOUS at 17:03

## 2024-11-28 RX ADMIN — INSULIN LISPRO 5 UNITS: 100 INJECTION, SOLUTION INTRAVENOUS; SUBCUTANEOUS at 17:03

## 2024-11-28 RX ADMIN — INSULIN GLARGINE 20 UNITS: 100 INJECTION, SOLUTION SUBCUTANEOUS at 12:22

## 2024-11-28 RX ADMIN — GABAPENTIN 400 MG: 400 CAPSULE ORAL at 08:00

## 2024-11-28 RX ADMIN — ACETAMINOPHEN 650 MG: 325 TABLET, FILM COATED ORAL at 20:42

## 2024-11-28 RX ADMIN — OXYCODONE HYDROCHLORIDE 10 MG: 10 TABLET ORAL at 14:53

## 2024-11-28 RX ADMIN — METOPROLOL TARTRATE 25 MG: 25 TABLET, FILM COATED ORAL at 20:42

## 2024-11-28 RX ADMIN — GABAPENTIN 400 MG: 400 CAPSULE ORAL at 14:53

## 2024-11-28 RX ADMIN — HYDROMORPHONE HYDROCHLORIDE 0.2 MG: 0.2 INJECTION, SOLUTION INTRAMUSCULAR; INTRAVENOUS; SUBCUTANEOUS at 00:06

## 2024-11-28 RX ADMIN — PIPERACILLIN SODIUM AND TAZOBACTAM SODIUM 3.38 G: 3; .375 INJECTION, SOLUTION INTRAVENOUS at 00:05

## 2024-11-28 RX ADMIN — ASPIRIN 81 MG: 81 TABLET, COATED ORAL at 08:00

## 2024-11-28 RX ADMIN — INSULIN LISPRO 5 UNITS: 100 INJECTION, SOLUTION INTRAVENOUS; SUBCUTANEOUS at 12:22

## 2024-11-28 RX ADMIN — ESCITALOPRAM OXALATE 10 MG: 10 TABLET ORAL at 08:01

## 2024-11-28 RX ADMIN — ACETAMINOPHEN 650 MG: 325 TABLET, FILM COATED ORAL at 06:12

## 2024-11-28 RX ADMIN — HYDROMORPHONE HYDROCHLORIDE 0.2 MG: 0.2 INJECTION, SOLUTION INTRAMUSCULAR; INTRAVENOUS; SUBCUTANEOUS at 22:38

## 2024-11-28 RX ADMIN — INSULIN LISPRO 4 UNITS: 100 INJECTION, SOLUTION INTRAVENOUS; SUBCUTANEOUS at 08:08

## 2024-11-28 RX ADMIN — PIPERACILLIN SODIUM AND TAZOBACTAM SODIUM 3.38 G: 3; .375 INJECTION, SOLUTION INTRAVENOUS at 12:21

## 2024-11-28 RX ADMIN — INSULIN LISPRO 6 UNITS: 100 INJECTION, SOLUTION INTRAVENOUS; SUBCUTANEOUS at 12:21

## 2024-11-28 RX ADMIN — HYDROMORPHONE HYDROCHLORIDE 0.2 MG: 0.2 INJECTION, SOLUTION INTRAMUSCULAR; INTRAVENOUS; SUBCUTANEOUS at 04:41

## 2024-11-28 RX ADMIN — ACETAMINOPHEN 650 MG: 325 TABLET, FILM COATED ORAL at 00:46

## 2024-11-28 RX ADMIN — INSULIN LISPRO 5 UNITS: 100 INJECTION, SOLUTION INTRAVENOUS; SUBCUTANEOUS at 08:08

## 2024-11-28 RX ADMIN — ACETAMINOPHEN 650 MG: 325 TABLET, FILM COATED ORAL at 12:30

## 2024-11-28 RX ADMIN — DOCUSATE SODIUM 100 MG: 100 CAPSULE, LIQUID FILLED ORAL at 08:00

## 2024-11-28 RX ADMIN — METHOCARBAMOL TABLETS 500 MG: 500 TABLET, COATED ORAL at 17:54

## 2024-11-28 RX ADMIN — NICOTINE 1 PATCH: 14 PATCH, EXTENDED RELEASE TRANSDERMAL at 08:01

## 2024-11-28 RX ADMIN — ENOXAPARIN SODIUM 40 MG: 100 INJECTION SUBCUTANEOUS at 04:41

## 2024-11-28 RX ADMIN — METHOCARBAMOL TABLETS 500 MG: 500 TABLET, COATED ORAL at 00:08

## 2024-11-28 RX ADMIN — ATORVASTATIN CALCIUM 40 MG: 40 TABLET, FILM COATED ORAL at 08:00

## 2024-11-28 RX ADMIN — OXYCODONE HYDROCHLORIDE 10 MG: 10 TABLET ORAL at 08:07

## 2024-11-28 RX ADMIN — METOPROLOL TARTRATE 25 MG: 25 TABLET, FILM COATED ORAL at 08:00

## 2024-11-28 RX ADMIN — PIPERACILLIN SODIUM AND TAZOBACTAM SODIUM 3.38 G: 3; .375 INJECTION, SOLUTION INTRAVENOUS at 06:43

## 2024-11-28 RX ADMIN — OXYCODONE HYDROCHLORIDE 10 MG: 10 TABLET ORAL at 20:43

## 2024-11-28 RX ADMIN — DOCUSATE SODIUM 100 MG: 100 CAPSULE, LIQUID FILLED ORAL at 20:42

## 2024-11-28 RX ADMIN — VANCOMYCIN HYDROCHLORIDE 1500 MG: 1.5 INJECTION, POWDER, LYOPHILIZED, FOR SOLUTION INTRAVENOUS at 13:31

## 2024-11-28 RX ADMIN — METHOCARBAMOL TABLETS 500 MG: 500 TABLET, COATED ORAL at 12:21

## 2024-11-28 RX ADMIN — PIPERACILLIN SODIUM AND TAZOBACTAM SODIUM 3.38 G: 3; .375 INJECTION, SOLUTION INTRAVENOUS at 17:54

## 2024-11-28 ASSESSMENT — COGNITIVE AND FUNCTIONAL STATUS - GENERAL
HELP NEEDED FOR BATHING: A LITTLE
DRESSING REGULAR LOWER BODY CLOTHING: A LOT
MOVING TO AND FROM BED TO CHAIR: A LITTLE
DRESSING REGULAR UPPER BODY CLOTHING: A LITTLE
DAILY ACTIVITIY SCORE: 19
CLIMB 3 TO 5 STEPS WITH RAILING: TOTAL
WALKING IN HOSPITAL ROOM: A LOT
TURNING FROM BACK TO SIDE WHILE IN FLAT BAD: A LITTLE
MOVING FROM LYING ON BACK TO SITTING ON SIDE OF FLAT BED WITH BEDRAILS: A LITTLE
STANDING UP FROM CHAIR USING ARMS: A LOT
MOBILITY SCORE: 14
TOILETING: A LITTLE

## 2024-11-28 ASSESSMENT — PAIN SCALES - GENERAL
PAINLEVEL_OUTOF10: 7
PAINLEVEL_OUTOF10: 8
PAINLEVEL_OUTOF10: 8
PAINLEVEL_OUTOF10: 9
PAINLEVEL_OUTOF10: 9

## 2024-11-28 ASSESSMENT — PAIN DESCRIPTION - LOCATION: LOCATION: LEG

## 2024-11-28 ASSESSMENT — PAIN DESCRIPTION - ORIENTATION: ORIENTATION: RIGHT

## 2024-11-28 NOTE — PROGRESS NOTES
Vancomycin Dosing by Pharmacy- FOLLOW UP    Errol Fonseca is a 54 y.o. year old male who Pharmacy has been consulted for vancomycin dosing for cellulitis, skin and soft tissue. Based on the patient's indication and renal status this patient is being dosed based on a goal AUC of 400-600.     Renal function is currently stable.    Current vancomycin dose: 1500 mg given every 12 hours    Estimated vancomycin AUC on current dose: 469 mg/L.hr     Visit Vitals  /85 (BP Location: Right arm, Patient Position: Lying)   Pulse 68   Temp 36.1 °C (97 °F) (Temporal)   Resp 18        Lab Results   Component Value Date    CREATININE 0.76 2024    CREATININE 0.87 2024    CREATININE 0.83 2024    CREATININE 0.80 2024        Patient weight is as follows:   Vitals:    24 1127   Weight: 102 kg (224 lb 13.9 oz)       Cultures:  Susceptibility data for the encounter in last 14 days.  Collected Specimen Info Organism   24 Swab from ABSCESS Mixed Anaerobic Bacteria     Mixed Gram-Positive Bacteria         I/O last 3 completed shifts:  In: 240 (2.4 mL/kg) [P.O.:240]  Out: 2550 (25 mL/kg) [Urine:2550 (0.7 mL/kg/hr)]  Weight: 102 kg   I/O during current shift:  I/O this shift:  In: -   Out: 1300 [Urine:1300]    Temp (24hrs), Av.1 °C (97 °F), Min:36 °C (96.8 °F), Max:36.5 °C (97.7 °F)      Assessment/Plan    Within goal AUC range. Continue current vancomycin regimen.    This dosing regimen is predicted by [employer]ight[employer] to result in the following pharmacokinetic parameters:  Regimen: 1500 mg IV every 12 hours.  Start time: 12:41 on 2024  Exposure target: AUC24 (range)400-600 mg/L.hr   OMP27-06: 469 mg/L.hr  AUC24,ss: 469 mg/L.hr  Probability of AUC24 > 400: 93 %  Ctrough,ss: 13.1 mg/L  Probability of Ctrough,ss > 20: 0 %      The next level will be obtained on  at am lab draw. May be obtained sooner if clinically indicated.   Will continue to monitor renal function daily while on vancomycin  and order serum creatinine at least every 48 hours if not already ordered.  Follow for continued vancomycin needs, clinical response, and signs/symptoms of toxicity.       Magdaleno Ricketts, PharmD

## 2024-11-28 NOTE — CARE PLAN
The patient's goals for the shift include      The clinical goals for the shift include pt pain will be controlled    Problem: Skin  Goal: Decreased wound size/increased tissue granulation at next dressing change  Outcome: Progressing  Flowsheets (Taken 11/28/2024 0408)  Decreased wound size/increased tissue granulation at next dressing change:   Promote sleep for wound healing   Protective dressings over bony prominences  Goal: Participates in plan/prevention/treatment measures  Outcome: Progressing  Goal: Prevent/manage excess moisture  Outcome: Progressing  Goal: Prevent/minimize sheer/friction injuries  Outcome: Progressing  Goal: Promote/optimize nutrition  Outcome: Progressing  Goal: Promote skin healing  Outcome: Progressing     Problem: Pain - Adult  Goal: Verbalizes/displays adequate comfort level or baseline comfort level  Outcome: Progressing     Problem: Safety - Adult  Goal: Free from fall injury  Outcome: Progressing     Problem: Fall/Injury  Goal: Not fall by end of shift  Outcome: Progressing  Goal: Be free from injury by end of the shift  Outcome: Progressing  Goal: Verbalize understanding of personal risk factors for fall in the hospital  Outcome: Progressing  Goal: Verbalize understanding of risk factor reduction measures to prevent injury from fall in the home  Outcome: Progressing  Goal: Use assistive devices by end of the shift  Outcome: Progressing  Goal: Pace activities to prevent fatigue by end of the shift  Outcome: Progressing     Problem: Pain  Goal: Takes deep breaths with improved pain control throughout the shift  Outcome: Progressing  Goal: Turns in bed with improved pain control throughout the shift  Outcome: Progressing  Goal: Walks with improved pain control throughout the shift  Outcome: Progressing  Goal: Performs ADL's with improved pain control throughout shift  Outcome: Progressing  Goal: Participates in PT with improved pain control throughout the shift  Outcome:  Progressing  Goal: Free from opioid side effects throughout the shift  Outcome: Progressing  Goal: Free from acute confusion related to pain meds throughout the shift  Outcome: Progressing

## 2024-11-28 NOTE — PROGRESS NOTES
VASCULAR SURGERY PROGRESS NOTE  Assessment/Plan   Errol Fonseca is 54 y.o. 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 Patient taken to the OR by podiatry twice for debridement but during second debridement infection found to have spread and foot deemed non-salvagable. Vascular consulted from PACU for urgent BKA .   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    Patient continues to progress well postoperatively, dressing changes without evidence of purulence or infection.  Patient awaiting BKA formalization scheduled for Friday, 11/29/2024.    Plan:  On OR schedule, consented  Multimodal pain control with Tylenol, oxycodone, Dilaudid, gabapentin -- made PRN oxy available more frequently due to patient complaints of continued pain   ASA, atorvastatin, metoprolol.  Holding home nitroglycerin.  Systolic pressures up to 170 this morning: increased metoprolol back to home dose of 25 BID and restarted home dose of lisinopril 20 mg daily  Regular diet, bowel regimen  No IVF  Continue Zosyn, vancomycin.  BID dressing changes  SSI, Lantus 20 units daily lispro 5 units 3 times daily before meals.  Lovenox DVT Ppx  Nicotine patch.  Lexapro, trazodone.  PT/OT consults ordered.    D/w attending, Dr. Dunphy Grant B Hubbard, MD  Vascular Surgery Fellow  Service Pager: [phone]      Subjective   Patient seen at bedside this morning, no acute events overnight. Patient complains of pain at the raw surface of the disarticulation. Otherwise doing well.     Objective   Vitals:  Heart Rate:  [68-72]   Temp:  [36 °C (96.8 °F)-36.5 °C (97.7 °F)]   Resp:  [18]   BP: (154-176)/()   SpO2:  [95 %-96 %]     Exam:  Constitutional: No acute distress, resting comfortably  Neuro:  AOx3, grossly intact  ENMT: moist mucous membranes  CV: no tachycardia  Pulm:  non-labored on room air  GI: soft, non-tender, non-distended  Skin: warm and dry  Musculoskeletal: moving all extremities  Extremities: Right lower extremity with guillotine BKA amputation.  Dressing change, without purulence, no visible signs of infection.  Left lower extremity, bilateral upper extremities warm well-perfused, neurovascularly intact.      Labs:  Results from last 7 days   Lab Units 11/27/24  0908 11/26/24  0911 11/25/24  0824   WBC AUTO x10*3/uL 9.5 8.4 8.3   HEMOGLOBIN g/dL 9.7* 9.4* 9.4*   PLATELETS AUTO x10*3/uL 354 352 336      Results from last 7 days   Lab Units 11/27/24  0908 11/26/24  0911 11/25/24  0824   SODIUM mmol/L 129* 135* 134*   POTASSIUM mmol/L 5.2 4.1 4.7   CHLORIDE mmol/L 95* 95* 96*   CO2 mmol/L 30 32 32   BUN mg/dL 13 10 11   CREATININE mg/dL 0.87 0.83 0.80   GLUCOSE mg/dL 258* 139* 227*   MAGNESIUM mg/dL 1.74 1.83 1.75   PHOSPHORUS mg/dL 3.6 3.4 3.1

## 2024-11-28 NOTE — PROGRESS NOTES
Physical Therapy                 Therapy Communication Note    Patient Name: Errol Fonseca  MRN: [id number]  Department: [place] [address]  Room: [facility]8034  Today's Date: 11/28/2024     Discipline: Physical Therapy    Missed Visit Reason: Missed Visit Reason: Patient refused (Pt politely declined due to pain 8/10. RN notifieid.)    Missed Time:15:06 PM

## 2024-11-29 ENCOUNTER — ANESTHESIA EVENT (OUTPATIENT)
Dept: OPERATING ROOM | Facility: HOSPITAL | Age: 54
End: 2024-11-29
Payer: MEDICAID

## 2024-11-29 ENCOUNTER — ANESTHESIA (OUTPATIENT)
Dept: OPERATING ROOM | Facility: HOSPITAL | Age: 54
End: 2024-11-29
Payer: MEDICAID

## 2024-11-29 LAB
ALBUMIN SERPL BCP-MCNC: 3.3 G/DL (ref 3.4–5)
ANION GAP SERPL CALC-SCNC: 11 MMOL/L (ref 10–20)
BUN SERPL-MCNC: 15 MG/DL (ref 6–23)
CALCIUM SERPL-MCNC: 8.4 MG/DL (ref 8.6–10.6)
CHLORIDE SERPL-SCNC: 98 MMOL/L (ref 98–107)
CO2 SERPL-SCNC: 29 MMOL/L (ref 21–32)
CREAT SERPL-MCNC: 0.7 MG/DL (ref 0.5–1.3)
EGFRCR SERPLBLD CKD-EPI 2021: >90 ML/MIN/1.73M*2
ERYTHROCYTE [DISTWIDTH] IN BLOOD BY AUTOMATED COUNT: 13.9 % (ref 11.5–14.5)
GLUCOSE BLD MANUAL STRIP-MCNC: 234 MG/DL (ref 74–99)
GLUCOSE BLD MANUAL STRIP-MCNC: 236 MG/DL (ref 74–99)
GLUCOSE BLD MANUAL STRIP-MCNC: 239 MG/DL (ref 74–99)
GLUCOSE SERPL-MCNC: 218 MG/DL (ref 74–99)
HCT VFR BLD AUTO: 27.7 % (ref 41–52)
HGB BLD-MCNC: 9.1 G/DL (ref 13.5–17.5)
LABORATORY COMMENT REPORT: NORMAL
MAGNESIUM SERPL-MCNC: 1.8 MG/DL (ref 1.6–2.4)
MCH RBC QN AUTO: 33.3 PG (ref 26–34)
MCHC RBC AUTO-ENTMCNC: 32.9 G/DL (ref 32–36)
MCV RBC AUTO: 102 FL (ref 80–100)
NRBC BLD-RTO: 0 /100 WBCS (ref 0–0)
PATH REPORT.FINAL DX SPEC: NORMAL
PATH REPORT.GROSS SPEC: NORMAL
PATH REPORT.MICROSCOPIC SPEC OTHER STN: NORMAL
PATH REPORT.RELEVANT HX SPEC: NORMAL
PATH REPORT.TOTAL CANCER: NORMAL
PHOSPHATE SERPL-MCNC: 3.1 MG/DL (ref 2.5–4.9)
PLATELET # BLD AUTO: 418 X10*3/UL (ref 150–450)
POTASSIUM SERPL-SCNC: 4.3 MMOL/L (ref 3.5–5.3)
RBC # BLD AUTO: 2.73 X10*6/UL (ref 4.5–5.9)
SODIUM SERPL-SCNC: 134 MMOL/L (ref 136–145)
WBC # BLD AUTO: 15.2 X10*3/UL (ref 4.4–11.3)

## 2024-11-29 PROCEDURE — 3700000001 HC GENERAL ANESTHESIA TIME - INITIAL BASE CHARGE: Performed by: STUDENT IN AN ORGANIZED HEALTH CARE EDUCATION/TRAINING PROGRAM

## 2024-11-29 PROCEDURE — 27590 AMPUTATE LEG AT THIGH: CPT | Performed by: STUDENT IN AN ORGANIZED HEALTH CARE EDUCATION/TRAINING PROGRAM

## 2024-11-29 PROCEDURE — 2500000001 HC RX 250 WO HCPCS SELF ADMINISTERED DRUGS (ALT 637 FOR MEDICARE OP): Performed by: STUDENT IN AN ORGANIZED HEALTH CARE EDUCATION/TRAINING PROGRAM

## 2024-11-29 PROCEDURE — 64905 NERVE PEDICLE TRANSFER: CPT | Performed by: STUDENT IN AN ORGANIZED HEALTH CARE EDUCATION/TRAINING PROGRAM

## 2024-11-29 PROCEDURE — 2720000007 HC OR 272 NO HCPCS: Performed by: STUDENT IN AN ORGANIZED HEALTH CARE EDUCATION/TRAINING PROGRAM

## 2024-11-29 PROCEDURE — 2500000001 HC RX 250 WO HCPCS SELF ADMINISTERED DRUGS (ALT 637 FOR MEDICARE OP)

## 2024-11-29 PROCEDURE — 2500000005 HC RX 250 GENERAL PHARMACY W/O HCPCS: Performed by: STUDENT IN AN ORGANIZED HEALTH CARE EDUCATION/TRAINING PROGRAM

## 2024-11-29 PROCEDURE — 2500000002 HC RX 250 W HCPCS SELF ADMINISTERED DRUGS (ALT 637 FOR MEDICARE OP, ALT 636 FOR OP/ED)

## 2024-11-29 PROCEDURE — 2500000004 HC RX 250 GENERAL PHARMACY W/ HCPCS (ALT 636 FOR OP/ED)

## 2024-11-29 PROCEDURE — P9045 ALBUMIN (HUMAN), 5%, 250 ML: HCPCS | Mod: JZ | Performed by: STUDENT IN AN ORGANIZED HEALTH CARE EDUCATION/TRAINING PROGRAM

## 2024-11-29 PROCEDURE — 7100000001 HC RECOVERY ROOM TIME - INITIAL BASE CHARGE: Performed by: STUDENT IN AN ORGANIZED HEALTH CARE EDUCATION/TRAINING PROGRAM

## 2024-11-29 PROCEDURE — 1200000002 HC GENERAL ROOM WITH TELEMETRY DAILY

## 2024-11-29 PROCEDURE — 99255 IP/OBS CONSLTJ NEW/EST HI 80: CPT

## 2024-11-29 PROCEDURE — 2500000004 HC RX 250 GENERAL PHARMACY W/ HCPCS (ALT 636 FOR OP/ED): Performed by: STUDENT IN AN ORGANIZED HEALTH CARE EDUCATION/TRAINING PROGRAM

## 2024-11-29 PROCEDURE — 83735 ASSAY OF MAGNESIUM: CPT

## 2024-11-29 PROCEDURE — 3600000008 HC OR TIME - EACH INCREMENTAL 1 MINUTE - PROCEDURE LEVEL THREE: Performed by: STUDENT IN AN ORGANIZED HEALTH CARE EDUCATION/TRAINING PROGRAM

## 2024-11-29 PROCEDURE — 7100000002 HC RECOVERY ROOM TIME - EACH INCREMENTAL 1 MINUTE: Performed by: STUDENT IN AN ORGANIZED HEALTH CARE EDUCATION/TRAINING PROGRAM

## 2024-11-29 PROCEDURE — 36415 COLL VENOUS BLD VENIPUNCTURE: CPT

## 2024-11-29 PROCEDURE — 01SD0ZZ REPOSITION FEMORAL NERVE, OPEN APPROACH: ICD-10-PCS | Performed by: STUDENT IN AN ORGANIZED HEALTH CARE EDUCATION/TRAINING PROGRAM

## 2024-11-29 PROCEDURE — 80069 RENAL FUNCTION PANEL: CPT

## 2024-11-29 PROCEDURE — 85027 COMPLETE CBC AUTOMATED: CPT

## 2024-11-29 PROCEDURE — 0Y6C0Z3 DETACHMENT AT RIGHT UPPER LEG, LOW, OPEN APPROACH: ICD-10-PCS | Performed by: STUDENT IN AN ORGANIZED HEALTH CARE EDUCATION/TRAINING PROGRAM

## 2024-11-29 PROCEDURE — 3600000003 HC OR TIME - INITIAL BASE CHARGE - PROCEDURE LEVEL THREE: Performed by: STUDENT IN AN ORGANIZED HEALTH CARE EDUCATION/TRAINING PROGRAM

## 2024-11-29 PROCEDURE — 86850 RBC ANTIBODY SCREEN: CPT | Performed by: STUDENT IN AN ORGANIZED HEALTH CARE EDUCATION/TRAINING PROGRAM

## 2024-11-29 PROCEDURE — 3700000002 HC GENERAL ANESTHESIA TIME - EACH INCREMENTAL 1 MINUTE: Performed by: STUDENT IN AN ORGANIZED HEALTH CARE EDUCATION/TRAINING PROGRAM

## 2024-11-29 PROCEDURE — 82947 ASSAY GLUCOSE BLOOD QUANT: CPT

## 2024-11-29 RX ORDER — FENTANYL CITRATE 50 UG/ML
INJECTION, SOLUTION INTRAMUSCULAR; INTRAVENOUS AS NEEDED
Status: DISCONTINUED | OUTPATIENT
Start: 2024-11-29 | End: 2024-11-29

## 2024-11-29 RX ORDER — HYDROMORPHONE HYDROCHLORIDE 1 MG/ML
INJECTION, SOLUTION INTRAMUSCULAR; INTRAVENOUS; SUBCUTANEOUS AS NEEDED
Status: DISCONTINUED | OUTPATIENT
Start: 2024-11-29 | End: 2024-11-29

## 2024-11-29 RX ORDER — LIDOCAINE HYDROCHLORIDE 20 MG/ML
INJECTION, SOLUTION INFILTRATION; PERINEURAL AS NEEDED
Status: DISCONTINUED | OUTPATIENT
Start: 2024-11-29 | End: 2024-11-29

## 2024-11-29 RX ORDER — ALBUMIN HUMAN 50 G/1000ML
SOLUTION INTRAVENOUS AS NEEDED
Status: DISCONTINUED | OUTPATIENT
Start: 2024-11-29 | End: 2024-11-29

## 2024-11-29 RX ORDER — LIDOCAINE HYDROCHLORIDE 10 MG/ML
0.1 INJECTION, SOLUTION INFILTRATION; PERINEURAL ONCE
Status: DISCONTINUED | OUTPATIENT
Start: 2024-11-29 | End: 2024-11-29 | Stop reason: HOSPADM

## 2024-11-29 RX ORDER — ROPIVACAINE HYDROCHLORIDE 5 MG/ML
INJECTION, SOLUTION EPIDURAL; INFILTRATION; PERINEURAL AS NEEDED
Status: DISCONTINUED | OUTPATIENT
Start: 2024-11-29 | End: 2024-11-29

## 2024-11-29 RX ORDER — ALBUTEROL SULFATE 0.83 MG/ML
2.5 SOLUTION RESPIRATORY (INHALATION) ONCE AS NEEDED
Status: DISCONTINUED | OUTPATIENT
Start: 2024-11-29 | End: 2024-11-29 | Stop reason: HOSPADM

## 2024-11-29 RX ORDER — PHENYLEPHRINE HYDROCHLORIDE 10 MG/ML
INJECTION INTRAVENOUS AS NEEDED
Status: DISCONTINUED | OUTPATIENT
Start: 2024-11-29 | End: 2024-11-29

## 2024-11-29 RX ORDER — SODIUM CHLORIDE 0.9 G/100ML
IRRIGANT IRRIGATION AS NEEDED
Status: DISCONTINUED | OUTPATIENT
Start: 2024-11-29 | End: 2024-11-29 | Stop reason: HOSPADM

## 2024-11-29 RX ORDER — ACETAMINOPHEN 325 MG/1
650 TABLET ORAL EVERY 4 HOURS PRN
Status: DISCONTINUED | OUTPATIENT
Start: 2024-11-29 | End: 2024-11-29 | Stop reason: HOSPADM

## 2024-11-29 RX ORDER — SODIUM CHLORIDE, SODIUM LACTATE, POTASSIUM CHLORIDE, CALCIUM CHLORIDE 600; 310; 30; 20 MG/100ML; MG/100ML; MG/100ML; MG/100ML
75 INJECTION, SOLUTION INTRAVENOUS CONTINUOUS
Status: DISCONTINUED | OUTPATIENT
Start: 2024-11-29 | End: 2024-11-29 | Stop reason: HOSPADM

## 2024-11-29 RX ORDER — ONDANSETRON HYDROCHLORIDE 2 MG/ML
4 INJECTION, SOLUTION INTRAVENOUS ONCE AS NEEDED
Status: DISCONTINUED | OUTPATIENT
Start: 2024-11-29 | End: 2024-11-29 | Stop reason: HOSPADM

## 2024-11-29 RX ORDER — HYDROMORPHONE HYDROCHLORIDE 0.2 MG/ML
0.2 INJECTION INTRAMUSCULAR; INTRAVENOUS; SUBCUTANEOUS EVERY 5 MIN PRN
Status: DISCONTINUED | OUTPATIENT
Start: 2024-11-29 | End: 2024-11-29 | Stop reason: HOSPADM

## 2024-11-29 RX ORDER — MIDAZOLAM HYDROCHLORIDE 1 MG/ML
INJECTION, SOLUTION INTRAMUSCULAR; INTRAVENOUS AS NEEDED
Status: DISCONTINUED | OUTPATIENT
Start: 2024-11-29 | End: 2024-11-29

## 2024-11-29 RX ORDER — ENOXAPARIN SODIUM 100 MG/ML
40 INJECTION SUBCUTANEOUS EVERY 24 HOURS
Status: DISCONTINUED | OUTPATIENT
Start: 2024-11-30 | End: 2024-12-03 | Stop reason: HOSPADM

## 2024-11-29 RX ORDER — LABETALOL HYDROCHLORIDE 5 MG/ML
5 INJECTION, SOLUTION INTRAVENOUS ONCE AS NEEDED
Status: DISCONTINUED | OUTPATIENT
Start: 2024-11-29 | End: 2024-11-29 | Stop reason: HOSPADM

## 2024-11-29 RX ORDER — PROPOFOL 10 MG/ML
INJECTION, EMULSION INTRAVENOUS AS NEEDED
Status: DISCONTINUED | OUTPATIENT
Start: 2024-11-29 | End: 2024-11-29

## 2024-11-29 RX ORDER — ROPIVACAINE IN 0.9% SOD CHL/PF 0.2 %
10 PLASTIC BAG, INJECTION (ML) EPIDURAL CONTINUOUS
Status: DISCONTINUED | OUTPATIENT
Start: 2024-11-29 | End: 2024-12-03 | Stop reason: HOSPADM

## 2024-11-29 RX ORDER — ROCURONIUM BROMIDE 10 MG/ML
INJECTION, SOLUTION INTRAVENOUS AS NEEDED
Status: DISCONTINUED | OUTPATIENT
Start: 2024-11-29 | End: 2024-11-29

## 2024-11-29 RX ORDER — ONDANSETRON HYDROCHLORIDE 2 MG/ML
INJECTION, SOLUTION INTRAVENOUS AS NEEDED
Status: DISCONTINUED | OUTPATIENT
Start: 2024-11-29 | End: 2024-11-29

## 2024-11-29 RX ORDER — VANCOMYCIN HYDROCHLORIDE 1 G/20ML
INJECTION, POWDER, LYOPHILIZED, FOR SOLUTION INTRAVENOUS AS NEEDED
Status: DISCONTINUED | OUTPATIENT
Start: 2024-11-29 | End: 2024-11-29 | Stop reason: HOSPADM

## 2024-11-29 RX ORDER — CEFAZOLIN 1 G/1
INJECTION, POWDER, FOR SOLUTION INTRAVENOUS AS NEEDED
Status: DISCONTINUED | OUTPATIENT
Start: 2024-11-29 | End: 2024-11-29

## 2024-11-29 RX ORDER — OXYCODONE HYDROCHLORIDE 5 MG/1
5 TABLET ORAL EVERY 4 HOURS PRN
Status: DISCONTINUED | OUTPATIENT
Start: 2024-11-29 | End: 2024-11-29 | Stop reason: HOSPADM

## 2024-11-29 RX ORDER — INSULIN LISPRO 100 [IU]/ML
6 INJECTION, SOLUTION INTRAVENOUS; SUBCUTANEOUS ONCE
Status: DISCONTINUED | OUTPATIENT
Start: 2024-11-29 | End: 2024-11-29

## 2024-11-29 RX ADMIN — OXYCODONE HYDROCHLORIDE 10 MG: 10 TABLET ORAL at 00:38

## 2024-11-29 RX ADMIN — HYDROMORPHONE HYDROCHLORIDE 0.2 MG: 0.2 INJECTION, SOLUTION INTRAMUSCULAR; INTRAVENOUS; SUBCUTANEOUS at 21:57

## 2024-11-29 RX ADMIN — ACETAMINOPHEN 650 MG: 325 TABLET, FILM COATED ORAL at 00:38

## 2024-11-29 RX ADMIN — HYDROMORPHONE HYDROCHLORIDE 0.4 MG: 1 INJECTION, SOLUTION INTRAMUSCULAR; INTRAVENOUS; SUBCUTANEOUS at 12:10

## 2024-11-29 RX ADMIN — METHOCARBAMOL TABLETS 500 MG: 500 TABLET, COATED ORAL at 18:11

## 2024-11-29 RX ADMIN — OXYCODONE HYDROCHLORIDE 10 MG: 10 TABLET ORAL at 20:30

## 2024-11-29 RX ADMIN — METOPROLOL TARTRATE 25 MG: 25 TABLET, FILM COATED ORAL at 20:30

## 2024-11-29 RX ADMIN — OXYCODONE HYDROCHLORIDE 10 MG: 10 TABLET ORAL at 14:59

## 2024-11-29 RX ADMIN — VANCOMYCIN HYDROCHLORIDE 1500 MG: 1.5 INJECTION, POWDER, LYOPHILIZED, FOR SOLUTION INTRAVENOUS at 18:13

## 2024-11-29 RX ADMIN — Medication 6 L/MIN: at 11:54

## 2024-11-29 RX ADMIN — GABAPENTIN 400 MG: 400 CAPSULE ORAL at 20:30

## 2024-11-29 RX ADMIN — METHOCARBAMOL TABLETS 500 MG: 500 TABLET, COATED ORAL at 00:38

## 2024-11-29 RX ADMIN — PIPERACILLIN SODIUM AND TAZOBACTAM SODIUM 3.38 G: 3; .375 INJECTION, SOLUTION INTRAVENOUS at 18:18

## 2024-11-29 RX ADMIN — ACETAMINOPHEN 650 MG: 325 TABLET, FILM COATED ORAL at 20:30

## 2024-11-29 RX ADMIN — PIPERACILLIN SODIUM AND TAZOBACTAM SODIUM 3.38 G: 3; .375 INJECTION, SOLUTION INTRAVENOUS at 04:41

## 2024-11-29 RX ADMIN — INSULIN LISPRO 5 UNITS: 100 INJECTION, SOLUTION INTRAVENOUS; SUBCUTANEOUS at 18:12

## 2024-11-29 RX ADMIN — INSULIN LISPRO 4 UNITS: 100 INJECTION, SOLUTION INTRAVENOUS; SUBCUTANEOUS at 18:11

## 2024-11-29 RX ADMIN — HYDROMORPHONE HYDROCHLORIDE 0.2 MG: 0.2 INJECTION, SOLUTION INTRAMUSCULAR; INTRAVENOUS; SUBCUTANEOUS at 04:41

## 2024-11-29 RX ADMIN — GABAPENTIN 400 MG: 400 CAPSULE ORAL at 14:59

## 2024-11-29 RX ADMIN — ACETAMINOPHEN 650 MG: 325 TABLET, FILM COATED ORAL at 14:59

## 2024-11-29 RX ADMIN — HYDROMORPHONE HYDROCHLORIDE 0.2 MG: 0.2 INJECTION, SOLUTION INTRAMUSCULAR; INTRAVENOUS; SUBCUTANEOUS at 18:19

## 2024-11-29 RX ADMIN — PIPERACILLIN SODIUM AND TAZOBACTAM SODIUM 3.38 G: 3; .375 INJECTION, SOLUTION INTRAVENOUS at 00:39

## 2024-11-29 SDOH — HEALTH STABILITY: MENTAL HEALTH: CURRENT SMOKER: 0

## 2024-11-29 ASSESSMENT — PAIN - FUNCTIONAL ASSESSMENT
PAIN_FUNCTIONAL_ASSESSMENT: 0-10
PAIN_FUNCTIONAL_ASSESSMENT: UNABLE TO SELF-REPORT
PAIN_FUNCTIONAL_ASSESSMENT: UNABLE TO SELF-REPORT
PAIN_FUNCTIONAL_ASSESSMENT: 0-10

## 2024-11-29 ASSESSMENT — COGNITIVE AND FUNCTIONAL STATUS - GENERAL
DRESSING REGULAR UPPER BODY CLOTHING: A LITTLE
TURNING FROM BACK TO SIDE WHILE IN FLAT BAD: A LITTLE
MOBILITY SCORE: 16
TOILETING: A LITTLE
MOVING TO AND FROM BED TO CHAIR: A LITTLE
MOVING FROM LYING ON BACK TO SITTING ON SIDE OF FLAT BED WITH BEDRAILS: A LITTLE
MOVING TO AND FROM BED TO CHAIR: A LITTLE
CLIMB 3 TO 5 STEPS WITH RAILING: TOTAL
WALKING IN HOSPITAL ROOM: A LITTLE
STANDING UP FROM CHAIR USING ARMS: A LITTLE
CLIMB 3 TO 5 STEPS WITH RAILING: TOTAL
DAILY ACTIVITIY SCORE: 19
MOVING FROM LYING ON BACK TO SITTING ON SIDE OF FLAT BED WITH BEDRAILS: A LITTLE
TURNING FROM BACK TO SIDE WHILE IN FLAT BAD: A LITTLE
MOBILITY SCORE: 16
STANDING UP FROM CHAIR USING ARMS: A LITTLE
WALKING IN HOSPITAL ROOM: A LITTLE
DRESSING REGULAR LOWER BODY CLOTHING: A LOT
HELP NEEDED FOR BATHING: A LITTLE

## 2024-11-29 ASSESSMENT — PAIN SCALES - GENERAL
PAINLEVEL_OUTOF10: 9
PAINLEVEL_OUTOF10: 7
PAINLEVEL_OUTOF10: 7
PAINLEVEL_OUTOF10: 5 - MODERATE PAIN
PAINLEVEL_OUTOF10: 7
PAINLEVEL_OUTOF10: 8
PAINLEVEL_OUTOF10: 7
PAINLEVEL_OUTOF10: 7
PAINLEVEL_OUTOF10: 5 - MODERATE PAIN
PAIN_LEVEL: 0
PAINLEVEL_OUTOF10: 4
PAINLEVEL_OUTOF10: 5 - MODERATE PAIN
PAINLEVEL_OUTOF10: 7

## 2024-11-29 ASSESSMENT — PAIN DESCRIPTION - ORIENTATION
ORIENTATION: RIGHT
ORIENTATION: RIGHT

## 2024-11-29 ASSESSMENT — COLUMBIA-SUICIDE SEVERITY RATING SCALE - C-SSRS
6. HAVE YOU EVER DONE ANYTHING, STARTED TO DO ANYTHING, OR PREPARED TO DO ANYTHING TO END YOUR LIFE?: NO
1. IN THE PAST MONTH, HAVE YOU WISHED YOU WERE DEAD OR WISHED YOU COULD GO TO SLEEP AND NOT WAKE UP?: NO
2. HAVE YOU ACTUALLY HAD ANY THOUGHTS OF KILLING YOURSELF?: NO

## 2024-11-29 ASSESSMENT — PAIN SCALES - WONG BAKER: WONGBAKER_NUMERICALRESPONSE: HURTS WHOLE LOT

## 2024-11-29 ASSESSMENT — PAIN DESCRIPTION - LOCATION
LOCATION: LEG

## 2024-11-29 ASSESSMENT — PAIN DESCRIPTION - DESCRIPTORS
DESCRIPTORS: ACHING;THROBBING
DESCRIPTORS: ACHING;THROBBING

## 2024-11-29 NOTE — ANESTHESIA PROCEDURE NOTES
Peripheral IV  Date/Time: 11/29/2024 7:28 AM      Placement  Needle size: 18 G  Laterality: left  Location: hand  Local anesthetic: none  Site prep: alcohol  Technique: anatomical landmarks  Attempts: 1

## 2024-11-29 NOTE — SIGNIFICANT EVENT
Vascular Surgery Post-Operative Plan    S/p right AKA with TMR x4    Plan:  2 hours PACU stay for NV checks  pain control  Aspirin  Lovenox tomorrow (11/30)  diet as tolerated after 4 hours  Mckoy removed post op - void check I n8 hours  First dressing change on 12/1  48 hours Ancef for periop antibiotics  PT tomorrow morning  SQH for DVT Ppx  Wound: right AKA wound with staples and nylon sutures    Aldair Mccollum MD  Vascular Surgery Fellow  Team Pager [phone]  11/29/24  11:27 AM

## 2024-11-29 NOTE — ANESTHESIA PROCEDURE NOTES
Airway  Date/Time: 11/29/2024 7:40 AM  Urgency: elective    Airway not difficult    Staffing  Performed: resident   Authorized by: Marques Ceballos MD    Performed by: Maris Chino MD  Patient location during procedure: OR    Indications and Patient Condition  Indications for airway management: anesthesia  Spontaneous ventilation: present  Sedation level: deep  Preoxygenated: yes  Patient position: sniffing  Mask difficulty assessment: 2 - vent by mask + OA or adjuvant +/- NMBA    Final Airway Details  Final airway type: endotracheal airway      Successful airway: ETT  Cuffed: yes   Successful intubation technique: direct laryngoscopy  Facilitating devices/methods: intubating stylet  Endotracheal tube insertion site: oral  Blade: Fred  Blade size: #4  ETT size (mm): 7.5  Cormack-Lehane Classification: grade I - full view of glottis  Placement verified by: capnometry   Measured from: teeth  ETT to teeth (cm): 23  Number of attempts at approach: 1         [facility] Progress Note  Patient: Fernando Cervantes Date: 2023   : 1945 Attending: Jatin Sheth, *         Admission date: 2023    ICU admit date:  2023  Intubation date:  N/A    Chief Complaint: Fall with head laceration    HPI:  Fernando Cervantes is a 77 year old male with history of HTN, ischemic cardiomyopathy, afib on eliquis, CAD, CDK 3 and prostate cancer s/p prostatectomy who presented to an outside hosptial via EMS after an unwitnessed fall. Was apparently walking down the stairs at home and fell down ~3-4 steps and fell head first into the window sill. Denies any precipitating factors prior to fall. Reports that he believes he did lose consciousness for at least a few minutes. CT head completed and revealed right sided frontal parietal subdural hematoma up to 6 mm with extension to the right side of the tentorium. No midline shift. Right occipital soft tissue hematoma. CT C spine with no acute fracture. CXR with no evidence of fractures. Given Kcentra for reversal of eliquis and started on cardene to keep SBP < 160. Transferred to [facility] for further evaluation. Repeat CT head over night with slight increase in SDH with subsequent scan stable.    24hr Events: Head CT this AM stable    Impression:  -- Traumatic right fronto-parietal and falcine subdural hematoma with LOC  -- Paroxsymal atrial fibrillation on chronic eliquis  -- CAD s/p PCI to LAD in 2019  -- Ischemic cardiomyopathy, EF 26%  -- S/p PPM/AICD  -- Pulmonary HTN WHO group 2  -- CKD 3  -- HTN  -- HLD  -- OUMOU    DISCUSSION/PLAN  Neuro: AM head CT stable. Neuro intact. C/o mild headache. Suspect cardiac etiology for fall/LOC  -- Continue to hold eliquis and bASA until 2 week f/u appt  -- Keppra for seizure ppx x7 days, dose adjusted for CKD  -- Goal SBP < 160 - PRN IV hydralazine/labetalol  -- PT/OT/SLP  -- Trauma surgery signed off  -- NSGY signing off, d/w Sandra TATE    Pulmonary: Adequate saturations on  room air.    CV: V-paced. Normotensive. Last echo 09/2022 with EF 22%. +Orthostatic BP when working with therapies yesterday, asymptomatic - suspect could have been cause of fall. No orthostasis this AM per RN. He tells me carvedilol has dropped his BP before. Device interrogation negative for events. ECHO largely unchanged from prior, EF 32%  -- Resume PTA entresto  -- Hold PTA coreg & torsemide given orthostasis yesterday  -- Continue PTA amiodarone, atorvastatin  -- Consult [facility] for HTN management & as he was scheduled to have Cardio Mems implant next week  -- Goal SBP as noted above     Renal: CKD 3. Appears euvolemic. Good UO.   -- Hold diuretic as above (was on 80 mg torsemide daily)  -- Monitor/replete lytes as needed  -- 2L FR    GI: No acute concerns. LBM PTA  -- PO diet as tolerated  -- Scheduled colace    Heme: Normocytic anemia. New mild thrombocytopenia this AM - no overt signs of bleeding.  -- Continue to hold eliquis/asa    Endocrine: Pre-DM. A1C 5.9. Blood glucose maintained intrinsically within ICU target range.  -- Hold PTA Farxiga    ID: Afebrile. New leukocytosis today but afebrile and no overt signs of infection.  -- Monitor off antimicrobials, f/u AM CBC    Disposition: This patient is deemed medically appropriate for transfer out of intensive care. Summary of hospitalization and active problems given to LEA Dominguez. Dr. Lemus to assume care as attending upon transfer.    BEST PRACTICES:  - VTE prophylaxis: SCDs, no SQH today - consider starting tomorrow  - SUP: N/A  - LDA: PIV   - Nutrition: PO  - Therapy/mobilization: PT/OT  - Goals of care note documented: 6/7/2023    ================================================================    Subjective: Having mild headache, feeling very tired which is unusual for him as he usually has trouble sleeping. Hoping he doesn't need anymore staples in his head as they hurt going in    I/O last 3 completed shifts:  In: 2573 [P.O.:1516; I.V.:375]  Out: 1975  [Urine:1975]  No intake/output data recorded.    Vital Last Value 24 Hour Range   Temperature 98.4 °F (36.9 °C) (06/08/23 0400) Temp  Min: 98 °F (36.7 °C)  Max: 99.4 °F (37.4 °C)   Pulse 64 (06/08/23 0700) Pulse  Min: 63  Max: 64   Respiratory 16 (06/07/23 1300) Resp  Min: 16  Max: 27   Non-Invasive  Blood Pressure 135/79 (06/08/23 0700) BP  Min: 91/53  Max: 149/74   Pulse Oximetry 99 % (06/08/23 0700) SpO2  Min: 92 %  Max: 99 %   Arterial   Blood Pressure   No data recorded        Physical Exam:  General: Well developed male, NAD  Neuro: Awake, alert, and oriented x3. PERRL. EOMs intact. Strength 5/5 in BUE & BLE. Sensation intact  HEENT: Stapled laceration top of scalp, scabbed. Scab mid/left forehead, no drainage. Moist oral mucus membranes  Neck: Supple, trachea midline  Chest: Lungs CTA. no rhonchi, rales, or wheezes. No accessory muscle use.   Heart: V-paced. S1S2, no m/g/r  Abdomen: Soft, non-tender, non-distended. Bowel sounds active.   Extremities: No cyanosis or edema. Radial/DP pulses 1+ bilaterally.  Skin: Warm, dry, no rashes or lesions.     Pertinent Reviewed: Allergies, Medications, Labs, Imaging and Physician and Nursing Notes    ACCS Attestation     The above patient and treatment plan were discussed with Dr. Salvador Branch. Services I personally billed for include subsequent hospital care level 3 (10112).    LORAINE Torres  [facility]

## 2024-11-29 NOTE — CONSULTS
Errol Fonseca is a 54 y.o. year old male patient who presents for Right Above Knee Amputation with Dr. Sánchez on . Acute Pain consulted for block for postoperative pain control.     Anticipated Postop Pain Issues -   Palliative: typically relieved with IV analgesics and regional local anesthetics  Provocative: typically with movement  Quality: typically burning and aching  Radiation: typically none  Severity: typically severe 8-10/10  Timing: typically constant    Past Medical History:   Diagnosis Date    Acute ischemic heart disease, unspecified     Coronary syndrome, acute    Alcoholic hepatitis 2019    Cirrhosis of liver (Multi)     Coronary artery disease     Diabetes mellitus (Multi)     Hypertension     Personal history of other diseases of the circulatory system     History of heart block    Personal history of other diseases of the musculoskeletal system and connective tissue     History of arthritis    Personal history of other endocrine, nutritional and metabolic disease     History of obesity    Pneumonia of right lung due to infectious organism, unspecified part of lung 2024    SAH (subarachnoid hemorrhage) (Multi) 2021    Syncope 2019    Tobacco use disorder 2021        Past Surgical History:   Procedure Laterality Date    APPENDECTOMY  2018    Appendectomy    CARDIAC CATHETERIZATION  2018    Cardiac Cath Procedure Outcome:        Family History   Problem Relation Name Age of Onset    Other (cva) Mother          Social History     Socioeconomic History    Marital status: Legally      Spouse name: Not on file    Number of children: Not on file    Years of education: Not on file    Highest education level: Not on file   Occupational History    Not on file   Tobacco Use    Smoking status: Former     Current packs/day: 0.00     Types: Cigarettes     Quit date: 2024     Years since quittin.9     Passive exposure: Past    Smokeless tobacco:  Never   Vaping Use    Vaping status: Every Day    Substances: Nicotine, Flavoring    Devices: Disposable, Pre-filled or refillable cartridge, Refillable tank, Pre-filled pod    Passive vaping exposure: Yes   Substance and Sexual Activity    Alcohol use: Not Currently     Comment: none since March 2024    Drug use: Never    Sexual activity: Defer   Other Topics Concern    Not on file   Social History Narrative    Not on file     Social Drivers of Health     Financial Resource Strain: Low Risk  (11/21/2024)    Overall Financial Resource Strain (CARDIA)     Difficulty of Paying Living Expenses: Not hard at all   Food Insecurity: No Food Insecurity (11/21/2024)    Hunger Vital Sign     Worried About Running Out of Food in the Last Year: Never true     Ran Out of Food in the Last Year: Never true   Transportation Needs: No Transportation Needs (11/21/2024)    PRAPARE - Transportation     Lack of Transportation (Medical): No     Lack of Transportation (Non-Medical): No   Physical Activity: Not on file   Stress: Not on file   Social Connections: Not on file   Intimate Partner Violence: Not At Risk (11/21/2024)    Humiliation, Afraid, Rape, and Kick questionnaire     Fear of Current or Ex-Partner: No     Emotionally Abused: No     Physically Abused: No     Sexually Abused: No   Housing Stability: Low Risk  (11/21/2024)    Housing Stability Vital Sign     Unable to Pay for Housing in the Last Year: No     Number of Times Moved in the Last Year: 1     Homeless in the Last Year: No        No Known Allergies      Review of Systems  Gen: No fatigue, anorexia, insomnia, fever.   Eyes: No vision loss, double vision, drainage, eye pain.   ENT: No pharyngitis, dry mouth, no hearing changes or ear discharge  Cardiac: No chest pain, palpitations, syncope, near syncope.   Pulmonary: No shortness of breath, cough, hemoptysis.   Heme/lymph: No swollen glands, fever, bleeding.   GI: No abdominal pain, change in bowel habits, melena,  hematemesis, hematochezia, nausea, vomiting, diarrhea.   : No discharge, dysuria, frequency, urgency, hematuria.  Endo: No polyuria or weight loss.   Musculoskeletal: Negative for any pain or loss of ROM/weakness  Skin: No rashes or lesions  Neuro: Normal speech, no numbness or weakness. No gait difficulties  Review of systems is otherwise negative unless stated above or in history of present illness.    Physical Exam:  Constitutional:  no distress, alert and cooperative  Eyes: clear sclera  Head/Neck: No apparent injury, trachea midline  Respiratory/Thorax: Patent airways, thorax symmetric, breathing comfortably  Cardiovascular: no pitting edema  Gastrointestinal: Nondistended  Musculoskeletal: ROM intact  Extremities: no clubbing  Neurological: alert, lott x4  Psychological: Appropriate affect    Results for orders placed or performed during the hospital encounter of 11/20/24 (from the past 24 hours)   POCT GLUCOSE   Result Value Ref Range    POCT Glucose 162 (H) 74 - 99 mg/dL   POCT GLUCOSE   Result Value Ref Range    POCT Glucose 236 (H) 74 - 99 mg/dL   POCT GLUCOSE   Result Value Ref Range    POCT Glucose 234 (H) 74 - 99 mg/dL      Errol Fonseca is a 54 y.o. year old male patient who presents for Right Above Knee Amputation with Dr. Sánchez on 11/29. Acute Pain consulted for block for postoperative pain control.     Plan:    - Right Femoral catheter block performed intraoperatively on 11/29  - Ambit ball with Ropivacaine 0.2%/NaCl 0.9% 500mL, Rate 10 cc/hr bilaterally  - Ambit medication will not interfere with pain medication prescribed by the primary team.   - Please be aware of local anesthetic toxic dose and absorption variability before considering lidocaine patches  - Acute pain service will follow while catheters in place  - Rest of pain management per primary team    Loyd Walton DO  Acute Pain Resident  pg [phone] ph [phone]

## 2024-11-29 NOTE — BRIEF OP NOTE
Date: 2024 - 2024  OR Location: [facility] OR    Name: Errol Fonseca, : 1970, Age: 54 y.o., MRN: [id number], Sex: male    Diagnosis  Pre-op Diagnosis      * Foot ulcer with fat layer exposed, right (Multi) [L97.512] Post-op Diagnosis     * Foot ulcer with fat layer exposed, right (Multi) [L97.512]     Procedures  ABOVE KNEE AMPUTATION WITH TMR  95026 - OH AMPUTATION LEG THROUGH TIBIA&FIBULA      Surgeons      * Oliverio Sánchez - Primary    Resident/Fellow/Other Assistant:  Surgeons and Role:     * Aldair Mccollum MD - Assisting    Staff:   Circulator: Lashonda  Circulator: Raul Hodgson Person: Zenon Hodgson Person: Max    Anesthesia Staff: Anesthesiologist: Marques Ceballos MD  Anesthesia Resident: Maris Chino MD    Procedure Summary  Anesthesia: General  ASA: III  Estimated Blood Loss: 20 mL  Intra-op Medications:   Administrations occurring from 0655 to 0925 on 24:   Medication Name Total Dose   albumin human bottle 5% 250 mL   ceFAZolin (Ancef) 1 g 2 g   dexAMETHasone (Decadron) injection 4 mg/mL 4 mg   fentaNYL (Sublimaze) injection 50 mcg/mL 200 mcg   insulin lispro injection 0-10 Units Cannot be calculated   lidocaine (Xylocaine) 2 % 50 mg   midazolam (Versed) 1 mg/1 mL 2 mg   phenylephrine (Russell-Synephrine) injection 440 mcg   50 mL propofol 10mg/mL 150 mg   rocuronium (ZeMuron) 50 mg/5 mL injection 100 mg              Anesthesia Record               Intraprocedure I/O Totals          Intake    Propofol Drip 0.00 mL    The total shown is the total volume documented since Anesthesia Start was filed.    Total Intake 0 mL       Output    Urine 525 mL    Total Output 525 mL       Net    Net Volume -525 mL          Specimen:   ID Type Source Tests Collected by Time   1 : RIGHT AKA Tissue LEG AMPUTATION ABOVE THE KNEE RIGHT SURGICAL PATHOLOGY EXAM Oliverio Sánchez MD 2024 0810                  Findings: successful right AKA with TMR x4    Complications:  None; patient tolerated  the procedure well.     Disposition: PACU - hemodynamically stable.  Condition: stable  Specimens Collected:   ID Type Source Tests Collected by Time   1 : RIGHT AKA Tissue LEG AMPUTATION ABOVE THE KNEE RIGHT SURGICAL PATHOLOGY EXAM Oliverio Sánchez MD 11/29/2024 0810

## 2024-11-29 NOTE — ANESTHESIA POSTPROCEDURE EVALUATION
Patient: Errol Fonseca    Procedure Summary       Date: 11/29/24 Room / Location: [facility] OR 16 / [facility] OR    Anesthesia Start: 0720 Anesthesia Stop: 1203    Procedure: ABOVE KNEE AMPUTATION WITH TMR (Right: Thigh - Leg Upper) Diagnosis:       Foot ulcer with fat layer exposed, right (Multi)      (Foot ulcer with fat layer exposed, right (Multi) [L97.512])    Surgeons: Oliverio Sánchez MD Responsible Provider: Marques Ceballos MD    Anesthesia Type: general ASA Status: 3            Anesthesia Type: general    Vitals Value Taken Time   /73 11/29/24 1200   Temp 36.8 11/29/24 1204   Pulse 69 11/29/24 1202   Resp 8 11/29/24 1202   SpO2 94 % 11/29/24 1202   Vitals shown include unfiled device data.    Anesthesia Post Evaluation    Patient location during evaluation: PACU  Patient participation: complete - patient participated  Level of consciousness: awake and alert  Pain score: 0  Pain management: adequate  Airway patency: patent  Cardiovascular status: stable  Respiratory status: spontaneous ventilation and face mask  Hydration status: acceptable  Postoperative Nausea and Vomiting: none        No notable events documented.

## 2024-11-29 NOTE — ANESTHESIA PROCEDURE NOTES
Peripheral Block    Patient location during procedure: OR  Start time: 11/29/2024 11:25 AM  End time: 11/29/2024 11:35 AM  Reason for block: at surgeon's request and post-op pain management  Staffing  Performed: attending   Authorized by: Marques Ceballos MD    Performed by: Maris Chino MD  Preanesthetic Checklist  Completed: patient identified, IV checked, site marked, risks and benefits discussed, surgical consent, monitors and equipment checked, pre-op evaluation and timeout performed   Timeout performed at: 11/29/2024 7:20 AM  Peripheral Block  Patient position: laying flat  Prep: ChloraPrep  Patient monitoring: heart rate, continuous pulse ox and cardiac monitor  Block type: femoral  Laterality: right  Injection technique: catheter  Guidance: ultrasound guided  Local infiltration: ropivacaine  Needle  Needle type: short-bevel   Needle gauge: 22 G  Needle length: 8 cm  Needle localization: ultrasound guidance     image stored in chart  Assessment  Injection assessment: negative aspiration for heme, incremental injection and local visualized surrounding nerve on ultrasound  Heart rate change: no  Slow fractionated injection: yes  Additional Notes  Femoral nerve block: Informed consent obtained.  Risks, benefits, and alternatives discussed.  ASA monitors placed, general anesthesia induced and timeout performed.  Patient positioned, prepped with chlorhexidine, and draped with sterile towels.      Ultrasound guidance was used to visualize the femoral nerve and surrounding structures with visualization of the needle throughout duration of the procedure. Aspiration negative. A total of ropi 0.5 % 15mL ml and dexamethasone 4 mg and 150mcg epi was injected on right  side. Catheter inserted and tunneled.    Images stored in Epic

## 2024-11-29 NOTE — ANESTHESIA PREPROCEDURE EVALUATION
Patient: Errol Fonseca    Procedure Information       Date/Time: 11/29/24 0655    Procedure: Formalization of Leg Amputation Below Knee (Right)    Location: [facility] OR 16 / [facility] OR    Surgeons: Oliverio Sánchez MD            Relevant Problems   Cardiac   (+) Coronary artery disease   (+) Coronary artery disease involving native coronary artery of native heart with unstable angina pectoris   (+) Hyperlipidemia   (+) Hypertension      Liver   (+) Alcoholic cirrhosis (Multi)   (+) Hepatic cirrhosis (Multi)      Endocrine   (+) Type 2 diabetes mellitus, with long-term current use of insulin      ID   (+) Necrotizing fasciitis of lower leg (Multi)       Clinical information reviewed:   Tobacco  Allergies  Meds   Med Hx  Surg Hx   Fam Hx  Soc Hx        NPO Detail:  NPO/Void Status  Carbohydrate Drink Given Prior to Surgery? : N  Date of Last Liquid: 11/28/24  Time of Last Liquid: 2359  Date of Last Solid: 11/28/24  Time of Last Solid: 2359  Last Intake Type: Clear fluids; Light meal  Time of Last Void: 0530         Physical Exam    Airway  Mallampati: IV  TM distance: >3 FB  Neck ROM: full     Cardiovascular    Dental   (+) upper dentures, lower dentures  Comments: POOR DENTITION, LOWER TEETH   Pulmonary    Abdominal            Anesthesia Plan    History of general anesthesia?: yes  History of complications of general anesthesia?: no    ASA 3     general and regional   (Plan for general anesthesia with ETT. Patient also consented for R femoral/sciatic single shot nerve block to be performed while under GA. Risks and benefits explained and pt ok to proceed. )  The patient is not a current smoker.  Patient was previously instructed to abstain from smoking on day of procedure.  Patient did not smoke on day of procedure.    intravenous induction   Anesthetic plan and risks discussed with patient.  Use of blood products discussed with patient who.    Plan discussed with resident and attending.

## 2024-11-29 NOTE — CARE PLAN
The patient's goals for the shift include      The clinical goals for the shift include pt pain will be controlled      Problem: Skin  Goal: Decreased wound size/increased tissue granulation at next dressing change  Outcome: Progressing  Goal: Participates in plan/prevention/treatment measures  Outcome: Progressing  Goal: Prevent/manage excess moisture  Outcome: Progressing  Goal: Prevent/minimize sheer/friction injuries  Outcome: Progressing  Goal: Promote/optimize nutrition  Outcome: Progressing  Goal: Promote skin healing  Outcome: Progressing     Problem: Pain - Adult  Goal: Verbalizes/displays adequate comfort level or baseline comfort level  Outcome: Progressing     Problem: Safety - Adult  Goal: Free from fall injury  Outcome: Progressing

## 2024-11-30 LAB
ABO GROUP (TYPE) IN BLOOD: NORMAL
ALBUMIN SERPL BCP-MCNC: 2.9 G/DL (ref 3.4–5)
ANION GAP SERPL CALC-SCNC: 11 MMOL/L (ref 10–20)
ANTIBODY SCREEN: NORMAL
BUN SERPL-MCNC: 13 MG/DL (ref 6–23)
CALCIUM SERPL-MCNC: 8 MG/DL (ref 8.6–10.6)
CHLORIDE SERPL-SCNC: 98 MMOL/L (ref 98–107)
CO2 SERPL-SCNC: 28 MMOL/L (ref 21–32)
CREAT SERPL-MCNC: 0.79 MG/DL (ref 0.5–1.3)
EGFRCR SERPLBLD CKD-EPI 2021: >90 ML/MIN/1.73M*2
ERYTHROCYTE [DISTWIDTH] IN BLOOD BY AUTOMATED COUNT: 13.8 % (ref 11.5–14.5)
GLUCOSE BLD MANUAL STRIP-MCNC: 228 MG/DL (ref 74–99)
GLUCOSE BLD MANUAL STRIP-MCNC: 269 MG/DL (ref 74–99)
GLUCOSE BLD MANUAL STRIP-MCNC: 298 MG/DL (ref 74–99)
GLUCOSE BLD MANUAL STRIP-MCNC: 329 MG/DL (ref 74–99)
GLUCOSE SERPL-MCNC: 325 MG/DL (ref 74–99)
HCT VFR BLD AUTO: 24.2 % (ref 41–52)
HGB BLD-MCNC: 7.9 G/DL (ref 13.5–17.5)
MAGNESIUM SERPL-MCNC: 1.8 MG/DL (ref 1.6–2.4)
MCH RBC QN AUTO: 33.1 PG (ref 26–34)
MCHC RBC AUTO-ENTMCNC: 32.6 G/DL (ref 32–36)
MCV RBC AUTO: 101 FL (ref 80–100)
NRBC BLD-RTO: 0 /100 WBCS (ref 0–0)
PHOSPHATE SERPL-MCNC: 2.2 MG/DL (ref 2.5–4.9)
PLATELET # BLD AUTO: 330 X10*3/UL (ref 150–450)
POTASSIUM SERPL-SCNC: 3.8 MMOL/L (ref 3.5–5.3)
RBC # BLD AUTO: 2.39 X10*6/UL (ref 4.5–5.9)
RH FACTOR (ANTIGEN D): NORMAL
SODIUM SERPL-SCNC: 133 MMOL/L (ref 136–145)
WBC # BLD AUTO: 10.3 X10*3/UL (ref 4.4–11.3)

## 2024-11-30 PROCEDURE — 2500000004 HC RX 250 GENERAL PHARMACY W/ HCPCS (ALT 636 FOR OP/ED)

## 2024-11-30 PROCEDURE — 1200000002 HC GENERAL ROOM WITH TELEMETRY DAILY

## 2024-11-30 PROCEDURE — 2500000001 HC RX 250 WO HCPCS SELF ADMINISTERED DRUGS (ALT 637 FOR MEDICARE OP)

## 2024-11-30 PROCEDURE — 97530 THERAPEUTIC ACTIVITIES: CPT | Mod: GP

## 2024-11-30 PROCEDURE — 82947 ASSAY GLUCOSE BLOOD QUANT: CPT

## 2024-11-30 PROCEDURE — 97116 GAIT TRAINING THERAPY: CPT | Mod: GP

## 2024-11-30 PROCEDURE — 80069 RENAL FUNCTION PANEL: CPT

## 2024-11-30 PROCEDURE — 2500000002 HC RX 250 W HCPCS SELF ADMINISTERED DRUGS (ALT 637 FOR MEDICARE OP, ALT 636 FOR OP/ED)

## 2024-11-30 PROCEDURE — 83735 ASSAY OF MAGNESIUM: CPT

## 2024-11-30 PROCEDURE — 85027 COMPLETE CBC AUTOMATED: CPT

## 2024-11-30 PROCEDURE — 36415 COLL VENOUS BLD VENIPUNCTURE: CPT

## 2024-11-30 PROCEDURE — 99231 SBSQ HOSP IP/OBS SF/LOW 25: CPT | Performed by: STUDENT IN AN ORGANIZED HEALTH CARE EDUCATION/TRAINING PROGRAM

## 2024-11-30 RX ORDER — HYDROMORPHONE HYDROCHLORIDE 1 MG/ML
0.4 INJECTION, SOLUTION INTRAMUSCULAR; INTRAVENOUS; SUBCUTANEOUS
Status: DISCONTINUED | OUTPATIENT
Start: 2024-11-30 | End: 2024-12-02

## 2024-11-30 RX ORDER — HYDROMORPHONE HYDROCHLORIDE 0.2 MG/ML
0.2 INJECTION INTRAMUSCULAR; INTRAVENOUS; SUBCUTANEOUS ONCE
Status: COMPLETED | OUTPATIENT
Start: 2024-11-30 | End: 2024-11-30

## 2024-11-30 RX ADMIN — OXYCODONE HYDROCHLORIDE 10 MG: 10 TABLET ORAL at 05:39

## 2024-11-30 RX ADMIN — METHOCARBAMOL TABLETS 500 MG: 500 TABLET, COATED ORAL at 06:58

## 2024-11-30 RX ADMIN — LISINOPRIL 20 MG: 20 TABLET ORAL at 08:21

## 2024-11-30 RX ADMIN — METOPROLOL TARTRATE 25 MG: 25 TABLET, FILM COATED ORAL at 20:26

## 2024-11-30 RX ADMIN — METHOCARBAMOL TABLETS 500 MG: 500 TABLET, COATED ORAL at 18:28

## 2024-11-30 RX ADMIN — HYDROMORPHONE HYDROCHLORIDE 0.2 MG: 0.2 INJECTION, SOLUTION INTRAMUSCULAR; INTRAVENOUS; SUBCUTANEOUS at 12:30

## 2024-11-30 RX ADMIN — INSULIN LISPRO 8 UNITS: 100 INJECTION, SOLUTION INTRAVENOUS; SUBCUTANEOUS at 12:40

## 2024-11-30 RX ADMIN — INSULIN LISPRO 5 UNITS: 100 INJECTION, SOLUTION INTRAVENOUS; SUBCUTANEOUS at 16:51

## 2024-11-30 RX ADMIN — HYDROMORPHONE HYDROCHLORIDE 0.4 MG: 1 INJECTION, SOLUTION INTRAMUSCULAR; INTRAVENOUS; SUBCUTANEOUS at 20:23

## 2024-11-30 RX ADMIN — PIPERACILLIN SODIUM AND TAZOBACTAM SODIUM 3.38 G: 3; .375 INJECTION, SOLUTION INTRAVENOUS at 12:30

## 2024-11-30 RX ADMIN — HYDROMORPHONE HYDROCHLORIDE 0.2 MG: 0.2 INJECTION, SOLUTION INTRAMUSCULAR; INTRAVENOUS; SUBCUTANEOUS at 15:29

## 2024-11-30 RX ADMIN — VANCOMYCIN HYDROCHLORIDE 1500 MG: 1.5 INJECTION, POWDER, LYOPHILIZED, FOR SOLUTION INTRAVENOUS at 16:57

## 2024-11-30 RX ADMIN — METOPROLOL TARTRATE 25 MG: 25 TABLET, FILM COATED ORAL at 08:36

## 2024-11-30 RX ADMIN — GABAPENTIN 400 MG: 400 CAPSULE ORAL at 08:21

## 2024-11-30 RX ADMIN — ACETAMINOPHEN 650 MG: 325 TABLET, FILM COATED ORAL at 20:26

## 2024-11-30 RX ADMIN — INSULIN LISPRO 6 UNITS: 100 INJECTION, SOLUTION INTRAVENOUS; SUBCUTANEOUS at 08:34

## 2024-11-30 RX ADMIN — INSULIN LISPRO 4 UNITS: 100 INJECTION, SOLUTION INTRAVENOUS; SUBCUTANEOUS at 16:53

## 2024-11-30 RX ADMIN — METHOCARBAMOL TABLETS 500 MG: 500 TABLET, COATED ORAL at 00:39

## 2024-11-30 RX ADMIN — GABAPENTIN 400 MG: 400 CAPSULE ORAL at 14:20

## 2024-11-30 RX ADMIN — ACETAMINOPHEN 650 MG: 325 TABLET, FILM COATED ORAL at 06:58

## 2024-11-30 RX ADMIN — HYDROMORPHONE HYDROCHLORIDE 0.2 MG: 0.2 INJECTION, SOLUTION INTRAMUSCULAR; INTRAVENOUS; SUBCUTANEOUS at 07:29

## 2024-11-30 RX ADMIN — INSULIN GLARGINE 20 UNITS: 100 INJECTION, SOLUTION SUBCUTANEOUS at 12:40

## 2024-11-30 RX ADMIN — INSULIN LISPRO 5 UNITS: 100 INJECTION, SOLUTION INTRAVENOUS; SUBCUTANEOUS at 12:40

## 2024-11-30 RX ADMIN — METHOCARBAMOL TABLETS 500 MG: 500 TABLET, COATED ORAL at 12:40

## 2024-11-30 RX ADMIN — HYDROMORPHONE HYDROCHLORIDE 0.2 MG: 0.2 INJECTION, SOLUTION INTRAMUSCULAR; INTRAVENOUS; SUBCUTANEOUS at 01:55

## 2024-11-30 RX ADMIN — PIPERACILLIN SODIUM AND TAZOBACTAM SODIUM 3.38 G: 3; .375 INJECTION, SOLUTION INTRAVENOUS at 18:33

## 2024-11-30 RX ADMIN — OXYCODONE HYDROCHLORIDE 10 MG: 10 TABLET ORAL at 10:08

## 2024-11-30 RX ADMIN — PIPERACILLIN SODIUM AND TAZOBACTAM SODIUM 3.38 G: 3; .375 INJECTION, SOLUTION INTRAVENOUS at 00:38

## 2024-11-30 RX ADMIN — HYDROMORPHONE HYDROCHLORIDE 0.2 MG: 0.2 INJECTION, SOLUTION INTRAMUSCULAR; INTRAVENOUS; SUBCUTANEOUS at 11:06

## 2024-11-30 RX ADMIN — ACETAMINOPHEN 650 MG: 325 TABLET, FILM COATED ORAL at 14:20

## 2024-11-30 RX ADMIN — PIPERACILLIN SODIUM AND TAZOBACTAM SODIUM 3.38 G: 3; .375 INJECTION, SOLUTION INTRAVENOUS at 06:58

## 2024-11-30 RX ADMIN — GABAPENTIN 400 MG: 400 CAPSULE ORAL at 20:26

## 2024-11-30 RX ADMIN — INSULIN LISPRO 5 UNITS: 100 INJECTION, SOLUTION INTRAVENOUS; SUBCUTANEOUS at 08:34

## 2024-11-30 RX ADMIN — ASPIRIN 81 MG: 81 TABLET, COATED ORAL at 08:21

## 2024-11-30 RX ADMIN — DOCUSATE SODIUM 100 MG: 100 CAPSULE, LIQUID FILLED ORAL at 08:21

## 2024-11-30 RX ADMIN — OXYCODONE HYDROCHLORIDE 10 MG: 10 TABLET ORAL at 22:47

## 2024-11-30 RX ADMIN — ENOXAPARIN SODIUM 40 MG: 100 INJECTION SUBCUTANEOUS at 08:21

## 2024-11-30 RX ADMIN — ESCITALOPRAM OXALATE 10 MG: 10 TABLET ORAL at 08:21

## 2024-11-30 RX ADMIN — ACETAMINOPHEN 650 MG: 325 TABLET, FILM COATED ORAL at 00:38

## 2024-11-30 RX ADMIN — OXYCODONE HYDROCHLORIDE 10 MG: 10 TABLET ORAL at 00:52

## 2024-11-30 RX ADMIN — OXYCODONE HYDROCHLORIDE 10 MG: 10 TABLET ORAL at 18:27

## 2024-11-30 RX ADMIN — OXYCODONE HYDROCHLORIDE 10 MG: 10 TABLET ORAL at 14:20

## 2024-11-30 RX ADMIN — VANCOMYCIN HYDROCHLORIDE 1500 MG: 1.5 INJECTION, POWDER, LYOPHILIZED, FOR SOLUTION INTRAVENOUS at 05:39

## 2024-11-30 RX ADMIN — ATORVASTATIN CALCIUM 40 MG: 40 TABLET, FILM COATED ORAL at 08:21

## 2024-11-30 ASSESSMENT — PAIN SCALES - GENERAL
PAINLEVEL_OUTOF10: 9
PAINLEVEL_OUTOF10: 7
PAINLEVEL_OUTOF10: 8
PAINLEVEL_OUTOF10: 8
PAINLEVEL_OUTOF10: 9
PAINLEVEL_OUTOF10: 8
PAINLEVEL_OUTOF10: 3
PAINLEVEL_OUTOF10: 6
PAINLEVEL_OUTOF10: 8
PAINLEVEL_OUTOF10: 9
PAINLEVEL_OUTOF10: 2
PAINLEVEL_OUTOF10: 9
PAINLEVEL_OUTOF10: 7
PAINLEVEL_OUTOF10: 7
PAINLEVEL_OUTOF10: 2
PAINLEVEL_OUTOF10: 8
PAINLEVEL_OUTOF10: 9

## 2024-11-30 ASSESSMENT — COGNITIVE AND FUNCTIONAL STATUS - GENERAL
DAILY ACTIVITIY SCORE: 19
CLIMB 3 TO 5 STEPS WITH RAILING: TOTAL
TOILETING: A LITTLE
STANDING UP FROM CHAIR USING ARMS: A LITTLE
DRESSING REGULAR LOWER BODY CLOTHING: A LOT
DAILY ACTIVITIY SCORE: 19
TURNING FROM BACK TO SIDE WHILE IN FLAT BAD: A LITTLE
WALKING IN HOSPITAL ROOM: A LOT
DRESSING REGULAR LOWER BODY CLOTHING: A LOT
MOVING TO AND FROM BED TO CHAIR: A LITTLE
STANDING UP FROM CHAIR USING ARMS: A LITTLE
TURNING FROM BACK TO SIDE WHILE IN FLAT BAD: A LITTLE
MOVING TO AND FROM BED TO CHAIR: A LITTLE
WALKING IN HOSPITAL ROOM: TOTAL
DRESSING REGULAR UPPER BODY CLOTHING: A LITTLE
CLIMB 3 TO 5 STEPS WITH RAILING: TOTAL
MOVING FROM LYING ON BACK TO SITTING ON SIDE OF FLAT BED WITH BEDRAILS: A LITTLE
DRESSING REGULAR UPPER BODY CLOTHING: A LITTLE
CLIMB 3 TO 5 STEPS WITH RAILING: TOTAL
MOVING TO AND FROM BED TO CHAIR: A LITTLE
MOVING FROM LYING ON BACK TO SITTING ON SIDE OF FLAT BED WITH BEDRAILS: A LITTLE
WALKING IN HOSPITAL ROOM: A LOT
MOBILITY SCORE: 14
STANDING UP FROM CHAIR USING ARMS: A LITTLE
TOILETING: A LITTLE
MOVING FROM LYING ON BACK TO SITTING ON SIDE OF FLAT BED WITH BEDRAILS: A LITTLE
HELP NEEDED FOR BATHING: A LITTLE
HELP NEEDED FOR BATHING: A LITTLE
MOBILITY SCORE: 15
TURNING FROM BACK TO SIDE WHILE IN FLAT BAD: A LITTLE
MOBILITY SCORE: 15

## 2024-11-30 ASSESSMENT — PAIN DESCRIPTION - ORIENTATION
ORIENTATION: RIGHT

## 2024-11-30 ASSESSMENT — PAIN DESCRIPTION - LOCATION
LOCATION: LEG

## 2024-11-30 ASSESSMENT — PAIN SCALES - WONG BAKER
WONGBAKER_NUMERICALRESPONSE: HURTS WHOLE LOT
WONGBAKER_NUMERICALRESPONSE: HURTS EVEN MORE
WONGBAKER_NUMERICALRESPONSE: HURTS EVEN MORE
WONGBAKER_NUMERICALRESPONSE: HURTS LITTLE BIT

## 2024-11-30 ASSESSMENT — PAIN SCALES - PAIN ASSESSMENT IN ADVANCED DEMENTIA (PAINAD)
BREATHING: NORMAL
TOTALSCORE: MEDICATION (SEE MAR)
BODYLANGUAGE: RELAXED
CONSOLABILITY: NO NEED TO CONSOLE

## 2024-11-30 ASSESSMENT — PAIN - FUNCTIONAL ASSESSMENT
PAIN_FUNCTIONAL_ASSESSMENT: 0-10
PAIN_FUNCTIONAL_ASSESSMENT: 0-10

## 2024-11-30 NOTE — PROGRESS NOTES
Physical Therapy    Physical Therapy Treatment    Patient Name: Errol Fonseca  MRN: [id number]  Department: [place] [address]  Room: [facility]8034-A  Today's Date: 11/30/2024  Time Calculation  Start Time: 1313  Stop Time: 1351  Time Calculation (min): 38 min    Assessment/Plan   PT Assessment  Evaluation/Treatment Tolerance: Patient limited by pain  Medical Staff Made Aware: Yes  End of Session Communication: Bedside nurse  Assessment Comment: Pt with good motivation and participation despite increased pain levels. Pt was able to perform 3 seperate bed<>chair transfers with WW and min A. Patient continues to benefit from skilled physical therapy to maximize functional mobility and safety. Pt remains appropriate for high intensity therapy when medically appropriate for DC.  End of Session Patient Position: Bed, 3 rail up, Alarm off, not on at start of session (CB in reach)  PT Plan  Treatment/Interventions: Bed mobility, Transfer training, Gait training, Stair training, Balance training, Neuromuscular re-education, Strengthening, Endurance training, Range of motion, Therapeutic activity, Therapeutic exercise  PT Plan: Ongoing PT  PT Frequency: 5 times per week  PT Discharge Recommendations: High intensity level of continued care  Equipment Recommended upon Discharge: Wheeled walker  PT Recommended Transfer Status: Assist x2, Assistive device  PT - OK to Discharge: Yes  RN cleared prior to session     General Visit Information:   PT  Visit  PT Received On: 11/30/24  Response to Previous Treatment: Patient with no complaints from previous session.  General  Reason for Referral: R knee disarticulation. S/p s/p R AKA 11/29  Past Medical History Relevant to Rehab: DM, CAD s/p PCI, HTN, HLD, alcoholic cirrhosis, prior left 5th toe amputation  Family/Caregiver Present: No  Prior to Session Communication: Bedside nurse  Patient Position Received: Bed, 3 rail up, Alarm off, not on at start of session  Preferred Learning Style: auditory,  verbal, visual  General Comment: Pt pleasant and agreeable to therapy. Pt endorsing increased pain levels after surgery    Subjective   Precautions:  Precautions  Hearing/Visual Limitations: Glasses for reading  LE Weight Bearing Status: Right Non-Weight Bearing  Medical Precautions: Fall precautions  Precautions Comment: R AKA    Objective   Pain:  Pain Assessment  Pain Assessment: 0-10  0-10 (Numeric) Pain Score: 9  Pain Type: Surgical pain  Pain Location: Leg  Pain Orientation: Right  Pain Interventions: Repositioned, Ambulation/increased activity (RN notified)  Response to Interventions: Resting quietly  Cognition:  Cognition  Overall Cognitive Status: Within Functional Limits  Arousal/Alertness: Appropriate responses to stimuli  Orientation Level: Oriented X4  Following Commands: Follows multistep commands consistently  Coordination:  Movements are Fluid and Coordinated: Yes  Postural Control:  Postural Control  Postural Control: Within Functional Limits  Static Sitting Balance  Static Sitting-Balance Support: Bilateral upper extremity supported, Feet supported (LLE only)  Static Standing Balance  Static Standing-Balance Support: Bilateral upper extremity supported  Static Standing-Level of Assistance: Minimum assistance  Static Standing-Comment/Number of Minutes: WW    Activity Tolerance:  Activity Tolerance  Endurance: Tolerates 10 - 20 min exercise with multiple rests  Treatments:  Therapeutic Activity  Therapeutic Activity Performed: Yes  Therapeutic Activity 1: static/dynamic standing with WW and min A  Therapeutic Activity 2: Pt performed bed to chair, chair to commode, and commode to bed transfer with WW and min A.    Bed Mobility  Bed Mobility: Yes  Bed Mobility 1  Bed Mobility 1: Supine to sitting, Sitting to supine  Level of Assistance 1: Contact guard  Bed Mobility Comments 1: min vc for sequencing    Ambulation/Gait Training  Ambulation/Gait Training Performed: Yes  Ambulation/Gait Training  1  Surface 1: Level tile  Device 1: Rolling walker  Assistance 1: Minimum assistance, Minimal verbal cues  Quality of Gait 1: Forward flexed posture, Decreased step length (Hopping with LLE - decreased foot clearance, unsteady)  Comments/Distance (ft) 1: 3ft x 2, 5ft  Transfers  Transfer: Yes  Transfer 1  Transfer From 1: Sit to, Stand to  Transfer to 1: Stand, Sit  Technique 1: Sit to stand, Stand to sit  Transfer Device 1: Walker  Transfer Level of Assistance 1: Moderate assistance  Trials/Comments 1: 3 trials; min vc for sequencing, safety, and hand placement  Transfers 2  Transfer From 2: Bed to  Transfer to 2: Chair with arms  Technique 2:  (steps)  Transfer Device 2: Walker  Transfer Level of Assistance 2: Minimum assistance  Trials/Comments 2: min vc for sfaety and sequencing  Transfers 3  Transfer From 3: Chair with arms to  Transfer to 3: Commode-standard  Technique 3:  (steps)  Transfer Device 3: Walker  Transfer Level of Assistance 3: Minimum assistance  Trials/Comments 3: min vc for sfaety and sequencing  Transfers 4  Transfer From 4: Commode-standard to  Transfer to 4: Bed  Technique 4:  (steps)  Transfer Device 4: Walker  Transfer Level of Assistance 4: Minimum assistance  Trials/Comments 4: min vc for sfaety and sequencing    Pt required increased time with mobility and rest breaks 2/2 pain    Outcome Measures:  [facility] Basic Mobility  Turning from your back to your side while in a flat bed without using bedrails: A little  Moving from lying on your back to sitting on the side of a flat bed without using bedrails: A little  Moving to and from bed to chair (including a wheelchair): A little  Standing up from a chair using your arms (e.g. wheelchair or bedside chair): A little  To walk in hospital room: Total  Climbing 3-5 steps with railing: Total  Basic Mobility - Total Score: 14    Education Documentation  Precautions, taught by Lisa Nino PT at 11/30/2024  3:12 PM.  Learner: Patient  Readiness:  Acceptance  Method: Explanation, Demonstration  Response: Verbalizes Understanding, Needs Reinforcement    Body Mechanics, taught by Lisa Nino PT at 11/30/2024  3:12 PM.  Learner: Patient  Readiness: Acceptance  Method: Explanation, Demonstration  Response: Verbalizes Understanding, Needs Reinforcement    Mobility Training, taught by Lisa Nino PT at 11/30/2024  3:12 PM.  Learner: Patient  Readiness: Acceptance  Method: Explanation, Demonstration  Response: Verbalizes Understanding, Needs Reinforcement    Education Comments  No comments found.      Encounter Problems       Encounter Problems (Active)       Balance       STG - Maintains dynamic standing balance with upper extremity support and SBA (Progressing)       Start:  11/23/24    Expected End:  12/07/24       INTERVENTIONS:  1. Practice standing with minimal support.  2. Educate patient about standing tolerance.  3. Educate patient about independence with gait, transfers, and ADL's.  4. Educate patient about use of assistive device.  5. Educate patient about self-directed care.            Mobility       LTG - Patient will ambulate household distance with LRD and SBA (Progressing)       Start:  11/23/24    Expected End:  12/07/24            LTG - Patient will navigate 3 steps with CGA and rails/device (Progressing)       Start:  11/23/24    Expected End:  12/07/24               PT Transfers       STG - Patient will perform bed mobility independently (Progressing)       Start:  11/23/24    Expected End:  12/07/24            STG - Patient will transfer sit to and from stand with SBA and LRD (Progressing)       Start:  11/23/24    Expected End:  12/07/24               Pain - Adult          Safety       LTG - Patient will adhere to hip precautions during ADL's and transfers       Start:  11/21/24            LTG - Patient will demonstrate safety requirements appropriate to situation/environment       Start:  11/21/24            LTG - Patient will  utilize safety techniques       Start:  11/21/24            STG - Patient locks brakes on wheelchair       Start:  11/21/24            STG - Patient uses call light consistently to request assistance with transfers       Start:  11/21/24            STG - Patient uses gait belt during all transfers       Start:  11/21/24            Goal 1       Start:  11/21/24            Goal 2       Start:  11/21/24            Goal 3       Start:  11/21/24

## 2024-11-30 NOTE — PROGRESS NOTES
Postop Pain HPI -   Palliative: relieved with IV analgesics and regional local anesthetics  Provocative: movement  Quality:  burning and aching  Radiation:  none  Severity:  7/10  Timing: constant    24-HOUR OPIOID CONSUMPTION:  Tylenol x4, dilaudid x3, gabapentin x2, robaxin x3, oxy 10 x4    Scheduled medications  acetaminophen, 650 mg, oral, q6h  aspirin, 81 mg, oral, Daily  atorvastatin, 40 mg, oral, Daily  docusate sodium, 100 mg, oral, BID  enoxaparin, 40 mg, subcutaneous, q24h  escitalopram, 10 mg, oral, Daily  gabapentin, 400 mg, oral, TID  insulin glargine, 20 Units, subcutaneous, q24h  insulin lispro, 0-10 Units, subcutaneous, TID AC  insulin lispro, 5 Units, subcutaneous, TID AC  lisinopril, 20 mg, oral, Daily  methocarbamol, 500 mg, oral, q6h RILEY  metoprolol tartrate, 25 mg, oral, BID  nicotine, 1 patch, transdermal, Daily  piperacillin-tazobactam, 3.375 g, intravenous, q6h  polyethylene glycol, 17 g, oral, Daily  vancomycin, 1,500 mg, intravenous, q12h      Continuous medications  ropivacaine (PF) in NS cmpd, 10 mL/hr      PRN medications  PRN medications: dextrose, dextrose, glucagon, glucagon, HYDROmorphone, [Held by provider] nitroglycerin, oxyCODONE, oxyCODONE, traZODone, vancomycin     Physical Exam:  Constitutional:  no distress, alert and cooperative  Eyes: clear sclera  Head/Neck: No apparent injury, trachea midline  Respiratory/Thorax: Patent airways, thorax symmetric, breathing comfortably  Cardiovascular: no pitting edema  Gastrointestinal: Nondistended  Musculoskeletal: ROM intact  Extremities: no clubbing  Neurological: alert, lott x4  Psychological: Appropriate affect    Results for orders placed or performed during the hospital encounter of 11/20/24 (from the past 24 hours)   POCT GLUCOSE   Result Value Ref Range    POCT Glucose 234 (H) 74 - 99 mg/dL   CBC   Result Value Ref Range    WBC 15.2 (H) 4.4 - 11.3 x10*3/uL    nRBC 0.0 0.0 - 0.0 /100 WBCs    RBC 2.73 (L) 4.50 - 5.90 x10*6/uL     Hemoglobin 9.1 (L) 13.5 - 17.5 g/dL    Hematocrit 27.7 (L) 41.0 - 52.0 %     (H) 80 - 100 fL    MCH 33.3 26.0 - 34.0 pg    MCHC 32.9 32.0 - 36.0 g/dL    RDW 13.9 11.5 - 14.5 %    Platelets 418 150 - 450 x10*3/uL   Renal Function Panel   Result Value Ref Range    Glucose 218 (H) 74 - 99 mg/dL    Sodium 134 (L) 136 - 145 mmol/L    Potassium 4.3 3.5 - 5.3 mmol/L    Chloride 98 98 - 107 mmol/L    Bicarbonate 29 21 - 32 mmol/L    Anion Gap 11 10 - 20 mmol/L    Urea Nitrogen 15 6 - 23 mg/dL    Creatinine 0.70 0.50 - 1.30 mg/dL    eGFR >90 >60 mL/min/1.73m*2    Calcium 8.4 (L) 8.6 - 10.6 mg/dL    Phosphorus 3.1 2.5 - 4.9 mg/dL    Albumin 3.3 (L) 3.4 - 5.0 g/dL   Magnesium   Result Value Ref Range    Magnesium 1.80 1.60 - 2.40 mg/dL   Type and screen   Result Value Ref Range    ABO TYPE A     Rh TYPE POS     ANTIBODY SCREEN NEG    POCT GLUCOSE   Result Value Ref Range    POCT Glucose 239 (H) 74 - 99 mg/dL       Errol Fonseca is a 54 y.o. year old male patient who presents for Right Above Knee Amputation with Dr. Sánchez on 11/29. Acute Pain consulted for block for postoperative pain control.      Plan:   - Right Femoral catheter block performed intraoperatively on 11/29  - Ambit ball with Ropivacaine 0.2%/NaCl 0.9% 500mL, Rate 10 cc/hr  - Will continue infusion today   - Ambit medication will not interfere with pain medication prescribed by the primary team.   - Please be aware of local anesthetic toxic dose and absorption variability before considering lidocaine patches  - Acute pain service will follow while catheters in place  - Rest of pain management per primary team     Acute Pain Resident  pg [phone] ph [phone]

## 2024-11-30 NOTE — PROGRESS NOTES
VASCULAR SURGERY PROGRESS NOTE  Assessment/Plan   Errol Fonseca is 54 y.o. 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 Patient taken to the OR by podiatry twice for debridement but during second debridement infection found to have spread and foot deemed non-salvagable. Vascular consulted from PACU for urgent BKA .   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    Patient continues to progress well postoperatively, dressing changes without evidence of purulence or infection.  Patient awaiting BKA formalization scheduled for Friday, 11/29/2024.    11/30: Now s/p BKA formalization yesterday (11/29). Pain tolerable.    Plan:  - Continue post-op dressing for now  Multimodal pain control with Tylenol, oxycodone, Dilaudid, gabapentin -- made PRN oxy available more frequently due to patient complaints of continued pain   ASA, atorvastatin, metoprolol.  Holding home nitroglycerin.  Systolic pressures up to 170 this morning: increased metoprolol back to home dose of 25 BID and restarted home dose of lisinopril 20 mg daily  Regular diet, bowel regimen  Continue Zosyn, vancomycin.  Maintain surgical dressing; will be taken down by vascular when appropriate  SSI, Lantus 20 units daily lispro 5 units 3 times daily before meals.  Lovenox DVT Ppx  Nicotine patch.  Lexapro, trazodone.  PT/OT consults ordered.    D/w attending, Dr. Pau Pedraza MD  Vascular Surgery, PGY4  Team Pager: [phone]    Subjective   No overnight events.  Pain present, but tolerable.  No other concerns.    Objective   Vitals:  Heart Rate:  [68-85]   Temp:  [36.1 °C (97 °F)-36.8 °C (98.3 °F)]   Resp:  [10-16]   BP: (132-163)/(64-83)   SpO2:  [93 %-100 %]     Exam:  Constitutional: No acute distress, resting comfortably  Neuro:  AOx3, grossly intact  ENMT: moist mucous  membranes  CV: no tachycardia  Pulm: non-labored on room air  GI: soft, non-tender, non-distended  Skin: warm and dry  Musculoskeletal: moving all extremities  Extremities: Right lower extremity, s/p AKA, wound wrapped with Ioban, which is clean, dry, and intact. No purulence, no visible signs of infection.  Left lower extremity, bilateral upper extremities warm well-perfused, neurovascularly intact.    Labs:  Results from last 7 days   Lab Units 11/29/24  1741 11/28/24  0737 11/27/24  0908   WBC AUTO x10*3/uL 15.2* 8.3 9.5   HEMOGLOBIN g/dL 9.1* 9.7* 9.7*   PLATELETS AUTO x10*3/uL 418 419 354      Results from last 7 days   Lab Units 11/29/24  1741 11/28/24  0737 11/27/24  0908   SODIUM mmol/L 134* 133* 129*   POTASSIUM mmol/L 4.3 4.1 5.2   CHLORIDE mmol/L 98 97* 95*   CO2 mmol/L 29 29 30   BUN mg/dL 15 12 13   CREATININE mg/dL 0.70 0.76 0.87   GLUCOSE mg/dL 218* 215* 258*   MAGNESIUM mg/dL 1.80 1.79 1.74   PHOSPHORUS mg/dL 3.1 3.2 3.6

## 2024-11-30 NOTE — CARE PLAN
The patient's goals for the shift include  rest and pain management     The clinical goals for the shift include pain management

## 2024-12-01 VITALS
BODY MASS INDEX: 29.83 KG/M2 | WEIGHT: 225.09 LBS | SYSTOLIC BLOOD PRESSURE: 138 MMHG | HEART RATE: 85 BPM | HEIGHT: 73 IN | RESPIRATION RATE: 17 BRPM | DIASTOLIC BLOOD PRESSURE: 74 MMHG | OXYGEN SATURATION: 95 % | TEMPERATURE: 97.2 F

## 2024-12-01 LAB
ALBUMIN SERPL BCP-MCNC: 3.2 G/DL (ref 3.4–5)
ANION GAP SERPL CALC-SCNC: 12 MMOL/L (ref 10–20)
BUN SERPL-MCNC: 9 MG/DL (ref 6–23)
CALCIUM SERPL-MCNC: 8.3 MG/DL (ref 8.6–10.6)
CHLORIDE SERPL-SCNC: 97 MMOL/L (ref 98–107)
CO2 SERPL-SCNC: 26 MMOL/L (ref 21–32)
CREAT SERPL-MCNC: 0.6 MG/DL (ref 0.5–1.3)
EGFRCR SERPLBLD CKD-EPI 2021: >90 ML/MIN/1.73M*2
ERYTHROCYTE [DISTWIDTH] IN BLOOD BY AUTOMATED COUNT: 13.4 % (ref 11.5–14.5)
GLUCOSE BLD MANUAL STRIP-MCNC: 187 MG/DL (ref 74–99)
GLUCOSE BLD MANUAL STRIP-MCNC: 208 MG/DL (ref 74–99)
GLUCOSE BLD MANUAL STRIP-MCNC: 232 MG/DL (ref 74–99)
GLUCOSE BLD MANUAL STRIP-MCNC: 301 MG/DL (ref 74–99)
GLUCOSE BLD MANUAL STRIP-MCNC: 354 MG/DL (ref 74–99)
GLUCOSE BLD MANUAL STRIP-MCNC: 374 MG/DL (ref 74–99)
GLUCOSE SERPL-MCNC: 308 MG/DL (ref 74–99)
HCT VFR BLD AUTO: 25.6 % (ref 41–52)
HGB BLD-MCNC: 8.6 G/DL (ref 13.5–17.5)
MAGNESIUM SERPL-MCNC: 1.67 MG/DL (ref 1.6–2.4)
MCH RBC QN AUTO: 33 PG (ref 26–34)
MCHC RBC AUTO-ENTMCNC: 33.6 G/DL (ref 32–36)
MCV RBC AUTO: 98 FL (ref 80–100)
NRBC BLD-RTO: 0 /100 WBCS (ref 0–0)
PHOSPHATE SERPL-MCNC: 2.4 MG/DL (ref 2.5–4.9)
PLATELET # BLD AUTO: 340 X10*3/UL (ref 150–450)
POTASSIUM SERPL-SCNC: 4.6 MMOL/L (ref 3.5–5.3)
RBC # BLD AUTO: 2.61 X10*6/UL (ref 4.5–5.9)
SODIUM SERPL-SCNC: 130 MMOL/L (ref 136–145)
VANCOMYCIN SERPL-MCNC: 10.3 UG/ML (ref 5–20)
WBC # BLD AUTO: 10.8 X10*3/UL (ref 4.4–11.3)

## 2024-12-01 PROCEDURE — 2500000002 HC RX 250 W HCPCS SELF ADMINISTERED DRUGS (ALT 637 FOR MEDICARE OP, ALT 636 FOR OP/ED)

## 2024-12-01 PROCEDURE — 80202 ASSAY OF VANCOMYCIN: CPT

## 2024-12-01 PROCEDURE — 2500000004 HC RX 250 GENERAL PHARMACY W/ HCPCS (ALT 636 FOR OP/ED)

## 2024-12-01 PROCEDURE — 36415 COLL VENOUS BLD VENIPUNCTURE: CPT

## 2024-12-01 PROCEDURE — 2500000004 HC RX 250 GENERAL PHARMACY W/ HCPCS (ALT 636 FOR OP/ED): Performed by: PHARMACIST

## 2024-12-01 PROCEDURE — 82947 ASSAY GLUCOSE BLOOD QUANT: CPT

## 2024-12-01 PROCEDURE — 99231 SBSQ HOSP IP/OBS SF/LOW 25: CPT | Performed by: STUDENT IN AN ORGANIZED HEALTH CARE EDUCATION/TRAINING PROGRAM

## 2024-12-01 PROCEDURE — 1200000002 HC GENERAL ROOM WITH TELEMETRY DAILY

## 2024-12-01 PROCEDURE — 85027 COMPLETE CBC AUTOMATED: CPT

## 2024-12-01 PROCEDURE — 2500000001 HC RX 250 WO HCPCS SELF ADMINISTERED DRUGS (ALT 637 FOR MEDICARE OP)

## 2024-12-01 PROCEDURE — 2500000002 HC RX 250 W HCPCS SELF ADMINISTERED DRUGS (ALT 637 FOR MEDICARE OP, ALT 636 FOR OP/ED): Performed by: STUDENT IN AN ORGANIZED HEALTH CARE EDUCATION/TRAINING PROGRAM

## 2024-12-01 PROCEDURE — 80069 RENAL FUNCTION PANEL: CPT

## 2024-12-01 PROCEDURE — 83735 ASSAY OF MAGNESIUM: CPT

## 2024-12-01 PROCEDURE — 2500000005 HC RX 250 GENERAL PHARMACY W/O HCPCS: Performed by: PHARMACIST

## 2024-12-01 RX ORDER — INSULIN GLARGINE 100 [IU]/ML
30 INJECTION, SOLUTION SUBCUTANEOUS EVERY 24 HOURS
Status: DISCONTINUED | OUTPATIENT
Start: 2024-12-01 | End: 2024-12-03 | Stop reason: HOSPADM

## 2024-12-01 RX ORDER — INSULIN LISPRO 100 [IU]/ML
0-15 INJECTION, SOLUTION INTRAVENOUS; SUBCUTANEOUS EVERY 4 HOURS
Status: DISCONTINUED | OUTPATIENT
Start: 2024-12-01 | End: 2024-12-02

## 2024-12-01 RX ADMIN — PIPERACILLIN SODIUM AND TAZOBACTAM SODIUM 3.38 G: 3; .375 INJECTION, SOLUTION INTRAVENOUS at 00:54

## 2024-12-01 RX ADMIN — OXYCODONE HYDROCHLORIDE 10 MG: 10 TABLET ORAL at 13:13

## 2024-12-01 RX ADMIN — LISINOPRIL 20 MG: 20 TABLET ORAL at 08:24

## 2024-12-01 RX ADMIN — INSULIN LISPRO 5 UNITS: 100 INJECTION, SOLUTION INTRAVENOUS; SUBCUTANEOUS at 12:32

## 2024-12-01 RX ADMIN — INSULIN LISPRO 15 UNITS: 100 INJECTION, SOLUTION INTRAVENOUS; SUBCUTANEOUS at 21:50

## 2024-12-01 RX ADMIN — METOPROLOL TARTRATE 25 MG: 25 TABLET, FILM COATED ORAL at 08:24

## 2024-12-01 RX ADMIN — INSULIN LISPRO 8 UNITS: 100 INJECTION, SOLUTION INTRAVENOUS; SUBCUTANEOUS at 06:27

## 2024-12-01 RX ADMIN — PIPERACILLIN SODIUM AND TAZOBACTAM SODIUM 3.38 G: 3; .375 INJECTION, SOLUTION INTRAVENOUS at 12:31

## 2024-12-01 RX ADMIN — INSULIN LISPRO 2 UNITS: 100 INJECTION, SOLUTION INTRAVENOUS; SUBCUTANEOUS at 16:40

## 2024-12-01 RX ADMIN — ACETAMINOPHEN 650 MG: 325 TABLET, FILM COATED ORAL at 12:31

## 2024-12-01 RX ADMIN — METHOCARBAMOL TABLETS 500 MG: 500 TABLET, COATED ORAL at 23:10

## 2024-12-01 RX ADMIN — PIPERACILLIN SODIUM AND TAZOBACTAM SODIUM 3.38 G: 3; .375 INJECTION, SOLUTION INTRAVENOUS at 23:10

## 2024-12-01 RX ADMIN — OXYCODONE HYDROCHLORIDE 10 MG: 10 TABLET ORAL at 21:41

## 2024-12-01 RX ADMIN — GABAPENTIN 400 MG: 400 CAPSULE ORAL at 14:12

## 2024-12-01 RX ADMIN — METHOCARBAMOL TABLETS 500 MG: 500 TABLET, COATED ORAL at 12:31

## 2024-12-01 RX ADMIN — VANCOMYCIN HYDROCHLORIDE 1750 MG: 5 INJECTION, POWDER, LYOPHILIZED, FOR SOLUTION INTRAVENOUS at 16:58

## 2024-12-01 RX ADMIN — INSULIN LISPRO 5 UNITS: 100 INJECTION, SOLUTION INTRAVENOUS; SUBCUTANEOUS at 16:40

## 2024-12-01 RX ADMIN — HYDROMORPHONE HYDROCHLORIDE 0.4 MG: 1 INJECTION, SOLUTION INTRAMUSCULAR; INTRAVENOUS; SUBCUTANEOUS at 14:17

## 2024-12-01 RX ADMIN — ASPIRIN 81 MG: 81 TABLET, COATED ORAL at 08:24

## 2024-12-01 RX ADMIN — METHOCARBAMOL TABLETS 500 MG: 500 TABLET, COATED ORAL at 06:28

## 2024-12-01 RX ADMIN — METHOCARBAMOL TABLETS 500 MG: 500 TABLET, COATED ORAL at 17:08

## 2024-12-01 RX ADMIN — OXYCODONE HYDROCHLORIDE 10 MG: 10 TABLET ORAL at 04:47

## 2024-12-01 RX ADMIN — GABAPENTIN 400 MG: 400 CAPSULE ORAL at 21:41

## 2024-12-01 RX ADMIN — METOPROLOL TARTRATE 25 MG: 25 TABLET, FILM COATED ORAL at 21:41

## 2024-12-01 RX ADMIN — ESCITALOPRAM OXALATE 10 MG: 10 TABLET ORAL at 08:23

## 2024-12-01 RX ADMIN — INSULIN LISPRO 4 UNITS: 100 INJECTION, SOLUTION INTRAVENOUS; SUBCUTANEOUS at 12:33

## 2024-12-01 RX ADMIN — ACETAMINOPHEN 650 MG: 325 TABLET, FILM COATED ORAL at 08:23

## 2024-12-01 RX ADMIN — HYDROMORPHONE HYDROCHLORIDE 0.4 MG: 1 INJECTION, SOLUTION INTRAMUSCULAR; INTRAVENOUS; SUBCUTANEOUS at 18:17

## 2024-12-01 RX ADMIN — OXYCODONE HYDROCHLORIDE 10 MG: 10 TABLET ORAL at 17:12

## 2024-12-01 RX ADMIN — PIPERACILLIN SODIUM AND TAZOBACTAM SODIUM 3.38 G: 3; .375 INJECTION, SOLUTION INTRAVENOUS at 06:28

## 2024-12-01 RX ADMIN — ATORVASTATIN CALCIUM 40 MG: 40 TABLET, FILM COATED ORAL at 08:23

## 2024-12-01 RX ADMIN — HYDROMORPHONE HYDROCHLORIDE 0.4 MG: 1 INJECTION, SOLUTION INTRAMUSCULAR; INTRAVENOUS; SUBCUTANEOUS at 23:11

## 2024-12-01 RX ADMIN — GABAPENTIN 400 MG: 400 CAPSULE ORAL at 08:24

## 2024-12-01 RX ADMIN — ACETAMINOPHEN 650 MG: 325 TABLET, FILM COATED ORAL at 21:41

## 2024-12-01 RX ADMIN — HYDROMORPHONE HYDROCHLORIDE 0.4 MG: 1 INJECTION, SOLUTION INTRAMUSCULAR; INTRAVENOUS; SUBCUTANEOUS at 00:54

## 2024-12-01 RX ADMIN — INSULIN GLARGINE 30 UNITS: 100 INJECTION, SOLUTION SUBCUTANEOUS at 12:34

## 2024-12-01 RX ADMIN — PIPERACILLIN SODIUM AND TAZOBACTAM SODIUM 3.38 G: 3; .375 INJECTION, SOLUTION INTRAVENOUS at 18:40

## 2024-12-01 RX ADMIN — INSULIN LISPRO 5 UNITS: 100 INJECTION, SOLUTION INTRAVENOUS; SUBCUTANEOUS at 08:25

## 2024-12-01 RX ADMIN — ENOXAPARIN SODIUM 40 MG: 100 INJECTION SUBCUTANEOUS at 08:24

## 2024-12-01 RX ADMIN — VANCOMYCIN HYDROCHLORIDE 1500 MG: 1.5 INJECTION, POWDER, LYOPHILIZED, FOR SOLUTION INTRAVENOUS at 04:14

## 2024-12-01 RX ADMIN — OXYCODONE HYDROCHLORIDE 10 MG: 10 TABLET ORAL at 09:02

## 2024-12-01 RX ADMIN — METHOCARBAMOL TABLETS 500 MG: 500 TABLET, COATED ORAL at 00:54

## 2024-12-01 RX ADMIN — HYDROMORPHONE HYDROCHLORIDE 0.4 MG: 1 INJECTION, SOLUTION INTRAMUSCULAR; INTRAVENOUS; SUBCUTANEOUS at 07:00

## 2024-12-01 ASSESSMENT — PAIN SCALES - GENERAL
PAINLEVEL_OUTOF10: 7
PAINLEVEL_OUTOF10: 5 - MODERATE PAIN
PAINLEVEL_OUTOF10: 0 - NO PAIN
PAINLEVEL_OUTOF10: 8
PAINLEVEL_OUTOF10: 6
PAINLEVEL_OUTOF10: 8
PAINLEVEL_OUTOF10: 7
PAINLEVEL_OUTOF10: 4

## 2024-12-01 ASSESSMENT — PAIN DESCRIPTION - ORIENTATION
ORIENTATION: RIGHT
ORIENTATION: RIGHT

## 2024-12-01 ASSESSMENT — COGNITIVE AND FUNCTIONAL STATUS - GENERAL
MOVING FROM LYING ON BACK TO SITTING ON SIDE OF FLAT BED WITH BEDRAILS: A LITTLE
DRESSING REGULAR LOWER BODY CLOTHING: A LITTLE
HELP NEEDED FOR BATHING: A LITTLE
CLIMB 3 TO 5 STEPS WITH RAILING: TOTAL
CLIMB 3 TO 5 STEPS WITH RAILING: TOTAL
MOVING TO AND FROM BED TO CHAIR: A LITTLE
DRESSING REGULAR LOWER BODY CLOTHING: A LITTLE
DRESSING REGULAR UPPER BODY CLOTHING: A LITTLE
MOBILITY SCORE: 14
STANDING UP FROM CHAIR USING ARMS: A LITTLE
HELP NEEDED FOR BATHING: A LITTLE
DRESSING REGULAR UPPER BODY CLOTHING: A LITTLE
TURNING FROM BACK TO SIDE WHILE IN FLAT BAD: A LITTLE
STANDING UP FROM CHAIR USING ARMS: A LITTLE
MOVING FROM LYING ON BACK TO SITTING ON SIDE OF FLAT BED WITH BEDRAILS: A LITTLE
TOILETING: A LITTLE
TURNING FROM BACK TO SIDE WHILE IN FLAT BAD: A LITTLE
DAILY ACTIVITIY SCORE: 20
WALKING IN HOSPITAL ROOM: A LOT
MOBILITY SCORE: 15
MOVING TO AND FROM BED TO CHAIR: A LITTLE
DAILY ACTIVITIY SCORE: 20
WALKING IN HOSPITAL ROOM: TOTAL
TOILETING: A LITTLE

## 2024-12-01 ASSESSMENT — PAIN - FUNCTIONAL ASSESSMENT
PAIN_FUNCTIONAL_ASSESSMENT: 0-10

## 2024-12-01 ASSESSMENT — PAIN SCALES - WONG BAKER
WONGBAKER_NUMERICALRESPONSE: HURTS EVEN MORE
WONGBAKER_NUMERICALRESPONSE: HURTS WHOLE LOT
WONGBAKER_NUMERICALRESPONSE: HURTS WHOLE LOT

## 2024-12-01 ASSESSMENT — PAIN DESCRIPTION - LOCATION
LOCATION: LEG

## 2024-12-01 NOTE — PROGRESS NOTES
Vancomycin Dosing by Pharmacy- FOLLOW UP    Errol Fonseca is a 54 y.o. year old male who Pharmacy has been consulted for vancomycin dosing for cellulitis, skin and soft tissue. Based on the patient's indication and renal status this patient is being dosed based on a goal AUC of 400-600.     Renal function is currently stable.    Current vancomycin dose: 1500 mg given every 12 hours    Estimated vancomycin AUC on current dose: 403 mg/L.hr     Visit Vitals  /90 (BP Location: Right arm, Patient Position: Lying)   Pulse 71   Temp 36.4 °C (97.6 °F) (Temporal)   Resp 16        Lab Results   Component Value Date    CREATININE 0.79 2024    CREATININE 0.70 2024    CREATININE 0.76 2024    CREATININE 0.87 2024        Patient weight is as follows:   Vitals:    24 0601   Weight: 102 kg (225 lb 1.4 oz)       Cultures:  Susceptibility data for the encounter in last 14 days.  Collected Specimen Info Organism   24 Swab from ABSCESS Mixed Anaerobic Bacteria     Mixed Gram-Positive Bacteria         I/O last 3 completed shifts:  In: 500 (4.9 mL/kg) [IV Piggyback:500]  Out: 2850 (27.9 mL/kg) [Urine:2850 (0.8 mL/kg/hr)]  Weight: 102.1 kg   I/O during current shift:  No intake/output data recorded.    Temp (24hrs), Av.5 °C (97.7 °F), Min:36 °C (96.8 °F), Max:36.8 °C (98.3 °F)      Assessment/Plan    Within goal. However. exposure chart showing possibly subtherapeutic if we continue on current reigmen. Will increase to 1750 mg Q12H starting at 1700 on . Follow-up lab level 12/3 with morning labs     This dosing regimen is predicted by [employer] to result in the following pharmacokinetic parameters:  Loading dose: N/A  Regimen: 1750 mg IV every 12 hours.  Start time: 16:14 on 2024  Exposure target: AUC24 (range)400-600 mg/L.hr   [id number]: 454 mg/L.hr  AUC24,ss: 468 mg/L.hr  Probability of AUC24 > 400: 94 %  Ctrough,ss: 14.9 mg/L  Probability of Ctrough,ss > 20: 2 %      The next  level will be obtained on 12/3 at AM labs. May be obtained sooner if clinically indicated.   Will continue to monitor renal function daily while on vancomycin and order serum creatinine at least every 48 hours if not already ordered.  Follow for continued vancomycin needs, clinical response, and signs/symptoms of toxicity.       Jr Barnard, PharmD

## 2024-12-01 NOTE — CARE PLAN
Transitional Care Coordinator Note: Patient discussed with medical team, per medical team patient is not medically ready. Discharge dispo: Elena Leonardo RN  Transitional Care Coordinator

## 2024-12-01 NOTE — CARE PLAN
Problem: Skin  Goal: Decreased wound size/increased tissue granulation at next dressing change  Outcome: Progressing  Goal: Participates in plan/prevention/treatment measures  Outcome: Progressing  Goal: Prevent/manage excess moisture  Outcome: Progressing  Goal: Prevent/minimize sheer/friction injuries  Outcome: Progressing  Goal: Promote/optimize nutrition  Outcome: Progressing  Goal: Promote skin healing  Outcome: Progressing     Problem: Pain - Adult  Goal: Verbalizes/displays adequate comfort level or baseline comfort level  Outcome: Progressing     Problem: Safety - Adult  Goal: Free from fall injury  Outcome: Progressing     Problem: Discharge Planning  Goal: Discharge to home or other facility with appropriate resources  Outcome: Progressing     Problem: Chronic Conditions and Co-morbidities  Goal: Patient's chronic conditions and co-morbidity symptoms are monitored and maintained or improved  Outcome: Progressing     Problem: Fall/Injury  Goal: Not fall by end of shift  Outcome: Progressing  Goal: Be free from injury by end of the shift  Outcome: Progressing  Goal: Verbalize understanding of personal risk factors for fall in the hospital  Outcome: Progressing  Goal: Verbalize understanding of risk factor reduction measures to prevent injury from fall in the home  Outcome: Progressing  Goal: Use assistive devices by end of the shift  Outcome: Progressing  Goal: Pace activities to prevent fatigue by end of the shift  Outcome: Progressing     Problem: Pain  Goal: Takes deep breaths with improved pain control throughout the shift  Outcome: Progressing  Goal: Turns in bed with improved pain control throughout the shift  Outcome: Progressing  Goal: Walks with improved pain control throughout the shift  Outcome: Progressing  Goal: Performs ADL's with improved pain control throughout shift  Outcome: Progressing  Goal: Participates in PT with improved pain control throughout the shift  Outcome: Progressing  Goal:  Free from opioid side effects throughout the shift  Outcome: Progressing  Goal: Free from acute confusion related to pain meds throughout the shift  Outcome: Progressing     Problem: Diabetes  Goal: Achieve decreasing blood glucose levels by end of shift  Outcome: Progressing  Goal: Increase stability of blood glucose readings by end of shift  Outcome: Progressing  Goal: Decrease in ketones present in urine by end of shift  Outcome: Progressing  Goal: Maintain electrolyte levels within acceptable range throughout shift  Outcome: Progressing  Goal: Maintain glucose levels >70mg/dl to <250mg/dl throughout shift  Outcome: Progressing  Goal: No changes in neurological exam by end of shift  Outcome: Progressing  Goal: Learn about and adhere to nutrition recommendations by end of shift  Outcome: Progressing  Goal: Vital signs within normal range for age by end of shift  Outcome: Progressing  Goal: Increase self care and/or family involovement by end of shift  Outcome: Progressing  Goal: Receive DSME education by end of shift  Outcome: Progressing   The patient's goals for the shift include      The clinical goals for the shift include Patient will have pain under control on this shift

## 2024-12-01 NOTE — PROGRESS NOTES
Postop Pain HPI -   Palliative: relieved with IV analgesics and regional local anesthetics  Provocative: movement  Quality:  burning and aching  Radiation:  none  Severity:  6/10  Timing: constant    24-HOUR OPIOID CONSUMPTION:  Tylenol x3, gabapentin x3, robaxin x4, dilaudid x2, oxy x5    Scheduled medications  acetaminophen, 650 mg, oral, q6h  aspirin, 81 mg, oral, Daily  atorvastatin, 40 mg, oral, Daily  docusate sodium, 100 mg, oral, BID  enoxaparin, 40 mg, subcutaneous, q24h  escitalopram, 10 mg, oral, Daily  gabapentin, 400 mg, oral, TID  insulin glargine, 20 Units, subcutaneous, q24h  insulin lispro, 0-10 Units, subcutaneous, TID AC  insulin lispro, 5 Units, subcutaneous, TID AC  lisinopril, 20 mg, oral, Daily  methocarbamol, 500 mg, oral, q6h RILEY  metoprolol tartrate, 25 mg, oral, BID  nicotine, 1 patch, transdermal, Daily  piperacillin-tazobactam, 3.375 g, intravenous, q6h  polyethylene glycol, 17 g, oral, Daily  vancomycin, 1,500 mg, intravenous, q12h      Continuous medications  ropivacaine (PF) in NS cmpd, 10 mL/hr      PRN medications  PRN medications: dextrose, dextrose, glucagon, glucagon, HYDROmorphone, [Held by provider] nitroglycerin, oxyCODONE, oxyCODONE, traZODone, vancomycin     Physical Exam:  Constitutional:  no distress, alert and cooperative  Eyes: clear sclera  Head/Neck: No apparent injury, trachea midline  Respiratory/Thorax: Patent airways, thorax symmetric, breathing comfortably  Cardiovascular: no pitting edema  Gastrointestinal: Nondistended  Musculoskeletal: ROM intact  Extremities: no clubbing  Neurological: alert, lott x4  Psychological: Appropriate affect    Results for orders placed or performed during the hospital encounter of 11/20/24 (from the past 24 hours)   POCT GLUCOSE   Result Value Ref Range    POCT Glucose 298 (H) 74 - 99 mg/dL   CBC   Result Value Ref Range    WBC 10.3 4.4 - 11.3 x10*3/uL    nRBC 0.0 0.0 - 0.0 /100 WBCs    RBC 2.39 (L) 4.50 - 5.90 x10*6/uL    Hemoglobin  7.9 (L) 13.5 - 17.5 g/dL    Hematocrit 24.2 (L) 41.0 - 52.0 %     (H) 80 - 100 fL    MCH 33.1 26.0 - 34.0 pg    MCHC 32.6 32.0 - 36.0 g/dL    RDW 13.8 11.5 - 14.5 %    Platelets 330 150 - 450 x10*3/uL   Renal Function Panel   Result Value Ref Range    Glucose 325 (H) 74 - 99 mg/dL    Sodium 133 (L) 136 - 145 mmol/L    Potassium 3.8 3.5 - 5.3 mmol/L    Chloride 98 98 - 107 mmol/L    Bicarbonate 28 21 - 32 mmol/L    Anion Gap 11 10 - 20 mmol/L    Urea Nitrogen 13 6 - 23 mg/dL    Creatinine 0.79 0.50 - 1.30 mg/dL    eGFR >90 >60 mL/min/1.73m*2    Calcium 8.0 (L) 8.6 - 10.6 mg/dL    Phosphorus 2.2 (L) 2.5 - 4.9 mg/dL    Albumin 2.9 (L) 3.4 - 5.0 g/dL   Magnesium   Result Value Ref Range    Magnesium 1.80 1.60 - 2.40 mg/dL   POCT GLUCOSE   Result Value Ref Range    POCT Glucose 329 (H) 74 - 99 mg/dL   POCT GLUCOSE   Result Value Ref Range    POCT Glucose 228 (H) 74 - 99 mg/dL   POCT GLUCOSE   Result Value Ref Range    POCT Glucose 269 (H) 74 - 99 mg/dL   POCT GLUCOSE   Result Value Ref Range    POCT Glucose 301 (H) 74 - 99 mg/dL   Vancomycin   Result Value Ref Range    Vancomycin 10.3 5.0 - 20.0 ug/mL       Errol Fonseca is a 54 y.o. year old male patient who presents for Right Above Knee Amputation with Dr. Sánchez on 11/29. Acute Pain consulted for block for postoperative pain control.      Plan:   - Right Femoral catheter block performed intraoperatively on 11/29  - Ambit ball with Ropivacaine 0.2%/NaCl 0.9% 500mL, Rate 10 cc/hr  - Will continue infusion today and refill the bag  - Ambit medication will not interfere with pain medication prescribed by the primary team.   - Please be aware of local anesthetic toxic dose and absorption variability before considering lidocaine patches  - Acute pain service will follow while catheters in place  - Rest of pain management per primary team     Acute Pain Resident  pg [phone]  [phone]

## 2024-12-01 NOTE — PROGRESS NOTES
VASCULAR SURGERY PROGRESS NOTE  Assessment/Plan   Errol Fonseca is 54 y.o. 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 Patient taken to the OR by podiatry twice for debridement but during second debridement infection found to have spread and foot deemed non-salvagable. Vascular consulted from PACU for urgent BKA .   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    Patient continues to progress well postoperatively, dressing changes without evidence of purulence or infection.  Patient awaiting BKA formalization scheduled for Friday, 11/29/2024.    11/30: Now s/p BKA formalization yesterday (11/29). Pain tolerable.    Plan:  - Continue post-op dressing for now  Multimodal pain control with Tylenol, oxycodone, Dilaudid, gabapentin -- made PRN oxy available more frequently due to patient complaints of continued pain   ASA, atorvastatin, metoprolol.  Holding home nitroglycerin.  Systolic pressures up to 170 this morning: increased metoprolol back to home dose of 25 BID and restarted home dose of lisinopril 20 mg daily  Regular diet, bowel regimen  Continue Zosyn, vancomycin.  Maintain surgical dressing; will be taken down by vascular when appropriate  SSI, Lantus 20 units daily lispro 5 units 3 times daily before meals.  Lovenox DVT Ppx  Nicotine patch.  Lexapro, trazodone.  PT/OT consults ordered.    D/w attending, Dr. Pau Quiles MD  General Surgery, PGY1  Team Pager: [phone]    Subjective   No overnight events.  Pain present, but tolerable. Worse with movement.  No other concerns.    Objective   Vitals:  Heart Rate:  [68-74]   Temp:  [36 °C (96.8 °F)-36.8 °C (98.3 °F)]   Resp:  [16-18]   BP: (109-165)/(63-90)   SpO2:  [95 %-97 %]     Exam:  Constitutional: No acute distress, resting comfortably  Neuro:  AOx3, grossly intact  ENMT: moist  mucous membranes  CV: no tachycardia  Pulm: non-labored on room air  GI: soft, non-tender, non-distended  Skin: warm and dry  Musculoskeletal: moving all extremities  Extremities: Right lower extremity, s/p AKA, wound wrapped with Ioban, which is clean, dry, and intact. No purulence, no visible signs of infection.  Left lower extremity, bilateral upper extremities warm well-perfused, neurovascularly intact.    Labs:  Results from last 7 days   Lab Units 12/01/24  0823 11/30/24  0957 11/29/24  1741   WBC AUTO x10*3/uL 10.8 10.3 15.2*   HEMOGLOBIN g/dL 8.6* 7.9* 9.1*   PLATELETS AUTO x10*3/uL 340 330 418      Results from last 7 days   Lab Units 11/30/24  0957 11/29/24  1741 11/28/24  0737   SODIUM mmol/L 133* 134* 133*   POTASSIUM mmol/L 3.8 4.3 4.1   CHLORIDE mmol/L 98 98 97*   CO2 mmol/L 28 29 29   BUN mg/dL 13 15 12   CREATININE mg/dL 0.79 0.70 0.76   GLUCOSE mg/dL 325* 218* 215*   MAGNESIUM mg/dL 1.80 1.80 1.79   PHOSPHORUS mg/dL 2.2* 3.1 3.2

## 2024-12-02 LAB
ALBUMIN SERPL BCP-MCNC: 3.3 G/DL (ref 3.4–5)
ANION GAP SERPL CALC-SCNC: 11 MMOL/L (ref 10–20)
BUN SERPL-MCNC: 8 MG/DL (ref 6–23)
CALCIUM SERPL-MCNC: 8.9 MG/DL (ref 8.6–10.6)
CHLORIDE SERPL-SCNC: 97 MMOL/L (ref 98–107)
CO2 SERPL-SCNC: 29 MMOL/L (ref 21–32)
CREAT SERPL-MCNC: 0.59 MG/DL (ref 0.5–1.3)
EGFRCR SERPLBLD CKD-EPI 2021: >90 ML/MIN/1.73M*2
ERYTHROCYTE [DISTWIDTH] IN BLOOD BY AUTOMATED COUNT: 13.7 % (ref 11.5–14.5)
FUNGUS SPEC CULT: NORMAL
FUNGUS SPEC FUNGUS STN: NORMAL
GLUCOSE BLD MANUAL STRIP-MCNC: 184 MG/DL (ref 74–99)
GLUCOSE BLD MANUAL STRIP-MCNC: 196 MG/DL (ref 74–99)
GLUCOSE BLD MANUAL STRIP-MCNC: 212 MG/DL (ref 74–99)
GLUCOSE BLD MANUAL STRIP-MCNC: 228 MG/DL (ref 74–99)
GLUCOSE BLD MANUAL STRIP-MCNC: 232 MG/DL (ref 74–99)
GLUCOSE BLD MANUAL STRIP-MCNC: 256 MG/DL (ref 74–99)
GLUCOSE SERPL-MCNC: 152 MG/DL (ref 74–99)
HCT VFR BLD AUTO: 27.7 % (ref 41–52)
HGB BLD-MCNC: 8.8 G/DL (ref 13.5–17.5)
MAGNESIUM SERPL-MCNC: 1.73 MG/DL (ref 1.6–2.4)
MCH RBC QN AUTO: 32.1 PG (ref 26–34)
MCHC RBC AUTO-ENTMCNC: 31.8 G/DL (ref 32–36)
MCV RBC AUTO: 101 FL (ref 80–100)
NRBC BLD-RTO: 0 /100 WBCS (ref 0–0)
PHOSPHATE SERPL-MCNC: 2.7 MG/DL (ref 2.5–4.9)
PLATELET # BLD AUTO: 410 X10*3/UL (ref 150–450)
POTASSIUM SERPL-SCNC: 3.7 MMOL/L (ref 3.5–5.3)
RBC # BLD AUTO: 2.74 X10*6/UL (ref 4.5–5.9)
SODIUM SERPL-SCNC: 133 MMOL/L (ref 136–145)
WBC # BLD AUTO: 9.7 X10*3/UL (ref 4.4–11.3)

## 2024-12-02 PROCEDURE — 97530 THERAPEUTIC ACTIVITIES: CPT | Mod: GO

## 2024-12-02 PROCEDURE — 2500000001 HC RX 250 WO HCPCS SELF ADMINISTERED DRUGS (ALT 637 FOR MEDICARE OP)

## 2024-12-02 PROCEDURE — 2500000004 HC RX 250 GENERAL PHARMACY W/ HCPCS (ALT 636 FOR OP/ED): Performed by: PHARMACIST

## 2024-12-02 PROCEDURE — 85027 COMPLETE CBC AUTOMATED: CPT

## 2024-12-02 PROCEDURE — 2500000002 HC RX 250 W HCPCS SELF ADMINISTERED DRUGS (ALT 637 FOR MEDICARE OP, ALT 636 FOR OP/ED)

## 2024-12-02 PROCEDURE — S4991 NICOTINE PATCH NONLEGEND: HCPCS

## 2024-12-02 PROCEDURE — 99231 SBSQ HOSP IP/OBS SF/LOW 25: CPT | Performed by: ANESTHESIOLOGY

## 2024-12-02 PROCEDURE — 2500000004 HC RX 250 GENERAL PHARMACY W/ HCPCS (ALT 636 FOR OP/ED)

## 2024-12-02 PROCEDURE — 2500000002 HC RX 250 W HCPCS SELF ADMINISTERED DRUGS (ALT 637 FOR MEDICARE OP, ALT 636 FOR OP/ED): Performed by: STUDENT IN AN ORGANIZED HEALTH CARE EDUCATION/TRAINING PROGRAM

## 2024-12-02 PROCEDURE — 83735 ASSAY OF MAGNESIUM: CPT

## 2024-12-02 PROCEDURE — 97530 THERAPEUTIC ACTIVITIES: CPT | Mod: GP,CQ

## 2024-12-02 PROCEDURE — 82947 ASSAY GLUCOSE BLOOD QUANT: CPT

## 2024-12-02 PROCEDURE — 36415 COLL VENOUS BLD VENIPUNCTURE: CPT

## 2024-12-02 PROCEDURE — 80069 RENAL FUNCTION PANEL: CPT

## 2024-12-02 PROCEDURE — 1200000002 HC GENERAL ROOM WITH TELEMETRY DAILY

## 2024-12-02 PROCEDURE — 2500000005 HC RX 250 GENERAL PHARMACY W/O HCPCS: Performed by: PHARMACIST

## 2024-12-02 RX ORDER — INSULIN LISPRO 100 [IU]/ML
0-15 INJECTION, SOLUTION INTRAVENOUS; SUBCUTANEOUS
Status: DISCONTINUED | OUTPATIENT
Start: 2024-12-03 | End: 2024-12-03 | Stop reason: HOSPADM

## 2024-12-02 RX ORDER — ACETAMINOPHEN 325 MG/1
650 TABLET ORAL EVERY 8 HOURS PRN
Start: 2024-12-02

## 2024-12-02 RX ORDER — GABAPENTIN 400 MG/1
400 CAPSULE ORAL 3 TIMES DAILY
Start: 2024-12-02

## 2024-12-02 RX ORDER — POLYETHYLENE GLYCOL 3350 17 G/17G
17 POWDER, FOR SOLUTION ORAL DAILY PRN
Start: 2024-12-02 | End: 2024-12-15 | Stop reason: HOSPADM

## 2024-12-02 RX ORDER — OXYCODONE HYDROCHLORIDE 5 MG/1
5 TABLET ORAL EVERY 6 HOURS PRN
Qty: 15 TABLET | Refills: 0 | Status: ON HOLD | OUTPATIENT
Start: 2024-12-02 | End: 2024-12-14

## 2024-12-02 RX ORDER — METFORMIN HYDROCHLORIDE 500 MG/1
500 TABLET, EXTENDED RELEASE ORAL
Status: DISCONTINUED | OUTPATIENT
Start: 2024-12-02 | End: 2024-12-03 | Stop reason: HOSPADM

## 2024-12-02 RX ORDER — INSULIN LISPRO 100 [IU]/ML
7 INJECTION, SOLUTION INTRAVENOUS; SUBCUTANEOUS
Status: DISCONTINUED | OUTPATIENT
Start: 2024-12-02 | End: 2024-12-03 | Stop reason: HOSPADM

## 2024-12-02 RX ADMIN — GABAPENTIN 400 MG: 400 CAPSULE ORAL at 09:49

## 2024-12-02 RX ADMIN — INSULIN LISPRO 12 UNITS: 100 INJECTION, SOLUTION INTRAVENOUS; SUBCUTANEOUS at 18:07

## 2024-12-02 RX ADMIN — OXYCODONE HYDROCHLORIDE 10 MG: 10 TABLET ORAL at 15:33

## 2024-12-02 RX ADMIN — INSULIN LISPRO 7 UNITS: 100 INJECTION, SOLUTION INTRAVENOUS; SUBCUTANEOUS at 18:09

## 2024-12-02 RX ADMIN — OXYCODONE HYDROCHLORIDE 10 MG: 10 TABLET ORAL at 02:04

## 2024-12-02 RX ADMIN — METOPROLOL TARTRATE 25 MG: 25 TABLET, FILM COATED ORAL at 20:40

## 2024-12-02 RX ADMIN — VANCOMYCIN HYDROCHLORIDE 1750 MG: 5 INJECTION, POWDER, LYOPHILIZED, FOR SOLUTION INTRAVENOUS at 04:13

## 2024-12-02 RX ADMIN — OXYCODONE HYDROCHLORIDE 10 MG: 10 TABLET ORAL at 06:29

## 2024-12-02 RX ADMIN — METFORMIN ER 500 MG 500 MG: 500 TABLET ORAL at 18:14

## 2024-12-02 RX ADMIN — ACETAMINOPHEN 650 MG: 325 TABLET, FILM COATED ORAL at 20:40

## 2024-12-02 RX ADMIN — OXYCODONE HYDROCHLORIDE 10 MG: 10 TABLET ORAL at 10:59

## 2024-12-02 RX ADMIN — INSULIN LISPRO 9 UNITS: 100 INJECTION, SOLUTION INTRAVENOUS; SUBCUTANEOUS at 06:35

## 2024-12-02 RX ADMIN — ACETAMINOPHEN 650 MG: 325 TABLET, FILM COATED ORAL at 15:33

## 2024-12-02 RX ADMIN — INSULIN LISPRO 5 UNITS: 100 INJECTION, SOLUTION INTRAVENOUS; SUBCUTANEOUS at 09:52

## 2024-12-02 RX ADMIN — METOPROLOL TARTRATE 25 MG: 25 TABLET, FILM COATED ORAL at 09:49

## 2024-12-02 RX ADMIN — ENOXAPARIN SODIUM 40 MG: 100 INJECTION SUBCUTANEOUS at 09:49

## 2024-12-02 RX ADMIN — METHOCARBAMOL TABLETS 500 MG: 500 TABLET, COATED ORAL at 20:40

## 2024-12-02 RX ADMIN — ACETAMINOPHEN 650 MG: 325 TABLET, FILM COATED ORAL at 09:49

## 2024-12-02 RX ADMIN — HYDROMORPHONE HYDROCHLORIDE 0.4 MG: 1 INJECTION, SOLUTION INTRAMUSCULAR; INTRAVENOUS; SUBCUTANEOUS at 03:36

## 2024-12-02 RX ADMIN — INSULIN LISPRO 3 UNITS: 100 INJECTION, SOLUTION INTRAVENOUS; SUBCUTANEOUS at 02:13

## 2024-12-02 RX ADMIN — INSULIN LISPRO 6 UNITS: 100 INJECTION, SOLUTION INTRAVENOUS; SUBCUTANEOUS at 13:06

## 2024-12-02 RX ADMIN — METHOCARBAMOL TABLETS 500 MG: 500 TABLET, COATED ORAL at 13:06

## 2024-12-02 RX ADMIN — OXYCODONE HYDROCHLORIDE 10 MG: 10 TABLET ORAL at 19:35

## 2024-12-02 RX ADMIN — ESCITALOPRAM OXALATE 10 MG: 10 TABLET ORAL at 09:49

## 2024-12-02 RX ADMIN — OXYCODONE HYDROCHLORIDE 10 MG: 10 TABLET ORAL at 23:39

## 2024-12-02 RX ADMIN — GABAPENTIN 400 MG: 400 CAPSULE ORAL at 20:40

## 2024-12-02 RX ADMIN — ATORVASTATIN CALCIUM 40 MG: 40 TABLET, FILM COATED ORAL at 09:49

## 2024-12-02 RX ADMIN — LISINOPRIL 20 MG: 20 TABLET ORAL at 09:49

## 2024-12-02 RX ADMIN — METHOCARBAMOL TABLETS 500 MG: 500 TABLET, COATED ORAL at 06:29

## 2024-12-02 RX ADMIN — PIPERACILLIN SODIUM AND TAZOBACTAM SODIUM 3.38 G: 3; .375 INJECTION, SOLUTION INTRAVENOUS at 06:29

## 2024-12-02 RX ADMIN — INSULIN GLARGINE 30 UNITS: 100 INJECTION, SOLUTION SUBCUTANEOUS at 13:06

## 2024-12-02 RX ADMIN — GABAPENTIN 400 MG: 400 CAPSULE ORAL at 15:33

## 2024-12-02 RX ADMIN — ASPIRIN 81 MG: 81 TABLET, COATED ORAL at 09:49

## 2024-12-02 RX ADMIN — INSULIN LISPRO 7 UNITS: 100 INJECTION, SOLUTION INTRAVENOUS; SUBCUTANEOUS at 13:06

## 2024-12-02 ASSESSMENT — PAIN DESCRIPTION - LOCATION
LOCATION: LEG

## 2024-12-02 ASSESSMENT — PAIN DESCRIPTION - ORIENTATION
ORIENTATION: RIGHT

## 2024-12-02 ASSESSMENT — PAIN - FUNCTIONAL ASSESSMENT
PAIN_FUNCTIONAL_ASSESSMENT: 0-10

## 2024-12-02 ASSESSMENT — COGNITIVE AND FUNCTIONAL STATUS - GENERAL
STANDING UP FROM CHAIR USING ARMS: A LITTLE
WALKING IN HOSPITAL ROOM: A LOT
STANDING UP FROM CHAIR USING ARMS: A LITTLE
CLIMB 3 TO 5 STEPS WITH RAILING: TOTAL
HELP NEEDED FOR BATHING: A LITTLE
MOVING FROM LYING ON BACK TO SITTING ON SIDE OF FLAT BED WITH BEDRAILS: A LITTLE
MOVING FROM LYING ON BACK TO SITTING ON SIDE OF FLAT BED WITH BEDRAILS: A LITTLE
TURNING FROM BACK TO SIDE WHILE IN FLAT BAD: A LITTLE
MOBILITY SCORE: 15
WALKING IN HOSPITAL ROOM: A LOT
DAILY ACTIVITIY SCORE: 20
DRESSING REGULAR LOWER BODY CLOTHING: A LOT
TOILETING: A LITTLE
CLIMB 3 TO 5 STEPS WITH RAILING: TOTAL
MOBILITY SCORE: 15
MOVING TO AND FROM BED TO CHAIR: A LITTLE
MOVING TO AND FROM BED TO CHAIR: A LITTLE
TURNING FROM BACK TO SIDE WHILE IN FLAT BAD: A LITTLE

## 2024-12-02 ASSESSMENT — PAIN SCALES - GENERAL
PAINLEVEL_OUTOF10: 7
PAINLEVEL_OUTOF10: 8
PAINLEVEL_OUTOF10: 7
PAINLEVEL_OUTOF10: 6
PAINLEVEL_OUTOF10: 7
PAINLEVEL_OUTOF10: 7
PAINLEVEL_OUTOF10: 5 - MODERATE PAIN
PAINLEVEL_OUTOF10: 7
PAINLEVEL_OUTOF10: 7
PAINLEVEL_OUTOF10: 4

## 2024-12-02 ASSESSMENT — PAIN SCALES - WONG BAKER
WONGBAKER_NUMERICALRESPONSE: HURTS EVEN MORE
WONGBAKER_NUMERICALRESPONSE: HURTS WHOLE LOT
WONGBAKER_NUMERICALRESPONSE: HURTS EVEN MORE

## 2024-12-02 ASSESSMENT — PAIN DESCRIPTION - DESCRIPTORS
DESCRIPTORS: THROBBING
DESCRIPTORS: THROBBING;ACHING

## 2024-12-02 NOTE — PROGRESS NOTES
Postop Pain HPI -   Palliative: relieved with IV analgesics and regional local anesthetics  Provocative: movement  Quality:  burning and aching  Radiation:  none  Severity:  5/10  Timing: constant    Scheduled medications  acetaminophen, 650 mg, oral, q6h  aspirin, 81 mg, oral, Daily  atorvastatin, 40 mg, oral, Daily  docusate sodium, 100 mg, oral, BID  enoxaparin, 40 mg, subcutaneous, q24h  escitalopram, 10 mg, oral, Daily  gabapentin, 400 mg, oral, TID  insulin glargine, 30 Units, subcutaneous, q24h  insulin lispro, 0-15 Units, subcutaneous, q4h  insulin lispro, 5 Units, subcutaneous, TID AC  lisinopril, 20 mg, oral, Daily  methocarbamol, 500 mg, oral, q6h RILEY  metoprolol tartrate, 25 mg, oral, BID  nicotine, 1 patch, transdermal, Daily  polyethylene glycol, 17 g, oral, Daily      Continuous medications  ropivacaine (PF) in NS cmpd, 10 mL/hr, Last Rate: 10 mL/hr (12/01/24 1712)      PRN medications  PRN medications: dextrose, dextrose, glucagon, glucagon, [Held by provider] nitroglycerin, oxyCODONE, oxyCODONE, traZODone     Physical Exam:  Constitutional:  no distress, alert and cooperative  Eyes: clear sclera  Head/Neck: No apparent injury, trachea midline  Respiratory/Thorax: Patent airways, thorax symmetric, breathing comfortably  Cardiovascular: no pitting edema  Gastrointestinal: Nondistended  Musculoskeletal: ROM intact  Extremities: no clubbing  Neurological: alert, lott x4  Psychological: Appropriate affect    Results for orders placed or performed during the hospital encounter of 11/20/24 (from the past 24 hours)   POCT GLUCOSE   Result Value Ref Range    POCT Glucose 208 (H) 74 - 99 mg/dL   POCT GLUCOSE   Result Value Ref Range    POCT Glucose 187 (H) 74 - 99 mg/dL   POCT GLUCOSE   Result Value Ref Range    POCT Glucose 374 (H) 74 - 99 mg/dL   POCT GLUCOSE   Result Value Ref Range    POCT Glucose 354 (H) 74 - 99 mg/dL   POCT GLUCOSE   Result Value Ref Range    POCT Glucose 196 (H) 74 - 99 mg/dL   POCT  GLUCOSE   Result Value Ref Range    POCT Glucose 212 (H) 74 - 99 mg/dL   POCT GLUCOSE   Result Value Ref Range    POCT Glucose 256 (H) 74 - 99 mg/dL   POCT GLUCOSE   Result Value Ref Range    POCT Glucose 184 (H) 74 - 99 mg/dL      Errol Fonseca is a 54 y.o. year old male patient who presents for Right Above Knee Amputation with Dr. Sánchez on 11/29. Acute Pain consulted for block for postoperative pain control.      Plan:   - Right Femoral catheter block performed intraoperatively on 11/29  - Catheter was out already ,when the primary team removed the dressing .  - Consider Toradol if okay by surgical team.   - Acute pain service will sign off  - Rest of pain management per primary team     Acute Pain Resident  pg [phone]

## 2024-12-02 NOTE — PROGRESS NOTES
VASCULAR SURGERY PROGRESS NOTE  Assessment/Plan   Errol Fonseca is 54 y.o. 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 Patient taken to the OR by podiatry twice for debridement but during second debridement infection found to have spread and foot deemed non-salvagable. Vascular consulted from PACU for urgent BKA .   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    Patient continues to progress well postoperatively, pain well-controlled, amputation site without signs of infection.  Dressing changed at bedside 12/20/24, without signs or symptoms of infection.  Dispo recommended for acute rehab, pending precert.    Plan:  Multimodal pain control with Tylenol, oxycodone, Dilaudid, gabapentin   ASA, atorvastatin, metoprolol.  Holding home nitroglycerin.  Regular diet, bowel regimen  Vancomycin, Zosyn discontinued 12/2/24.  Maintain surgical dressing; will be taken down by vascular when appropriate  SSI, Lantus 30 units daily, lispro 5 units 3 times daily before meals.  Lovenox DVT Ppx  Nicotine patch.  Lexapro, trazodone.  PT/OT consults ordered.    D/w attending, Dr. Princess Meza MD  PGY-1 General Surgery  Vascular Surgery [phone]        Subjective   No acute overnight events.  Patient seen at bedside, pain well-controlled, comfortably lying.  Dressing changed at bedside, intact, no significant signs of infection, bruising.    Objective   Vitals:  Heart Rate:  [68-85]   Temp:  [36.2 °C (97.2 °F)-36.8 °C (98.2 °F)]   Resp:  [16-18]   BP: (125-153)/(73-89)   SpO2:  [95 %-96 %]     Exam:  Constitutional: No acute distress, resting comfortably  Neuro:  AOx3, grossly intact  ENMT: moist mucous membranes  CV: no tachycardia  Pulm: non-labored on RA  GI: soft, non-tender, non-distended  Skin: warm  and dry  Musculoskeletal: moving all extremities  Extremities: RLE surgical site appears clean, dry, Mepilex intact, without signs symptoms of infection.  No redness, oozing, strikethrough visible.  Nontender to palpation.  Bilateral upper, LLE neuromuscular intact, warm and well-perfused.      Labs:  Results from last 7 days   Lab Units 12/01/24  0823 11/30/24  0957 11/29/24  1741   WBC AUTO x10*3/uL 10.8 10.3 15.2*   HEMOGLOBIN g/dL 8.6* 7.9* 9.1*   PLATELETS AUTO x10*3/uL 340 330 418      Results from last 7 days   Lab Units 12/01/24  0823 11/30/24  0957 11/29/24  1741   SODIUM mmol/L 130* 133* 134*   POTASSIUM mmol/L 4.6 3.8 4.3   CHLORIDE mmol/L 97* 98 98   CO2 mmol/L 26 28 29   BUN mg/dL 9 13 15   CREATININE mg/dL 0.60 0.79 0.70   GLUCOSE mg/dL 308* 325* 218*   MAGNESIUM mg/dL 1.67 1.80 1.80   PHOSPHORUS mg/dL 2.4* 2.2* 3.1

## 2024-12-02 NOTE — PROGRESS NOTES
Physical Therapy    Physical Therapy Treatment    Patient Name: Errol Fonseca  MRN: [id number]  Department: [place] [address]  Room: [facility]8034-A  Today's Date: 12/2/2024  Time Calculation  Start Time: 0834  Stop Time: 0848  Time Calculation (min): 14 min         Assessment/Plan   PT Assessment  PT Assessment Results: Decreased strength, Decreased range of motion, Decreased endurance, Impaired balance, Decreased mobility, Orthopedic restrictions, Pain  Rehab Prognosis: Excellent  Barriers to Discharge: Functional mobility  Evaluation/Treatment Tolerance: Patient limited by pain  Medical Staff Made Aware: Yes  End of Session Communication: Bedside nurse  Assessment Comment: Pt offers excellent effort with therapy, is able to complete functional transfers with CGA and good safety awareness. Limited by pain this date, but remains appropriate for high intensity therapy  End of Session Patient Position: Up in chair, Alarm off, not on at start of session     PT Plan  Treatment/Interventions: Bed mobility, Transfer training, Gait training, Stair training, Balance training, Neuromuscular re-education, Strengthening, Endurance training, Range of motion, Therapeutic activity, Therapeutic exercise  PT Plan: Ongoing PT  PT Frequency: 5 times per week  PT Discharge Recommendations: High intensity level of continued care  Equipment Recommended upon Discharge: Wheeled walker  PT Recommended Transfer Status: Assist x1, Assistive device  PT - OK to Discharge: Yes      General Visit Information:   PT  Visit  PT Received On: 12/02/24  Response to Previous Treatment: Patient with no complaints from previous session.  General  Prior to Session Communication: Bedside nurse  Patient Position Received: Bed, 3 rail up, Alarm off, not on at start of session  General Comment: Pt supine in bed, very pleasant and motivared  Per handoff with RN, pt is appropriate for therapy, vitals are stable and pain is controlled. Other concerns prior to tx are:  none    Subjective   Precautions:  Precautions  LE Weight Bearing Status: Right Non-Weight Bearing  Medical Precautions: Fall precautions  Precautions Comment: R AKA    Objective   Pain:  Pain Assessment  Pain Assessment: 0-10  0-10 (Numeric) Pain Score: 7  Pain Location: Leg  Pain Orientation: Right  Pain Interventions:  (Pt medicated prior to therapy)  Cognition:  Cognition  Overall Cognitive Status: Within Functional Limits  Orientation Level: Oriented X4    Treatments:     Bed Mobility  Bed Mobility: Yes  Bed Mobility 1  Bed Mobility 1: Supine to sitting  Level of Assistance 1: Close supervision    Ambulation/Gait Training  Ambulation/Gait Training Performed: No  Transfers  Transfer: Yes  Transfer 1  Transfer From 1: Bed to  Transfer to 1: Commode-standard  Technique 1: Stand pivot  Transfer Level of Assistance 1: Contact guard  Transfers 2  Transfer From 2: Commode-standard to  Transfer to 2: Bed  Technique 2: Stand pivot  Transfer Level of Assistance 2: Contact guard  Transfers 3  Transfer From 3: Bed to  Transfer to 3: Chair with arms  Technique 3: Stand pivot  Transfer Level of Assistance 3: Contact guard  Transfers 4  Transfer From 4: Sit to, Stand to  Transfer to 4: Sit  Technique 4: Sit to stand, Stand to sit  Transfer Device 4: Walker  Transfer Level of Assistance 4: Contact guard    Outcome Measures:     [facility] Basic Mobility  Turning from your back to your side while in a flat bed without using bedrails: A little  Moving from lying on your back to sitting on the side of a flat bed without using bedrails: A little  Moving to and from bed to chair (including a wheelchair): A little  Standing up from a chair using your arms (e.g. wheelchair or bedside chair): A little  To walk in hospital room: A lot  Climbing 3-5 steps with railing: Total  Basic Mobility - Total Score: 15    Education Documentation  Precautions, taught by Nancy Huitron PTA at 12/2/2024  9:20 AM.  Learner: Patient  Readiness:  Acceptance  Method: Explanation, Demonstration  Response: Verbalizes Understanding, Demonstrated Understanding  Comment: precautions, safe mobility    Body Mechanics, taught by Nancy Huitron PTA at 12/2/2024  9:20 AM.  Learner: Patient  Readiness: Acceptance  Method: Explanation, Demonstration  Response: Verbalizes Understanding, Demonstrated Understanding  Comment: precautions, safe mobility    Mobility Training, taught by Nancy Huitron PTA at 12/2/2024  9:20 AM.  Learner: Patient  Readiness: Acceptance  Method: Explanation, Demonstration  Response: Verbalizes Understanding, Demonstrated Understanding  Comment: precautions, safe mobility    Education Comments  No comments found.        OP EDUCATION:       Encounter Problems       Encounter Problems (Active)       Balance       STG - Maintains dynamic standing balance with upper extremity support and SBA (Progressing)       Start:  11/23/24    Expected End:  12/07/24       INTERVENTIONS:  1. Practice standing with minimal support.  2. Educate patient about standing tolerance.  3. Educate patient about independence with gait, transfers, and ADL's.  4. Educate patient about use of assistive device.  5. Educate patient about self-directed care.            Mobility       LTG - Patient will ambulate household distance with LRD and SBA (Progressing)       Start:  11/23/24    Expected End:  12/07/24            LTG - Patient will navigate 3 steps with CGA and rails/device (Progressing)       Start:  11/23/24    Expected End:  12/07/24               PT Transfers       STG - Patient will perform bed mobility independently (Progressing)       Start:  11/23/24    Expected End:  12/07/24            STG - Patient will transfer sit to and from stand with SBA and LRD (Progressing)       Start:  11/23/24    Expected End:  12/07/24                   GISELLE Mary

## 2024-12-02 NOTE — PROGRESS NOTES
Transitional Care Coordinator Note: Patient discussed with medical team (vascular), per vascular team patient is not medically ready. Plan to adjust diabetic medication. ADOD 12/3  Discharge dispo: Plan for patient to discharge to [facility]. TCC placed request in TCC/OT communication column for updated note. [facility] facility updated on ADOD.       Javier Mack RN BSN   Transitional Care Coordinator

## 2024-12-02 NOTE — OP NOTE
ABOVE KNEE AMPUTATION WITH TMR (R) Operative Note     Date: 2024  OR Location: [facility] OR    Name: Errol Fonseca, : 1970, Age: 54 y.o., MRN: [id number], Sex: male    Diagnosis  Pre-op Diagnosis      * Foot ulcer with fat layer exposed, right (Multi) [L97.512] Post-op Diagnosis     * Foot ulcer with fat layer exposed, right (Multi) [L97.512]     Procedures    Right above knee amputation  Targeted muscle reinnervation of the right saphenous nerve  Targeted muscle reinnervation of the right common peroneal nerve  Targeted muscle reinnervation of the first tibial component of the right sciatic nerve  Targeted muscle reinnervation of the second tibial component of the right sciatic nerve    Surgeons      * Oliverio Sánchez - Primary    Resident/Fellow/Other Assistant:  Surgeons and Role:     * Aldair Mccollum MD - Assisting    Staff:   Annika: Lashonda  Circulator: Raul Hodgson Person: Zenon Hodgson Person: Max    Anesthesia Staff: Anesthesiologist: Marques Ceballos MD  Anesthesia Resident: Maris Chino MD    Procedure Summary  Anesthesia: General  ASA: III  Estimated Blood Loss: 150 mL    Intra-op Medications:   Administrations occurring from 0601 to 1300 on 24:   Medication Name Total Dose   vancomycin (Vancocin) vial for injection 2 g   sodium chloride 0.9 % irrigation solution 500 mL   acetaminophen (Tylenol) tablet 650 mg Cannot be calculated   aspirin EC tablet 81 mg Cannot be calculated   atorvastatin (Lipitor) tablet 40 mg Cannot be calculated   dextrose 50 % injection 12.5 g Cannot be calculated   dextrose 50 % injection 25 g Cannot be calculated   docusate sodium (Colace) capsule 100 mg Cannot be calculated   escitalopram (Lexapro) tablet 10 mg Cannot be calculated   gabapentin (Neurontin) capsule 400 mg Cannot be calculated   glucagon (Glucagen) injection 1 mg Cannot be calculated   glucagon (Glucagen) injection 1 mg Cannot be calculated   insulin lispro injection 5 Units Cannot  be calculated   lisinopril tablet 20 mg Cannot be calculated   methocarbamol (Robaxin) tablet 500 mg Cannot be calculated   metoprolol tartrate (Lopressor) tablet 25 mg Cannot be calculated   nicotine (Nicoderm CQ) 14 mg/24 hr patch 1 patch Cannot be calculated   oxyCODONE (Roxicodone) immediate release tablet 10 mg Cannot be calculated   oxyCODONE (Roxicodone) immediate release tablet 5 mg Cannot be calculated   polyethylene glycol (Glycolax, Miralax) packet 17 g Cannot be calculated   traZODone (Desyrel) tablet 50 mg Cannot be calculated   albumin human bottle 5% 250 mL   ceFAZolin (Ancef) 1 g 2 g   dexAMETHasone (Decadron) injection 4 mg/mL 4 mg   enoxaparin (Lovenox) syringe 40 mg Cannot be calculated   fentaNYL (Sublimaze) injection 50 mcg/mL 200 mcg   HYDROmorphone (Dilaudid) injection 0.4 mg 0.4 mg   HYDROmorphone (Dilaudid) injection 1 mg/mL 0.4 mg   HYDROmorphone PF (Dilaudid) injection 0.2 mg Cannot be calculated   insulin glargine (Lantus) injection 20 Units Cannot be calculated   insulin lispro injection 0-10 Units Cannot be calculated   lactated Ringer's infusion Cannot be calculated   lidocaine (Xylocaine) 2 % 50 mg   midazolam (Versed) 1 mg/1 mL 2 mg   ondansetron (Zofran) 2 mg/mL injection 4 mg   oxygen (O2) therapy 156 L   phenylephrine (Russell-Synephrine) injection 600 mcg   piperacillin-tazobactam (Zosyn) 3.375 g in dextrose (iso) IV 50 mL Cannot be calculated   50 mL propofol 10mg/mL 200 mg   rocuronium (ZeMuron) 50 mg/5 mL injection 120 mg   ropivacaine PF (Naropin) 0.5 % 15 mL   sugammadex (Bridion) 200 mg/2 mL injection 300 mg   vancomycin (Vancocin) pharmacy to dose - pharmacy monitoring Cannot be calculated   vancomycin 1,500 mg in sodium chloride 0.9% 500 mL IV Cannot be calculated              Anesthesia Record               Intraprocedure I/O Totals          Intake    Propofol Drip 0.00 mL    The total shown is the total volume documented since Anesthesia Start was filed.    Total Intake 0  mL       Output    Urine 575 mL    Total Output 575 mL       Net    Net Volume -575 mL          Specimen:   ID Type Source Tests Collected by Time   1 : RIGHT AKA Tissue LEG AMPUTATION ABOVE THE KNEE RIGHT SURGICAL PATHOLOGY EXAM Oliverio Sánchez MD 2024 0810                 Drains and/or Catheters:   [REMOVED] Urethral Catheter Double-lumen (Removed)       [REMOVED] Urethral Catheter 16 Fr. (Removed)       Tourniquet Times:   * Missing tourniquet times found for documented tourniquets in lo *     Implants:     Findings: Healthy and viable muscle without any evidence of overt infection    Indications: Errol Fonseca is an 54 y.o. male with history of diabetes, CAD, hypertension, hyperlipidemia who presented with necrotizing soft tissue infection of the right lower extremity requiring a right through knee disarticulation for source control on 2024.  He presents today for formalization of the above-knee amputation with targeted muscle reinnervation.  The risks and benefits of the procedure including bleeding, infection, need for more/additional procedures, poor wound healing, phantom pain, were discussed with the patient who elected to proceed with the surgery.    The patient was seen in the preoperative area. The risks, benefits, complications, treatment options, non-operative alternatives, expected recovery and outcomes were discussed with the patient. The possibilities of reaction to medication, pulmonary aspiration, injury to surrounding structures, bleeding, recurrent infection, the need for additional procedures, failure to diagnose a condition, and creating a complication requiring transfusion or operation were discussed with the patient. The patient concurred with the proposed plan, giving informed consent.  The site of surgery was properly noted/marked if necessary per policy. The patient has been actively warmed in preoperative area. Preoperative antibiotics have been ordered and  given within 1 hours of incision. Venous thrombosis prophylaxis are not indicated.    Procedure Details:     Patient was taken to the operating room and placed in supine position.  General anesthesia was then induced.  Patient was then prepped and draped in the usual sterile fashion.  A timeout was then performed to verify the patient, procedure, site prior to beginning.     A fishmouth incision was first marked on the right thigh about 10 cm above the knee.  Next, a medial incision was made distal to R AKA incision to expose the underlying superficial femoral artery bundle and the adjacent saphenous nerve.  The sartorius muscle was reflected posteriorly and the underlying vascular bundle was identified.  The saphenous nerve was dissected free and sharply transected distally.  The superficial femoral artery and vein were then suture-ligated using 2-0 silk sutures.  Next, we proceeded to perform the above-knee amputation.  An incision was made in the usual fishmouth fashion.  This was deepened through the anterior and medial compartments of the thigh.  A plane was then created posterior to the femur and the periosteum was then elevated using a periosteal elevator.  The femur was then transected few centimeters proximal to the level of our skin incision using a bone saw.  The remaining posterior muscles were then transected and the sciatic nerve was identified and transected as distal as possible using Metzenbaum scissors.  The remaining distal thigh was then sent to pathology as a routine specimen.  Hemostasis was then achieved after the transected femur was beveled anteriorly.    We then proceeded to perform targeted muscle reinnervation. First, the  sciatic nerve was  into the peroneal and tibial components. The tibial nerve was large and had two distinct nerve bundles which were further  to prevent any significant size mismatch during the coaptation. With this, we proceeded to perform TMR of the  right saphenous nerve, common peroneal nerve and two nerve bundles of the tibial component of the sciatic nerve in four separate coaptations.      Using a check point stimulator, a motor end nerve was identified within the vastus medialis muscle. This was sharply divided as was the saphenous nerve. Next, a tension free co-aptation was performed of the saphenous nerve with the distal motor end nerve within the vastus medialis muscle using 8-0 Prolene suture. The repair was then covered by imbricating the surrounding muscle using 3-0 Vicryl suture. The newly coapted saphenous nerve was then stimulated with nerve stimulator with contraction of the muscle.       Using a check point stimulator, a motor end nerve was identified within the biceps femoris muscle. This was sharply divided as was the common peroneal nerve. Next, a tension free co-aptation was performed of the common peroneal nerve with the distal motor end nerve using 8-0 Prolene suture. The repair was then covered by imbricating the surrounding muscle using 3-0 Vicryl suture. The newly co-apted common peroneal nerve was then stimulated with nerve stimulator with contraction of the muscle.      Using a check point stimulator, a motor end nerve was identified within the biceps femoris muscle. This was sharply divided as was first tibial component of the sciatic nerve. Next, a tension free co-aptation was performed of the sciatic nerve with the distal motor end nerve using 7-0 Prolene suture.  The newly coapted nerve was then stimulated with nerve stimulator with contraction of the muscle.      Finally, using a check point stimulator, a motor end nerve was identified within the adductor magus muscle. This was sharply divided as was the second tibial component of the sciatic nerve. Next, a tension free co-aptation was performed of the sciatic nerve with the distal motor end nerve using 7-0 Prolene suture. The repair was then covered by imbricating the  surrounding muscle using 3-0 Vicryl suture. The newly co-apted nerve was then stimulated with the nerve stimulator with contraction of the muscle.       At this time, the wound was copiously irrigated and hemostasis was achieved. The incision was closed in 2 layers. The fascia was re-approximated using interrupted 2-0 Vicryl sutures and the skin was closed using staples and interrupted nylon suture. A sterile dressing was then applied. The patient tolerated the procedure well and was extubated without difficulty. The patient was taken PACU in stable condition. I was present for the entirety of the case.     Complications:  None; patient tolerated the procedure well.    Disposition: PACU - hemodynamically stable.  Condition: stable     Attending Attestation: I was present and scrubbed for the entire procedure.    Oliverio Sánchez  Phone Number: [phone]

## 2024-12-02 NOTE — PROGRESS NOTES
Occupational Therapy    Occupational Therapy Treatment    Name: Errol Fonseca  MRN: [id number]  Department: [place] [address]  Room: [facility]  Date: 12/02/24  Time Calculation  Start Time: 1442  Stop Time: 1452  Time Calculation (min): 10 min    Assessment:  OT Assessment: difficulty I/ADLs, safety, fxnl mob  Prognosis: Good  Barriers to Discharge: None  Evaluation/Treatment Tolerance: Patient tolerated treatment well  Medical Staff Made Aware: Yes  End of Session Communication: Bedside nurse  End of Session Patient Position: Bed, 3 rail up, Alarm off, not on at start of session  Plan:  Treatment Interventions: ADL retraining, Functional transfer training, UE strengthening/ROM, Endurance training, Patient/family training, Equipment evaluation/education, Compensatory technique education  OT Frequency: 4 times per week  OT Discharge Recommendations: High intensity level of continued care  Equipment Recommended upon Discharge:  (shower chair, WHW, wheelchair)  OT Recommended Transfer Status: Assist of 1  OT - OK to Discharge: Yes    Subjective   Previous Visit Info:  OT Last Visit  OT Received On: 12/02/24  General:  General  Reason for Referral: R knee disarticulation. S/p s/p R AKA 11/29  Past Medical History Relevant to Rehab: DM, CAD s/p PCI, HTN, HLD, alcoholic cirrhosis, prior left 5th toe amputation  Family/Caregiver Present: No  Prior to Session Communication: Bedside nurse  Patient Position Received: Bed, 3 rail up, Alarm off, not on at start of session  Precautions:  LE Weight Bearing Status: Right Non-Weight Bearing  Medical Precautions: Fall precautions  Precautions Comment: R AKA        Pain Assessment:  Pain Assessment  Pain Assessment: 0-10  Wiley-Baker FACES Pain Rating: Hurts even more  Pain Location:  (RLE)     Objective   Cognition:  Overall Cognitive Status: Within Functional Limits  Orientation Level: Oriented X4  Insight: Within function limits  Activities of Daily Living:           UE Dressing  UE  Dressing Level of Assistance:  (adjusted gown I seated EOB)        Bed Mobility/Transfers: Bed Mobility  Bed Mobility:  (sup/sit S, seated EOB > 6 min I reported has been up to BSC 3x today, declined sit to stand 2/2 pain)    Outcome Measures:  [facility] Daily Activity  Putting on and taking off regular lower body clothing: A lot  Bathing (including washing, rinsing, drying): A little  Putting on and taking off regular upper body clothing: None  Toileting, which includes using toilet, bedpan or urinal: A little  Taking care of personal grooming such as brushing teeth: None  Eating Meals: None  Daily Activity - Total Score: 20        Education Documentation  Body Mechanics, taught by Sarina Inman OT at 12/2/2024  2:58 PM.  Learner: Patient  Readiness: Acceptance  Method: Explanation, Demonstration  Response: Verbalizes Understanding, Needs Reinforcement  Comment: celeste lassiter    Precautions, taught by Sarina Inman OT at 12/2/2024  2:58 PM.  Learner: Patient  Readiness: Acceptance  Method: Explanation, Demonstration  Response: Verbalizes Understanding, Needs Reinforcement  Comment: celeste lassiter    ADL Training, taught by Sarina Inman OT at 12/2/2024  2:58 PM.  Learner: Patient  Readiness: Acceptance  Method: Explanation, Demonstration  Response: Verbalizes Understanding, Needs Reinforcement  Comment: maikol fxnl mob    Education Comments  No comments found.      Goals:  Encounter Problems       Encounter Problems (Active)       ADLs       Pt will complete LB dressing with modified independence while seated and/or standing and AE as needed.  (Progressing)       Start:  11/23/24    Expected End:  12/07/24            Pt will complete UB /LB bathing tasks with modified independence while seated and AE as needed.  (Progressing)       Start:  11/23/24    Expected End:  12/07/24            Pt will complete toilet hygiene while seated /standing with IND level of assistance.   (Progressing)       Start:   11/23/24    Expected End:  12/07/24               BALANCE       Pt will maintain dynamic standing balance during ADL task with modified independent level of assistance in order to demonstrate decreased risk of falling and improved postural control. (Progressing)       Start:  11/23/24    Expected End:  12/07/24               COGNITION/SAFETY       Patient will recall and adhere to weight bearing and /or ROM restrictions with all ADL and functional mobility in order to promote healing and safety with functional tasks (Progressing)       Start:  11/23/24    Expected End:  12/07/24               EXERCISE/STRENGTHENING       Patient will be educated on BUE HEP for increased ADL performance. (Progressing)       Start:  11/23/24    Expected End:  12/07/24               MOBILITY       Patient will perform Functional mobility max Household distances/Community Distances with contact guard assist level of assistance and least restrictive device in order to improve safety and functional mobility. (Progressing)       Start:  11/23/24    Expected End:  12/07/24               TRANSFERS       Patient will complete sit to stand transfer with modified independent level of assistance and least restrictive device in order to improve safety and prepare for out of bed mobility. (Progressing)       Start:  11/23/24    Expected End:  12/07/24

## 2024-12-02 NOTE — CARE PLAN
The patient's goals for the shift include      The clinical goals for the shift include pt will remain HDS through shift

## 2024-12-02 NOTE — OP NOTE
Leg Amputation Below Knee (R) Operative Note     Date: 2024  OR Location: [facility] OR    Name: Errol Fonseca, : 1970, Age: 54 y.o., MRN: [id number], Sex: male    Diagnosis  Pre-op Diagnosis      * Necrotizing fasciitis of lower leg (Multi) [M72.6] Post-op Diagnosis     * Necrotizing fasciitis of lower leg (Multi) [M72.6]     Procedures    Right knee disarticulation (41685)    Surgeons      * Oliverio Sánchez - Primary    Resident/Fellow/Other Assistant:  Surgeons and Role:     * Martina Beck MD - Fellow    Staff:   Surgical Assistant:   Andreeaulator: Tricia Hodgson Person: Bahman    Anesthesia Staff: Anesthesiologist: William Red MD  Anesthesia Resident: Mary Olivier DO    Procedure Summary  Anesthesia: General  ASA: III  Estimated Blood Loss: 10 mL  Intra-op Medications:   Administrations occurring from 0001 to 0251 on 24:   Medication Name Total Dose   sodium chloride 0.9 % irrigation solution 1,000 mL   ceFAZolin (Ancef) vial 1 g 2 g   fentaNYL (Sublimaze) injection 50 mcg/mL 100 mcg   HYDROmorphone (Dilaudid) injection 0.5 mg 0.5 mg   HYDROmorphone (Dilaudid) injection 1 mg/mL 1 mg   LR bolus Cannot be calculated   lidocaine (Xylocaine) 2 % 100 mg   metoprolol 5 mg/5 mL 5 mg   ondansetron (Zofran) 2 mg/mL injection 4 mg   oxygen (O2) therapy 124.97 L   phenylephrine (Russell-Synephrine) injection 600 mcg   propofol (Diprivan) injection 10 mg/mL 200 mg   succinylcholine (Anectine) 20 mg/mL injection 60 mg              Anesthesia Record               Intraprocedure I/O Totals          Intake    Propofol Drip 0.00 mL    The total shown is the total volume documented since Anesthesia Start was filed.    Total Intake 0 mL       Output    Urine 575 mL    Total Output 575 mL       Net    Net Volume -575 mL          Specimen:   ID Type Source Tests Collected by Time   1 : RIGHT LEG, BELOW THE KNEE Tissue LEG AMPUTATION BELOW THE KNEE RIGHT SURGICAL PATHOLOGY EXAM Oliverio Sánchez MD 2024  0143                 Drains and/or Catheters: * None in log *    Tourniquet Times:   * Missing tourniquet times found for documented tourniquets in lo *     Implants: N/A    Findings: Nonviable muscle within the anterior compartment to the level of the knee with murky fluid seeping through the muscle. Upon inspection, the anterior tibialis and extensor digitorum longus muscle was non contractile and extremely friable and able to be removed bluntly with minimal traction concerning for complete non-viability and advanced necrotizing infection. As a result, decision was made to proceed a knee disarticulation for source control. No evidence of infection at this level.     Indications: Errol Fonseca is an 54 y.o. male hypertension, hyperlipidemia, diabetes, CAD who presented with necrotizing soft tissue infection of the right foot for which he underwent numerous debridements with the most recent debridement concerning for proximal extension as a result, vascular surgery was consulted.  Patient had crepitus on exam with significant erythema up to the level of the mid calf.  As a result, a right transtibial guillotine versus knee disarticulation was discussed with the patient for source control with plans to return to the operating room at a later date for formalization.  The risks and benefits of this procedure were discussed with the patient who elected to proceed.    The patient was seen in the preoperative area. The risks, benefits, complications, treatment options, non-operative alternatives, expected recovery and outcomes were discussed with the patient. The possibilities of reaction to medication, pulmonary aspiration, injury to surrounding structures, bleeding, recurrent infection, the need for additional procedures, failure to diagnose a condition, and creating a complication requiring transfusion or operation were discussed with the patient. The patient concurred with the proposed plan, giving  informed consent.  The site of surgery was properly noted/marked if necessary per policy. The patient has been actively warmed in preoperative area. Preoperative antibiotics have been ordered and given within 1 hours of incision. Venous thrombosis prophylaxis are not indicated.    Procedure Details:     Patient was taken to the operating room and placed in supine position. General anesthesia was then induced. Patient was then prepped and draped in the usual sterile fashion. Timeout was again performed to verify the patient, procedure, site prior to beginning.     We first began by marking out our potential flap for a BKA.  Then, a longitudinal incision was made over the anterior compartment of the right leg below our BKA anterior flap.  This was deepened through the subcutaneous tissue and the fascia was sharply incised.  There was murky fluid underneath and the muscle was noted to be gray and nonviable.  It was also noncontractile.  On closer inspection, it was noted that it was extremely friable and could be removed with minimal traction concerning for severe infection and nonviability.  Given that the entire anterior compartment was compromised to the level of the knee with this necrotizing infection, decision was made to proceed with a knee disarticulation for source control.    We first began by making a skin this decision circumferentially along the inferior border of the right patella taking care to leave a slightly longer posterior flap. This was deepened down through the subcutaneous tissue. Medially, the great saphenous vein was suture-ligated with 3-0 silk sutures. The patellar ligament was divided and the right knee joint space was entered. The medial and lateral ligaments were sharply divided followed by the anterior and posterior cruciate ligaments allowing for complete knee disarticulation. The posterior soft tissues were carefully divided and the popliteal vessels and nerve bundle was identified. The  artery and the vein were identified and suture-ligated using 2-0 silk sutures. The nerve was sharply divided and the remaining posterior soft tissue was divided. Hemostasis was then achieved using electrocautery. And the wound was copiously irrigated with vancomycin solution. The skin was then loosely closed over the femoral condyles to prevent any proximal retraction using 2-0 nylon suture. The wound was then dressed with saline soaked Kerlix followed by ABD pads, dry Kerlix and Ace. The patient tolerated the procedure well and was transferred to PACU in stable condition. I was present for the entirety of the procedure.     Complications:  None; patient tolerated the procedure well.    Disposition: PACU - hemodynamically stable.  Condition: stable     Attending Attestation: I was present and scrubbed for the entire procedure.    Oliverio Sánchez  Phone Number: [phone]

## 2024-12-02 NOTE — PROGRESS NOTES
Vancomycin Dosing by Pharmacy- Cessation of Therapy    Consult to pharmacy for vancomycin dosing has been discontinued by the prescriber, pharmacy will sign off at this time.    Please call pharmacy if there are further questions or re-enter a consult if vancomycin is resumed.     Christian Fernandez, SalomonD

## 2024-12-03 ENCOUNTER — HOSPITAL ENCOUNTER (INPATIENT)
Facility: HOSPITAL | Age: 54
End: 2024-12-03
Attending: PHYSICAL MEDICINE & REHABILITATION | Admitting: PHYSICAL MEDICINE & REHABILITATION
Payer: MEDICAID

## 2024-12-03 VITALS
SYSTOLIC BLOOD PRESSURE: 127 MMHG | RESPIRATION RATE: 17 BRPM | TEMPERATURE: 97.5 F | HEIGHT: 73 IN | HEART RATE: 72 BPM | OXYGEN SATURATION: 100 % | DIASTOLIC BLOOD PRESSURE: 80 MMHG | BODY MASS INDEX: 29.83 KG/M2 | WEIGHT: 225.09 LBS

## 2024-12-03 DIAGNOSIS — G89.18 ACUTE POSTOPERATIVE PAIN: ICD-10-CM

## 2024-12-03 DIAGNOSIS — Z89.611: ICD-10-CM

## 2024-12-03 DIAGNOSIS — M72.6 NECROTIZING FASCIITIS OF LOWER LEG (MULTI): ICD-10-CM

## 2024-12-03 DIAGNOSIS — E55.9 VITAMIN D DEFICIENCY: ICD-10-CM

## 2024-12-03 DIAGNOSIS — E11.59 TYPE 2 DIABETES MELLITUS WITH OTHER CIRCULATORY COMPLICATION, WITH LONG-TERM CURRENT USE OF INSULIN: Primary | ICD-10-CM

## 2024-12-03 DIAGNOSIS — Z79.4 TYPE 2 DIABETES MELLITUS WITH OTHER CIRCULATORY COMPLICATION, WITH LONG-TERM CURRENT USE OF INSULIN: Primary | ICD-10-CM

## 2024-12-03 PROBLEM — S78.111A ABOVE KNEE AMPUTATION OF RIGHT LOWER EXTREMITY (MULTI): Status: ACTIVE | Noted: 2024-12-03

## 2024-12-03 LAB
ALBUMIN SERPL BCP-MCNC: 3.5 G/DL (ref 3.4–5)
ANION GAP SERPL CALC-SCNC: 14 MMOL/L (ref 10–20)
BUN SERPL-MCNC: 11 MG/DL (ref 6–23)
CALCIUM SERPL-MCNC: 9.3 MG/DL (ref 8.6–10.6)
CHLORIDE SERPL-SCNC: 100 MMOL/L (ref 98–107)
CO2 SERPL-SCNC: 26 MMOL/L (ref 21–32)
CREAT SERPL-MCNC: 0.54 MG/DL (ref 0.5–1.3)
EGFRCR SERPLBLD CKD-EPI 2021: >90 ML/MIN/1.73M*2
ERYTHROCYTE [DISTWIDTH] IN BLOOD BY AUTOMATED COUNT: 13.7 % (ref 11.5–14.5)
GLUCOSE BLD MANUAL STRIP-MCNC: 161 MG/DL (ref 74–99)
GLUCOSE BLD MANUAL STRIP-MCNC: 187 MG/DL (ref 74–99)
GLUCOSE BLD MANUAL STRIP-MCNC: 263 MG/DL (ref 74–99)
GLUCOSE BLD MANUAL STRIP-MCNC: 309 MG/DL (ref 74–99)
GLUCOSE BLD MANUAL STRIP-MCNC: 315 MG/DL (ref 74–99)
GLUCOSE SERPL-MCNC: 213 MG/DL (ref 74–99)
HCT VFR BLD AUTO: 30.3 % (ref 41–52)
HGB BLD-MCNC: 9.5 G/DL (ref 13.5–17.5)
MAGNESIUM SERPL-MCNC: 1.97 MG/DL (ref 1.6–2.4)
MCH RBC QN AUTO: 32.3 PG (ref 26–34)
MCHC RBC AUTO-ENTMCNC: 31.4 G/DL (ref 32–36)
MCV RBC AUTO: 103 FL (ref 80–100)
NRBC BLD-RTO: 0 /100 WBCS (ref 0–0)
PHOSPHATE SERPL-MCNC: 3.5 MG/DL (ref 2.5–4.9)
PLATELET # BLD AUTO: 382 X10*3/UL (ref 150–450)
POTASSIUM SERPL-SCNC: 5 MMOL/L (ref 3.5–5.3)
RBC # BLD AUTO: 2.94 X10*6/UL (ref 4.5–5.9)
SODIUM SERPL-SCNC: 135 MMOL/L (ref 136–145)
WBC # BLD AUTO: 7.7 X10*3/UL (ref 4.4–11.3)

## 2024-12-03 PROCEDURE — 2500000001 HC RX 250 WO HCPCS SELF ADMINISTERED DRUGS (ALT 637 FOR MEDICARE OP): Performed by: PHYSICAL MEDICINE & REHABILITATION

## 2024-12-03 PROCEDURE — 82947 ASSAY GLUCOSE BLOOD QUANT: CPT

## 2024-12-03 PROCEDURE — 2500000004 HC RX 250 GENERAL PHARMACY W/ HCPCS (ALT 636 FOR OP/ED)

## 2024-12-03 PROCEDURE — 2500000001 HC RX 250 WO HCPCS SELF ADMINISTERED DRUGS (ALT 637 FOR MEDICARE OP)

## 2024-12-03 PROCEDURE — 2500000002 HC RX 250 W HCPCS SELF ADMINISTERED DRUGS (ALT 637 FOR MEDICARE OP, ALT 636 FOR OP/ED): Performed by: PHYSICAL MEDICINE & REHABILITATION

## 2024-12-03 PROCEDURE — 2500000002 HC RX 250 W HCPCS SELF ADMINISTERED DRUGS (ALT 637 FOR MEDICARE OP, ALT 636 FOR OP/ED)

## 2024-12-03 PROCEDURE — 80069 RENAL FUNCTION PANEL: CPT

## 2024-12-03 PROCEDURE — 85027 COMPLETE CBC AUTOMATED: CPT

## 2024-12-03 PROCEDURE — 2500000002 HC RX 250 W HCPCS SELF ADMINISTERED DRUGS (ALT 637 FOR MEDICARE OP, ALT 636 FOR OP/ED): Performed by: STUDENT IN AN ORGANIZED HEALTH CARE EDUCATION/TRAINING PROGRAM

## 2024-12-03 PROCEDURE — 36415 COLL VENOUS BLD VENIPUNCTURE: CPT

## 2024-12-03 PROCEDURE — 1280000001 HC REHAB SEMI-PRIVATE ROOM DAILY

## 2024-12-03 PROCEDURE — 83735 ASSAY OF MAGNESIUM: CPT

## 2024-12-03 RX ORDER — BISACODYL 5 MG
10 TABLET, DELAYED RELEASE (ENTERIC COATED) ORAL DAILY PRN
Status: ACTIVE | OUTPATIENT
Start: 2024-12-03

## 2024-12-03 RX ORDER — METOPROLOL TARTRATE 25 MG/1
25 TABLET, FILM COATED ORAL 2 TIMES DAILY
Status: DISPENSED | OUTPATIENT
Start: 2024-12-03

## 2024-12-03 RX ORDER — HYDROCHLOROTHIAZIDE 12.5 MG/1
12.5 TABLET ORAL DAILY
Status: DISPENSED | OUTPATIENT
Start: 2024-12-03

## 2024-12-03 RX ORDER — ACETAMINOPHEN 325 MG/1
650 TABLET ORAL EVERY 8 HOURS PRN
Status: DISPENSED | OUTPATIENT
Start: 2024-12-03

## 2024-12-03 RX ORDER — INSULIN LISPRO 100 [IU]/ML
0-10 INJECTION, SOLUTION INTRAVENOUS; SUBCUTANEOUS
Status: DISCONTINUED | OUTPATIENT
Start: 2024-12-04 | End: 2024-12-03

## 2024-12-03 RX ORDER — INSULIN GLARGINE 100 [IU]/ML
30 INJECTION, SOLUTION SUBCUTANEOUS DAILY
Status: DISCONTINUED | OUTPATIENT
Start: 2024-12-03 | End: 2024-12-05

## 2024-12-03 RX ORDER — ATORVASTATIN CALCIUM 40 MG/1
40 TABLET, FILM COATED ORAL DAILY
Status: DISPENSED | OUTPATIENT
Start: 2024-12-04

## 2024-12-03 RX ORDER — INSULIN LISPRO 100 [IU]/ML
7 INJECTION, SOLUTION INTRAVENOUS; SUBCUTANEOUS
Status: DISPENSED | OUTPATIENT
Start: 2024-12-03

## 2024-12-03 RX ORDER — GABAPENTIN 400 MG/1
400 CAPSULE ORAL 3 TIMES DAILY
Status: DISPENSED | OUTPATIENT
Start: 2024-12-03

## 2024-12-03 RX ORDER — DEXTROSE 50 % IN WATER (D50W) INTRAVENOUS SYRINGE
12.5
Status: ACTIVE | OUTPATIENT
Start: 2024-12-03

## 2024-12-03 RX ORDER — ALUMINUM HYDROXIDE, MAGNESIUM HYDROXIDE, AND SIMETHICONE 1200; 120; 1200 MG/30ML; MG/30ML; MG/30ML
30 SUSPENSION ORAL EVERY 6 HOURS PRN
Status: DISPENSED | OUTPATIENT
Start: 2024-12-03

## 2024-12-03 RX ORDER — FOLIC ACID 1 MG/1
1 TABLET ORAL DAILY
Status: DISPENSED | OUTPATIENT
Start: 2024-12-04

## 2024-12-03 RX ORDER — ASPIRIN 81 MG/1
81 TABLET ORAL DAILY
Status: DISPENSED | OUTPATIENT
Start: 2024-12-04

## 2024-12-03 RX ORDER — METFORMIN HYDROCHLORIDE 500 MG/1
500 TABLET, EXTENDED RELEASE ORAL
Qty: 30 TABLET | Refills: 0 | Status: SHIPPED | OUTPATIENT
Start: 2024-12-03 | End: 2024-12-15 | Stop reason: HOSPADM

## 2024-12-03 RX ORDER — ERGOCALCIFEROL 1.25 MG/1
50000 CAPSULE ORAL WEEKLY
Status: DISPENSED | OUTPATIENT
Start: 2024-12-03

## 2024-12-03 RX ORDER — NYSTATIN 100000 [USP'U]/G
1 POWDER TOPICAL 2 TIMES DAILY
Status: DISPENSED | OUTPATIENT
Start: 2024-12-03

## 2024-12-03 RX ORDER — POLYETHYLENE GLYCOL 3350 17 G/17G
17 POWDER, FOR SOLUTION ORAL DAILY PRN
Status: ACTIVE | OUTPATIENT
Start: 2024-12-03

## 2024-12-03 RX ORDER — METFORMIN HYDROCHLORIDE 500 MG/1
500 TABLET, EXTENDED RELEASE ORAL
Status: DISPENSED | OUTPATIENT
Start: 2024-12-03

## 2024-12-03 RX ORDER — DEXTROSE 50 % IN WATER (D50W) INTRAVENOUS SYRINGE
25
Status: ACTIVE | OUTPATIENT
Start: 2024-12-03

## 2024-12-03 RX ORDER — OXYCODONE HYDROCHLORIDE 5 MG/1
5 TABLET ORAL EVERY 4 HOURS PRN
Status: DISPENSED | OUTPATIENT
Start: 2024-12-03

## 2024-12-03 RX ORDER — LISINOPRIL 20 MG/1
20 TABLET ORAL DAILY
Status: DISPENSED | OUTPATIENT
Start: 2024-12-03

## 2024-12-03 RX ORDER — ACETAMINOPHEN 500 MG
5 TABLET ORAL NIGHTLY
Status: DISPENSED | OUTPATIENT
Start: 2024-12-03

## 2024-12-03 RX ORDER — TRAZODONE HYDROCHLORIDE 50 MG/1
50 TABLET ORAL NIGHTLY PRN
Status: ACTIVE | OUTPATIENT
Start: 2024-12-03

## 2024-12-03 RX ORDER — BISACODYL 10 MG/1
10 SUPPOSITORY RECTAL DAILY PRN
Status: ACTIVE | OUTPATIENT
Start: 2024-12-03

## 2024-12-03 RX ORDER — ESCITALOPRAM OXALATE 10 MG/1
10 TABLET ORAL DAILY
Status: DISPENSED | OUTPATIENT
Start: 2024-12-04

## 2024-12-03 RX ORDER — SENNOSIDES 8.6 MG/1
1 TABLET ORAL NIGHTLY
Status: DISPENSED | OUTPATIENT
Start: 2024-12-03

## 2024-12-03 RX ORDER — INSULIN LISPRO 100 [IU]/ML
0-10 INJECTION, SOLUTION INTRAVENOUS; SUBCUTANEOUS
Status: DISPENSED | OUTPATIENT
Start: 2024-12-03

## 2024-12-03 RX ADMIN — OXYCODONE HYDROCHLORIDE 10 MG: 10 TABLET ORAL at 03:39

## 2024-12-03 RX ADMIN — ENOXAPARIN SODIUM 40 MG: 100 INJECTION SUBCUTANEOUS at 08:51

## 2024-12-03 RX ADMIN — ATORVASTATIN CALCIUM 40 MG: 40 TABLET, FILM COATED ORAL at 08:51

## 2024-12-03 RX ADMIN — INSULIN LISPRO 7 UNITS: 100 INJECTION, SOLUTION INTRAVENOUS; SUBCUTANEOUS at 12:53

## 2024-12-03 RX ADMIN — ACETAMINOPHEN 650 MG: 325 TABLET, FILM COATED ORAL at 14:31

## 2024-12-03 RX ADMIN — GABAPENTIN 400 MG: 400 CAPSULE ORAL at 08:51

## 2024-12-03 RX ADMIN — ACETAMINOPHEN 650 MG: 325 TABLET, FILM COATED ORAL at 03:30

## 2024-12-03 RX ADMIN — GABAPENTIN 400 MG: 400 CAPSULE ORAL at 14:31

## 2024-12-03 RX ADMIN — ACETAMINOPHEN 650 MG: 325 TABLET, FILM COATED ORAL at 08:51

## 2024-12-03 RX ADMIN — INSULIN LISPRO 3 UNITS: 100 INJECTION, SOLUTION INTRAVENOUS; SUBCUTANEOUS at 08:52

## 2024-12-03 RX ADMIN — INSULIN LISPRO 7 UNITS: 100 INJECTION, SOLUTION INTRAVENOUS; SUBCUTANEOUS at 08:52

## 2024-12-03 RX ADMIN — METHOCARBAMOL TABLETS 500 MG: 500 TABLET, COATED ORAL at 12:52

## 2024-12-03 RX ADMIN — METFORMIN ER 500 MG 500 MG: 500 TABLET ORAL at 08:51

## 2024-12-03 RX ADMIN — OXYCODONE HYDROCHLORIDE 10 MG: 10 TABLET ORAL at 10:12

## 2024-12-03 RX ADMIN — METHOCARBAMOL TABLETS 500 MG: 500 TABLET, COATED ORAL at 03:35

## 2024-12-03 RX ADMIN — ASPIRIN 81 MG: 81 TABLET, COATED ORAL at 08:51

## 2024-12-03 RX ADMIN — LISINOPRIL 20 MG: 20 TABLET ORAL at 08:51

## 2024-12-03 RX ADMIN — METOPROLOL TARTRATE 25 MG: 25 TABLET, FILM COATED ORAL at 08:51

## 2024-12-03 RX ADMIN — INSULIN LISPRO 9 UNITS: 100 INJECTION, SOLUTION INTRAVENOUS; SUBCUTANEOUS at 12:52

## 2024-12-03 RX ADMIN — ESCITALOPRAM OXALATE 10 MG: 10 TABLET ORAL at 08:51

## 2024-12-03 RX ADMIN — INSULIN GLARGINE 30 UNITS: 100 INJECTION, SOLUTION SUBCUTANEOUS at 12:52

## 2024-12-03 RX ADMIN — OXYCODONE HYDROCHLORIDE 10 MG: 10 TABLET ORAL at 14:31

## 2024-12-03 RX ADMIN — METHOCARBAMOL TABLETS 500 MG: 500 TABLET, COATED ORAL at 08:51

## 2024-12-03 SDOH — ECONOMIC STABILITY: INCOME INSECURITY: IN THE PAST 12 MONTHS HAS THE ELECTRIC, GAS, OIL, OR WATER COMPANY THREATENED TO SHUT OFF SERVICES IN YOUR HOME?: NO

## 2024-12-03 SDOH — ECONOMIC STABILITY: FOOD INSECURITY: WITHIN THE PAST 12 MONTHS, THE FOOD YOU BOUGHT JUST DIDN'T LAST AND YOU DIDN'T HAVE MONEY TO GET MORE.: NEVER TRUE

## 2024-12-03 SDOH — SOCIAL STABILITY: SOCIAL INSECURITY: DO YOU FEEL UNSAFE GOING BACK TO THE PLACE WHERE YOU ARE LIVING?: NO

## 2024-12-03 SDOH — ECONOMIC STABILITY: HOUSING INSECURITY: AT ANY TIME IN THE PAST 12 MONTHS, WERE YOU HOMELESS OR LIVING IN A SHELTER (INCLUDING NOW)?: NO

## 2024-12-03 SDOH — ECONOMIC STABILITY: FOOD INSECURITY: HOW HARD IS IT FOR YOU TO PAY FOR THE VERY BASICS LIKE FOOD, HOUSING, MEDICAL CARE, AND HEATING?: NOT HARD AT ALL

## 2024-12-03 SDOH — ECONOMIC STABILITY: FOOD INSECURITY: WITHIN THE PAST 12 MONTHS, YOU WORRIED THAT YOUR FOOD WOULD RUN OUT BEFORE YOU GOT THE MONEY TO BUY MORE.: NEVER TRUE

## 2024-12-03 SDOH — SOCIAL STABILITY: SOCIAL INSECURITY: WITHIN THE LAST YEAR, HAVE YOU BEEN HUMILIATED OR EMOTIONALLY ABUSED IN OTHER WAYS BY YOUR PARTNER OR EX-PARTNER?: NO

## 2024-12-03 SDOH — ECONOMIC STABILITY: TRANSPORTATION INSECURITY: IN THE PAST 12 MONTHS, HAS LACK OF TRANSPORTATION KEPT YOU FROM MEDICAL APPOINTMENTS OR FROM GETTING MEDICATIONS?: NO

## 2024-12-03 SDOH — SOCIAL STABILITY: SOCIAL INSECURITY: WITHIN THE LAST YEAR, HAVE YOU BEEN AFRAID OF YOUR PARTNER OR EX-PARTNER?: NO

## 2024-12-03 SDOH — SOCIAL STABILITY: SOCIAL INSECURITY: ARE YOU OR HAVE YOU BEEN THREATENED OR ABUSED PHYSICALLY, EMOTIONALLY, OR SEXUALLY BY ANYONE?: NO

## 2024-12-03 SDOH — SOCIAL STABILITY: SOCIAL INSECURITY: DO YOU FEEL ANYONE HAS EXPLOITED OR TAKEN ADVANTAGE OF YOU FINANCIALLY OR OF YOUR PERSONAL PROPERTY?: NO

## 2024-12-03 SDOH — SOCIAL STABILITY: SOCIAL INSECURITY: HAVE YOU HAD THOUGHTS OF HARMING ANYONE ELSE?: NO

## 2024-12-03 SDOH — SOCIAL STABILITY: SOCIAL INSECURITY: ARE THERE ANY APPARENT SIGNS OF INJURIES/BEHAVIORS THAT COULD BE RELATED TO ABUSE/NEGLECT?: NO

## 2024-12-03 SDOH — ECONOMIC STABILITY: HOUSING INSECURITY: IN THE LAST 12 MONTHS, WAS THERE A TIME WHEN YOU WERE NOT ABLE TO PAY THE MORTGAGE OR RENT ON TIME?: NO

## 2024-12-03 SDOH — SOCIAL STABILITY: SOCIAL INSECURITY: HAS ANYONE EVER THREATENED TO HURT YOUR FAMILY OR YOUR PETS?: NO

## 2024-12-03 SDOH — SOCIAL STABILITY: SOCIAL INSECURITY: ABUSE: ADULT

## 2024-12-03 SDOH — SOCIAL STABILITY: SOCIAL INSECURITY: DOES ANYONE TRY TO KEEP YOU FROM HAVING/CONTACTING OTHER FRIENDS OR DOING THINGS OUTSIDE YOUR HOME?: NO

## 2024-12-03 SDOH — ECONOMIC STABILITY: HOUSING INSECURITY: IN THE PAST 12 MONTHS, HOW MANY TIMES HAVE YOU MOVED WHERE YOU WERE LIVING?: 1

## 2024-12-03 SDOH — SOCIAL STABILITY: SOCIAL INSECURITY: HAVE YOU HAD ANY THOUGHTS OF HARMING ANYONE ELSE?: NO

## 2024-12-03 ASSESSMENT — COGNITIVE AND FUNCTIONAL STATUS - GENERAL
CLIMB 3 TO 5 STEPS WITH RAILING: TOTAL
MOVING TO AND FROM BED TO CHAIR: A LITTLE
MOBILITY SCORE: 18
STANDING UP FROM CHAIR USING ARMS: A LITTLE
WALKING IN HOSPITAL ROOM: A LITTLE

## 2024-12-03 ASSESSMENT — PAIN - FUNCTIONAL ASSESSMENT
PAIN_FUNCTIONAL_ASSESSMENT: 0-10
PAIN_FUNCTIONAL_ASSESSMENT: 0-10
PAIN_FUNCTIONAL_ASSESSMENT: UNABLE TO SELF-REPORT
PAIN_FUNCTIONAL_ASSESSMENT: 0-10
PAIN_FUNCTIONAL_ASSESSMENT: 0-10

## 2024-12-03 ASSESSMENT — PAIN SCALES - GENERAL
PAINLEVEL_OUTOF10: 7
PAINLEVEL_OUTOF10: 7
PAINLEVEL_OUTOF10: 8
PAINLEVEL_OUTOF10: 5 - MODERATE PAIN
PAINLEVEL_OUTOF10: 7
PAINLEVEL_OUTOF10: 5 - MODERATE PAIN
PAINLEVEL_OUTOF10: 6

## 2024-12-03 ASSESSMENT — PATIENT HEALTH QUESTIONNAIRE - PHQ9
SUM OF ALL RESPONSES TO PHQ9 QUESTIONS 1 & 2: 0
1. LITTLE INTEREST OR PLEASURE IN DOING THINGS: NOT AT ALL
2. FEELING DOWN, DEPRESSED OR HOPELESS: NOT AT ALL

## 2024-12-03 ASSESSMENT — LIFESTYLE VARIABLES
HOW OFTEN DO YOU HAVE A DRINK CONTAINING ALCOHOL: MONTHLY OR LESS
SKIP TO QUESTIONS 9-10: 0
HOW OFTEN DO YOU HAVE 6 OR MORE DRINKS ON ONE OCCASION: MONTHLY
AUDIT-C TOTAL SCORE: 4
AUDIT-C TOTAL SCORE: 4
HOW MANY STANDARD DRINKS CONTAINING ALCOHOL DO YOU HAVE ON A TYPICAL DAY: 3 OR 4

## 2024-12-03 ASSESSMENT — PAIN DESCRIPTION - ORIENTATION
ORIENTATION: RIGHT
ORIENTATION: RIGHT

## 2024-12-03 ASSESSMENT — PAIN DESCRIPTION - LOCATION
LOCATION: LEG
LOCATION: LEG

## 2024-12-03 ASSESSMENT — COLUMBIA-SUICIDE SEVERITY RATING SCALE - C-SSRS
1. IN THE PAST MONTH, HAVE YOU WISHED YOU WERE DEAD OR WISHED YOU COULD GO TO SLEEP AND NOT WAKE UP?: NO
6. HAVE YOU EVER DONE ANYTHING, STARTED TO DO ANYTHING, OR PREPARED TO DO ANYTHING TO END YOUR LIFE?: NO
2. HAVE YOU ACTUALLY HAD ANY THOUGHTS OF KILLING YOURSELF?: NO

## 2024-12-03 ASSESSMENT — ACTIVITIES OF DAILY LIVING (ADL): LACK_OF_TRANSPORTATION: NO

## 2024-12-03 ASSESSMENT — PAIN DESCRIPTION - DESCRIPTORS: DESCRIPTORS: THROBBING

## 2024-12-03 NOTE — NURSING NOTE
Nurse called report to  [place] prior to patient discharge. IV access was removed pt. Tolerated well no s/s of pain or discomfort and catheter was intact. Pt discharged with community care in stable condition.

## 2024-12-03 NOTE — PROGRESS NOTES
Transitional Care Coordinator Note: Patient discussed with medical team (vascular), per vascular patient is medically ready. Discharge dispo: Plan for patient to discharge to [facility]. TCC requested transportation for 2pm. TCC met with patient to updated patient on discharge plan. Patient expressed understanding and appreciation of information and is agreeable to discharge plan. Bedside nurse updated. Transportation requested for 2pm, confirmed  time pending. AR facility updated, blue slip to be provided to unit secretary.       Javier Mack RN BSN   Transitional Care Coordinator

## 2024-12-03 NOTE — PREADMISSION SCREENING NOTE
Physical Medicine and Rehabilitation  Preadmission Screening    Rehab Physician's Review and Admission Determination:  Errol Fonseca is a 54 y.o. male who needs acute inpatient rehabilitation in order to achieve the functional goals outlined below. He requires close rehabilitation physician monitoring and management due to his complex medical conditions and co-morbidities with the primary indication of:  Rehab Admission Indication: Amputation: Amputation: 0005.3  Unilateral Lower Limb Above the Knee (AK). Comorbid conditions that will impact course of rehabilitation: DM, CAD s/p PCI, HTN, HLD, alcoholic cirrhosis. He requires 24-hour rehabilitation nursing to manage bowel and bladder function, skin care, surgical incision, nutrition and fluid intake, pulmonary hygiene, pain control, safety, and medication management. In addition, rehabilitation nursing will reiterate and reinforce therapy skills and equipment use, including ADLs, as well as provide education to the patient and family. Errol Fonseca is willing to participate in and is able to tolerate the proposed plan of care.    History of Present Illness: ADMITTED TO [facility] 11/21/24.Errol Fonseca is 54 y.o. 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 Patient taken to the OR by podiatry twice for debridement but during second debridement infection found to have spread and foot deemed non-salvagable. Vascular consulted from PACU for urgent AKA .   Patient now s/p right guillotine knee disarticulation 11/23/2024 with formalization of right AKA 11/29/2024.  FULL CODE  REGULAR DIET  NKA  PRECAUTIONS:FALL    RISK FOR MEDICAL COMPLICATIONS:   CAD-HEART FAILURE  DM- HYPO/HYPERGLYCEMIC EVENT  HTN- MI,CVA  FALL-  FX  PAIN- POOR INTAKE,UNCONTROLLED  PAIN    MEDS:TYLENOL,ASA,LIPITOR,COLACE,MIRALAX,LOVENOX,LEXAPRO,NEURONTIN,LANTUS,LISPRO INSULIN,ZESTRIL,GLUCOPHAGE,ROBAXIN,LOPRESSOR,NICODERM PATCH,PRN DESYREL,PRN MARCIE    LABS:  12/2 ,K+3.7,BUN 8,CREAT 0.59, 12/3 WBC 7.7,H&H 9.5,30.3,PLTS 382,000    Prior Functional Status:  Lives with Other (Comment)LIVES W/ SISTER. 5 STEPS INTO HOME,5-6 STEPS TO BEDROOM. PT CAN STAY WITH SON WHO LIVES IN A FIRST FLOOR APT. THRESHHOLD TO ENTER. LIMITED ASSIST.  Self-Care: IND  Ambulation: IND  Stairs: IND  Cognition: A&OX3  Wheelchair: N/A  Devices: NONE    Current Functional Status:  Eating: IND  Oral hygiene: SET UP  Toileting: CGA/MIN  Bathing: MIN  Upper body dressing: IND  Lower body dressing: MIN  Bed Mobility: SUPERVISION  Transfers: CGA,AMB 5 FEET MIN W/W  Bladder and Bowel: CONT  Cognition: A&OX3    Rehabilitation Goals and Plan:  Expected level of improvement: MOD IND  Likelihood of reaching these goals: excellent.  Therapy treatments needed to achieve these goals: physical therapy, occupational therapy, nursing, aide, and CM .  Barriers to achieving these goals: Pain   Expected length of stay: 5-7 day(s)    When medically stable, anticipated discharge disposition: home with home health care  The potential to achieve that is excellent.

## 2024-12-03 NOTE — PROGRESS NOTES
VASCULAR SURGERY PROGRESS NOTE  Assessment/Plan   Errol Fonseca is 54 y.o. 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 Patient taken to the OR by podiatry twice for debridement but during second debridement infection found to have spread and foot deemed non-salvagable. Vascular consulted from PACU for urgent BKA .   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     Patient continues to progress well postoperatively, pain well-controlled, amputation site without signs of infection.  Dressing changed, site without signs of infection. Patient recommended for acute rehab, pending precert. Medically ready for discharge    Plan:  Multimodal pain control with Tylenol, oxycodone, Dilaudid, gabapentin   ASA, atorvastatin, metoprolol.  Holding home nitroglycerin.  Regular diet, bowel regimen  Vancomycin, Zosyn discontinued 12/2/24.  Maintain surgical dressing; will be taken down by vascular when appropriate  SSI, Lantus 30 units daily, lispro 5 units 3 times daily before meals.  Lovenox DVT Ppx  Nicotine patch.  Lexapro, trazodone.  PT/OT consults ordered.       D/w attending, Dr. Princess Meza MD  PGY-1 General Surgery  Vascular Surgery [id number]        Subjective   No acute events overnight.  Patient seen at bedside, conversational, awake, alert, in good spirits.  Patient reports working with PT with accidental removal of dressing.  Dressing with changed to bedside, wound site appears clean, dry, intact, without signs of infection.    Objective   Vitals:  Heart Rate:  [67-86]   Temp:  [35.7 °C (96.2 °F)-36.9 °C (98.4 °F)]   Resp:  [18]   BP: (105-161)/(66-90)   SpO2:  [95 %-100 %]     Exam:  Constitutional: No acute distress, resting comfortably  Neuro:  AOx3, grossly intact  ENMT: moist mucous  membranes  CV: no tachycardia  Pulm: non-labored on RA  GI: soft, non-tender, non-distended  Skin: warm and dry  Musculoskeletal: moving all extremities  Extremities: Right lower extremity amputation site appears well-healed, dressing intact, with mild strikethrough visible in the center.  No pus, phlegmon noted.  Bilateral upper, left lower extremity warm, well-perfused, neuromuscular intact.      Labs:  Results from last 7 days   Lab Units 12/02/24  0841 12/01/24  0823 11/30/24  0957   WBC AUTO x10*3/uL 9.7 10.8 10.3   HEMOGLOBIN g/dL 8.8* 8.6* 7.9*   PLATELETS AUTO x10*3/uL 410 340 330      Results from last 7 days   Lab Units 12/02/24  0841 12/01/24  0823 11/30/24  0957   SODIUM mmol/L 133* 130* 133*   POTASSIUM mmol/L 3.7 4.6 3.8   CHLORIDE mmol/L 97* 97* 98   CO2 mmol/L 29 26 28   BUN mg/dL 8 9 13   CREATININE mg/dL 0.59 0.60 0.79   GLUCOSE mg/dL 152* 308* 325*   MAGNESIUM mg/dL 1.73 1.67 1.80   PHOSPHORUS mg/dL 2.7 2.4* 2.2*

## 2024-12-03 NOTE — CARE PLAN
The patient's goals for the shift include      The clinical goals for the shift include pain control      Problem: Skin  Goal: Decreased wound size/increased tissue granulation at next dressing change  Outcome: Progressing  Flowsheets (Taken 12/3/2024 1024)  Decreased wound size/increased tissue granulation at next dressing change:   Promote sleep for wound healing   Protective dressings over bony prominences   Utilize specialty bed per algorithm  Goal: Participates in plan/prevention/treatment measures  Outcome: Progressing  Flowsheets (Taken 12/3/2024 1024)  Participates in plan/prevention/treatment measures:   Discuss with provider PT/OT consult   Elevate heels   Increase activity/out of bed for meals  Goal: Prevent/manage excess moisture  Outcome: Progressing  Flowsheets (Taken 12/3/2024 1024)  Prevent/manage excess moisture:   Monitor for/manage infection if present   Cleanse incontinence/protect with barrier cream   Use wicking fabric (obtain order)   Follow provider orders for dressing changes   Moisturize dry skin  Goal: Prevent/minimize sheer/friction injuries  Outcome: Progressing  Flowsheets (Taken 12/3/2024 1024)  Prevent/minimize sheer/friction injuries:   Complete micro-shifts as needed if patient unable. Adjust patient position to relieve pressure points, not a full turn   Increase activity/out of bed for meals   Use pull sheet   Utilize specialty bed per algorithm   HOB 30 degrees or less   Turn/reposition every 2 hours/use positioning/transfer devices  Goal: Promote/optimize nutrition  Outcome: Progressing  Flowsheets (Taken 12/3/2024 1024)  Promote/optimize nutrition:   Assist with feeding   Monitor/record intake including meals   Offer water/supplements/favorite foods   Consume > 50% meals/supplements   Reassess MST if dietician not consulted  Goal: Promote skin healing  Outcome: Progressing  Flowsheets (Taken 12/3/2024 1024)  Promote skin healing:   Assess skin/pad under line(s)/device(s)    Protective dressings over bony prominences   Turn/reposition every 2 hours/use positioning/transfer devices   Ensure correct size (line/device) and apply per  instructions   Rotate device position/do not position patient on device     Problem: Pain - Adult  Goal: Verbalizes/displays adequate comfort level or baseline comfort level  Outcome: Progressing     Problem: Safety - Adult  Goal: Free from fall injury  Outcome: Progressing     Problem: Discharge Planning  Goal: Discharge to home or other facility with appropriate resources  Outcome: Progressing     Problem: Chronic Conditions and Co-morbidities  Goal: Patient's chronic conditions and co-morbidity symptoms are monitored and maintained or improved  Outcome: Progressing     Problem: Fall/Injury  Goal: Not fall by end of shift  Outcome: Progressing  Goal: Be free from injury by end of the shift  Outcome: Progressing  Goal: Verbalize understanding of personal risk factors for fall in the hospital  Outcome: Progressing  Goal: Verbalize understanding of risk factor reduction measures to prevent injury from fall in the home  Outcome: Progressing  Goal: Use assistive devices by end of the shift  Outcome: Progressing  Goal: Pace activities to prevent fatigue by end of the shift  Outcome: Progressing     Problem: Pain  Goal: Takes deep breaths with improved pain control throughout the shift  Outcome: Progressing  Goal: Turns in bed with improved pain control throughout the shift  Outcome: Progressing  Goal: Walks with improved pain control throughout the shift  Outcome: Progressing  Goal: Performs ADL's with improved pain control throughout shift  Outcome: Progressing  Goal: Participates in PT with improved pain control throughout the shift  Outcome: Progressing  Goal: Free from opioid side effects throughout the shift  Outcome: Progressing  Goal: Free from acute confusion related to pain meds throughout the shift  Outcome: Progressing     Problem:  Diabetes  Goal: Achieve decreasing blood glucose levels by end of shift  Outcome: Progressing  Goal: Increase stability of blood glucose readings by end of shift  Outcome: Progressing  Goal: Decrease in ketones present in urine by end of shift  Outcome: Progressing  Goal: Maintain electrolyte levels within acceptable range throughout shift  Outcome: Progressing  Goal: Maintain glucose levels >70mg/dl to <250mg/dl throughout shift  Outcome: Progressing  Goal: No changes in neurological exam by end of shift  Outcome: Progressing  Goal: Learn about and adhere to nutrition recommendations by end of shift  Outcome: Progressing  Goal: Vital signs within normal range for age by end of shift  Outcome: Progressing  Goal: Increase self care and/or family involovement by end of shift  Outcome: Progressing  Goal: Receive DSME education by end of shift  Outcome: Progressing

## 2024-12-03 NOTE — DISCHARGE SUMMARY
Discharge Diagnosis  Foot ulcer with fat layer exposed, right (Multi)    Issues Requiring Follow-Up  Staple removal  Follow-up in clinic 1 month    Test Results Pending At Discharge  Pending Labs       Order Current Status    Surgical Pathology Exam In process    Surgical Pathology Exam In process    Fungal Culture/Smear Preliminary result            Hospital Course   Errol Fonseca is 54 y.o. 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  Admission labs notable for lactate of 2.3 and WBC of 18.3.  Patient underwent debridement with podiatry an 11/21/2024 and 11/22/2024 however infection had spread and right lower extremity was deemed non-salvagable. Vascular surgery was consulted on 11/22/2024 given nonsalvageable status of right lower extremity following which patient underwent right guillotine knee disarticulation on 11/23/2024.  Postoperatively, patient was maintained IV antibiotics including vancomycin/Zosyn.  Patient underwent formalization of amputation 11/29/2024.  Patient remained on antibiotics in the perioperative period, with antibiotics discontinued 12/2/24.  Otherwise, postoperative course uncomplicated patient recovering well, tolerating diet, maintained on home insulin regimen.  At discharge, patient tolerating full diet, awake, alert, voiding, stooling appropriately.  Patient to be discharged to acute rehab given new AKA.      Pertinent Physical Exam At Time of Discharge  Physical Exam  Constitutional:       General: He is not in acute distress.     Appearance: He is not ill-appearing.   HENT:      Head: Normocephalic and atraumatic.   Eyes:      Extraocular Movements: Extraocular movements intact.      Pupils: Pupils are equal, round, and reactive to light.   Cardiovascular:      Rate and Rhythm: Normal rate and regular rhythm.   Pulmonary:       Effort: Pulmonary effort is normal. No respiratory distress.   Abdominal:      General: Abdomen is flat. There is no distension.      Palpations: Abdomen is soft.      Tenderness: There is no guarding or rebound.   Musculoskeletal:         General: No swelling or tenderness. Normal range of motion.      Cervical back: Normal range of motion.   Skin:     General: Skin is warm and dry.   Neurological:      General: No focal deficit present.      Mental Status: He is alert and oriented to person, place, and time.   Psychiatric:         Mood and Affect: Mood normal.         Home Medications     Medication List      START taking these medications     acetaminophen 325 mg tablet; Commonly known as: Tylenol; Take 2 tablets   (650 mg) by mouth every 8 hours if needed for mild pain (1 - 3).   gabapentin 400 mg capsule; Commonly known as: Neurontin; Take 1 capsule   (400 mg) by mouth 3 times a day.   oxyCODONE 5 mg immediate release tablet; Commonly known as: Roxicodone;   Take 1 tablet (5 mg) by mouth every 6 hours if needed for severe pain (7 -   10) for up to 16 days.   polyethylene glycol 17 gram packet; Commonly known as: Glycolax,   Miralax; Take 17 g by mouth once daily as needed (constipation).     CONTINUE taking these medications     aspirin 81 mg EC tablet; Take 1 tablet (81 mg) by mouth once daily.   atorvastatin 40 mg tablet; Commonly known as: Lipitor; Take 1 tablet (40   mg) by mouth once daily.   Blood Glucose Test strip; Generic drug: blood sugar diagnostic; Use 4   times daily as instructed   Blood Pressure Cuff misc; Generic drug: miscellaneous medical supply;   Dispense 1 blood pressure cuff   escitalopram 10 mg tablet; Commonly known as: Lexapro; Take 1 tablet (10   mg) by mouth once daily.   folic acid 1 mg tablet; Commonly known as: Folvite; Take 1 tablet (1 mg)   by mouth once daily. Do not start before April 3, 2024.   FreeStyle Brenda 3 Plus Sensor device; Generic drug: blood-glucose   sensor;  "Dispense 1 free style brenda monitoring kit   FreeStyle Brenda 3 [place] misc; Generic drug: blood-glucose   meter,continuous; Use as instructed   insulin glargine 100 unit/mL (3 mL) pen; Commonly known as: Lantus;   Inject 30 Units under the skin once daily in the morning.   insulin lispro 100 unit/mL injection; Commonly known as: HumaLOG KwikPen   Insulin; Inject 7 Units under the skin 3 times daily (morning, midday,   late afternoon). Take as directed per insulin instructions.   lisinopriL-hydrochlorothiazide 20-12.5 mg tablet; Take 1 tablet by mouth   once daily.   melatonin 5 mg tablet; Take 1 tablet (5 mg) by mouth once daily at   bedtime.   metFORMIN  mg 24 hr tablet; Commonly known as: Glucophage-XR; Take   1 tablet (500 mg) by mouth 2 times daily (morning and late afternoon). Do   not crush, chew, or split.   metoprolol tartrate 25 mg tablet; Commonly known as: Lopressor; Take 1   tablet (25 mg) by mouth 2 times a day.   multivitamin with minerals tablet; Take 1 tablet by mouth once daily.   nitroglycerin 0.4 mg SL tablet; Commonly known as: Nitrostat   pen needle, diabetic 32 gauge x 5/32\" needle; Use 4x daily as instructed   traZODone 50 mg tablet; Commonly known as: Desyrel; Take 1 tablet (50   mg) by mouth as needed at bedtime for sleep.   Trulicity 0.75 mg/0.5 mL pen injector; Generic drug: dulaglutide; Inject   0.75 mg under the skin 1 (one) time per week.   Vitamin D2 1,250 mcg (50,000 unit) capsule; Generic drug:   ergocalciferol; Take 1 capsule (50,000 Units) by mouth 1 (one) time per   week.     STOP taking these medications     ciprofloxacin 500 mg tablet; Commonly known as: Cipro   sulfamethoxazole-trimethoprim 800-160 mg tablet; Commonly known as:   Bactrim DS       Outpatient Follow-Up  Future Appointments   Date Time Provider Department [place]   12/26/2024 11:00 AM Angel Hurtado DO [id number] [place]   12/27/2024  1:40 PM Oliverio Sánchez MD [id number] [facility]       Elder Meza, " MD

## 2024-12-04 LAB
ANION GAP SERPL CALC-SCNC: 12 MMOL/L (ref 10–20)
BUN SERPL-MCNC: 15 MG/DL (ref 6–23)
CALCIUM SERPL-MCNC: 9.3 MG/DL (ref 8.6–10.3)
CHLORIDE SERPL-SCNC: 96 MMOL/L (ref 98–107)
CO2 SERPL-SCNC: 30 MMOL/L (ref 21–32)
CREAT SERPL-MCNC: 0.71 MG/DL (ref 0.5–1.3)
EGFRCR SERPLBLD CKD-EPI 2021: >90 ML/MIN/1.73M*2
ERYTHROCYTE [DISTWIDTH] IN BLOOD BY AUTOMATED COUNT: 13.6 % (ref 11.5–14.5)
GLUCOSE BLD MANUAL STRIP-MCNC: 163 MG/DL (ref 74–99)
GLUCOSE BLD MANUAL STRIP-MCNC: 176 MG/DL (ref 74–99)
GLUCOSE BLD MANUAL STRIP-MCNC: 183 MG/DL (ref 74–99)
GLUCOSE BLD MANUAL STRIP-MCNC: 232 MG/DL (ref 74–99)
GLUCOSE SERPL-MCNC: 193 MG/DL (ref 74–99)
HCT VFR BLD AUTO: 29.3 % (ref 41–52)
HGB BLD-MCNC: 9.5 G/DL (ref 13.5–17.5)
MCH RBC QN AUTO: 32.6 PG (ref 26–34)
MCHC RBC AUTO-ENTMCNC: 32.4 G/DL (ref 32–36)
MCV RBC AUTO: 101 FL (ref 80–100)
NRBC BLD-RTO: 0 /100 WBCS (ref 0–0)
PLATELET # BLD AUTO: 417 X10*3/UL (ref 150–450)
POTASSIUM SERPL-SCNC: 4.4 MMOL/L (ref 3.5–5.3)
RBC # BLD AUTO: 2.91 X10*6/UL (ref 4.5–5.9)
SODIUM SERPL-SCNC: 134 MMOL/L (ref 136–145)
WBC # BLD AUTO: 9.2 X10*3/UL (ref 4.4–11.3)

## 2024-12-04 PROCEDURE — 1280000001 HC REHAB SEMI-PRIVATE ROOM DAILY

## 2024-12-04 PROCEDURE — 80048 BASIC METABOLIC PNL TOTAL CA: CPT | Performed by: PHYSICAL MEDICINE & REHABILITATION

## 2024-12-04 PROCEDURE — 97116 GAIT TRAINING THERAPY: CPT | Mod: GP

## 2024-12-04 PROCEDURE — 97110 THERAPEUTIC EXERCISES: CPT | Mod: GO

## 2024-12-04 PROCEDURE — 97530 THERAPEUTIC ACTIVITIES: CPT | Mod: GP

## 2024-12-04 PROCEDURE — 82947 ASSAY GLUCOSE BLOOD QUANT: CPT

## 2024-12-04 PROCEDURE — 2500000002 HC RX 250 W HCPCS SELF ADMINISTERED DRUGS (ALT 637 FOR MEDICARE OP, ALT 636 FOR OP/ED): Performed by: PHYSICAL MEDICINE & REHABILITATION

## 2024-12-04 PROCEDURE — 97162 PT EVAL MOD COMPLEX 30 MIN: CPT | Mod: GP

## 2024-12-04 PROCEDURE — 85027 COMPLETE CBC AUTOMATED: CPT | Performed by: PHYSICAL MEDICINE & REHABILITATION

## 2024-12-04 PROCEDURE — 97110 THERAPEUTIC EXERCISES: CPT | Mod: GP

## 2024-12-04 PROCEDURE — 97166 OT EVAL MOD COMPLEX 45 MIN: CPT | Mod: GO

## 2024-12-04 PROCEDURE — 2500000001 HC RX 250 WO HCPCS SELF ADMINISTERED DRUGS (ALT 637 FOR MEDICARE OP): Performed by: PHYSICAL MEDICINE & REHABILITATION

## 2024-12-04 PROCEDURE — 97530 THERAPEUTIC ACTIVITIES: CPT | Mod: GO

## 2024-12-04 PROCEDURE — 36415 COLL VENOUS BLD VENIPUNCTURE: CPT | Performed by: PHYSICAL MEDICINE & REHABILITATION

## 2024-12-04 ASSESSMENT — BRIEF INTERVIEW FOR MENTAL STATUS (BIMS)
WHAT MONTH IS IT: ACCURATE WITHIN 5 DAYS
ASKED TO RECALL BLUE: YES, NO CUE REQUIRED
BIMS SUMMARY SCORE: 13
WHAT YEAR IS IT: CORRECT
INITIAL REPETITION OF BED BLUE SOCK - FIRST ATTEMPT: 3
WHAT DAY OF THE WEEK IS IT: CORRECT
ASKED TO RECALL SOCK: YES, NO CUE REQUIRED
ASKED TO RECALL BED: NO, COULD NOT RECALL

## 2024-12-04 ASSESSMENT — PAIN - FUNCTIONAL ASSESSMENT
PAIN_FUNCTIONAL_ASSESSMENT: 0-10
PAIN_FUNCTIONAL_ASSESSMENT: UNABLE TO SELF-REPORT

## 2024-12-04 ASSESSMENT — PAIN SCALES - GENERAL
PAINLEVEL_OUTOF10: 7
PAINLEVEL_OUTOF10: 5 - MODERATE PAIN
PAINLEVEL_OUTOF10: 5 - MODERATE PAIN
PAINLEVEL_OUTOF10: 6
PAINLEVEL_OUTOF10: 6
PAINLEVEL_OUTOF10: 7
PAINLEVEL_OUTOF10: 6
PAINLEVEL_OUTOF10: 6
PAINLEVEL_OUTOF10: 4
PAINLEVEL_OUTOF10: 6
PAINLEVEL_OUTOF10: 5 - MODERATE PAIN
PAINLEVEL_OUTOF10: 8
PAINLEVEL_OUTOF10: 4
PAINLEVEL_OUTOF10: 5 - MODERATE PAIN

## 2024-12-04 ASSESSMENT — PAIN DESCRIPTION - LOCATION
LOCATION: LEG

## 2024-12-04 ASSESSMENT — PAIN DESCRIPTION - DESCRIPTORS
DESCRIPTORS: ACHING
DESCRIPTORS: ACHING
DESCRIPTORS: THROBBING
DESCRIPTORS: ACHING
DESCRIPTORS: ACHING
DESCRIPTORS: ACHING;THROBBING

## 2024-12-04 ASSESSMENT — PAIN DESCRIPTION - ORIENTATION
ORIENTATION: RIGHT;LOWER
ORIENTATION: RIGHT
ORIENTATION: RIGHT;LOWER

## 2024-12-04 ASSESSMENT — ACTIVITIES OF DAILY LIVING (ADL)
BATHING_ASSISTANCE: MODERATE
ADL_ASSISTANCE: INDEPENDENT

## 2024-12-04 NOTE — PROGRESS NOTES
Occupational Therapy    Evaluation    Patient Name: Errol Fonseca  MRN: [id number]  Department: [facility] REHAB  Room: [address] [place] [address]  Today's Date: 12/4/2024  Time Calculation  Start Time: 0745  Stop Time: 0830  Time Calculation (min): 45 min        Assessment:  OT Assessment: decline in adl, transfers, mobility  Prognosis: Good  Barriers to Discharge: None  Evaluation/Treatment Tolerance: Patient limited by pain  End of Session Communication: Bedside nurse  End of Session Patient Position: Up in chair, Alarm on  OT Assessment Results: Decreased ADL status, Decreased safe judgment during ADL, Decreased endurance, Decreased sensation, Decreased functional mobility, Decreased IADLs, Decreased trunk control for functional activities  Prognosis: Good  Barriers to Discharge: None  Evaluation/Treatment Tolerance: Patient limited by pain  Strengths: Ability to acquire knowledge, Attitude of self, Housing layout  Barriers to Participation: Comorbidities  Plan:  Treatment Interventions: ADL retraining, Functional transfer training, UE strengthening/ROM, Endurance training, Patient/family training, Equipment evaluation/education, Compensatory technique education, Continued evaluation  OT Frequency: 90 min/5 days per week (1 week)  OT Discharge Recommendations:  (mod I at wc level, sba tub/dme transfers)  Equipment Recommended upon Discharge: Wheeled walker, Wheelchair, Bedside commode  Treatment Interventions: ADL retraining, Functional transfer training, UE strengthening/ROM, Endurance training, Patient/family training, Equipment evaluation/education, Compensatory technique education, Continued evaluation    Subjective   Current Problem:  No diagnosis found.  General:  General  Reason for Referral: OT eval and treat s/p rt AKA (53 yo male adm 11/21/24 with RLE necrotizing soft tissue infection. Pt underwent right guillotine knee disarticulation on 11/23/24 with formalization of right AKA on 11/29/24.)  Referred By: Gian Elizondo  Medical History Relevant to Rehab: DM, CAD s/p PCI, HTN, HLD, alcoholic cirrhosis, prior left 5th toe amputation  Patient Position Received: Up in chair, Alarm on  Precautions:  Hearing/Visual Limitations: Glasses for reading  LE Weight Bearing Status: Right Non-Weight Bearing  Medical Precautions: Fall precautions  Precautions Comment: R AKA            Pain:  Pain Assessment  Pain Assessment: 0-10  0-10 (Numeric) Pain Score: 6  Pain Type: Phantom pain, Surgical pain  Pain Location:  (rt leg)  Pain Interventions: Medication (See MAR), Repositioned, Distraction    Objective   Cognition:  Overall Cognitive Status: Within Functional Limits  Orientation Level: Oriented X4  Impulsive: Moderately           Home Living:  Type of Home:  (Pt currently lives with dtr in multi-level home with bedroom and 1/2 bath in basement. 4 stairs to landing and then one curb step to enter daughter's home. Plan for discharge to son's home which is first floor apt with one small curb step to enter.)  Lives With:  (currently with daughter and son in law, will be moving to son's apt)  Bathroom Shower/Tub:  (tub/shower at son's; walk in shower on 3rd level at daughter's house)  Bathroom Toilet: Standard  Bathroom Equipment: None  Prior Function:  Level of [place]: Independent with ADLs and functional transfers, Independent with homemaking with ambulation  Receives Help From: Family  ADL Assistance: Independent  Homemaking Assistance: Independent  Ambulatory Assistance: Independent (no device)  Vocational: Unemployed  Hand Dominance: Left  Prior Function Comments: drives, but does not have a car at this time  IADL History:     ADL:  Eating Assistance: Independent  Grooming Deficit: Setup  Bathing Assistance: Moderate  UE Dressing Deficit: Setup  LE Dressing Assistance:  (mod a x1-2)  Toileting Assistance with Device:  (mod a x1-2)  Activity Tolerance:  Endurance: Decreased tolerance for upright activites  Bed Mobility/Transfers: Bed  Mobility  Bed Mobility:  (rolling with bedrail and min assist due to pain. Sit to supine with SBA. Supine to sit with min assist.)    Transfers  Transfer:  (Sit to stand with mod a x1. Pivot transfers without a device with min/mod a x2. Pt is impulsive and unsteady. Max verbal and manual cues for techniques.)      Functional Mobility:  Functional Mobility  Functional Mobility Performed:  (completes 10 ft simple mobility at wwalker level min a x2 and wc follow, impulsive and unsteady, cues for slowing pace/impulsivity)     Sensation:  Sensation Comment: c/o neuropathy/poor sensation in left foot; reports ue's no sensation issues  Strength:  Strength Comments: bilat ue mmt wfl        Hand Function:  Gross Grasp: Functional  Coordination: Functional  Extremities: RUE   RUE : Within Functional Limits and LUE   LUE: Within Functional Limits      Education Documentation  Body Mechanics, taught by Luh Maldonado OT at 12/4/2024 12:24 PM.  Learner: Patient  Readiness: Acceptance  Method: Explanation  Response: Verbalizes Understanding, Needs Reinforcement    Precautions, taught by Luh Maldonado OT at 12/4/2024 12:24 PM.  Learner: Patient  Readiness: Acceptance  Method: Explanation  Response: Verbalizes Understanding, Needs Reinforcement    ADL Training, taught by Luh Maldonado OT at 12/4/2024 12:24 PM.  Learner: Patient  Readiness: Acceptance  Method: Explanation  Response: Verbalizes Understanding, Needs Reinforcement    Education Comments  No comments found.        OP EDUCATION:  Education  Individual(s) Educated: Patient  Education Provided: Diagnosis & Precautions (rehab program, initated residual limb training)  Patient Response to Education: Patient/Caregiver Verbalized Understanding of Information  Education Comment: pt reports no concerns regarding sex/intimacy as relates to dx    Goals:  Encounter Problems       Encounter Problems (Active)       OT Problem       LTG - Patient will complete grooming with mod I  (Progressing)       Start:  12/04/24    Expected End:  12/11/24            LTG - Patient will complete bathing with supervision or better (Progressing)       Start:  12/04/24    Expected End:  12/11/24            LTG - Patient will complete UE dressing with mod I (Progressing)       Start:  12/04/24    Expected End:  12/11/24            LTG - Patient will complete LE dressing using adaptive equip as needed with mod I (Progressing)       Start:  12/04/24    Expected End:  12/11/24            LTG - Patient will complete toileting with mod I (Progressing)       Start:  12/04/24    Expected End:  12/11/24            LTG - Patient will complete commode transfer via wwalker or pivot with mod I (Progressing)       Start:  12/04/24    Expected End:  12/11/24            LTG - Patient will complete tub/shower transfer using DME as needed with sba (Progressing)       Start:  12/04/24    Expected End:  12/11/24            LTG - Patient will complete functional mobility via wheelchar mod I (Progressing)       Start:  12/04/24    Expected End:  12/11/24            LTG - Patient will complete simple mobility/approach steps via wwalker supervision or better (Progressing)       Start:  12/04/24    Expected End:  12/11/24            LTG - Patient will complete light meal prep or kitchen access at wheelchair level mod I (Progressing)       Start:  12/04/24    Expected End:  12/11/24

## 2024-12-04 NOTE — PROGRESS NOTES
Physical Therapy    Physical Therapy Treatment    Patient Name: Errol Fonseca  MRN: [id number]  Department: [facility] REHAB  Room: [address] [place] [address]  Today's Date: 12/4/2024  Time Calculation  Start Time: 1300  Stop Time: 1345  Time Calculation (min): 45 min         Assessment/Plan   PT Assessment  PT Assessment Results: Decreased strength, Decreased endurance, Impaired balance, Decreased mobility, Decreased safety awareness, Pain  Rehab Prognosis:  (Good for stated goals)  Barriers to Discharge: Limited assist available at discharge  Evaluation/Treatment Tolerance: Patient limited by pain  Strengths: Attitude of self, Housing layout, Premorbid level of function, Support of Caregivers  Barriers to Participation:  (Pain)  End of Session Patient Position: Bed, 2 rail up, Alarm on     PT Plan  Treatment/Interventions: Bed mobility, Transfer training, Gait training, Stair training, Balance training, Strengthening, Endurance training, Therapeutic exercise, Therapeutic activity, Wheelchair management  PT Plan: Ongoing PT  PT Frequency: 90 min/5 days per week (1 week)  PT Discharge Recommendations:  (Anticipate discharge home mod independent at the wheelchair level.  Assist with higher level functional tasks.)  Equipment Recommended upon Discharge: Wheeled walker, Wheelchair  PT Recommended Transfer Status: Assist x2      General Visit Information:   PT  Visit  PT Received On: 12/04/24  General  Reason for Referral: PT eval and treat s/p right AKA (55 yo male adm 11/21/24 with RLE necrotizing soft tissue infection.  Pt underwent right guillotine knee disarticulation on 11/23/24 with formalization of right AKA on 11/29/24.)  Referred By: Gian  Past Medical History Relevant to Rehab: DM, CAD s/p PCI, HTN, HLD, alcoholic cirrhosis, prior left 5th toe amputation  Patient Position Received: Up in chair, Alarm on    Subjective   Precautions:  Precautions  Medical Precautions: Fall precautions  Precautions Comment: R AKA         Objective   Pain:  Pain Assessment  Pain Assessment: 0-10  0-10 (Numeric) Pain Score: 5 - Moderate pain  Pain Type: Surgical pain, Phantom pain  Pain Location: Leg  Pain Orientation: Right  Pain Interventions: Repositioned, Ambulation/increased activity, Distraction    Cognition:  Cognition  Overall Cognitive Status: Within Functional Limits  Impulsive: Moderately    Treatments:  Therapeutic Exercise  Therapeutic Exercise Performed:  (Pt performed supine AKA mat ex program 2 x5-6 reps each.  Ex performed to improve strength and decreased residual limb sensitivity.)    Bed Mobility  Bed Mobility:  (Bed mobility training:  sit to supine with SBA.  Supine to sit with CGA with head of bed elevated.)    Ambulation/Gait Training  Ambulation/Gait Training Performed:  (Pt amb 12 ft with wheeled walker and min assist x2.  Unsteady and quickly fatigues.)    Transfers  Transfer:  (Sit to stand transfer training with mod assist x1.  Repeated pivot transfer training to the left and right x4 without a device with min/mod assist x2.  Mod verbal cues for technique.)    Wheelchair Activities  Propulsion:  (Pt propelled wheelchair 100 ft with SBA and mod cues for turns.)    EDUCATION:  Education Comment:  (Pt educated on purposefully decreasing speed of movements to improve safety.)    Encounter Problems       Encounter Problems (Active)       PT Problem       STG - Bed mobility with SBA (Progressing)       Start:  12/04/24    Expected End:  12/07/24            STG - Transfers with min/mod assist x1.  (Progressing)       Start:  12/04/24    Expected End:  12/07/24            STG- Pt will amb 15 ft with wheeled walker and mod assist x1. (Progressing)       Start:  12/04/24    Expected End:  12/07/24            STG - Will determine most approp means of entry into home. (Progressing)       Start:  12/04/24    Expected End:  12/07/24            STG - Pt will propel wheelchair 100 ft with SBA (Progressing)       Start:  12/04/24     Expected End:  12/07/24            LTG - Mod independent bed mobility. (Progressing)       Start:  12/04/24    Expected End:  12/11/24            LTG - Mod independent transfers. (Progressing)       Start:  12/04/24    Expected End:  12/11/24            LTG - Pt will amb 25 ft with wheeled walker and min assist. (Progressing)       Start:  12/04/24    Expected End:  12/11/24            LTG - Will educate pt and family on most approp means of entry into home. (Progressing)       Start:  12/04/24    Expected End:  12/11/24            LTG - Pt will propel wheelchair 150 ft independently (Progressing)       Start:  12/04/24    Expected End:  12/11/24

## 2024-12-04 NOTE — CONSULTS
"Nutrition Initial Assessment:   Nutrition Assessment    Reason for Assessment: Admission nursing screening (acute rehab protocol; MST=0)    Patient is a 54 y.o. male presenting with R AKA 11/23/24, 11/29/24    PmHx: DM, CAD, HTN, HLD, ETOH cirrhosis, hx L 5th toe amputation    Nutrition History:  Energy Intake: Good > 75 %  Food and Nutrient History: Pt sitting in room in wheelchair at time of visit today. Pt reports appetite is good now, feels hungry.  Had not been eating great PTA due multiple surgeries/infection.Pt denies chewing or swallowing problems.  Denies unintentional weight loss. Reports -225lbs. Pt POD#11 initial R AKA and POD#5 closure site. Pt agreeble to Ensure HP and Anatoliy to support wound healing.  Vitamin/Herbal Supplement Use: vitamin D2 and folic acid  Food Allergies/Intolerances:  None  GI Symptoms: None  Oral Problems: None       Anthropometrics:  Height: 185.4 cm (6' 0.99\")   Weight: 103 kg (227 lb 8.2 oz)   BMI (Calculated): 30.02  IBW/kg (Dietitian Calculated): 83.6 kg  Percent of IBW: 123 %       Weight History:     Weight Change %:  Weight History / % Weight Change: -225lbs;  11/21 102kg, 11/15 102kg, 9/4 102kg, 8/27 95.3kg, 6/5 101kg, 4/15 96.2kg, 3/26 85.3kg, 10/10/23 83.9kg  Significant Weight Loss: No    Nutrition Focused Physical Exam Findings:    Subcutaneous Fat Loss:   Orbital Fat Pads: Well nourished (slightly bulging fat pads)  Buccal Fat Pads: Well nourished (full, rounded cheeks)  Triceps: Well nourished (ample fat tissue)  Muscle Wasting:  Temporalis: Well nourished (well-defined muscle)  Pectoralis (Clavicular Region): Well nourished (clavicle not visible)  Deltoid/Trapezius: Well nourished (rounded appearance at arm, shoulder, neck)  Interosseous: Well nourished (muscle bulges)  Trapezius/Infraspinatus/Supraspinatus (Scapular Region): Well nourished (bones not prominent, muscle taut)  Edema:  Edema: +1 trace  Edema Location: RLE  Physical Findings:  Skin: " Positive (R stump incision)    Nutrition Significant Labs:  CBC Trend:   Results from last 7 days   Lab Units 12/04/24  0721 12/03/24  0830 12/02/24  0841 12/01/24  0823   WBC AUTO x10*3/uL 9.2 7.7 9.7 10.8   RBC AUTO x10*6/uL 2.91* 2.94* 2.74* 2.61*   HEMOGLOBIN g/dL 9.5* 9.5* 8.8* 8.6*   HEMATOCRIT % 29.3* 30.3* 27.7* 25.6*   MCV fL 101* 103* 101* 98   PLATELETS AUTO x10*3/uL 417 382 410 340    , BMP Trend:   Results from last 7 days   Lab Units 12/04/24  0721 12/03/24  0830 12/02/24  0841 12/01/24  0823   GLUCOSE mg/dL 193* 213* 152* 308*   CALCIUM mg/dL 9.3 9.3 8.9 8.3*   SODIUM mmol/L 134* 135* 133* 130*   POTASSIUM mmol/L 4.4 5.0 3.7 4.6   CO2 mmol/L 30 26 29 26   CHLORIDE mmol/L 96* 100 97* 97*   BUN mg/dL 15 11 8 9   CREATININE mg/dL 0.71 0.54 0.59 0.60    , A1C:  Lab Results   Component Value Date    HGBA1C 7.3 (H) 09/25/2024   , BG POCT trend:   Results from last 7 days   Lab Units 12/04/24  1112 12/04/24  0647 12/03/24  2222 12/03/24  1613 12/03/24  1210   POCT GLUCOSE mg/dL 232* 163* 161* 309* 263*        Nutrition Specific Medications:  Reviewed     I/O:   Last BM Date: 12/04/24; Stool Appearance: Formed, Soft (12/04/24 0658)    Dietary Orders (From admission, onward)       Start     Ordered    12/04/24 1042  Oral nutritional supplements  Until discontinued        Question Answer Comment   Deliver with Lunch    Select supplement: Ensure High Protein        12/04/24 1041    12/04/24 0859  Adult diet Regular, Consistent Carb; CCD 75 gm/meal  Diet effective now        Question Answer Comment   Diet type Regular    Diet type Consistent Carb    Carb diet selection: CCD 75 gm/meal        12/04/24 0858    12/04/24 0859  Oral nutritional supplements  Until discontinued        Question Answer Comment   Deliver with Breakfast    Deliver with Dinner    Select supplement: Anatoliy        12/04/24 0858    12/03/24 1751  May Participate in Room Service  ( ROOM SERVICE MAY PARTICIPATE)  Once        Question:  .   Answer:  Yes    12/03/24 1750                     Estimated Needs:      Method for Estimating Needs: 2000-2300kcals (28-32kcals/kg adj IBW)     Method for Estimating Needs: 84-105g (1.2-1.5g/kg adj IBW)     Method for Estimating Needs: 1 mL/kcal or as per MD        Nutrition Diagnosis   Malnutrition Diagnosis  Patient has Malnutrition Diagnosis: No    Nutrition Diagnosis  Patient has Nutrition Diagnosis: Yes  Diagnosis Status (1): Ongoing  Nutrition Diagnosis 1: Increased nutrient needs  Related to (1): increased metabolic demand  As Evidenced by (1): wound healing needs to R AKA stump; acute rehab demands       Nutrition Interventions/Recommendations         Nutrition Prescription:  Individualized Nutrition Prescription Provided for : 75gm carb controlled diet with Ensure HP once daily and Anatoliy twice daily        Nutrition Interventions:   Interventions: Meals and snacks, Medical food supplement  Meals and Snacks: Carbohydrate-modified diet  Goal: consume >75% of meals  Medical Food Supplement: Commercial beverage  Goal: consume >75% of Ensure HP once daily (for an additional 160 kcals, 16 gm protein each)  Additional Interventions: consume >75% of Anatoliy twice daily (for an additional 90 kcals, 2.5 gm protein, supplement glutamine and arginine)    Collaboration and Referral of Nutrition Care:  (spoke with pt)    Nutrition Education:   Pt denied education need      Nutrition Monitoring and Evaluation   Food/Nutrient Related History Monitoring  Monitoring and Evaluation Plan: Energy intake, Fluid intake, Amount of food  Energy Intake: Estimated energy intake  Criteria: Meal/ONS intake to meet >75% of estimated needs  Fluid Intake: Estimated fluid intake  Criteria: fluid intake to meet >75% of estimated need  Amount of Food: Estimated amout of food, Medical food intake  Criteria: Pt to consume >75% of meals/ONS    Body Composition/Growth/Weight History  Monitoring and Evaluation Plan: Weight  Weight: Measured  weight  Criteria: maintain stable weight    Biochemical Data, Medical Tests and Procedures  Monitoring and Evaluation Plan: Electrolyte/renal panel, Glucose/endocrine profile  Electrolyte and Renal Panel: Sodium  Criteria: labs WNL  Glucose/Endocrine Profile: Glucose, casual  Criteria: BG within acceptable range    Nutrition Focused Physical Findings  Monitoring and Evaluation Plan: Skin  Criteria: promote healing through adequate nutrition         Time Spent (min): 45 minutes

## 2024-12-04 NOTE — PROGRESS NOTES
Occupational Therapy    OT Treatment    Patient Name: Errol Fonseca  MRN: [id number]  Department: [facility] REHAB  Room: [address] [place] [address]  Today's Date: 12/4/2024  Time Calculation  Start Time: 0915  Stop Time: 1000  Time Calculation (min): 45 min        Assessment:  OT Assessment: decline in adl, transfers, mobility  Prognosis: Good  Barriers to Discharge: None  Evaluation/Treatment Tolerance: Patient limited by pain  End of Session Communication: Bedside nurse  End of Session Patient Position: Up in chair, Alarm on  OT Assessment Results: Decreased ADL status, Decreased safe judgment during ADL, Decreased endurance, Decreased sensation, Decreased functional mobility, Decreased IADLs, Decreased trunk control for functional activities  Prognosis: Good  Barriers to Discharge: None  Evaluation/Treatment Tolerance: Patient limited by pain  Strengths: Ability to acquire knowledge, Attitude of self, Housing layout  Barriers to Participation: Comorbidities  Plan:  Treatment Interventions: ADL retraining, Functional transfer training, UE strengthening/ROM, Endurance training, Patient/family training, Equipment evaluation/education, Compensatory technique education, Continued evaluation  OT Frequency: 90 min/5 days per week (1 week)  OT Discharge Recommendations:  (mod I at wc level, sba tub/dme transfers)  Equipment Recommended upon Discharge: Wheeled walker, Wheelchair, Bedside commode  Treatment Interventions: ADL retraining, Functional transfer training, UE strengthening/ROM, Endurance training, Patient/family training, Equipment evaluation/education, Compensatory technique education, Continued evaluation    Subjective   Previous Visit Info:  OT Last Visit  OT Received On: 12/04/24  General:  General  Reason for Referral: OT eval and treat s/p rt AKA (53 yo male adm 11/21/24 with RLE necrotizing soft tissue infection. Pt underwent right guillotine knee disarticulation on 11/23/24 with formalization of right AKA on  11/29/24.)  Referred By: Gian  Past Medical History Relevant to Rehab: DM, CAD s/p PCI, HTN, HLD, alcoholic cirrhosis, prior left 5th toe amputation  Prior to Session Communication: Bedside nurse (rehab tech)  Patient Position Received: Up in chair, Alarm on  Precautions:  Hearing/Visual Limitations: Glasses for reading  LE Weight Bearing Status: Right Non-Weight Bearing  Medical Precautions: Fall precautions  Precautions Comment: R AKA          Pain:  Pain Assessment  Pain Assessment: 0-10  0-10 (Numeric) Pain Score: 4  Pain Type: Phantom pain, Surgical pain  Pain Location:  (rt le)  Pain Interventions: Medication (See MAR), Repositioned, Distraction    Objective    Cognition:  Cognition  Overall Cognitive Status: Within Functional Limits  Orientation Level: Oriented X4  Impulsive: Moderately    Functional Mobility:  Functional Mobility  Functional Mobility Performed:  (completes 10 ft simple mobility at wwalker level min a x2 and wc follow, impulsive and unsteady, cues for slowing pace/impulsivity)  Functional Mobility 1  Comments 1: completes simple wheel chair mobility 40 ft x2, rest breaks needed for fatigue       Therapy/Activity: Therapeutic Exercise  Therapeutic Exercise Activity 1: completes wc push ups with cga and min cues for tech x5 reps  Therapeutic Exercise Activity 2: completes bilat ue ther ex program with 3# weights 10 x2 sets 6 planes with sba and min cues for techs to promote indep in adl, transfers, mobility       EDUCATION:  Education  Individual(s) Educated: Patient  Education Provided:  (goals of session, initiated bilat ue hep/techs, educated on wc  safety/techs, leg rest mgmt and able to demo unlatching and removing leg rest)  Patient Response to Education: Patient/Caregiver Verbalized Understanding of Information    Goals:  Encounter Problems       Encounter Problems (Active)       OT Problem       LTG - Patient will complete grooming with mod I (Progressing)       Start:  12/04/24     Expected End:  12/11/24            LTG - Patient will complete bathing with supervision or better (Progressing)       Start:  12/04/24    Expected End:  12/11/24            LTG - Patient will complete UE dressing with mod I (Progressing)       Start:  12/04/24    Expected End:  12/11/24            LTG - Patient will complete LE dressing using adaptive equip as needed with mod I (Progressing)       Start:  12/04/24    Expected End:  12/11/24            LTG - Patient will complete toileting with mod I (Progressing)       Start:  12/04/24    Expected End:  12/11/24            LTG - Patient will complete commode transfer via wwalker or pivot with mod I (Progressing)       Start:  12/04/24    Expected End:  12/11/24            LTG - Patient will complete tub/shower transfer using DME as needed with sba (Progressing)       Start:  12/04/24    Expected End:  12/11/24            LTG - Patient will complete functional mobility via wheelchar mod I (Progressing)       Start:  12/04/24    Expected End:  12/11/24            LTG - Patient will complete simple mobility/approach steps via wwalker supervision or better (Progressing)       Start:  12/04/24    Expected End:  12/11/24            LTG - Patient will complete light meal prep or kitchen access at wheelchair level mod I (Progressing)       Start:  12/04/24    Expected End:  12/11/24

## 2024-12-04 NOTE — PROGRESS NOTES
12/4/24:  Spoke with patient bedside , introduced self and explained role.  Patient was living with his daughter prior to admission in a multi level home.  Upon discharge, patient's plan is to move in with his son who has a first floor apartment.  Prior to admission the patient states he was independent.  Discussed ELOS depends on progress and goals, will continue to update regarding DC planning. -Chon Oconnell RN    12/5/24:  Spoke with patient bedside and discussed possible DC Tuesday 12/10.  Offered therapy teaching for patient's son, will consider.  -Chon Oconnell RN    12/10/24:  Spoke with patient bedside, discussed DC [place] 12/15.  He is agreeable to updated DC plan, his son is off that day and can provide assistance.  Patient's son provided pictures of home layout for therapy.  -Chon Oconnell RN    12/13/24:  Patient ready and agreeable to DC [place] 12/15 with [facility].  -Chon Oconnell RN    12/16/24:  Follow up phone call attempted, L/M. -Chon Oconnell RN

## 2024-12-04 NOTE — INDIVIDUALIZED OVERALL PLAN OF CARE NOTE
Individualized Plan of Care:     Primary rehabilitation diagnosis: Right above-knee amputation     [facility] code: 5.3     Estimated length of stay:   7 days     Medical functional prognosis is good to be discharged home at a wheelchair level with home health care at a modified independent level       Patient/family anticipated outcome:  Home as independent as possible     Functional outcome/goal:  Bed mobility: Modified independent  Transfer sit to stand : Modified independent  Ambulation: Short distances with a wheeled walker and supervised  Upper extremity dressing: Modified independent  Lower extremity dressing: Supervised  Independent with wheelchair mobility  communicate needs and wants as independent as able.       Anticipated interventions:  Therapeutic exercises  Gait Training  Transfer training  Exercises to improve balance and mobility  ADL retraining for grooming, bathing, ADL activities  Exercises to improve strength and endurance            Required therapy:  Physical therapy 90 minutes 5 days/week.  Occupational therapy 90 minutes 5 days/week.       Patient has good potential to be discharged home at an overall modified independent level.  He will likely go home at a wheelchair level.  He is highly motivated to participate in therapy to go home with his son      Right above-knee amputation  Begin physical therapy for strengthening, preprosthetic gait training, transfer training, wheelchair mobility  Begin occupational therapy for ADL retraining, strengthening, transfer training, and ADL activities     2.  Hyperlipidemia  Lipitor 40 mg daily     3.  Diabetes mellitus type 2  Metformin 500 mg twice daily  Lantus 30 units at at bedtime  Sliding scale with meals  Trulicity 0.75 weekly     4.  Hypertension   Lisinopril 20 mg  Hydrochlorothiazide 12.5 mg daily  Adjust medication for appropriate blood pressure control     5.  Pain management   Neurontin 400 mg 3 times daily  Oxycodone as needed     6.   Mood  Lexapro 10 mg daily  Deseryl     7.  Bowel bladder  Medications to encourage bowel movement every 2 to 3 days     FENGI  Patient is at risk for infection we will continue to monitor his wound for adequate wound healing  He is a high fall risk will encourage safe transfers  He is on a bowel program for appropriate bowel movement

## 2024-12-04 NOTE — CARE PLAN
The patient's goals for the shift include      The clinical goals for the shift include Patient will receive adequate rest this shift.      Problem: Diabetes  Goal: Achieve decreasing blood glucose levels by end of shift  Outcome: Progressing  Goal: Increase stability of blood glucose readings by end of shift  Outcome: Progressing  Goal: Decrease in ketones present in urine by end of shift  Outcome: Progressing  Goal: Maintain electrolyte levels within acceptable range throughout shift  Outcome: Progressing  Goal: Maintain glucose levels >70mg/dl to <250mg/dl throughout shift  Outcome: Progressing  Goal: No changes in neurological exam by end of shift  Outcome: Progressing  Goal: Learn about and adhere to nutrition recommendations by end of shift  Outcome: Progressing  Goal: Vital signs within normal range for age by end of shift  Outcome: Progressing  Goal: Increase self care and/or family involovement by end of shift  Outcome: Progressing  Goal: Receive DSME education by end of shift  Outcome: Progressing     Problem: Pain  Goal: Takes deep breaths with improved pain control throughout the shift  Outcome: Progressing  Goal: Turns in bed with improved pain control throughout the shift  Outcome: Progressing  Goal: Walks with improved pain control throughout the shift  Outcome: Progressing  Goal: Performs ADL's with improved pain control throughout shift  Outcome: Progressing  Goal: Participates in PT with improved pain control throughout the shift  Outcome: Progressing  Goal: Free from opioid side effects throughout the shift  Outcome: Progressing  Goal: Free from acute confusion related to pain meds throughout the shift  Outcome: Progressing     Problem: Skin  Goal: Decreased wound size/increased tissue granulation at next dressing change  Outcome: Progressing  Flowsheets (Taken 12/4/2024 0154)  Decreased wound size/increased tissue granulation at next dressing change: Promote sleep for wound healing  Goal:  Participates in plan/prevention/treatment measures  Outcome: Progressing  Flowsheets (Taken 12/4/2024 0154)  Participates in plan/prevention/treatment measures: Elevate heels  Goal: Prevent/manage excess moisture  Outcome: Progressing  Flowsheets (Taken 12/4/2024 0154)  Prevent/manage excess moisture: Moisturize dry skin  Goal: Prevent/minimize sheer/friction injuries  Outcome: Progressing  Flowsheets (Taken 12/4/2024 0154)  Prevent/minimize sheer/friction injuries:   HOB 30 degrees or less   Use pull sheet  Goal: Promote/optimize nutrition  Outcome: Progressing  Flowsheets (Taken 12/4/2024 0154)  Promote/optimize nutrition: Offer water/supplements/favorite foods  Goal: Promote skin healing  Outcome: Progressing  Flowsheets (Taken 12/4/2024 0154)  Promote skin healing: Assess skin/pad under line(s)/device(s)

## 2024-12-04 NOTE — H&P
Admission diagnosis:    History Of Present Illness  Errol Fonseca is a 54 y.o. male presenting with a right above-knee amputation.  He has a history of a left fifth toe amputation, diabetes mellitus, coronary artery disease with a history of stent, hypertension, hyperlipidemia, and alcohol cirrhosis.  He presented to the emergency room on 1120 with right lower extremity necrotizing soft tissue infection and was seen by podiatry twice for debridement but was found to have widespread infection in his foot was nonsalvageable.  On 11/23/2024 he underwent a right guillotine knee disarticulation.  On 1129/24 he underwent a right above-knee amputation.  He is highly motivated to participate in therapy and is going to be discharged with his son.     prior Functional Status:  Lives with Other (Comment)LIVES W/ SISTER. 5 STEPS INTO HOME,5-6 STEPS TO BEDROOM. PT CAN STAY WITH SON WHO LIVES IN A FIRST FLOOR APT. THRESHHOLD TO ENTER. LIMITED ASSIST.  Self-Care: IND  Ambulation: IND  Stairs: IND  Cognition: A&OX3  Wheelchair: N/A  Devices: NONE     Current Functional Status:  Eating: IND  Oral hygiene: SET UP  Toileting: CGA/MIN  Bathing: MIN  Upper body dressing: IND  Lower body dressing: MIN  Bed Mobility: SUPERVISION  Transfers: CGA,AMB 5 FEET MIN W/W  Bladder and Bowel: CONT  Cognition: A&OX3     Rehabilitation Goals and Plan:  Expected level of improvement: MOD IND  Likelihood of reaching these goals: excellent.  Therapy treatments needed to achieve these goals: physical therapy, occupational therapy, nursing, aide, and CM .  Barriers to achieving these goals: Pain   Expected length of stay: 5-7 day(s)     Past Medical History  He has a past medical history of Acute ischemic heart disease, unspecified, Alcoholic hepatitis (05/19/2019), Cirrhosis of liver (Multi), Coronary artery disease, Diabetes mellitus (Multi), Hypertension, Personal history of other diseases of the circulatory system, Personal history of other  diseases of the musculoskeletal system and connective tissue, Personal history of other endocrine, nutritional and metabolic disease, Pneumonia of right lung due to infectious organism, unspecified part of lung (03/26/2024), SAH (subarachnoid hemorrhage) (Multi) (11/16/2021), Syncope (05/19/2019), and Tobacco use disorder (12/06/2021).    Surgical History  He has a past surgical history that includes Cardiac catheterization (05/02/2018) and Appendectomy (05/03/2018).,  left fifth toe amputation, and right above-knee amputation     Social History  He reports that he quit smoking about 11 months ago. His smoking use included cigarettes. He has been exposed to tobacco smoke. He has never used smokeless tobacco. He reports that he does not currently use alcohol. He reports that he does not use drugs.    Family history mother is living, she has a history of a stroke father is also living and is in good health     Allergies  Patient has no known allergies.    Medications  Current Facility-Administered Medications   Medication Dose Route Frequency Provider Last Rate Last Admin    acetaminophen (Tylenol) tablet 650 mg  650 mg oral q8h PRN Radha Carranza MD   650 mg at 12/04/24 0637    alum-mag hydroxide-simeth (Mylanta) 200-200-20 mg/5 mL oral suspension 30 mL  30 mL oral q6h PRN Radha Carranza MD        aspirin EC tablet 81 mg  81 mg oral Daily Radha Carranza MD   81 mg at 12/04/24 0915    atorvastatin (Lipitor) tablet 40 mg  40 mg oral Daily Radha Carranza MD   40 mg at 12/04/24 0916    bisacodyl (Dulcolax) EC tablet 10 mg  10 mg oral Daily PRN Radha Carranza MD        bisacodyl (Dulcolax) suppository 10 mg  10 mg rectal Daily PRN Radha Carranza MD        dextrose 50 % injection 12.5 g  12.5 g intravenous q15 min PRN Radha Carranza MD        dextrose 50 % injection 25 g  25 g intravenous q15 min TIFFANIE CONWAY  MD Dillon        dulaglutide (Trulicity) injection 0.75 mg  0.75 mg subcutaneous Weekly Radha Carranza MD        ergocalciferol (Vitamin D-2) capsule 50,000 Units  50,000 Units oral Weekly Radha Carranza MD   50,000 Units at 12/03/24 1741    escitalopram (Lexapro) tablet 10 mg  10 mg oral Daily Radha Carranza MD   10 mg at 12/04/24 0917    flu vaccine trivalent (PF) (Fluarix/Fluzone/Flulaval) 6 months or greater injection  0.5 mL intramuscular During hospitalization Radha Carranza MD        folic acid (Folvite) tablet 1 mg  1 mg oral Daily Radha Carranza MD   1 mg at 12/04/24 0916    gabapentin (Neurontin) capsule 400 mg  400 mg oral TID Radha Carranza MD   400 mg at 12/04/24 0917    glucagon (Glucagen) injection 1 mg  1 mg intramuscular q15 min PRN Radha Carranza MD        glucagon (Glucagen) injection 1 mg  1 mg intramuscular q15 min PRN Radha Carranza MD        lisinopril tablet 20 mg  20 mg oral Daily Radha Carranza MD   20 mg at 12/04/24 0916    And    hydroCHLOROthiazide (Microzide) tablet 12.5 mg  12.5 mg oral Daily Radha Carranza MD   12.5 mg at 12/04/24 0916    insulin glargine (Lantus) injection 30 Units  30 Units subcutaneous Daily Radha Carranza MD   30 Units at 12/04/24 0905    insulin lispro injection 0-10 Units  0-10 Units subcutaneous TID  Radha Carranza MD   2 Units at 12/04/24 0703    insulin lispro injection 7 Units  7 Units subcutaneous TID BEBA Carranza MD   7 Units at 12/04/24 0704    melatonin tablet 5 mg  5 mg oral Nightly Radha Carranza MD   5 mg at 12/03/24 2044    metFORMIN XR (Glucophage-XR) 24 hr tablet 500 mg  500 mg oral BID Radha Carranza MD   500 mg at 12/04/24 0919    metoprolol tartrate (Lopressor) tablet 25 mg  25 mg oral BID  Radha Carranza MD   25 mg at 12/04/24 0916    nystatin (Mycostatin) 100,000 unit/gram powder 1 Application  1 Application Topical BID Radha Carranza MD   1 Application at 12/04/24 0923    oxyCODONE (Roxicodone) immediate release tablet 5 mg  5 mg oral q4h PRN Radha Carranza MD   5 mg at 12/04/24 0913    polyethylene glycol (Glycolax, Miralax) packet 17 g  17 g oral Daily PRN Radha Carranza MD        sennosides (Senokot) tablet 8.6 mg  1 tablet oral Nightly Radha Carranza MD        traZODone (Desyrel) tablet 50 mg  50 mg oral Nightly PRN Radha Carranza MD            Labs  Admission on 12/03/2024   Component Date Value Ref Range Status    POCT Glucose 12/03/2024 309 (H)  74 - 99 mg/dL Final    WBC 12/04/2024 9.2  4.4 - 11.3 x10*3/uL Final    nRBC 12/04/2024 0.0  0.0 - 0.0 /100 WBCs Final    RBC 12/04/2024 2.91 (L)  4.50 - 5.90 x10*6/uL Final    Hemoglobin 12/04/2024 9.5 (L)  13.5 - 17.5 g/dL Final    Hematocrit 12/04/2024 29.3 (L)  41.0 - 52.0 % Final    MCV 12/04/2024 101 (H)  80 - 100 fL Final    MCH 12/04/2024 32.6  26.0 - 34.0 pg Final    MCHC 12/04/2024 32.4  32.0 - 36.0 g/dL Final    RDW 12/04/2024 13.6  11.5 - 14.5 % Final    Platelets 12/04/2024 417  150 - 450 x10*3/uL Final    Glucose 12/04/2024 193 (H)  74 - 99 mg/dL Final    Sodium 12/04/2024 134 (L)  136 - 145 mmol/L Final    Potassium 12/04/2024 4.4  3.5 - 5.3 mmol/L Final    Chloride 12/04/2024 96 (L)  98 - 107 mmol/L Final    Bicarbonate 12/04/2024 30  21 - 32 mmol/L Final    Anion Gap 12/04/2024 12  10 - 20 mmol/L Final    Urea Nitrogen 12/04/2024 15  6 - 23 mg/dL Final    Creatinine 12/04/2024 0.71  0.50 - 1.30 mg/dL Final    eGFR 12/04/2024 >90  >60 mL/min/1.73m*2 Final    Calculations of estimated GFR are performed using the 2021 CKD-EPI Study Refit equation without the race variable for the IDMS-Traceable creatinine  "methods.  https://jasn.asnjournals.org/content/early/2021/09/22/ASN.2216352086    Calcium 12/04/2024 9.3  8.6 - 10.3 mg/dL Final    POCT Glucose 12/03/2024 161 (H)  74 - 99 mg/dL Final    POCT Glucose 12/04/2024 163 (H)  74 - 99 mg/dL Final   No results displayed because visit has over 200 results.           Last Recorded Vitals  Blood pressure 112/65, pulse 80, temperature 36.3 °C (97.3 °F), temperature source Temporal, resp. rate 18, height 1.854 m (6' 0.99\"), weight 103 kg (227 lb 8.2 oz), SpO2 97%.      Review of Systems   Constitutional:  Negative for chills, fatigue and fever.   HENT:  Negative for congestion, ear discharge, ear pain and hearing loss.    Eyes:  Negative for pain and discharge.   Respiratory:  Negative for chest tightness, shortness of breath and wheezing.    Cardiovascular:  Negative for chest pain, palpitations and leg swelling.   Gastrointestinal:  Negative for abdominal distention, blood in stool,   Endocrine: Negative for polydipsia and polyphagia.   Genitourinary:  Negative for difficulty urinating, dysuria, frequency   Musculoskeletal:  Negative for back pain, myalgias and neck stiffness.   Skin:  Negative for color change, rash and wound.   Neurological:  Negative for dizziness, seizures, numbness and headaches.   Hematological:  Negative for adenopathy. Does not bruise/bleed easily.   Psychiatric/Behavioral:  Negative for confusion and hallucinations. The patient is not nervous/anxious.        Physical Exam  Constitutional:       General: he is not in acute distress.     Appearance: Normal appearance.   HENT:      Head: Normocephalic.      Nose: Nose normal.     Eyes:      Extraocular Movements: Extraocular movements intact.      Conjunctiva/sclera: Conjunctivae normal.   Cardiovascular:      Rate and Rhythm: Normal rate and regular rhythm.      Pulses: Normal pulses.      Heart sounds: No murmur heard.     No gallop.   Pulmonary:      Breath sounds: Normal breath sounds. No wheezing, " rhonchi or rales.   Abdominal:      General: Abdomen is flat. Bowel sounds are normal. There is no distension.      Palpations: Abdomen is soft.      Tenderness: There is no abdominal tenderness. There is no guarding.   Musculoskeletal:         General: No swelling, tenderness or deformity.      Cervical back: No rigidity or tenderness.   Skin:     General: Skin is warm and dry.      Findings: No rash.   Neurological:      General: No focal deficit present.      Mental Status: he is alert and oriented to person, place, and time.      Cranial Nerves: No cranial nerve deficit.   His right AKA is a new dressing  Psychiatric:         Mood and Affect: Mood normal.      Assessment/Plan   Assessment & Plan  Above knee amputation of right lower extremity (Multi)    Alcoholic cirrhosis (Multi)    Coronary artery disease involving native coronary artery of native heart with unstable angina pectoris    Hyperlipidemia    Hypertension    Right above-knee amputee (Multi)      Right above-knee amputation  Begin physical therapy for strengthening, preprosthetic gait training, transfer training, wheelchair mobility  Begin occupational therapy for ADL retraining, strengthening, transfer training, and ADL activities    2.  Hyperlipidemia  Lipitor 40 mg daily    3.  Diabetes mellitus type 2  Metformin 500 mg twice daily  Lantus 30 units at at bedtime  Sliding scale with meals  Trulicity 0.75 weekly    4.  Hypertension   Lisinopril 20 mg  Hydrochlorothiazide 12.5 mg daily  Adjust medication for appropriate blood pressure control    5.  Pain management   Neurontin 400 mg 3 times daily  Oxycodone as needed    6.  Mood  Lexapro 10 mg daily  Deseryl    7.  Bowel bladder  Medications to encourage bowel movement every 2 to 3 days    FENGI  Patient is at risk for infection we will continue to monitor his wound for adequate wound healing  He is a high fall risk will encourage safe transfers  He is on a bowel program for appropriate bowel  movement         Physician Physical Assessment/ Post admission Evaluation (JUAN R)     I have reviewed the preadmission rehabilitation screening. There have been no relevant changes since the preadmission screening;    Rehab Plan of Care   The Interdisciplinary Team will work collaboratively to address the patient problems and goals to be managed by the Individualized Interdisciplinary Plan of Care. See the IPOC note for a complete review of the plan of care.    Medical Necessity:  Errol Fonseca requires, and is capable of participating in, an intensive and coordinated interdisciplinary acute inpatient rehabilitation program. He requires close rehabilitation physician monitoring and management to monitor his complex medical conditions and coordinate rehabilitation care. The patient's rehabilitation goals and medical complexity cannot adequately be managed in a less intensive setting. Potential risks for clinical complications include: Fall risk, infection and right stump, hypoglycemia.    Medical Prognosis:  Excellent for continued progress and participation with therapy.    Time spent reviewing records and documentation, examining patient, and my history and physical is 60 minutes      Christi Conde,

## 2024-12-04 NOTE — PROGRESS NOTES
Physical Therapy    Physical Therapy Evaluation    Patient Name: Errol Fonseca  MRN: [id number]  Department: [facility] REHAB  Room: [address] [place] [address]  Today's Date: 12/4/2024   Time Calculation  Start Time: 0745  Stop Time: 0830  Time Calculation (min): 45 min    Assessment/Plan   PT Assessment  PT Assessment Results: Decreased strength, Decreased endurance, Impaired balance, Decreased mobility, Decreased safety awareness, Pain  Rehab Prognosis:  (Good for stated goals)  Barriers to Discharge: Limited assist available at discharge  Evaluation/Treatment Tolerance: Patient limited by pain  Strengths: Attitude of self, Housing layout, Premorbid level of function, Support of Caregivers  Barriers to Participation:  (Pain)  End of Session Patient Position: Up in chair, Alarm on  IP OR SWING BED PT PLAN  Inpatient or Swing Bed: Inpatient  PT Plan  Treatment/Interventions: Bed mobility, Transfer training, Gait training, Stair training, Balance training, Strengthening, Endurance training, Therapeutic exercise, Therapeutic activity, Wheelchair management  PT Plan: Ongoing PT  PT Frequency: 90 min/5 days per week (1 week)  PT Discharge Recommendations:  (Anticipate discharge home mod independent at the wheelchair level.  Assist with higher level functional tasks.)  Equipment Recommended upon Discharge: Wheeled walker, Wheelchair  PT Recommended Transfer Status: Assist x2    Subjective   General Visit Information:  General  Reason for Referral: PT eval and treat s/p right AKA (55 yo male adm 11/21/24 with RLE necrotizing soft tissue infection.  Pt underwent right guillotine knee disarticulation on 11/23/24 with formalization of right AKA on 11/29/24.)  Referred By: Gian  Past Medical History Relevant to Rehab: DM, CAD s/p PCI, HTN, HLD, alcoholic cirrhosis, prior left 5th toe amputation  Patient Position Received: Up in chair, Alarm on  Home Living:  Home Living  Type of Home:  (Pt currently lives with dtr in multi-level home with  bedroom and 1/2 bath in basement. 4 stairs to landing and then one curb step to enter daughter's home.  Plan for discharge to son's home which is first floor apt with one small curb step to enter.)  Lives With:  (Pt will be staying with son at discharge.  Son works full-time days.)  Home Adaptive Equipment: None  Prior Level of Function:  Prior Function Per Pt/Caregiver Report  Level of [place]:  (Pt independent community ambulator without a device.  Pt has not driven in quite some time.)  Precautions:  Precautions  Medical Precautions: Fall precautions        Objective   Pain:  Pain Assessment  Pain Assessment: 0-10  0-10 (Numeric) Pain Score: 6  Pain Type: Surgical pain, Phantom pain  Pain Location: Leg  Pain Orientation: Right  Pain Interventions: Repositioned, Ambulation/increased activity, Distraction (Nursing provided pain meds prior to session)  Cognition:  Cognition  Overall Cognitive Status: Within Functional Limits  Impulsive: Moderately    General Assessments:  Activity Tolerance  Endurance: Decreased tolerance for upright activites    Sensation  Light Touch:  (Diminished to absent light touch at left foot.)    Static Sitting Balance  Static Sitting-Level of Assistance: Independent  Dynamic Sitting Balance  Dynamic Sitting-Level of Assistance: Minimum assistance    Static Standing Balance  Static Standing-Level of Assistance:  (Upt to mod assist with walker)  Dynamic Standing Balance  Dynamic Standing-Level of Assistance:  (Min/mod assist x2 with walker)  Functional Assessments:  Bed Mobility  Bed Mobility:  (Pt rolling with bedrail and min assist due to pain.  Sit to supine with SBA.  Supine to sit with min assist.)    Transfers  Transfer:  (Sit to stand with mod assist x1.  Pivot transfers without a device with min/mod assist x2.  Pt is impulsive and unsteady.  Max verbal and manual cues for techniques.)    Ambulation/Gait Training  Ambulation/Gait Training Performed:  (Pt amb 10 ft in a str line  with wheeled walker and min assist x2.  Markedly unsteady and at risk for falls.)    Stairs  Stairs:  (Unsafe to attempt)     Object From Floor  Comments:  (Unsafe to attempt)    Wheelchair Activities  Propulsion:  (Pt propels wheelchair 50 ft with intermittent min assist to instruct in most efficient means of propulsion.)  Extremity/Trunk Assessments:  RUE   RUE :  (RUE ROM and strength WFL.)  LUE   LUE:  (LUE ROM and strength WFL)  RLE   RLE :  (Right hip ROM WFL.  Hip strength is at least 3-/5.  No resistance given due to pain.)  LLE   LLE :  (LLE ROM WFL.  Left hip strength grossly 4-/5.  Distally, LLE strength WFL.)      Encounter Problems       Encounter Problems (Active)       PT Problem       STG - Bed mobility with SBA (Progressing)       Start:  12/04/24    Expected End:  12/07/24            STG - Transfers with min/mod assist x1.  (Progressing)       Start:  12/04/24    Expected End:  12/07/24            STG- Pt will amb 15 ft with wheeled walker and mod assist x1. (Progressing)       Start:  12/04/24    Expected End:  12/07/24            STG - Will determine most approp means of entry into home. (Progressing)       Start:  12/04/24    Expected End:  12/07/24            STG - Pt will propel wheelchair 100 ft with SBA (Progressing)       Start:  12/04/24    Expected End:  12/07/24            LTG - Mod independent bed mobility. (Progressing)       Start:  12/04/24    Expected End:  12/11/24            LTG - Mod independent transfers. (Progressing)       Start:  12/04/24    Expected End:  12/11/24            LTG - Pt will amb 25 ft with wheeled walker and min assist. (Progressing)       Start:  12/04/24    Expected End:  12/11/24            LTG - Will educate pt and family on most approp means of entry into home. (Progressing)       Start:  12/04/24    Expected End:  12/11/24            LTG - Pt will propel wheelchair 150 ft independently (Progressing)       Start:  12/04/24    Expected End:  12/11/24                    Education Documentation  Precautions, taught by Irma Partida, PT at 12/4/2024 12:13 PM.  Learner: Patient  Readiness: Acceptance  Method: Explanation, Demonstration  Response: Verbalizes Understanding, Needs Reinforcement    Mobility Training, taught by Irma Partida, PT at 12/4/2024 12:13 PM.  Learner: Patient  Readiness: Acceptance  Method: Explanation, Demonstration  Response: Verbalizes Understanding, Needs Reinforcement

## 2024-12-05 LAB
GLUCOSE BLD MANUAL STRIP-MCNC: 139 MG/DL (ref 74–99)
GLUCOSE BLD MANUAL STRIP-MCNC: 178 MG/DL (ref 74–99)
GLUCOSE BLD MANUAL STRIP-MCNC: 192 MG/DL (ref 74–99)
GLUCOSE BLD MANUAL STRIP-MCNC: 213 MG/DL (ref 74–99)

## 2024-12-05 PROCEDURE — 97116 GAIT TRAINING THERAPY: CPT | Mod: GP,CQ

## 2024-12-05 PROCEDURE — 97110 THERAPEUTIC EXERCISES: CPT | Mod: GO

## 2024-12-05 PROCEDURE — 97535 SELF CARE MNGMENT TRAINING: CPT | Mod: GO

## 2024-12-05 PROCEDURE — 97530 THERAPEUTIC ACTIVITIES: CPT | Mod: GO

## 2024-12-05 PROCEDURE — 82947 ASSAY GLUCOSE BLOOD QUANT: CPT

## 2024-12-05 PROCEDURE — 97110 THERAPEUTIC EXERCISES: CPT | Mod: GP,CQ

## 2024-12-05 PROCEDURE — 2500000001 HC RX 250 WO HCPCS SELF ADMINISTERED DRUGS (ALT 637 FOR MEDICARE OP): Performed by: PHYSICAL MEDICINE & REHABILITATION

## 2024-12-05 PROCEDURE — 97530 THERAPEUTIC ACTIVITIES: CPT | Mod: GP,CQ

## 2024-12-05 PROCEDURE — S4991 NICOTINE PATCH NONLEGEND: HCPCS | Performed by: PHYSICAL MEDICINE & REHABILITATION

## 2024-12-05 PROCEDURE — 2500000002 HC RX 250 W HCPCS SELF ADMINISTERED DRUGS (ALT 637 FOR MEDICARE OP, ALT 636 FOR OP/ED): Performed by: PHYSICAL MEDICINE & REHABILITATION

## 2024-12-05 PROCEDURE — 99232 SBSQ HOSP IP/OBS MODERATE 35: CPT | Performed by: PHYSICAL MEDICINE & REHABILITATION

## 2024-12-05 PROCEDURE — 1280000001 HC REHAB SEMI-PRIVATE ROOM DAILY

## 2024-12-05 RX ORDER — IBUPROFEN 200 MG
1 TABLET ORAL DAILY
Status: DISPENSED | OUTPATIENT
Start: 2024-12-05

## 2024-12-05 RX ORDER — INSULIN GLARGINE 100 [IU]/ML
34 INJECTION, SOLUTION SUBCUTANEOUS DAILY
Status: DISPENSED | OUTPATIENT
Start: 2024-12-06

## 2024-12-05 ASSESSMENT — PAIN - FUNCTIONAL ASSESSMENT
PAIN_FUNCTIONAL_ASSESSMENT: 0-10

## 2024-12-05 ASSESSMENT — PAIN SCALES - GENERAL
PAINLEVEL_OUTOF10: 7
PAINLEVEL_OUTOF10: 0 - NO PAIN
PAINLEVEL_OUTOF10: 7
PAINLEVEL_OUTOF10: 0 - NO PAIN
PAINLEVEL_OUTOF10: 6
PAINLEVEL_OUTOF10: 7
PAINLEVEL_OUTOF10: 7
PAINLEVEL_OUTOF10: 3

## 2024-12-05 ASSESSMENT — ACTIVITIES OF DAILY LIVING (ADL): HOME_MANAGEMENT_TIME_ENTRY: 10

## 2024-12-05 ASSESSMENT — PAIN DESCRIPTION - ORIENTATION
ORIENTATION: RIGHT
ORIENTATION: RIGHT

## 2024-12-05 ASSESSMENT — PAIN DESCRIPTION - LOCATION
LOCATION: LEG
LOCATION: LEG

## 2024-12-05 ASSESSMENT — PAIN DESCRIPTION - DESCRIPTORS: DESCRIPTORS: ACHING

## 2024-12-05 ASSESSMENT — PAIN SCALES - WONG BAKER: WONGBAKER_NUMERICALRESPONSE: NO HURT

## 2024-12-05 NOTE — PROGRESS NOTES
Physical Therapy    Physical Therapy Treatment    Patient Name: Errol Fonseca  MRN: [id number]  Department: [facility] REHAB  Room: [address] [place] [address]  Today's Date: 12/5/2024  Time Calculation  Start Time: 0915  Stop Time: 1000  Time Calculation (min): 45 min         Assessment/Plan   PT Assessment  End of Session Communication: Bedside nurse  End of Session Patient Position: Up in room (Seated EOB, call bell in reach.)     PT Plan  Treatment/Interventions: Bed mobility, Transfer training, Gait training, Stair training, Balance training, Strengthening, Endurance training, Therapeutic exercise, Therapeutic activity, Wheelchair management  PT Plan: Ongoing PT  PT Frequency: 90 min/5 days per week (1 week)  PT Discharge Recommendations:  (Anticipate discharge home mod independent at the wheelchair level.  Assist with higher level functional tasks.)  Equipment Recommended upon Discharge: Wheeled walker, Wheelchair  PT Recommended Transfer Status: Assist x2      General Visit Information:   PT  Visit  PT Received On: 12/05/24  General  Prior to Session Communication: Bedside nurse  Patient Position Received: Up in chair, Alarm on  General Comment:  (Pt reports feeling fatigued from yeskitty's therapy session d/t overexerting himself too soon; pleasant and willing to participate in therapy session.)    Subjective   Precautions:  Precautions  Hearing/Visual Limitations: Glasses for reading  LE Weight Bearing Status: Right Non-Weight Bearing  Medical Precautions: Fall precautions  Precautions Comment: R AKA            Objective   Pain:  Pain Assessment  Pain Assessment: 0-10  0-10 (Numeric) Pain Score:  (6/10 right residual limb)  Pain Interventions: Repositioned, Rest              Treatments:  Ambulation/Gait Training  Ambulation/Gait Training Performed: Yes (Pt amb to amb 20' and 15'x2 with FWW and Min/ModAx1-2 plus w/c follow; demos slow, steady lexi, fatigues quickly requiring extended seated rest breaks b/t trials. Denies  dizziness and no LOB noted.)  Transfers  Transfer: Yes (Pt performed sit<>stand multiple trials with FWW and ModAx1. Stand pivot transfer w/c>bed with Min/ModAx1. Denies dizziness and no LOB noted.)    Wheelchair Activities  Propulsion: Yes (Pt propelled wheelchair 100' and  manueverability activities (forwards/backwards with R/L turns) into 4'x4' space, with SBA and Min v/c for turns..)    Education Documentation  No documentation found.  Education Comments  Patient educated in safe techniques for gait with a device in order to maximize safety and functional independence.  Patient educated in safe and proper transfer techniques including proper positioning and hand/foot placement to maximize safety and functional independence.  Educated in safe w/c mobility including maneuvering on different surfaces, around obstacles and managing brakes, arm and leg rests.           OP EDUCATION:       Encounter Problems       Encounter Problems (Active)       PT Problem       STG - Bed mobility with SBA (Progressing)       Start:  12/04/24    Expected End:  12/07/24            STG - Transfers with min/mod assist x1.  (Progressing)       Start:  12/04/24    Expected End:  12/07/24            STG- Pt will amb 15 ft with wheeled walker and mod assist x1. (Progressing)       Start:  12/04/24    Expected End:  12/07/24            STG - Will determine most approp means of entry into home. (Progressing)       Start:  12/04/24    Expected End:  12/07/24            STG - Pt will propel wheelchair 100 ft with SBA (Progressing)       Start:  12/04/24    Expected End:  12/07/24            LTG - Mod independent bed mobility. (Progressing)       Start:  12/04/24    Expected End:  12/11/24            LTG - Mod independent transfers. (Progressing)       Start:  12/04/24    Expected End:  12/11/24            LTG - Pt will amb 25 ft with wheeled walker and min assist. (Progressing)       Start:  12/04/24    Expected End:  12/11/24            LTG -  Will educate pt and family on most approp means of entry into home. (Progressing)       Start:  12/04/24    Expected End:  12/11/24            LTG - Pt will propel wheelchair 150 ft independently (Progressing)       Start:  12/04/24    Expected End:  12/11/24

## 2024-12-05 NOTE — PROGRESS NOTES
"  Errol Fonseca is a 54 y.o. male on day 2 of admission presenting with Above knee amputation of right lower extremity (Multi).    Subjective   He is doing well and with some complaints of pain. He has throbbing of the distal right LE amp. He was seen in therapy and working with PT. Patient was seen today in therapy.  He is ambulating short distances with a standard walker and contact-guard to min assist.  He is modified independent using a wheelchair.  He does have pain at the distal end of the right lower extremity.       We did discuss home DC at  level with limited ambulation.       Objective       Physical Exam  Pleasant male in NAD  CTAB  S1S2  SNTND+BS  Negative Jennie's bilaterally  Right lower extremity with above-the-knee amputation.  No drainage appreciated.    Function: min/mod at this time     Assessment/Plan        Last Recorded Vitals  Blood pressure 122/68, pulse 72, temperature 36.2 °C (97.2 °F), temperature source Temporal, resp. rate 18, height 1.854 m (6' 0.99\"), weight 103 kg (227 lb 8.2 oz), SpO2 93%.    Therapy notes from last 24 hours reviewed.    Relevant Results                  Scheduled medications  aspirin, 81 mg, oral, Daily  atorvastatin, 40 mg, oral, Daily  dulaglutide, 0.75 mg, subcutaneous, Weekly  ergocalciferol, 50,000 Units, oral, Weekly  escitalopram, 10 mg, oral, Daily  influenza, 0.5 mL, intramuscular, During hospitalization  folic acid, 1 mg, oral, Daily  gabapentin, 400 mg, oral, TID  lisinopril, 20 mg, oral, Daily   And  hydroCHLOROthiazide, 12.5 mg, oral, Daily  insulin glargine, 30 Units, subcutaneous, Daily  insulin lispro, 0-10 Units, subcutaneous, TID AC  insulin lispro, 7 Units, subcutaneous, TID AC  melatonin, 5 mg, oral, Nightly  metFORMIN XR, 500 mg, oral, BID  metoprolol tartrate, 25 mg, oral, BID  nystatin, 1 Application, Topical, BID  sennosides, 1 tablet, oral, Nightly      Continuous medications     PRN medications  PRN medications: acetaminophen, alum-mag " hydroxide-simeth, bisacodyl, bisacodyl, dextrose, dextrose, glucagon, glucagon, oxyCODONE, polyethylene glycol, traZODone     Results for orders placed or performed during the hospital encounter of 12/03/24 (from the past 24 hours)   POCT GLUCOSE   Result Value Ref Range    POCT Glucose 176 (H) 74 - 99 mg/dL   POCT GLUCOSE   Result Value Ref Range    POCT Glucose 183 (H) 74 - 99 mg/dL   POCT GLUCOSE   Result Value Ref Range    POCT Glucose 192 (H) 74 - 99 mg/dL   POCT GLUCOSE   Result Value Ref Range    POCT Glucose 213 (H) 74 - 99 mg/dL                 Assessment/Plan   Principal Problem:    Above knee amputation of right lower extremity (Multi)  Active Problems:    Alcoholic cirrhosis (Multi)    Coronary artery disease involving native coronary artery of native heart with unstable angina pectoris    Hyperlipidemia    Hypertension    Right above-knee amputee (Multi)        Right above-knee amputation  Begin physical therapy for strengthening, preprosthetic gait training, transfer training, wheelchair mobility  Begin occupational therapy for ADL retraining, strengthening, transfer training, and ADL activities     2.  Hyperlipidemia  Lipitor 40 mg daily     3.  Diabetes mellitus type 2  Metformin 500 mg twice daily  Lantus 30 units at at bedtime  Sliding scale with meals  Trulicity 0.75 weekly     4.  Hypertension   Lisinopril 20 mg  Hydrochlorothiazide 12.5 mg daily  Adjust medication for appropriate blood pressure control     5.  Pain management   Neurontin 400 mg 3 times daily  Oxycodone as needed     6.  Mood  Lexapro 10 mg daily  Deseryl     7.  Bowel bladder  Medications to encourage bowel movement every 2 to 3 days     FENGI  Patient is at risk for infection we will continue to monitor his wound for adequate wound healing  He is a high fall risk will encourage safe transfers  He is on a bowel program for appropriate bowel movement        12/5  -ongoing rehab with goal of DC home at  level Mod I  -DM BS  elevated.  He is on  30 of Lantus daily.  Will slightly increase that.  -Blood pressure within acceptable ranges  -Continue hydrochlorothiazide and lisinopril.  -Discharge plan home with his son in a 1 level home at a wheelchair level.       I spent 35 minutes in the professional and overall care of this patient.      Radha Carranza MD

## 2024-12-05 NOTE — PROGRESS NOTES
Physical Therapy    Physical Therapy Treatment    Patient Name: Errol Fonseca  MRN: [id number]  Department: [facility] REHAB  Room: [address] [place] [address]  Today's Date: 12/5/2024  Time Calculation  Start Time: 1000  Stop Time: 1045  Time Calculation (min): 45 min         Assessment/Plan   PT Assessment  End of Session Communication:  (OT handoff)  End of Session Patient Position: Up in chair, Alarm off, not on at start of session     PT Plan  Treatment/Interventions: Bed mobility, Transfer training, Gait training, Stair training, Balance training, Strengthening, Endurance training, Therapeutic exercise, Therapeutic activity, Wheelchair management  PT Plan: Ongoing PT  PT Frequency: 90 min/5 days per week (1 week)  PT Discharge Recommendations:  (Anticipate discharge home mod independent at the wheelchair level.  Assist with higher level functional tasks.)  Equipment Recommended upon Discharge: Wheeled walker, Wheelchair  PT Recommended Transfer Status: Assist x2      General Visit Information:   PT  Visit  PT Received On: 12/05/24  General  Prior to Session Communication: Bedside nurse  Patient Position Received: Bed, 2 rail up, Alarm off, not on at start of session  General Comment: Patient pleasant and agreeable to an earlier session.    Subjective   Precautions:  Precautions  Hearing/Visual Limitations: Glasses for reading  LE Weight Bearing Status: Right Non-Weight Bearing  Medical Precautions: Fall precautions  Precautions Comment: R AKA    Objective   Treatments:  Therapeutic Exercise  Therapeutic Exercise Performed: Yes  Patient performed supine therex, L LE only: AP, QS, heel slides, and SAQ; B LE: GS and hip abd/add all 2 [id number]. Done to increased B LE strength and mobility and improve overall functional independence.      Therapeutic Activity  Therapeutic Activity Performed: Yes  Patient tolerated lying prone on gym mat ~5mins to promote stretch in hip flexor and avoid contraction.     Bed Mobility  Bed Mobility:  Yes  Patient performed bed mobility on gym mat; sit<>sup transfer with SBA x1, rolling R and L with CGA x1.    Transfers  Transfer: Yes  Patient performed squat pivot x3 trials. First 2 trials EOB>WC and WC>EOM with Yosi x1. Last trial EOM>WC with ModA x2 d/t weakness and fatigue in L LE.     Education Comments  Educated patient on transfer training and supine therex techniques to maximize strength, safety, and independence.         OP EDUCATION:       Encounter Problems       Encounter Problems (Active)       PT Problem       STG - Bed mobility with SBA (Progressing)       Start:  12/04/24    Expected End:  12/07/24            STG - Transfers with min/mod assist x1.  (Progressing)       Start:  12/04/24    Expected End:  12/07/24            STG- Pt will amb 15 ft with wheeled walker and mod assist x1. (Progressing)       Start:  12/04/24    Expected End:  12/07/24            STG - Will determine most approp means of entry into home. (Progressing)       Start:  12/04/24    Expected End:  12/07/24            STG - Pt will propel wheelchair 100 ft with SBA (Progressing)       Start:  12/04/24    Expected End:  12/07/24            LTG - Mod independent bed mobility. (Progressing)       Start:  12/04/24    Expected End:  12/11/24            LTG - Mod independent transfers. (Progressing)       Start:  12/04/24    Expected End:  12/11/24            LTG - Pt will amb 25 ft with wheeled walker and min assist. (Progressing)       Start:  12/04/24    Expected End:  12/11/24            LTG - Will educate pt and family on most approp means of entry into home. (Progressing)       Start:  12/04/24    Expected End:  12/11/24            LTG - Pt will propel wheelchair 150 ft independently (Progressing)       Start:  12/04/24    Expected End:  12/11/24

## 2024-12-05 NOTE — PROGRESS NOTES
Occupational Therapy    OT Treatment    Patient Name: Errol Fonseca  MRN: [id number]  Department: [facility] REHAB  Room: [address] [place] [address]  Today's Date: 12/5/2024  Time Calculation  Start Time: 1045  Stop Time: 1130  Time Calculation (min): 45 min        Assessment:  End of Session Communication: Bedside nurse  End of Session Patient Position:  (on commode/toilet in bathroom, RN/pca notified pt would like to sit on commode for a while for bm)     Plan:  Treatment Interventions: ADL retraining, Functional transfer training, UE strengthening/ROM, Endurance training, Patient/family training, Equipment evaluation/education, Compensatory technique education, Continued evaluation  OT Frequency: 90 min/5 days per week (1 week)  OT Discharge Recommendations:  (mod I at wc level, sba tub/dme transfers)  Equipment Recommended upon Discharge: Wheeled walker, Wheelchair, Bedside commode  Treatment Interventions: ADL retraining, Functional transfer training, UE strengthening/ROM, Endurance training, Patient/family training, Equipment evaluation/education, Compensatory technique education, Continued evaluation    Subjective   Previous Visit Info:  OT Last Visit  OT Received On: 12/05/24  General:  General  Prior to Session Communication:  (from PT)  Patient Position Received: Alarm on, Up in chair  Precautions:  Hearing/Visual Limitations: Glasses for reading  LE Weight Bearing Status: Right Non-Weight Bearing  Medical Precautions: Fall precautions  Precautions Comment: R AKA            Pain:  Pain Assessment  Pain Assessment: 0-10  0-10 (Numeric) Pain Score:  (6-7/10 rt residual limb, aggravated with pressure from moving during adl)  Pain Interventions: Repositioned, Rest    Objective         Activities of Daily Living: LE Dressing  Sock Level of Assistance:  (min a, painful rt leg with attempting to lift lle to reach foot)  Shoe Level of Assistance:  (min/mod a (pretied) to slip on)  LE Dressing Where Assessed:  Wheelchair    Toileting  Toileting Level of Assistance:  (mod a holding onto wall grab bar (pulling down pants))  Where Assessed: Toilet, Bedside commode       Bed Mobility/Transfers: Transfers  Transfer:  (mod a using wall grab bar to pivot to left side, cues and assist for postioning wc properly to commode, cues for hand placement/tech)  Transfer 1  Trials/Comments 1: initial pivot from mat to wc d/t increased fatigue and pain, min a x2      Functional Mobility:  Functional Mobility  Functional Mobility Performed:  (functional wc mobility during item retrieval from counter ht and floor with reacher, sba and min cues for techs, rest break needed for fatigue)          EDUCATION:  Education  Individual(s) Educated: Patient  Education Provided:  (educated on wc mobility techs/safe retrieval, reacher types/lengths; commode uses, easy clean up, commode vs drop arm commode with vendor options; transfer safety techs, adl techs; on anticipated home going recommendations- at wc level)  Patient Response to Education: Patient/Caregiver Verbalized Understanding of Information  Education Comment: could benefit from contintued education; asked pt to have son take pictures of doorways to bedroom and bathroom and measure.    Goals:  Encounter Problems       Encounter Problems (Active)       OT Problem       LTG - Patient will complete grooming with mod I (Progressing)       Start:  12/04/24    Expected End:  12/11/24            LTG - Patient will complete bathing with supervision or better (Progressing)       Start:  12/04/24    Expected End:  12/11/24            LTG - Patient will complete UE dressing with mod I (Progressing)       Start:  12/04/24    Expected End:  12/11/24            LTG - Patient will complete LE dressing using adaptive equip as needed with mod I (Progressing)       Start:  12/04/24    Expected End:  12/11/24            LTG - Patient will complete toileting with mod I (Progressing)       Start:  12/04/24     Expected End:  12/11/24            LTG - Patient will complete commode transfer via wwalker or pivot with mod I (Progressing)       Start:  12/04/24    Expected End:  12/11/24            LTG - Patient will complete tub/shower transfer using DME as needed with sba (Progressing)       Start:  12/04/24    Expected End:  12/11/24            LTG - Patient will complete functional mobility via wheelchar mod I (Progressing)       Start:  12/04/24    Expected End:  12/11/24            LTG - Patient will complete simple mobility/approach steps via wwalker supervision or better (Progressing)       Start:  12/04/24    Expected End:  12/11/24            LTG - Patient will complete light meal prep or kitchen access at wheelchair level mod I (Progressing)       Start:  12/04/24    Expected End:  12/11/24

## 2024-12-05 NOTE — PROGRESS NOTES
Occupational Therapy    OT Treatment    Patient Name: Errol Fonseca  MRN: [id number]  Department: [facility]  Room: [address] [place] [address]  Today's Date: 12/5/2024  Time Calculation  Start Time: 1300  Stop Time: 1345  Time Calculation (min): 45 min        Assessment:  End of Session Communication: Bedside nurse  End of Session Patient Position: Alarm on, Bed, 2 rail up     Plan:  Treatment Interventions: ADL retraining, Functional transfer training, UE strengthening/ROM, Endurance training, Patient/family training, Equipment evaluation/education, Compensatory technique education, Continued evaluation  OT Frequency: 90 min/5 days per week (1 week)  OT Discharge Recommendations:  (mod I at wc level, sba tub/dme transfers)  Equipment Recommended upon Discharge: Wheeled walker, Wheelchair, Bedside commode  Treatment Interventions: ADL retraining, Functional transfer training, UE strengthening/ROM, Endurance training, Patient/family training, Equipment evaluation/education, Compensatory technique education, Continued evaluation    Subjective   Previous Visit Info:  OT Last Visit  OT Received On: 12/05/24  General:  General  Prior to Session Communication:  (from PT)  Patient Position Received: Bed, 2 rail up, Alarm on  General Comment: pt reports in increased pain, declines pain meds at this time  Precautions:  Hearing/Visual Limitations: Glasses for reading  LE Weight Bearing Status: Right Non-Weight Bearing  Medical Precautions: Fall precautions  Precautions Comment: R AKA        Pain:  Pain Assessment  Pain Assessment: 0-10  0-10 (Numeric) Pain Score: 7  Pain Location:  (rt residual limb)  Pain Interventions: Rest, Repositioned    Objective         Therapy/Activity: Therapeutic Exercise  Therapeutic Exercise Activity 1: completes bilat ue ther ex program with 2# weights 10 x3 sets, 7 planes with supervision and min cues for techs, to promote indep in adl, transfers, mobiltiy for home going         EDUCATION:  Education  Individual(s) Educated: Patient  Education Provided:  (goals of session)  Home Program: Strengthening  Patient Response to Education: Patient/Caregiver Verbalized Understanding of Information  Education Comment: could benefit from contintued education; asked pt to have son take pictures of doorways to bedroom and bathroom and measure.    Goals:  Encounter Problems       Encounter Problems (Active)       OT Problem       LTG - Patient will complete grooming with mod I (Progressing)       Start:  12/04/24    Expected End:  12/11/24            LTG - Patient will complete bathing with supervision or better (Progressing)       Start:  12/04/24    Expected End:  12/11/24            LTG - Patient will complete UE dressing with mod I (Progressing)       Start:  12/04/24    Expected End:  12/11/24            LTG - Patient will complete LE dressing using adaptive equip as needed with mod I (Progressing)       Start:  12/04/24    Expected End:  12/11/24            LTG - Patient will complete toileting with mod I (Progressing)       Start:  12/04/24    Expected End:  12/11/24            LTG - Patient will complete commode transfer via wwalker or pivot with mod I (Progressing)       Start:  12/04/24    Expected End:  12/11/24            LTG - Patient will complete tub/shower transfer using DME as needed with sba (Progressing)       Start:  12/04/24    Expected End:  12/11/24            LTG - Patient will complete functional mobility via wheelchar mod I (Progressing)       Start:  12/04/24    Expected End:  12/11/24            LTG - Patient will complete simple mobility/approach steps via wwalker supervision or better (Progressing)       Start:  12/04/24    Expected End:  12/11/24            LTG - Patient will complete light meal prep or kitchen access at wheelchair level mod I (Progressing)       Start:  12/04/24    Expected End:  12/11/24

## 2024-12-05 NOTE — CARE PLAN
The patient's goals for the shift include      The clinical goals for the shift include Pain control      Problem: Diabetes  Goal: Achieve decreasing blood glucose levels by end of shift  Outcome: Progressing  Goal: Increase stability of blood glucose readings by end of shift  Outcome: Progressing  Goal: Decrease in ketones present in urine by end of shift  Outcome: Progressing  Goal: Maintain electrolyte levels within acceptable range throughout shift  Outcome: Progressing  Goal: Maintain glucose levels >70mg/dl to <250mg/dl throughout shift  Outcome: Progressing  Goal: No changes in neurological exam by end of shift  Outcome: Progressing  Goal: Learn about and adhere to nutrition recommendations by end of shift  Outcome: Progressing  Goal: Vital signs within normal range for age by end of shift  Outcome: Progressing  Goal: Increase self care and/or family involovement by end of shift  Outcome: Progressing  Goal: Receive DSME education by end of shift  Outcome: Progressing     Problem: Pain  Goal: Takes deep breaths with improved pain control throughout the shift  Outcome: Progressing  Goal: Turns in bed with improved pain control throughout the shift  Outcome: Progressing  Goal: Walks with improved pain control throughout the shift  Outcome: Progressing  Goal: Performs ADL's with improved pain control throughout shift  Outcome: Progressing  Goal: Participates in PT with improved pain control throughout the shift  Outcome: Progressing  Goal: Free from opioid side effects throughout the shift  Outcome: Progressing  Goal: Free from acute confusion related to pain meds throughout the shift  Outcome: Progressing     Problem: Skin  Goal: Decreased wound size/increased tissue granulation at next dressing change  Outcome: Progressing  Flowsheets (Taken 12/5/2024 0126)  Decreased wound size/increased tissue granulation at next dressing change:   Promote sleep for wound healing   Utilize specialty bed per algorithm    Protective dressings over bony prominences  Goal: Participates in plan/prevention/treatment measures  Outcome: Progressing  Flowsheets (Taken 12/5/2024 0126)  Participates in plan/prevention/treatment measures:   Discuss with provider PT/OT consult   Elevate heels   Increase activity/out of bed for meals  Goal: Prevent/manage excess moisture  Outcome: Progressing  Flowsheets (Taken 12/5/2024 0126)  Prevent/manage excess moisture:   Cleanse incontinence/protect with barrier cream   Moisturize dry skin   Use wicking fabric (obtain order)   Follow provider orders for dressing changes   Monitor for/manage infection if present  Goal: Prevent/minimize sheer/friction injuries  Outcome: Progressing  Flowsheets (Taken 12/5/2024 0126)  Prevent/minimize sheer/friction injuries:   Complete micro-shifts as needed if patient unable. Adjust patient position to relieve pressure points, not a full turn   Increase activity/out of bed for meals   Use pull sheet   HOB 30 degrees or less  Goal: Promote/optimize nutrition  Outcome: Progressing  Flowsheets (Taken 12/5/2024 0126)  Promote/optimize nutrition:   Assist with feeding   Monitor/record intake including meals   Consume > 50% meals/supplements  Goal: Promote skin healing  Outcome: Progressing  Flowsheets (Taken 12/5/2024 0126)  Promote skin healing:   Assess skin/pad under line(s)/device(s)   Protective dressings over bony prominences   Turn/reposition every 2 hours/use positioning/transfer devices

## 2024-12-05 NOTE — CARE PLAN
The patient's goals for the shift include  comfort    The clinical goals for the shift include remain HD stable

## 2024-12-06 LAB
GLUCOSE BLD MANUAL STRIP-MCNC: 123 MG/DL (ref 74–99)
GLUCOSE BLD MANUAL STRIP-MCNC: 211 MG/DL (ref 74–99)
GLUCOSE BLD MANUAL STRIP-MCNC: 228 MG/DL (ref 74–99)
GLUCOSE BLD MANUAL STRIP-MCNC: 250 MG/DL (ref 74–99)

## 2024-12-06 PROCEDURE — 82947 ASSAY GLUCOSE BLOOD QUANT: CPT

## 2024-12-06 PROCEDURE — 97110 THERAPEUTIC EXERCISES: CPT | Mod: GP,CQ

## 2024-12-06 PROCEDURE — 97116 GAIT TRAINING THERAPY: CPT | Mod: GP,CQ

## 2024-12-06 PROCEDURE — 99232 SBSQ HOSP IP/OBS MODERATE 35: CPT | Performed by: PHYSICAL MEDICINE & REHABILITATION

## 2024-12-06 PROCEDURE — 97530 THERAPEUTIC ACTIVITIES: CPT | Mod: GP,CQ

## 2024-12-06 PROCEDURE — 97110 THERAPEUTIC EXERCISES: CPT | Mod: GO

## 2024-12-06 PROCEDURE — 2500000002 HC RX 250 W HCPCS SELF ADMINISTERED DRUGS (ALT 637 FOR MEDICARE OP, ALT 636 FOR OP/ED): Performed by: PHYSICAL MEDICINE & REHABILITATION

## 2024-12-06 PROCEDURE — S4991 NICOTINE PATCH NONLEGEND: HCPCS | Performed by: PHYSICAL MEDICINE & REHABILITATION

## 2024-12-06 PROCEDURE — 1280000001 HC REHAB SEMI-PRIVATE ROOM DAILY

## 2024-12-06 PROCEDURE — 2500000001 HC RX 250 WO HCPCS SELF ADMINISTERED DRUGS (ALT 637 FOR MEDICARE OP): Performed by: PHYSICAL MEDICINE & REHABILITATION

## 2024-12-06 PROCEDURE — 97530 THERAPEUTIC ACTIVITIES: CPT | Mod: GO

## 2024-12-06 ASSESSMENT — PAIN SCALES - GENERAL
PAINLEVEL_OUTOF10: 5 - MODERATE PAIN
PAINLEVEL_OUTOF10: 6
PAINLEVEL_OUTOF10: 3
PAINLEVEL_OUTOF10: 6
PAINLEVEL_OUTOF10: 4
PAINLEVEL_OUTOF10: 6
PAINLEVEL_OUTOF10: 6
PAINLEVEL_OUTOF10: 4
PAINLEVEL_OUTOF10: 6
PAINLEVEL_OUTOF10: 4
PAINLEVEL_OUTOF10: 3
PAINLEVEL_OUTOF10: 6
PAINLEVEL_OUTOF10: 6
PAINLEVEL_OUTOF10: 5 - MODERATE PAIN

## 2024-12-06 ASSESSMENT — PAIN DESCRIPTION - ORIENTATION
ORIENTATION: LOWER;RIGHT
ORIENTATION: LOWER
ORIENTATION: RIGHT;LOWER
ORIENTATION: RIGHT;LOWER

## 2024-12-06 ASSESSMENT — PAIN DESCRIPTION - LOCATION
LOCATION: LEG

## 2024-12-06 NOTE — PROGRESS NOTES
"  Errol Fonseca is a 54 y.o. male on day 3 of admission presenting with Above knee amputation of right lower extremity (Multi).    Subjective   He is working in therapy. Pain is throbbing in the distal right LE. Sleeping well.     No CP/SOB.       Objective       Physical Exam    Pleasant male in NAD  CTAB  S1S2  SNTND+BS  Negative Jennie's bilaterally  Right lower extremity with above-the-knee amputation.  No drainage appreciated.     Function: ambulating with min/CGA working on [employer]  Assessment/Plan        Last Recorded Vitals  Blood pressure 130/65, pulse 74, temperature 36.7 °C (98.1 °F), resp. rate 18, height 1.854 m (6' 0.99\"), weight 103 kg (227 lb 8.2 oz), SpO2 96%.    Therapy notes from last 24 hours reviewed.    Relevant Results                  Scheduled medications  aspirin, 81 mg, oral, Daily  atorvastatin, 40 mg, oral, Daily  dulaglutide, 0.75 mg, subcutaneous, Weekly  ergocalciferol, 50,000 Units, oral, Weekly  escitalopram, 10 mg, oral, Daily  influenza, 0.5 mL, intramuscular, During hospitalization  folic acid, 1 mg, oral, Daily  gabapentin, 400 mg, oral, TID  lisinopril, 20 mg, oral, Daily   And  hydroCHLOROthiazide, 12.5 mg, oral, Daily  insulin glargine, 34 Units, subcutaneous, Daily  insulin lispro, 0-10 Units, subcutaneous, TID AC  insulin lispro, 7 Units, subcutaneous, TID AC  melatonin, 5 mg, oral, Nightly  metFORMIN XR, 500 mg, oral, BID  metoprolol tartrate, 25 mg, oral, BID  nicotine, 1 patch, transdermal, Daily  nystatin, 1 Application, Topical, BID  sennosides, 1 tablet, oral, Nightly      Continuous medications     PRN medications  PRN medications: acetaminophen, alum-mag hydroxide-simeth, bisacodyl, bisacodyl, dextrose, dextrose, glucagon, glucagon, oxyCODONE, polyethylene glycol, traZODone     Results for orders placed or performed during the hospital encounter of 12/03/24 (from the past 24 hours)   POCT GLUCOSE   Result Value Ref Range    POCT Glucose 213 (H) 74 - 99 mg/dL   POCT " GLUCOSE   Result Value Ref Range    POCT Glucose 139 (H) 74 - 99 mg/dL   POCT GLUCOSE   Result Value Ref Range    POCT Glucose 178 (H) 74 - 99 mg/dL   POCT GLUCOSE   Result Value Ref Range    POCT Glucose 250 (H) 74 - 99 mg/dL                 Assessment/Plan   Principal Problem:    Above knee amputation of right lower extremity (Multi)  Active Problems:    Alcoholic cirrhosis (Multi)    Coronary artery disease involving native coronary artery of native heart with unstable angina pectoris    Hyperlipidemia    Hypertension    Right above-knee amputee (Multi)          Right above-knee amputation  Begin physical therapy for strengthening, preprosthetic gait training, transfer training, wheelchair mobility  Begin occupational therapy for ADL retraining, strengthening, transfer training, and ADL activities     2.  Hyperlipidemia  Lipitor 40 mg daily     3.  Diabetes mellitus type 2  Metformin 500 mg twice daily  Lantus 30 units at at bedtime  Sliding scale with meals  Trulicity 0.75 weekly     4.  Hypertension   Lisinopril 20 mg  Hydrochlorothiazide 12.5 mg daily  Adjust medication for appropriate blood pressure control     5.  Pain management   Neurontin 400 mg 3 times daily  Oxycodone as needed     6.  Mood  Lexapro 10 mg daily  Deseryl     7.  Bowel bladder  Medications to encourage bowel movement every 2 to 3 days     SHELLIEI  Patient is at risk for infection we will continue to monitor his wound for adequate wound healing  He is a high fall risk will encourage safe transfers  He is on a bowel program for appropriate bowel movement     [facility] and [facility]  -Vitals stable with metoprolol, lisinopril and hydrochlorothiazide  -BS controlled with lantus and SSI  -DC planning home at  level mod I  -recheck CBC and BMP         I spent 35 minutes in the professional and overall care of this patient.      Radha Carranza MD

## 2024-12-06 NOTE — PROGRESS NOTES
Occupational Therapy    OT Treatment    Patient Name: Errol Fonseca  MRN: [id number]  Department: [facility]  Room: [address] [place] [address]  Today's Date: 12/6/2024  Time Calculation  Start Time: 0830  Stop Time: 0915  Time Calculation (min): 45 min    Amada Whitman OTR/L supervised and approves Raine Madera S/OT documentation and treatment of this patient.        Assessment:  End of Session Communication: Bedside nurse  End of Session Patient Position: Alarm on, Up in chair    Plan:  Treatment Interventions: ADL retraining, Functional transfer training, UE strengthening/ROM, Endurance training, Patient/family training, Equipment evaluation/education, Compensatory technique education, Continued evaluation  OT Frequency: 90 min/5 days per week (1 week)  OT Discharge Recommendations:  (mod I at wc level, sba tub/dme transfers)  Equipment Recommended upon Discharge: Wheeled walker, Wheelchair, Bedside commode  Treatment Interventions: ADL retraining, Functional transfer training, UE strengthening/ROM, Endurance training, Patient/family training, Equipment evaluation/education, Compensatory technique education, Continued evaluation    Subjective   Previous Visit Info:  OT Last Visit  OT Received On: 12/06/24  General:  General  Prior to Session Communication: Bedside nurse  Patient Position Received: Bed, 2 rail up, Alarm on  General Comment: Patient positive and motivated for therapy, concerns with phantom pain and discharge date.    Precautions:  Precautions  Hearing/Visual Limitations: Glasses for reading  LE Weight Bearing Status: Right Non-Weight Bearing  Medical Precautions: Fall precautions  Precautions Comment: R AKA    Pain:  Pain Assessment  Pain Assessment: 0-10  0-10 (Numeric) Pain Score: 6  Pain Type: Surgical pain, Phantom pain  Pain Location: Leg  Pain Interventions: Repositioned, Distraction    Objective    Functional Standing Tolerance:  Time: Completed static standing at table with CGA when fully upright for  3:34 minutes.  Functional Standing Tolerance Comments: Bilateral UEs supported on walker, cues to weightshift towards left side and push through BUEs  Bed Mobility/Transfers: Bed Mobility  Bed Mobility: Yes (Completes supine to sit towards left side with CGA, bed rails up and HOB raised)    Transfers  Transfer:  (Completes stand pivot from bed to wheelchair with Mod A x 1 towards sound LLE, demo impulsive and quick transitional movements during transfers, cues for pace and safety)    Functional Mobility:  Functional Mobility  Functional Mobility Performed: Yes  Functional Mobility 1  Comments 1: Wheelchair mobility with SBA/SUP, cues when navigatig environmental barriers with x1 collide with obstacle    Therapy/Activity:    Therapeutic Activity  Therapeutic Activity Performed: Yes  Therapeutic Activity 1: Completes dynamic ther act at table top while standing to facilitate trunk control and activity tolerance for ADLs including reaching outside base of support for clothespins at various vertical heights and crossing midline to drop into target; stood for ~4 minutes with Min A; dyspnea noted and increased rest break required    Sensory:  Sensory Integration: Yes  Desensitization: Performed to decrease phantom limb pain sitting in wheelchair including rubbing, tapping, and brushing with surgical brush with modeled example; pt reported improved sensation      OP EDUCATION:  Patient educated on but not limited to desensitization techniques for residual limb, safety training during functional transfers, energy conservation. Patient could benefit from contintued education.  Goals:  Encounter Problems       Encounter Problems (Active)       OT Problem       LTG - Patient will complete grooming with mod I (Progressing)       Start:  12/04/24    Expected End:  12/11/24            LTG - Patient will complete bathing with supervision or better (Progressing)       Start:  12/04/24    Expected End:  12/11/24            LTG -  Patient will complete UE dressing with mod I (Progressing)       Start:  12/04/24    Expected End:  12/11/24            LTG - Patient will complete LE dressing using adaptive equip as needed with mod I (Progressing)       Start:  12/04/24    Expected End:  12/11/24            LTG - Patient will complete toileting with mod I (Progressing)       Start:  12/04/24    Expected End:  12/11/24            LTG - Patient will complete commode transfer via wwalker or pivot with mod I (Progressing)       Start:  12/04/24    Expected End:  12/11/24            LTG - Patient will complete tub/shower transfer using DME as needed with sba (Progressing)       Start:  12/04/24    Expected End:  12/11/24            LTG - Patient will complete functional mobility via wheelchar mod I (Progressing)       Start:  12/04/24    Expected End:  12/11/24            LTG - Patient will complete simple mobility/approach steps via wwalker supervision or better (Progressing)       Start:  12/04/24    Expected End:  12/11/24            LTG - Patient will complete light meal prep or kitchen access at wheelchair level mod I (Progressing)       Start:  12/04/24    Expected End:  12/11/24

## 2024-12-06 NOTE — PROGRESS NOTES
Physical Therapy    Physical Therapy Treatment    Patient Name: Errol Fonseca  MRN: [id number]  Department: [facility]  Room: [address] [place] [address]  Today's Date: 12/6/2024  Time Calculation  Start Time: 1045  Stop Time: 1130  Time Calculation (min): 45 min         Assessment/Plan   PT Assessment  End of Session Communication: Bedside nurse  End of Session Patient Position: Up in bathroom (Seated on raised commode.)     PT Plan  Treatment/Interventions: Bed mobility, Transfer training, Gait training, Stair training, Balance training, Strengthening, Endurance training, Therapeutic exercise, Therapeutic activity, Wheelchair management  PT Plan: Ongoing PT  PT Frequency: 90 min/5 days per week (1 week)  PT Discharge Recommendations:  (Anticipate discharge home mod independent at the wheelchair level.  Assist with higher level functional tasks.)  Equipment Recommended upon Discharge: Wheeled walker, Wheelchair  PT Recommended Transfer Status: Assist x2      General Visit Information:   PT  Visit  PT Received On: 12/06/24  General  Prior to Session Communication:  (Received from OT.)  Patient Position Received: Up in chair, Alarm on  General Comment:  (Pt reports feeling tired; pleasant and motivated to particpate in therapy session.)    Subjective   Precautions:  Precautions  Hearing/Visual Limitations: Glasses for reading  LE Weight Bearing Status: Right Non-Weight Bearing  Medical Precautions: Fall precautions  Precautions Comment: R AKA    Vital Signs (Past 2hrs)                 Objective   Pain:  Pain Assessment  Pain Assessment: 0-10  0-10 (Numeric) Pain Score: 5 - Moderate pain  Pain Type: Acute pain  Pain Location: Leg  Pain Orientation: Right  Pain Interventions: Medication (See MAR)              Treatments:  Therapeutic Exercise  Therapeutic Exercise Performed: Yes (Pt performed supine therex consisting of: BLE, GS, QS; RLE ABD and SLR; LLE AP, HS, SAQ, ABD 2x10 each. Rest breaks required b/t activity d/t  fatigue.)    Therapeutic Activity  Therapeutic Activity Performed: Yes (Pt tolerated lying prone on gym mat ~5 mins to promote stretch in hip flexor and avoid contraction.)    Bed Mobility  Bed Mobility: Yes (Pt performed sit<>supine and rolling R/L x2   on gym mat with SBA.)    Ambulation/Gait Training  Ambulation/Gait Training Performed: Yes (Pt able to amb 20'x1 with FWW and MinAx1 plus w/c follow; demos slow, steady lexi, fatigues quickly.  Denies dizziness and no LOB noted.)  Transfers  Transfer: Yes (Pt performed sit<>stand with FWW and MinAx1; squat pivot toward left side w/c<>gym mat and w/c>raised commode with MinAx1.)    Education Documentation  No documentation found.  Education Comments  Patient educated in correct bed mobility with instruction for proper trunk positioning, upper and lower body placement and positioning and use of bed rail if appropriate.  Patient educated in safe techniques for gait with a device in order to maximize safety and functional independence.  Patient educated in safe and proper transfer techniques including proper positioning and hand/foot placement to maximize safety and functional independence.  Pt educated on therex, including optimal techniques to maximize function.          OP EDUCATION:       Encounter Problems       Encounter Problems (Active)       PT Problem       STG - Bed mobility with SBA (Progressing)       Start:  12/04/24    Expected End:  12/07/24            STG - Transfers with min/mod assist x1.  (Progressing)       Start:  12/04/24    Expected End:  12/07/24            STG- Pt will amb 15 ft with wheeled walker and mod assist x1. (Progressing)       Start:  12/04/24    Expected End:  12/07/24            STG - Will determine most approp means of entry into home. (Progressing)       Start:  12/04/24    Expected End:  12/07/24            STG - Pt will propel wheelchair 100 ft with SBA (Progressing)       Start:  12/04/24    Expected End:  12/07/24             LTG - Mod independent bed mobility. (Progressing)       Start:  12/04/24    Expected End:  12/11/24            LTG - Mod independent transfers. (Progressing)       Start:  12/04/24    Expected End:  12/11/24            LTG - Pt will amb 25 ft with wheeled walker and min assist. (Progressing)       Start:  12/04/24    Expected End:  12/11/24            LTG - Will educate pt and family on most approp means of entry into home. (Progressing)       Start:  12/04/24    Expected End:  12/11/24            LTG - Pt will propel wheelchair 150 ft independently (Progressing)       Start:  12/04/24    Expected End:  12/11/24

## 2024-12-06 NOTE — PROGRESS NOTES
Physical Therapy    Physical Therapy Treatment    Patient Name: Errol Fonseca  MRN: [id number]  Department: [facility] REHAB  Room: [address] [place] [address]  Today's Date: 12/6/2024  Time Calculation  Start Time: 0915  Stop Time: 1000  Time Calculation (min): 45 min         Assessment/Plan   PT Assessment  End of Session Communication:  (Passed off to OT.)  End of Session Patient Position: Up in chair, Alarm on     PT Plan  Treatment/Interventions: Bed mobility, Transfer training, Gait training, Stair training, Balance training, Strengthening, Endurance training, Therapeutic exercise, Therapeutic activity, Wheelchair management  PT Plan: Ongoing PT  PT Frequency: 90 min/5 days per week (1 week)  PT Discharge Recommendations:  (Anticipate discharge home mod independent at the wheelchair level.  Assist with higher level functional tasks.)  Equipment Recommended upon Discharge: Wheeled walker, Wheelchair  PT Recommended Transfer Status: Assist x2      General Visit Information:   PT  Visit  PT Received On: 12/06/24  General  Prior to Session Communication:  (Received from OT.)  Patient Position Received: Up in chair, Alarm on  General Comment:  (Pt feeling more tired than yesterday; pleasant and willing to participate in therapy session.)    Subjective   Precautions:  Precautions  Hearing/Visual Limitations: Glasses for reading  LE Weight Bearing Status: Right Non-Weight Bearing  Medical Precautions: Fall precautions  Precautions Comment: R AKA    Vital Signs (Past 2hrs)                 Objective   Pain:  Pain Assessment  Pain Assessment: 0-10  0-10 (Numeric) Pain Score: 6  Pain Type: Acute pain  Pain Location: Leg  Pain Orientation: Right              Treatments:  Therapeutic Exercise  Therapeutic Exercise Performed: Yes (Pt performed supine LLE therex consisting of AP, HS, ABD, SAQ, GS 2x10 reps each in order to improve strength and functional mobility.)    Bed Mobility  Bed Mobility: Yes (Pt performed sit<>supine transfer on gym mat,  scooting along EOM ~3' R/L x2, rolling R/L x2 with SBA x1.)    Ambulation/Gait Training  Ambulation/Gait Training Performed: Yes (Pt able to amb 16'x2 trials with FWW and MinAx1 plis w/c follow; demos slow, steady lexi, fatigues quickly requiring extended seated rest breaks b/t trials. Denies dizziness and no LOB noted)  Transfers  Transfer: Yes (Pt performed sit<>stand with FWW and MinAx1 and squat pivot w/c<>EOM with MinAx1.)    Education Documentation  No documentation found.  Education Comments  Patient educated in correct bed mobility with instruction for proper trunk positioning, upper and lower body placement and positioning and use of bed rail if appropriate.  Patient educated in safe techniques for gait with a device in order to maximize safety and functional independence.  Patient educated in safe and proper transfer techniques including proper positioning and hand/foot placement to maximize safety and functional independence.  Pt educated on therex, including optimal techniques to maximize function.          OP EDUCATION:       Encounter Problems       Encounter Problems (Active)       PT Problem       STG - Bed mobility with SBA (Progressing)       Start:  12/04/24    Expected End:  12/07/24            STG - Transfers with min/mod assist x1.  (Progressing)       Start:  12/04/24    Expected End:  12/07/24            STG- Pt will amb 15 ft with wheeled walker and mod assist x1. (Progressing)       Start:  12/04/24    Expected End:  12/07/24            STG - Will determine most approp means of entry into home. (Progressing)       Start:  12/04/24    Expected End:  12/07/24            STG - Pt will propel wheelchair 100 ft with SBA (Progressing)       Start:  12/04/24    Expected End:  12/07/24            LTG - Mod independent bed mobility. (Progressing)       Start:  12/04/24    Expected End:  12/11/24            LTG - Mod independent transfers. (Progressing)       Start:  12/04/24    Expected End:   12/11/24            LTG - Pt will amb 25 ft with wheeled walker and min assist. (Progressing)       Start:  12/04/24    Expected End:  12/11/24            LTG - Will educate pt and family on most approp means of entry into home. (Progressing)       Start:  12/04/24    Expected End:  12/11/24            LTG - Pt will propel wheelchair 150 ft independently (Progressing)       Start:  12/04/24    Expected End:  12/11/24

## 2024-12-06 NOTE — CARE PLAN
The patient's goals for the shift include  pain management    The clinical goals for the shift include remain HD stable

## 2024-12-06 NOTE — CARE PLAN
The patient's goals for the shift include      The clinical goals for the shift include remain HD stable

## 2024-12-06 NOTE — PROGRESS NOTES
Occupational Therapy    OT Treatment    Patient Name: Errol Fonseca  MRN: [id number]  Department: [facility] REH[facility]  Room: [address] [place] [address]  Today's Date: 12/6/2024  Time Calculation  Start Time: 1000  Stop Time: 1045  Time Calculation (min): 45 min    Amada Whitman OTR/L supervised and approves Raine Madera S/OT documentation and treatment of this patient.          Assessment:  End of Session Communication: Bedside nurse  End of Session Patient Position: Alarm on, Up in chair     Plan:  Treatment Interventions: ADL retraining, Functional transfer training, UE strengthening/ROM, Endurance training, Patient/family training, Equipment evaluation/education, Compensatory technique education, Continued evaluation  OT Frequency: 90 min/5 days per week (1 week)  OT Discharge Recommendations:  (mod I at wc level, sba tub/dme transfers)  Equipment Recommended upon Discharge: Wheeled walker, Wheelchair, Bedside commode  Treatment Interventions: ADL retraining, Functional transfer training, UE strengthening/ROM, Endurance training, Patient/family training, Equipment evaluation/education, Compensatory technique education, Continued evaluation    Subjective   Previous Visit Info:  OT Last Visit  OT Received On: 12/06/24  General:  General  Prior to Session Communication: Bedside nurse  Patient Position Received: Up in chair, Alarm on  General Comment:  (Patient actively engaged and participated in therapy)  Precautions:  Hearing/Visual Limitations: Glasses for reading  LE Weight Bearing Status: Right Non-Weight Bearing  Medical Precautions: Fall precautions  Precautions Comment: R AKA      Pain:  Pain Assessment  Pain Assessment: 0-10  0-10 (Numeric) Pain Score: 3  Pain Type: Phantom pain, Surgical pain  Pain Location: (Right Residual Limb)  Pain Interventions: Repositioned, Distraction    Objective      Functional Mobility:  Functional Mobility  Functional Mobility Performed: Yes  Functional Mobility 1  Comments 1: Wheelchair mobility  with SBA with no collides with environmental obstacles, improved carryover    Transfers  Transfer: Yes  Transfer 1  Transfer From 1:  (Performs stand pivot toward left side overall with Min A, cues for pace secondary to impulsive behavior to initiate transfer.)  Transfer to 1:  (Performs sit to stand transfer CGA/Min A to wheeled walker)  Technique 1:  (Performs commode transfer via stand pivot with Min A towards left side, verbal cues for positioning wheelchair to commode; verbal cues also to manage brakes of wheelchair and to move wheelchair to left side while on commode (to allow for safe pivot transfer to sound left side each transfer)      Therapy/Activity: Therapeutic Exercise  Therapeutic Exercise Performed: Yes  Therapeutic Exercise Activity 1: Completed bilateral UE ther ex program with 4lbs db weights; 12-15 reps x2 sets over 7 exercises in all planes    Therapeutic Activity  Therapeutic Activity Performed: Yes  Therapeutic Activity 1: Education and review on Amputee handout and residual limb education with patient having good understanding        OP EDUCATION:  Patient educated on but not limited to plan of care and goals, follow up and therapy progression, safety training, wheelchair setup, amputee handout issued, and energy conservation. Patient could benefit from contintued education.  Goals:  Encounter Problems       Encounter Problems (Active)       OT Problem       LTG - Patient will complete grooming with mod I (Progressing)       Start:  12/04/24    Expected End:  12/11/24            LTG - Patient will complete bathing with supervision or better (Progressing)       Start:  12/04/24    Expected End:  12/11/24            LTG - Patient will complete UE dressing with mod I (Progressing)       Start:  12/04/24    Expected End:  12/11/24            LTG - Patient will complete LE dressing using adaptive equip as needed with mod I (Progressing)       Start:  12/04/24    Expected End:  12/11/24             LTG - Patient will complete toileting with mod I (Progressing)       Start:  12/04/24    Expected End:  12/11/24            LTG - Patient will complete commode transfer via wwalker or pivot with mod I (Progressing)       Start:  12/04/24    Expected End:  12/11/24            LTG - Patient will complete tub/shower transfer using DME as needed with sba (Progressing)       Start:  12/04/24    Expected End:  12/11/24            LTG - Patient will complete functional mobility via wheelchar mod I (Progressing)       Start:  12/04/24    Expected End:  12/11/24            LTG - Patient will complete simple mobility/approach steps via wwalker supervision or better (Progressing)       Start:  12/04/24    Expected End:  12/11/24            LTG - Patient will complete light meal prep or kitchen access at wheelchair level mod I (Progressing)       Start:  12/04/24    Expected End:  12/11/24

## 2024-12-07 LAB
ANION GAP SERPL CALC-SCNC: 12 MMOL/L (ref 10–20)
BASOPHILS # BLD AUTO: 0.12 X10*3/UL (ref 0–0.1)
BASOPHILS NFR BLD AUTO: 2.2 %
BUN SERPL-MCNC: 20 MG/DL (ref 6–23)
CALCIUM SERPL-MCNC: 8.7 MG/DL (ref 8.6–10.3)
CHLORIDE SERPL-SCNC: 95 MMOL/L (ref 98–107)
CO2 SERPL-SCNC: 29 MMOL/L (ref 21–32)
CREAT SERPL-MCNC: 0.7 MG/DL (ref 0.5–1.3)
EGFRCR SERPLBLD CKD-EPI 2021: >90 ML/MIN/1.73M*2
EOSINOPHIL # BLD AUTO: 0.19 X10*3/UL (ref 0–0.7)
EOSINOPHIL NFR BLD AUTO: 3.4 %
ERYTHROCYTE [DISTWIDTH] IN BLOOD BY AUTOMATED COUNT: 13.5 % (ref 11.5–14.5)
GLUCOSE BLD MANUAL STRIP-MCNC: 147 MG/DL (ref 74–99)
GLUCOSE BLD MANUAL STRIP-MCNC: 151 MG/DL (ref 74–99)
GLUCOSE BLD MANUAL STRIP-MCNC: 191 MG/DL (ref 74–99)
GLUCOSE BLD MANUAL STRIP-MCNC: 222 MG/DL (ref 74–99)
GLUCOSE SERPL-MCNC: 241 MG/DL (ref 74–99)
HCT VFR BLD AUTO: 29.1 % (ref 41–52)
HGB BLD-MCNC: 9.5 G/DL (ref 13.5–17.5)
IMM GRANULOCYTES # BLD AUTO: 0.04 X10*3/UL (ref 0–0.7)
IMM GRANULOCYTES NFR BLD AUTO: 0.7 % (ref 0–0.9)
LYMPHOCYTES # BLD AUTO: 1.36 X10*3/UL (ref 1.2–4.8)
LYMPHOCYTES NFR BLD AUTO: 24.4 %
MCH RBC QN AUTO: 32.3 PG (ref 26–34)
MCHC RBC AUTO-ENTMCNC: 32.6 G/DL (ref 32–36)
MCV RBC AUTO: 99 FL (ref 80–100)
MONOCYTES # BLD AUTO: 0.49 X10*3/UL (ref 0.1–1)
MONOCYTES NFR BLD AUTO: 8.8 %
NEUTROPHILS # BLD AUTO: 3.37 X10*3/UL (ref 1.2–7.7)
NEUTROPHILS NFR BLD AUTO: 60.5 %
NRBC BLD-RTO: 0 /100 WBCS (ref 0–0)
PLATELET # BLD AUTO: 276 X10*3/UL (ref 150–450)
POTASSIUM SERPL-SCNC: 4.5 MMOL/L (ref 3.5–5.3)
RBC # BLD AUTO: 2.94 X10*6/UL (ref 4.5–5.9)
SODIUM SERPL-SCNC: 131 MMOL/L (ref 136–145)
WBC # BLD AUTO: 5.6 X10*3/UL (ref 4.4–11.3)

## 2024-12-07 PROCEDURE — 2500000002 HC RX 250 W HCPCS SELF ADMINISTERED DRUGS (ALT 637 FOR MEDICARE OP, ALT 636 FOR OP/ED): Performed by: PHYSICAL MEDICINE & REHABILITATION

## 2024-12-07 PROCEDURE — 85025 COMPLETE CBC W/AUTO DIFF WBC: CPT | Performed by: PHYSICAL MEDICINE & REHABILITATION

## 2024-12-07 PROCEDURE — 99232 SBSQ HOSP IP/OBS MODERATE 35: CPT | Performed by: PHYSICAL MEDICINE & REHABILITATION

## 2024-12-07 PROCEDURE — 80048 BASIC METABOLIC PNL TOTAL CA: CPT | Performed by: PHYSICAL MEDICINE & REHABILITATION

## 2024-12-07 PROCEDURE — 97110 THERAPEUTIC EXERCISES: CPT | Mod: GO

## 2024-12-07 PROCEDURE — 1280000001 HC REHAB SEMI-PRIVATE ROOM DAILY

## 2024-12-07 PROCEDURE — 36415 COLL VENOUS BLD VENIPUNCTURE: CPT | Performed by: PHYSICAL MEDICINE & REHABILITATION

## 2024-12-07 PROCEDURE — S4991 NICOTINE PATCH NONLEGEND: HCPCS | Performed by: PHYSICAL MEDICINE & REHABILITATION

## 2024-12-07 PROCEDURE — 97530 THERAPEUTIC ACTIVITIES: CPT | Mod: GP,CQ

## 2024-12-07 PROCEDURE — 97110 THERAPEUTIC EXERCISES: CPT | Mod: GP,CQ

## 2024-12-07 PROCEDURE — 97535 SELF CARE MNGMENT TRAINING: CPT | Mod: GO

## 2024-12-07 PROCEDURE — 97530 THERAPEUTIC ACTIVITIES: CPT | Mod: GO

## 2024-12-07 PROCEDURE — 82947 ASSAY GLUCOSE BLOOD QUANT: CPT

## 2024-12-07 PROCEDURE — 2500000001 HC RX 250 WO HCPCS SELF ADMINISTERED DRUGS (ALT 637 FOR MEDICARE OP): Performed by: PHYSICAL MEDICINE & REHABILITATION

## 2024-12-07 PROCEDURE — 97116 GAIT TRAINING THERAPY: CPT | Mod: GP,CQ

## 2024-12-07 ASSESSMENT — PAIN - FUNCTIONAL ASSESSMENT
PAIN_FUNCTIONAL_ASSESSMENT: 0-10

## 2024-12-07 ASSESSMENT — PAIN SCALES - GENERAL
PAINLEVEL_OUTOF10: 6
PAINLEVEL_OUTOF10: 5 - MODERATE PAIN
PAINLEVEL_OUTOF10: 4
PAINLEVEL_OUTOF10: 7
PAINLEVEL_OUTOF10: 6
PAINLEVEL_OUTOF10: 6
PAINLEVEL_OUTOF10: 0 - NO PAIN
PAINLEVEL_OUTOF10: 6
PAINLEVEL_OUTOF10: 5 - MODERATE PAIN

## 2024-12-07 ASSESSMENT — ACTIVITIES OF DAILY LIVING (ADL): HOME_MANAGEMENT_TIME_ENTRY: 15

## 2024-12-07 NOTE — PROGRESS NOTES
Occupational Therapy    OT Treatment    Patient Name: Errol Fonseca  MRN: [id number]  Department: [facility] RE[facility]  Room: [address] [place] [address]  Today's Date: 12/7/2024  Time Calculation  Start Time: 0745  Stop Time: 0830  Time Calculation (min): 45 min        Assessment:  End of Session Communication:  (to PT)  End of Session Patient Position: Up in chair, Alarm on     Plan:  Treatment Interventions: ADL retraining, Functional transfer training, UE strengthening/ROM, Endurance training, Patient/family training, Equipment evaluation/education, Compensatory technique education, Continued evaluation  OT Frequency: 90 min/5 days per week (1 week)  OT Discharge Recommendations:  (mod I at wc level, sba tub/dme transfers)  Equipment Recommended upon Discharge: Wheeled walker, Wheelchair, Bedside commode  Treatment Interventions: ADL retraining, Functional transfer training, UE strengthening/ROM, Endurance training, Patient/family training, Equipment evaluation/education, Compensatory technique education, Continued evaluation    Subjective   Previous Visit Info:  OT Last Visit  OT Received On: 12/07/24  General:  General  Patient Position Received: Up in chair, Alarm on  General Comment: pt cooperative and compliant  Precautions:  LE Weight Bearing Status: Right Non-Weight Bearing  Medical Precautions: Fall precautions/aka            Pain:  Pain Assessment  Pain Assessment: 0-10  0-10 (Numeric) Pain Score: 5 - Moderate pain  Pain Location:  (rt residual limb)  Pain Interventions: Medication (See MAR), Repositioned, Rest    Objective         Activities of Daily Living: LE Dressing  LE Dressing Adaptive Equipment: Reacher, Sock aide  Sock Level of Assistance: Close supervision (doffing without ae, and cues for proper use of sock aid)  LE Dressing Where Assessed: Wheelchair  LE Dressing Comments: educated on le adaptive equipment d/t c/o pain and difficulty consistently reaching lle       Bed Mobility/Transfers: Toilet Transfers  Toilet  Transfers Comments: reviewed recommendation for commode, issued handouts on vendor options and easy clean up; will check with father if has prior to purchse  Tub Transfers  Tub Transfers Comments: pt will have tub/shower at son's apt- educated on extended tub bench and tub mounted grab bar, sit and turn tech for safety d/t aka- hand outs issued on info/vendor options, pt declines to trial this date; reports will check with father if has dme prior to purchase      Functional Mobility:  Functional Mobility  Functional Mobility Performed:  (multiple wc mobility in hallway, OT clinic s/sba, simple to functional with good safety techs)    EDUCATION:  To pt, bathroom dme/ae for home going; residual limb care; reminder for having son measure and take pics of doorways, reports understanding       Goals:  Encounter Problems       Encounter Problems (Active)       OT Problem       LTG - Patient will complete grooming with mod I (Progressing)       Start:  12/04/24    Expected End:  12/11/24            LTG - Patient will complete bathing with supervision or better (Progressing)       Start:  12/04/24    Expected End:  12/11/24            LTG - Patient will complete UE dressing with mod I (Progressing)       Start:  12/04/24    Expected End:  12/11/24            LTG - Patient will complete LE dressing using adaptive equip as needed with mod I (Progressing)       Start:  12/04/24    Expected End:  12/11/24            LTG - Patient will complete toileting with mod I (Progressing)       Start:  12/04/24    Expected End:  12/11/24            LTG - Patient will complete commode transfer via wwalker or pivot with mod I (Progressing)       Start:  12/04/24    Expected End:  12/11/24            LTG - Patient will complete tub/shower transfer using DME as needed with sba (Progressing)       Start:  12/04/24    Expected End:  12/11/24            LTG - Patient will complete functional mobility via wheelchar mod I (Progressing)        Start:  12/04/24    Expected End:  12/11/24            LTG - Patient will complete simple mobility/approach steps via wwalker supervision or better (Progressing)       Start:  12/04/24    Expected End:  12/11/24            LTG - Patient will complete light meal prep or kitchen access at wheelchair level mod I (Progressing)       Start:  12/04/24    Expected End:  12/11/24

## 2024-12-07 NOTE — CARE PLAN
The patient's goals for the shift include  safety and comfort    The clinical goals for the shift include pain management

## 2024-12-07 NOTE — PROGRESS NOTES
Occupational Therapy    OT Treatment    Patient Name: Errol Fonseca  MRN: [id number]  Department: [facility] REHAB  Room: [address] [place] [address]  Today's Date: 12/7/2024  Time Calculation  Start Time: 1000  Stop Time: 1045  Time Calculation (min): 45 min        Assessment:  End of Session Communication:  (to PT)  End of Session Patient Position: Alarm on, Up in chair     Plan:  Treatment Interventions: ADL retraining, Functional transfer training, UE strengthening/ROM, Endurance training, Patient/family training, Equipment evaluation/education, Compensatory technique education, Continued evaluation  OT Frequency: 90 min/5 days per week (1 week)  OT Discharge Recommendations:  (mod I at wc level, sba tub/dme transfers)  Equipment Recommended upon Discharge: Wheeled walker, Wheelchair, Bedside commode  Treatment Interventions: ADL retraining, Functional transfer training, UE strengthening/ROM, Endurance training, Patient/family training, Equipment evaluation/education, Compensatory technique education, Continued evaluation    Subjective   Previous Visit Info:  OT Last Visit  OT Received On: 12/07/24  General:  General  Prior to Session Communication:  (from PT)  Patient Position Received: Up in chair, Alarm on  General Comment: pt cooperative and compliant  Precautions:  LE Weight Bearing Status: Right Non-Weight Bearing  Medical Precautions: Fall precautions  Precautions Comment: R AKA            Pain:  Pain Assessment  Pain Assessment: 0-10  0-10 (Numeric) Pain Score: 6  Pain Location:  (rt leg)  Pain Interventions: Medication (See MAR), Repositioned    Objective         Therapy/Activity: Therapeutic Exercise  Therapeutic Exercise Activity 1: Completed bilateral UE ther ex program with 3lbs  weights; 10x reps x3 sets over 9 exercises in all planes, to promote indep in adl, transfers, mobility for home going    EDUCATION:  To pt, UE HEP for strengthening, reports understanding       Goals:  Encounter Problems       Encounter Problems  (Active)       OT Problem       LTG - Patient will complete grooming with mod I (Progressing)       Start:  12/04/24    Expected End:  12/11/24            LTG - Patient will complete bathing with supervision or better (Progressing)       Start:  12/04/24    Expected End:  12/11/24            LTG - Patient will complete UE dressing with mod I (Progressing)       Start:  12/04/24    Expected End:  12/11/24            LTG - Patient will complete LE dressing using adaptive equip as needed with mod I (Progressing)       Start:  12/04/24    Expected End:  12/11/24            LTG - Patient will complete toileting with mod I (Progressing)       Start:  12/04/24    Expected End:  12/11/24            LTG - Patient will complete commode transfer via wwalker or pivot with mod I (Progressing)       Start:  12/04/24    Expected End:  12/11/24            LTG - Patient will complete tub/shower transfer using DME as needed with sba (Progressing)       Start:  12/04/24    Expected End:  12/11/24            LTG - Patient will complete functional mobility via wheelchar mod I (Progressing)       Start:  12/04/24    Expected End:  12/11/24            LTG - Patient will complete simple mobility/approach steps via wwalker supervision or better (Progressing)       Start:  12/04/24    Expected End:  12/11/24            LTG - Patient will complete light meal prep or kitchen access at wheelchair level mod I (Progressing)       Start:  12/04/24    Expected End:  12/11/24

## 2024-12-07 NOTE — PROGRESS NOTES
Physical Therapy    Physical Therapy Treatment    Patient Name: Errol Fonseca  MRN: [id number]  Department: [facility] REHAB  Room: [address] [place] [address]  Today's Date: 12/7/2024  Time Calculation  Start Time: 0915  Stop Time: 1000  Time Calculation (min): 45 min         Assessment/Plan   PT Assessment  End of Session Communication:  (Passed to OT.)  End of Session Patient Position: Up in chair, Alarm on     PT Plan  Treatment/Interventions: Bed mobility, Transfer training, Gait training, Stair training, Balance training, Strengthening, Endurance training, Therapeutic exercise, Therapeutic activity, Wheelchair management  PT Plan: Ongoing PT  PT Frequency: 90 min/5 days per week (1 week)  PT Discharge Recommendations:  (Anticipate discharge home mod independent at the wheelchair level.  Assist with higher level functional tasks.)  Equipment Recommended upon Discharge: Wheeled walker, Wheelchair  PT Recommended Transfer Status: Assist x2      General Visit Information:   PT  Visit  PT Received On: 12/07/24  General  Prior to Session Communication:  (Received from OT.)  Patient Position Received: Up in chair, Alarm on  General Comment:  (Pt pleasant and willing to participate in therapy session.)    Subjective   Precautions:  Precautions  Hearing/Visual Limitations: Glasses for reading  LE Weight Bearing Status: Right Non-Weight Bearing  Medical Precautions: Fall precautions  Precautions Comment: R AKA            Objective   Pain:  Pain Assessment  Pain Assessment: 0-10  0-10 (Numeric) Pain Score: 6  Pain Type: Acute pain  Pain Location: Leg  Pain Orientation: Right  Pain Interventions: Medication (See MAR)              Treatments:  Therapeutic Exercise  Therapeutic Exercise Performed: Yes (Pt performed supine LLE therex consisting of AP, HS, ABD, SAQ, GS 2x10 reps each in order to improve strength and functional mobility.)    Bed Mobility  Bed Mobility: Yes (Pt performed sit<>supine and rolling R/L x2 on gym mat with  SBA.)    Ambulation/Gait Training  Ambulation/Gait Training Performed: Yes (Pt able to amb 20'x1 and 35'x1 with FWW and MinAx1 plus w/c follow; demos increase in lexi and distance this date although fatigues quickly requiring extended seated rest break b/t trials d/t fatigue. Denies dizziness and no LOB noted)  Transfers  Transfer: Yes (Pt performed sit<>stand x2 with FWW and MinAx1; squat pivot toward left side w/c<>gym mat. Denies dizziness and no LOB noted.)  Education Documentation  No documentation found.  Education Comments  Patient educated in correct bed mobility with instruction for proper trunk positioning, upper and lower body placement and positioning and use of bed rail if appropriate.  Patient educated in safe techniques for gait with a device in order to maximize safety and functional independence.  Patient educated in safe and proper transfer techniques including proper positioning and hand/foot placement to maximize safety and functional independence.  Pt educated on therex, including optimal techniques to maximize function.          OP EDUCATION:       Encounter Problems       Encounter Problems (Active)       PT Problem       STG - Bed mobility with SBA (Progressing)       Start:  12/04/24    Expected End:  12/07/24            STG - Transfers with min/mod assist x1.  (Progressing)       Start:  12/04/24    Expected End:  12/07/24            STG- Pt will amb 15 ft with wheeled walker and mod assist x1. (Progressing)       Start:  12/04/24    Expected End:  12/07/24            STG - Will determine most approp means of entry into home. (Progressing)       Start:  12/04/24    Expected End:  12/07/24            STG - Pt will propel wheelchair 100 ft with SBA (Progressing)       Start:  12/04/24    Expected End:  12/07/24            LTG - Mod independent bed mobility. (Progressing)       Start:  12/04/24    Expected End:  12/11/24            LTG - Mod independent transfers. (Progressing)       Start:   12/04/24    Expected End:  12/11/24            LTG - Pt will amb 25 ft with wheeled walker and min assist. (Progressing)       Start:  12/04/24    Expected End:  12/11/24            LTG - Will educate pt and family on most approp means of entry into home. (Progressing)       Start:  12/04/24    Expected End:  12/11/24            LTG - Pt will propel wheelchair 150 ft independently (Progressing)       Start:  12/04/24    Expected End:  12/11/24

## 2024-12-07 NOTE — PROGRESS NOTES
"  Errol Fonseca is a 54 y.o. male on day 4 of admission presenting with Above knee amputation of right lower extremity (Multi).    Subjective   Patient was seen today in his room.  He was tired after therapy.  Pain is controlled with oxycodone after therapy.  He also uses gabapentin.    He feels his leg is getting stronger and he is getting more steady on his feet with transfers and short distance ambulation.  He denies chest pain or shortness of breath.       Objective       Physical Exam  Pleasant male in no apparent distress.  He is alert and oriented x 3  Chest clear to auscultation bilaterally  Cardiovascular S1-S2 without murmurs rubs or gallops  Abdomen soft nontender nondistended with active bowel sounds  Negative Homans on the left.  Right AKA with small dressing in place.  Function: Transfers at a standby assist level.  Assessment/Plan        Last Recorded Vitals  Blood pressure 129/62, pulse 68, temperature 36.7 °C (98.1 °F), resp. rate 20, height 1.854 m (6' 0.99\"), weight 103 kg (227 lb 8.2 oz), SpO2 97%.    Therapy notes from last 24 hours reviewed.    Relevant Results                  Scheduled medications  aspirin, 81 mg, oral, Daily  atorvastatin, 40 mg, oral, Daily  dulaglutide, 0.75 mg, subcutaneous, Weekly  ergocalciferol, 50,000 Units, oral, Weekly  escitalopram, 10 mg, oral, Daily  influenza, 0.5 mL, intramuscular, During hospitalization  folic acid, 1 mg, oral, Daily  gabapentin, 400 mg, oral, TID  lisinopril, 20 mg, oral, Daily   And  hydroCHLOROthiazide, 12.5 mg, oral, Daily  insulin glargine, 34 Units, subcutaneous, Daily  insulin lispro, 0-10 Units, subcutaneous, TID AC  insulin lispro, 7 Units, subcutaneous, TID AC  melatonin, 5 mg, oral, Nightly  metFORMIN XR, 500 mg, oral, BID  metoprolol tartrate, 25 mg, oral, BID  nicotine, 1 patch, transdermal, Daily  nystatin, 1 Application, Topical, BID  sennosides, 1 tablet, oral, Nightly      Continuous medications     PRN medications  PRN " medications: acetaminophen, alum-mag hydroxide-simeth, bisacodyl, bisacodyl, dextrose, dextrose, glucagon, glucagon, oxyCODONE, polyethylene glycol, traZODone     Results for orders placed or performed during the hospital encounter of 12/03/24 (from the past 24 hours)   POCT GLUCOSE   Result Value Ref Range    POCT Glucose 228 (H) 74 - 99 mg/dL   POCT GLUCOSE   Result Value Ref Range    POCT Glucose 123 (H) 74 - 99 mg/dL   Basic Metabolic Panel   Result Value Ref Range    Glucose 241 (H) 74 - 99 mg/dL    Sodium 131 (L) 136 - 145 mmol/L    Potassium 4.5 3.5 - 5.3 mmol/L    Chloride 95 (L) 98 - 107 mmol/L    Bicarbonate 29 21 - 32 mmol/L    Anion Gap 12 10 - 20 mmol/L    Urea Nitrogen 20 6 - 23 mg/dL    Creatinine 0.70 0.50 - 1.30 mg/dL    eGFR >90 >60 mL/min/1.73m*2    Calcium 8.7 8.6 - 10.3 mg/dL   CBC and Auto Differential   Result Value Ref Range    WBC 5.6 4.4 - 11.3 x10*3/uL    nRBC 0.0 0.0 - 0.0 /100 WBCs    RBC 2.94 (L) 4.50 - 5.90 x10*6/uL    Hemoglobin 9.5 (L) 13.5 - 17.5 g/dL    Hematocrit 29.1 (L) 41.0 - 52.0 %    MCV 99 80 - 100 fL    MCH 32.3 26.0 - 34.0 pg    MCHC 32.6 32.0 - 36.0 g/dL    RDW 13.5 11.5 - 14.5 %    Platelets 276 150 - 450 x10*3/uL    Neutrophils % 60.5 40.0 - 80.0 %    Immature Granulocytes %, Automated 0.7 0.0 - 0.9 %    Lymphocytes % 24.4 13.0 - 44.0 %    Monocytes % 8.8 2.0 - 10.0 %    Eosinophils % 3.4 0.0 - 6.0 %    Basophils % 2.2 0.0 - 2.0 %    Neutrophils Absolute 3.37 1.20 - 7.70 x10*3/uL    Immature Granulocytes Absolute, Automated 0.04 0.00 - 0.70 x10*3/uL    Lymphocytes Absolute 1.36 1.20 - 4.80 x10*3/uL    Monocytes Absolute 0.49 0.10 - 1.00 x10*3/uL    Eosinophils Absolute 0.19 0.00 - 0.70 x10*3/uL    Basophils Absolute 0.12 (H) 0.00 - 0.10 x10*3/uL   POCT GLUCOSE   Result Value Ref Range    POCT Glucose 222 (H) 74 - 99 mg/dL   POCT GLUCOSE   Result Value Ref Range    POCT Glucose 151 (H) 74 - 99 mg/dL                 Assessment/Plan   Principal Problem:    Above knee  amputation of right lower extremity (Multi)  Active Problems:    Alcoholic cirrhosis (Multi)    Coronary artery disease involving native coronary artery of native heart with unstable angina pectoris    Hyperlipidemia    Hypertension    Right above-knee amputee (Multi)            Right above-knee amputation  Begin physical therapy for strengthening, preprosthetic gait training, transfer training, wheelchair mobility  Begin occupational therapy for ADL retraining, strengthening, transfer training, and ADL activities     2.  Hyperlipidemia  Lipitor 40 mg daily     3.  Diabetes mellitus type 2  Metformin 500 mg twice daily  Lantus 30 units at at bedtime  Sliding scale with meals  Trulicity 0.75 weekly     4.  Hypertension   Lisinopril 20 mg  Hydrochlorothiazide 12.5 mg daily  Adjust medication for appropriate blood pressure control     5.  Pain management   Neurontin 400 mg 3 times daily  Oxycodone as needed     6.  Mood  Lexapro 10 mg daily  Deseryl     7.  Bowel bladder  Medications to encourage bowel movement every 2 to 3 days     FENGI  Patient is at risk for infection we will continue to monitor his wound for adequate wound healing  He is a high fall risk will encourage safe transfers  He is on a bowel program for appropriate bowel movement     12/7  -Continue working on balance with transfers and ambulation improving to contact-guard to standby assist.  -Blood pressure stable  -Sodium 131.  Continue fluid restrictions  -Blood sugars in the 200s.  Just increase Lantus insulin.  Will continue to monitor.  -Recheck BMP on Monday.       I spent 35 minutes in the professional and overall care of this patient.      Radha Carranza MD

## 2024-12-07 NOTE — PROGRESS NOTES
Physical Therapy    Physical Therapy Treatment    Patient Name: Errol Fonseca  MRN: [id number]  Department: [facility]  Room: [address] [place] [address]  Today's Date: 12/7/2024  Time Calculation  Start Time: 1045  Stop Time: 1130  Time Calculation (min): 45 min         Assessment/Plan   PT Assessment  End of Session Communication: Bedside nurse  End of Session Patient Position: Up in bathroom (Seated on raised commode.)     PT Plan  Treatment/Interventions: Bed mobility, Transfer training, Gait training, Stair training, Balance training, Strengthening, Endurance training, Therapeutic exercise, Therapeutic activity, Wheelchair management  PT Plan: Ongoing PT  PT Frequency: 90 min/5 days per week (1 week)  PT Discharge Recommendations:  (Anticipate discharge home mod independent at the wheelchair level.  Assist with higher level functional tasks.)  Equipment Recommended upon Discharge: Wheeled walker, Wheelchair  PT Recommended Transfer Status: Assist x2      General Visit Information:   PT  Visit  PT Received On: 12/07/24  General  Prior to Session Communication:  (Received from OT.)  Patient Position Received: Up in chair, Alarm on  General Comment:  (Pt reports feeling fatigued from previous therapy sessions; pleasant and williing to particiapate.)    Subjective   Precautions:  Precautions  Hearing/Visual Limitations: Glasses for reading  LE Weight Bearing Status: Right Non-Weight Bearing  Medical Precautions: Fall precautions  Precautions Comment: R AKA            Objective   Pain:  Pain Assessment  Pain Assessment: 0-10  0-10 (Numeric) Pain Score: 6  Pain Type: Acute pain  Pain Location: Leg  Pain Orientation: Right  Pain Interventions: Medication (See MAR), Repositioned              Treatments:  Therapeutic Exercise  Therapeutic Exercise Performed: Yes (Pt performed supine therex consisting of: BLE: GS, QS; LLE: AP, HS, SAQ, ABD; RLE: ABD, SLR, side-lying hip extension 2x10 each. Rest breaks required b/t activity d/t  fatigue)    Therapeutic Activity  Therapeutic Activity Performed: Yes (Pt tolerated lying prone on gym mat ~8 mins to promote stretch in hip flexor and avoid contraction.)     Bed Mobility  Bed Mobility: Yes (Pt performed sit<>supine and rolling R/L on gym mat with SBA.)    Ambulation/Gait Training  Ambulation/Gait Training Performed: Yes (Pt able to amb 35'x1 and 25'x1 with FWW and MinAx1 plus w/c follow; continues to improve with sequencing and endurance, demos steady lexi.  Denies dizziness and no LOB noted.)  Transfers  Transfer: Yes (Pt performed sit<>stand with FWW and MinAx1, squat pivot transfer w/c<>gym mat and w/c>raised commode with CGA/MinAx1; demos improvement with sequencing and hand placement safety. Denies dizziness and no LOB noted.)    Education Documentation  No documentation found.  Education Comments  No comments found.        OP EDUCATION:       Encounter Problems       Encounter Problems (Active)       PT Problem       STG - Bed mobility with SBA (Progressing)       Start:  12/04/24    Expected End:  12/07/24            STG - Transfers with min/mod assist x1.  (Progressing)       Start:  12/04/24    Expected End:  12/07/24            STG- Pt will amb 15 ft with wheeled walker and mod assist x1. (Progressing)       Start:  12/04/24    Expected End:  12/07/24            STG - Will determine most approp means of entry into home. (Progressing)       Start:  12/04/24    Expected End:  12/07/24            STG - Pt will propel wheelchair 100 ft with SBA (Progressing)       Start:  12/04/24    Expected End:  12/07/24            LTG - Mod independent bed mobility. (Progressing)       Start:  12/04/24    Expected End:  12/11/24            LTG - Mod independent transfers. (Progressing)       Start:  12/04/24    Expected End:  12/11/24            LTG - Pt will amb 25 ft with wheeled walker and min assist. (Progressing)       Start:  12/04/24    Expected End:  12/11/24            LTG - Will educate pt  and family on most approp means of entry into home. (Progressing)       Start:  12/04/24    Expected End:  12/11/24            LTG - Pt will propel wheelchair 150 ft independently (Progressing)       Start:  12/04/24    Expected End:  12/11/24

## 2024-12-08 VITALS
SYSTOLIC BLOOD PRESSURE: 127 MMHG | RESPIRATION RATE: 18 BRPM | DIASTOLIC BLOOD PRESSURE: 69 MMHG | WEIGHT: 227.51 LBS | HEART RATE: 78 BPM | OXYGEN SATURATION: 98 % | BODY MASS INDEX: 30.15 KG/M2 | TEMPERATURE: 98.1 F | HEIGHT: 73 IN

## 2024-12-08 LAB
GLUCOSE BLD MANUAL STRIP-MCNC: 156 MG/DL (ref 74–99)
GLUCOSE BLD MANUAL STRIP-MCNC: 192 MG/DL (ref 74–99)
GLUCOSE BLD MANUAL STRIP-MCNC: 210 MG/DL (ref 74–99)
GLUCOSE BLD MANUAL STRIP-MCNC: 233 MG/DL (ref 74–99)

## 2024-12-08 PROCEDURE — 2500000001 HC RX 250 WO HCPCS SELF ADMINISTERED DRUGS (ALT 637 FOR MEDICARE OP): Performed by: PHYSICAL MEDICINE & REHABILITATION

## 2024-12-08 PROCEDURE — 82947 ASSAY GLUCOSE BLOOD QUANT: CPT

## 2024-12-08 PROCEDURE — 1280000001 HC REHAB SEMI-PRIVATE ROOM DAILY

## 2024-12-08 PROCEDURE — 2500000002 HC RX 250 W HCPCS SELF ADMINISTERED DRUGS (ALT 637 FOR MEDICARE OP, ALT 636 FOR OP/ED): Performed by: PHYSICAL MEDICINE & REHABILITATION

## 2024-12-08 PROCEDURE — S4991 NICOTINE PATCH NONLEGEND: HCPCS | Performed by: PHYSICAL MEDICINE & REHABILITATION

## 2024-12-08 ASSESSMENT — PAIN DESCRIPTION - DESCRIPTORS
DESCRIPTORS: ACHING

## 2024-12-08 ASSESSMENT — PAIN SCALES - GENERAL
PAINLEVEL_OUTOF10: 4
PAINLEVEL_OUTOF10: 7
PAINLEVEL_OUTOF10: 9
PAINLEVEL_OUTOF10: 8
PAINLEVEL_OUTOF10: 8
PAINLEVEL_OUTOF10: 3
PAINLEVEL_OUTOF10: 7
PAINLEVEL_OUTOF10: 8

## 2024-12-08 ASSESSMENT — PAIN - FUNCTIONAL ASSESSMENT
PAIN_FUNCTIONAL_ASSESSMENT: 0-10
PAIN_FUNCTIONAL_ASSESSMENT: UNABLE TO SELF-REPORT
PAIN_FUNCTIONAL_ASSESSMENT: UNABLE TO SELF-REPORT
PAIN_FUNCTIONAL_ASSESSMENT: 0-10
PAIN_FUNCTIONAL_ASSESSMENT: 0-10

## 2024-12-08 ASSESSMENT — PAIN DESCRIPTION - LOCATION
LOCATION: LEG

## 2024-12-08 ASSESSMENT — PAIN DESCRIPTION - ORIENTATION
ORIENTATION: RIGHT
ORIENTATION: RIGHT;LOWER
ORIENTATION: RIGHT;MID;OUTER

## 2024-12-08 NOTE — CARE PLAN
The patient's goals for the shift include      The clinical goals for the shift include Patient will receive adequate rest this shift.      Problem: Diabetes  Goal: Achieve decreasing blood glucose levels by end of shift  12/7/2024 2234 by Gail Jensen RN  Outcome: Progressing  12/7/2024 2234 by Gail Jensen RN  Outcome: Progressing  Goal: Increase stability of blood glucose readings by end of shift  12/7/2024 2234 by Gail Jensen RN  Outcome: Progressing  12/7/2024 2234 by Gail Jensen RN  Outcome: Progressing  Goal: Decrease in ketones present in urine by end of shift  12/7/2024 2234 by Gail Jensen RN  Outcome: Progressing  12/7/2024 2234 by Gail Jensen RN  Outcome: Progressing  Goal: Maintain electrolyte levels within acceptable range throughout shift  12/7/2024 2234 by Gail Jensen RN  Outcome: Progressing  12/7/2024 2234 by Gail Jensen RN  Outcome: Progressing  Goal: Maintain glucose levels >70mg/dl to <250mg/dl throughout shift  12/7/2024 2234 by Gail Jensen RN  Outcome: Progressing  12/7/2024 2234 by Gail Jensen RN  Outcome: Progressing  Goal: No changes in neurological exam by end of shift  12/7/2024 2234 by Gail Jensen RN  Outcome: Progressing  12/7/2024 2234 by Gail Jensen RN  Outcome: Progressing  Goal: Learn about and adhere to nutrition recommendations by end of shift  12/7/2024 2234 by Gail Jensen RN  Outcome: Progressing  12/7/2024 2234 by Gail Jensen RN  Outcome: Progressing  Goal: Vital signs within normal range for age by end of shift  12/7/2024 2234 by Gail Jensen RN  Outcome: Progressing  12/7/2024 2234 by Gail Jensen RN  Outcome: Progressing  Goal: Increase self care and/or family involovement by end of shift  12/7/2024 2234 by Gail Jensen RN  Outcome: Progressing  12/7/2024 2234 by Gail Jensen RN  Outcome: Progressing  Goal: Receive DSME  education by end of shift  12/7/2024 2234 by Gail Jensen RN  Outcome: Progressing  12/7/2024 2234 by Gail Jensen RN  Outcome: Progressing     Problem: Pain  Goal: Takes deep breaths with improved pain control throughout the shift  12/7/2024 2234 by Gail Jensen RN  Outcome: Progressing  12/7/2024 2234 by Gail Jensen RN  Outcome: Progressing  Goal: Turns in bed with improved pain control throughout the shift  12/7/2024 2234 by Gail Jensen RN  Outcome: Progressing  12/7/2024 2234 by Gail Jensen RN  Outcome: Progressing  Goal: Walks with improved pain control throughout the shift  12/7/2024 2234 by Gail Jensen RN  Outcome: Progressing  12/7/2024 2234 by Gail Jensen RN  Outcome: Progressing  Goal: Performs ADL's with improved pain control throughout shift  12/7/2024 2234 by Gail Jensen RN  Outcome: Progressing  12/7/2024 2234 by Gail Jensen RN  Outcome: Progressing  Goal: Participates in PT with improved pain control throughout the shift  12/7/2024 2234 by Gail Jensen RN  Outcome: Progressing  12/7/2024 2234 by Gail Jensen RN  Outcome: Progressing  Goal: Free from opioid side effects throughout the shift  12/7/2024 2234 by Gail Jensen RN  Outcome: Progressing  12/7/2024 2234 by Gail Jensen RN  Outcome: Progressing  Goal: Free from acute confusion related to pain meds throughout the shift  12/7/2024 2234 by Gail Jensen RN  Outcome: Progressing  12/7/2024 2234 by Gail Jensen RN  Outcome: Progressing     Problem: Skin  Goal: Decreased wound size/increased tissue granulation at next dressing change  12/7/2024 2234 by Gail Jensen RN  Outcome: Progressing  12/7/2024 2234 by Gail Jensen RN  Outcome: Progressing  Goal: Participates in plan/prevention/treatment measures  12/7/2024 2234 by Gail Jensen RN  Outcome: Progressing  12/7/2024 2234 by Gail Villafana  WALI Jensen  Outcome: Progressing  Goal: Prevent/manage excess moisture  12/7/2024 2234 by Gail Jensen RN  Outcome: Progressing  12/7/2024 2234 by Gail Jensen RN  Outcome: Progressing  Goal: Prevent/minimize sheer/friction injuries  12/7/2024 2234 by Gail Jensen RN  Outcome: Progressing  12/7/2024 2234 by Gail Jensen, WALI  Outcome: Progressing  Goal: Promote/optimize nutrition  12/7/2024 2234 by Gail Jensen, WALI  Outcome: Progressing  12/7/2024 2234 by Gail Jensen RN  Outcome: Progressing  Goal: Promote skin healing  12/7/2024 2234 by Gail Jensen, WALI  Outcome: Progressing  12/7/2024 2234 by Gail Jensen RN  Outcome: Progressing     Problem: Nutrition  Goal: Oral intake greater 75%  12/7/2024 2234 by Gail Jensen RN  Outcome: Progressing  12/7/2024 2234 by Gail Jensen, WALI  Outcome: Progressing  Goal: Consume prescribed supplement  12/7/2024 2234 by Gail Jensen RN  Outcome: Progressing  12/7/2024 2234 by Gail Jensen RN  Outcome: Progressing  Goal: Adequate PO fluid intake  12/7/2024 2234 by Gail Jensen RN  Outcome: Progressing  12/7/2024 2234 by Gail Jensen RN  Outcome: Progressing  Goal: BG  mg/dL  12/7/2024 2234 by Gail Jensen, WALI  Outcome: Progressing  12/7/2024 2234 by Gail Jensen RN  Outcome: Progressing  Goal: Lab values WNL  12/7/2024 2234 by Gail Jensen RN  Outcome: Progressing  12/7/2024 2234 by Gail Jensen, WALI  Outcome: Progressing  Goal: Promote healing  12/7/2024 2234 by Gail Jensen, WALI  Outcome: Progressing  12/7/2024 2234 by Gail Jensen RN  Outcome: Progressing  Goal: Maintain stable weight  12/7/2024 2234 by Gail Jensen, WALI  Outcome: Progressing  12/7/2024 2234 by Gail Jensen, WALI  Outcome: Progressing     Problem: Fall/Injury  Goal: Not fall by end of shift  12/7/2024 2234 by Gail Jensen, RN  Outcome:  Progressing  12/7/2024 2234 by Gail Jensen RN  Outcome: Progressing  Goal: Be free from injury by end of the shift  12/7/2024 2234 by Gail Jensen RN  Outcome: Progressing  12/7/2024 2234 by Gail Jensen RN  Outcome: Progressing  Goal: Verbalize understanding of personal risk factors for fall in the hospital  12/7/2024 2234 by Gail Jensen RN  Outcome: Progressing  12/7/2024 2234 by Gail Jensen RN  Outcome: Progressing  Goal: Verbalize understanding of risk factor reduction measures to prevent injury from fall in the home  12/7/2024 2234 by Gail Jensen RN  Outcome: Progressing  12/7/2024 2234 by Gail Jensen RN  Outcome: Progressing  Goal: Use assistive devices by end of the shift  12/7/2024 2234 by Gail Jensen RN  Outcome: Progressing  12/7/2024 2234 by Gail Jensen RN  Outcome: Progressing  Goal: Pace activities to prevent fatigue by end of the shift  12/7/2024 2234 by Gail Jensen RN  Outcome: Progressing  12/7/2024 2234 by Gail Jensen, WALI  Outcome: Progressing

## 2024-12-09 LAB
ANION GAP SERPL CALC-SCNC: 10 MMOL/L (ref 10–20)
BASOPHILS # BLD MANUAL: 0.05 X10*3/UL (ref 0–0.1)
BASOPHILS NFR BLD MANUAL: 1 %
BUN SERPL-MCNC: 29 MG/DL (ref 6–23)
CALCIUM SERPL-MCNC: 9 MG/DL (ref 8.6–10.3)
CHLORIDE SERPL-SCNC: 95 MMOL/L (ref 98–107)
CO2 SERPL-SCNC: 31 MMOL/L (ref 21–32)
CREAT SERPL-MCNC: 0.82 MG/DL (ref 0.5–1.3)
EGFRCR SERPLBLD CKD-EPI 2021: >90 ML/MIN/1.73M*2
EOSINOPHIL # BLD MANUAL: 0.14 X10*3/UL (ref 0–0.7)
EOSINOPHIL NFR BLD MANUAL: 3 %
ERYTHROCYTE [DISTWIDTH] IN BLOOD BY AUTOMATED COUNT: 13.4 % (ref 11.5–14.5)
FUNGUS SPEC CULT: NORMAL
FUNGUS SPEC FUNGUS STN: NORMAL
GLUCOSE BLD MANUAL STRIP-MCNC: 158 MG/DL (ref 74–99)
GLUCOSE BLD MANUAL STRIP-MCNC: 183 MG/DL (ref 74–99)
GLUCOSE BLD MANUAL STRIP-MCNC: 231 MG/DL (ref 74–99)
GLUCOSE BLD MANUAL STRIP-MCNC: 255 MG/DL (ref 74–99)
GLUCOSE SERPL-MCNC: 193 MG/DL (ref 74–99)
HCT VFR BLD AUTO: 28.5 % (ref 41–52)
HGB BLD-MCNC: 9.4 G/DL (ref 13.5–17.5)
IMM GRANULOCYTES # BLD AUTO: 0.02 X10*3/UL (ref 0–0.7)
IMM GRANULOCYTES NFR BLD AUTO: 0.4 % (ref 0–0.9)
LYMPHOCYTES # BLD MANUAL: 1.63 X10*3/UL (ref 1.2–4.8)
LYMPHOCYTES NFR BLD MANUAL: 34 %
MCH RBC QN AUTO: 32.3 PG (ref 26–34)
MCHC RBC AUTO-ENTMCNC: 33 G/DL (ref 32–36)
MCV RBC AUTO: 98 FL (ref 80–100)
MONOCYTES # BLD MANUAL: 0.34 X10*3/UL (ref 0.1–1)
MONOCYTES NFR BLD MANUAL: 7 %
NEUTS SEG # BLD MANUAL: 2.64 X10*3/UL (ref 1.2–7)
NEUTS SEG NFR BLD MANUAL: 55 %
NRBC BLD-RTO: 0 /100 WBCS (ref 0–0)
PLATELET # BLD AUTO: 229 X10*3/UL (ref 150–450)
POTASSIUM SERPL-SCNC: 4.6 MMOL/L (ref 3.5–5.3)
RBC # BLD AUTO: 2.91 X10*6/UL (ref 4.5–5.9)
RBC MORPH BLD: NORMAL
SODIUM SERPL-SCNC: 131 MMOL/L (ref 136–145)
TOTAL CELLS COUNTED BLD: 100
WBC # BLD AUTO: 4.8 X10*3/UL (ref 4.4–11.3)

## 2024-12-09 PROCEDURE — 85027 COMPLETE CBC AUTOMATED: CPT | Performed by: PHYSICAL MEDICINE & REHABILITATION

## 2024-12-09 PROCEDURE — 80048 BASIC METABOLIC PNL TOTAL CA: CPT | Performed by: PHYSICAL MEDICINE & REHABILITATION

## 2024-12-09 PROCEDURE — 85007 BL SMEAR W/DIFF WBC COUNT: CPT | Performed by: PHYSICAL MEDICINE & REHABILITATION

## 2024-12-09 PROCEDURE — 2500000002 HC RX 250 W HCPCS SELF ADMINISTERED DRUGS (ALT 637 FOR MEDICARE OP, ALT 636 FOR OP/ED): Performed by: PHYSICAL MEDICINE & REHABILITATION

## 2024-12-09 PROCEDURE — 97110 THERAPEUTIC EXERCISES: CPT | Mod: GP,CQ

## 2024-12-09 PROCEDURE — S4991 NICOTINE PATCH NONLEGEND: HCPCS | Performed by: PHYSICAL MEDICINE & REHABILITATION

## 2024-12-09 PROCEDURE — 97530 THERAPEUTIC ACTIVITIES: CPT | Mod: GP

## 2024-12-09 PROCEDURE — 97116 GAIT TRAINING THERAPY: CPT | Mod: GP,CQ

## 2024-12-09 PROCEDURE — 36415 COLL VENOUS BLD VENIPUNCTURE: CPT | Performed by: PHYSICAL MEDICINE & REHABILITATION

## 2024-12-09 PROCEDURE — 97530 THERAPEUTIC ACTIVITIES: CPT | Mod: GO,CO

## 2024-12-09 PROCEDURE — 2500000001 HC RX 250 WO HCPCS SELF ADMINISTERED DRUGS (ALT 637 FOR MEDICARE OP): Performed by: PHYSICAL MEDICINE & REHABILITATION

## 2024-12-09 PROCEDURE — 82947 ASSAY GLUCOSE BLOOD QUANT: CPT

## 2024-12-09 PROCEDURE — 97110 THERAPEUTIC EXERCISES: CPT | Mod: GO,CO

## 2024-12-09 PROCEDURE — 1280000001 HC REHAB SEMI-PRIVATE ROOM DAILY

## 2024-12-09 ASSESSMENT — PAIN - FUNCTIONAL ASSESSMENT
PAIN_FUNCTIONAL_ASSESSMENT: 0-10
PAIN_FUNCTIONAL_ASSESSMENT: UNABLE TO SELF-REPORT

## 2024-12-09 ASSESSMENT — PAIN SCALES - GENERAL
PAINLEVEL_OUTOF10: 6
PAINLEVEL_OUTOF10: 6
PAINLEVEL_OUTOF10: 7
PAINLEVEL_OUTOF10: 3
PAINLEVEL_OUTOF10: 5 - MODERATE PAIN
PAINLEVEL_OUTOF10: 5 - MODERATE PAIN
PAINLEVEL_OUTOF10: 7
PAINLEVEL_OUTOF10: 5 - MODERATE PAIN
PAINLEVEL_OUTOF10: 5 - MODERATE PAIN
PAINLEVEL_OUTOF10: 8

## 2024-12-09 ASSESSMENT — PAIN DESCRIPTION - LOCATION
LOCATION: LEG
LOCATION: LEG

## 2024-12-09 ASSESSMENT — PAIN DESCRIPTION - ORIENTATION
ORIENTATION: RIGHT;LEFT
ORIENTATION: RIGHT;LOWER

## 2024-12-09 NOTE — CARE PLAN
The patient's goals for the shift include      The clinical goals for the shift include Patient will receive adequate rest this shift.    Problem: Diabetes  Goal: Achieve decreasing blood glucose levels by end of shift  Outcome: Progressing  Goal: Increase stability of blood glucose readings by end of shift  Outcome: Progressing  Goal: Decrease in ketones present in urine by end of shift  Outcome: Progressing  Goal: Maintain electrolyte levels within acceptable range throughout shift  Outcome: Progressing  Goal: Maintain glucose levels >70mg/dl to <250mg/dl throughout shift  Outcome: Progressing  Goal: No changes in neurological exam by end of shift  Outcome: Progressing  Goal: Learn about and adhere to nutrition recommendations by end of shift  Outcome: Progressing  Goal: Vital signs within normal range for age by end of shift  Outcome: Progressing  Goal: Increase self care and/or family involovement by end of shift  Outcome: Progressing  Goal: Receive DSME education by end of shift  Outcome: Progressing     Problem: Pain  Goal: Takes deep breaths with improved pain control throughout the shift  Outcome: Progressing  Goal: Turns in bed with improved pain control throughout the shift  Outcome: Progressing  Goal: Walks with improved pain control throughout the shift  Outcome: Progressing  Goal: Performs ADL's with improved pain control throughout shift  Outcome: Progressing  Goal: Participates in PT with improved pain control throughout the shift  Outcome: Progressing  Goal: Free from opioid side effects throughout the shift  Outcome: Progressing  Goal: Free from acute confusion related to pain meds throughout the shift  Outcome: Progressing     Problem: Skin  Goal: Decreased wound size/increased tissue granulation at next dressing change  Outcome: Progressing  Flowsheets (Taken 12/8/2024 9629)  Decreased wound size/increased tissue granulation at next dressing change: Promote sleep for wound healing  Goal:  Participates in plan/prevention/treatment measures  Outcome: Progressing  Flowsheets (Taken 12/8/2024 2301)  Participates in plan/prevention/treatment measures: Elevate heels  Goal: Prevent/manage excess moisture  Outcome: Progressing  Flowsheets (Taken 12/8/2024 2301)  Prevent/manage excess moisture: Moisturize dry skin  Goal: Prevent/minimize sheer/friction injuries  Outcome: Progressing  Flowsheets (Taken 12/8/2024 2301)  Prevent/minimize sheer/friction injuries: Use pull sheet  Goal: Promote/optimize nutrition  Outcome: Progressing  Flowsheets (Taken 12/8/2024 2301)  Promote/optimize nutrition: Offer water/supplements/favorite foods  Goal: Promote skin healing  Outcome: Progressing  Flowsheets (Taken 12/8/2024 2301)  Promote skin healing: Assess skin/pad under line(s)/device(s)     Problem: Nutrition  Goal: Oral intake greater 75%  Outcome: Progressing  Goal: Consume prescribed supplement  Outcome: Progressing  Goal: Adequate PO fluid intake  Outcome: Progressing  Goal: BG  mg/dL  Outcome: Progressing  Goal: Lab values WNL  Outcome: Progressing  Goal: Promote healing  Outcome: Progressing  Goal: Maintain stable weight  Outcome: Progressing     Problem: Fall/Injury  Goal: Not fall by end of shift  Outcome: Progressing  Goal: Be free from injury by end of the shift  Outcome: Progressing  Goal: Verbalize understanding of personal risk factors for fall in the hospital  Outcome: Progressing  Goal: Verbalize understanding of risk factor reduction measures to prevent injury from fall in the home  Outcome: Progressing  Goal: Use assistive devices by end of the shift  Outcome: Progressing  Goal: Pace activities to prevent fatigue by end of the shift  Outcome: Progressing

## 2024-12-09 NOTE — PROGRESS NOTES
Occupational Therapy    OT Treatment    Patient Name: Errol Fonseca  MRN: [id number]  Department: [facility]  Room: [address] [place] [address]  Today's Date: 12/9/2024  Time Calculation  Start Time: 0830  Stop Time: 0915  Time Calculation (min): 45 min        Assessment:  End of Session Communication: Bedside nurse  End of Session Patient Position: Up in chair, Alarm on     Plan:  Treatment Interventions: ADL retraining, Functional transfer training, UE strengthening/ROM, Endurance training, Patient/family training, Equipment evaluation/education, Compensatory technique education, Continued evaluation  OT Frequency: 90 min/5 days per week (1 week)  OT Discharge Recommendations:  (mod I at wc level, sba tub/dme transfers)  Equipment Recommended upon Discharge: Wheeled walker, Wheelchair, Bedside commode  Treatment Interventions: ADL retraining, Functional transfer training, UE strengthening/ROM, Endurance training, Patient/family training, Equipment evaluation/education, Compensatory technique education, Continued evaluation    Subjective   Previous Visit Info:  OT Last Visit  OT Received On: 12/09/24  General:  General  Patient Position Received: Up in chair, Alarm on   Precautions:  LE Weight Bearing Status: Right Non-Weight Bearing (Medical Precautions: Fall precautions Precautions Comment: R AKA   Pain:  Pain Assessment  Pain Assessment: 0-10 0-10 (Numeric) Pain Score: 6 Pain Type: Surgical pain   Pain Location: Leg Pain Orientation: Right  Pain Interventions: Medication (See MAR) (Response to Interventions:  (reports pain has not increased)    Objective      Therapy/Activity: Therapeutic Exercise  Therapeutic Exercise Performed: Yes   Therapeutic Exercise Activity 1:BUE exercises using' Three pound weights completing 30 repetitions of 8 exercises to promote greater independence with ADL's and transfers   EDUCATION:   Educated regarding fall precautions and home exercise program. Educated patient regarding proper technique and  need to complete exercises with slow and steady speed to target intended muscle group and avoid using large back muscles. Patient demonstrated good understanding and carryover with task.      Goals:  Encounter Problems       Encounter Problems (Active)       OT Problem       LTG - Patient will complete grooming with mod I (Progressing)       Start:  12/04/24    Expected End:  12/11/24            LTG - Patient will complete bathing with supervision or better (Progressing)       Start:  12/04/24    Expected End:  12/11/24            LTG - Patient will complete UE dressing with mod I (Progressing)       Start:  12/04/24    Expected End:  12/11/24            LTG - Patient will complete LE dressing using adaptive equip as needed with mod I (Progressing)       Start:  12/04/24    Expected End:  12/11/24            LTG - Patient will complete toileting with mod I (Progressing)       Start:  12/04/24    Expected End:  12/11/24            LTG - Patient will complete commode transfer via wwalker or pivot with mod I (Progressing)       Start:  12/04/24    Expected End:  12/11/24            LTG - Patient will complete tub/shower transfer using DME as needed with sba (Progressing)       Start:  12/04/24    Expected End:  12/11/24            LTG - Patient will complete functional mobility via wheelchar mod I (Progressing)       Start:  12/04/24    Expected End:  12/11/24            LTG - Patient will complete simple mobility/approach steps via wwalker supervision or better (Progressing)       Start:  12/04/24    Expected End:  12/11/24            LTG - Patient will complete light meal prep or kitchen access at wheelchair level mod I (Progressing)       Start:  12/04/24    Expected End:  12/11/24

## 2024-12-09 NOTE — PROGRESS NOTES
Occupational Therapy    OT Treatment    Patient Name: Errol Fonseca  MRN: [id number]  Department: [facility]  Room: [address] [place] [address]  Today's Date: 12/9/2024  Time Calculation  Start Time: 1045  Stop Time: 1130  Time Calculation (min): 45 min     Assessment:  End of Session Communication: Bedside nurse  End of Session Patient Position: Up in chair, Alarm on     Plan:  Treatment Interventions: ADL retraining, Functional transfer training, UE strengthening/ROM, Endurance training, Patient/family training, Equipment evaluation/education, Compensatory technique education, Continued evaluation  OT Frequency: 90 min/5 days per week (1 week)  OT Discharge Recommendations:  (mod I at wc level, sba tub/dme transfers)  Equipment Recommended upon Discharge: Wheeled walker, Wheelchair, Bedside commode  Treatment Interventions: ADL retraining, Functional transfer training, UE strengthening/ROM, Endurance training, Patient/family training, Equipment evaluation/education, Compensatory technique education, Continued evaluation    Subjective   Previous Visit Info:  OT Last Visit  OT Received On: 12/09/24  General:  General  Prior to Session Communication: Bedside nurse  Patient Position Received: Up in chair, Alarm on  General Comment: Patient agreeable to OT session, however, reports fatigue from all therapies.  Precautions:  Hearing/Visual Limitations: Glasses for reading  LE Weight Bearing Status: Right Non-Weight Bearing  Medical Precautions: Fall precautions  Precautions Comment: R AKA  Pain:  Pain Assessment  Pain Assessment: 0-10  0-10 (Numeric) Pain Score: 7  Pain Type: Phantom pain, Surgical pain  Pain Location: Leg  Pain Orientation: Right  Clinical Progression: Not changed  Pain Interventions: Medication (See MAR), Distraction, Ambulation/increased activity, Relaxation technique  Response to Interventions: Provider notified, Content/relaxed    Bed Mobility/Transfers: Bed Mobility  Bed Mobility: Yes  Bed Mobility 1  Bed  Mobility 1: Sitting to supine  Level of Assistance 1: Modified independent  Bed Mobility Comments 1: elevated hob. Pt reports has hospital bed already.    Transfers  Transfer: Yes  Transfer 1  Trials/Comments 1: Sit to stand transfers with CGA  Transfers 2  Trials/Comments 2: Pivot transfer from wheelchair to bed with CGA no device    Functional Mobility:  Functional Mobility  Functional Mobility Performed: Yes  Functional Mobility 1  Comments 1: Short distance mobility via ww from wheelchair to 3 in 1 commode in clinic with Min A x1 however unsteady at times with during mobility including approach steps to chair.  EDUCATION:   Patient; Diagnosis & Precautions;Fall precautons;POC discussed and agreed upon;Risk and benefits of OT discussed with patient or other; Education provided on safety during transfers. Education provided on recommended dme at discharge including 3 in 1 commode and tub bench. Patient providing pictures of home setup and unsure if wheelchair can fit in hallways of son's apartment- patient reports will have son measure.     Goals:  Encounter Problems       Encounter Problems (Active)       OT Problem       LTG - Patient will complete grooming with mod I (Progressing)       Start:  12/04/24    Expected End:  12/11/24            LTG - Patient will complete bathing with supervision or better (Progressing)       Start:  12/04/24    Expected End:  12/11/24            LTG - Patient will complete UE dressing with mod I (Progressing)       Start:  12/04/24    Expected End:  12/11/24            LTG - Patient will complete LE dressing using adaptive equip as needed with mod I (Progressing)       Start:  12/04/24    Expected End:  12/11/24            LTG - Patient will complete toileting with mod I (Progressing)       Start:  12/04/24    Expected End:  12/11/24            LTG - Patient will complete commode transfer via wwalker or pivot with mod I (Progressing)       Start:  12/04/24    Expected End:   12/11/24            LTG - Patient will complete tub/shower transfer using DME as needed with sba (Progressing)       Start:  12/04/24    Expected End:  12/11/24            LTG - Patient will complete functional mobility via wheelchar mod I (Progressing)       Start:  12/04/24    Expected End:  12/11/24            LTG - Patient will complete simple mobility/approach steps via wwalker supervision or better (Progressing)       Start:  12/04/24    Expected End:  12/11/24            LTG - Patient will complete light meal prep or kitchen access at wheelchair level mod I (Progressing)       Start:  12/04/24    Expected End:  12/11/24

## 2024-12-09 NOTE — PROGRESS NOTES
"Errol Fonseca is a 54 y.o. male on day 6 of admission presenting with Above knee amputation of right lower extremity (Multi).    Subjective   Patient is seen in therapy.  He is trying to cut back on the oxycodone.  He denies chest pain or shortness of breath.  10 review of systems are negative       Objective     Physical Exam  Constitutional:       General: no acute distress.     Appearance: Normal appearance.   Cardiovascular:   S1-S2 without gallops or murmurs  Pulmonary:   Lungs are clear throughout all fields without adventitious lung sounds  Abdominal:   Abdomen is soft without tenderness or organomegaly  Musculoskeletal:         General: No swelling, tenderness or deformity.     Skin:     General: Skin is warm and dry.      Findings: No rash.   Neurological:      General: No focal deficit present.      Mental Status:  alert and oriented to person, place, and time.      Cranial Nerves: No cranial nerve deficit.      Psychiatric:         Mood and Affect: Mood normal.      Last Recorded Vitals  Blood pressure 113/57, pulse 77, temperature 36.4 °C (97.5 °F), temperature source Temporal, resp. rate 18, height 1.854 m (6' 0.99\"), weight 103 kg (227 lb 8.2 oz), SpO2 98%.  Intake/Output last 3 Shifts:  I/O last 3 completed shifts:  In: 1040 (10.1 mL/kg) [P.O.:1040]  Out: 3375 (32.7 mL/kg) [Urine:3375 (0.9 mL/kg/hr)]  Weight: 103.2 kg     Relevant Results  Scheduled medications  aspirin, 81 mg, oral, Daily  atorvastatin, 40 mg, oral, Daily  dulaglutide, 0.75 mg, subcutaneous, Weekly  ergocalciferol, 50,000 Units, oral, Weekly  escitalopram, 10 mg, oral, Daily  influenza, 0.5 mL, intramuscular, During hospitalization  folic acid, 1 mg, oral, Daily  gabapentin, 400 mg, oral, TID  lisinopril, 20 mg, oral, Daily   And  hydroCHLOROthiazide, 12.5 mg, oral, Daily  insulin glargine, 34 Units, subcutaneous, Daily  insulin lispro, 0-10 Units, subcutaneous, TID AC  insulin lispro, 7 Units, subcutaneous, TID AC  melatonin, 5 " mg, oral, Nightly  metFORMIN XR, 500 mg, oral, BID  metoprolol tartrate, 25 mg, oral, BID  nicotine, 1 patch, transdermal, Daily  nystatin, 1 Application, Topical, BID  sennosides, 1 tablet, oral, Nightly      Continuous medications     PRN medications  PRN medications: acetaminophen, alum-mag hydroxide-simeth, bisacodyl, bisacodyl, dextrose, dextrose, glucagon, glucagon, oxyCODONE, polyethylene glycol, traZODone     Results for orders placed or performed during the hospital encounter of 12/03/24 (from the past 24 hours)   POCT GLUCOSE   Result Value Ref Range    POCT Glucose 156 (H) 74 - 99 mg/dL   POCT GLUCOSE   Result Value Ref Range    POCT Glucose 192 (H) 74 - 99 mg/dL   POCT GLUCOSE   Result Value Ref Range    POCT Glucose 233 (H) 74 - 99 mg/dL   Basic Metabolic Panel   Result Value Ref Range    Glucose 193 (H) 74 - 99 mg/dL    Sodium 131 (L) 136 - 145 mmol/L    Potassium 4.6 3.5 - 5.3 mmol/L    Chloride 95 (L) 98 - 107 mmol/L    Bicarbonate 31 21 - 32 mmol/L    Anion Gap 10 10 - 20 mmol/L    Urea Nitrogen 29 (H) 6 - 23 mg/dL    Creatinine 0.82 0.50 - 1.30 mg/dL    eGFR >90 >60 mL/min/1.73m*2    Calcium 9.0 8.6 - 10.3 mg/dL   CBC and Auto Differential   Result Value Ref Range    WBC 4.8 4.4 - 11.3 x10*3/uL    nRBC 0.0 0.0 - 0.0 /100 WBCs    RBC 2.91 (L) 4.50 - 5.90 x10*6/uL    Hemoglobin 9.4 (L) 13.5 - 17.5 g/dL    Hematocrit 28.5 (L) 41.0 - 52.0 %    MCV 98 80 - 100 fL    MCH 32.3 26.0 - 34.0 pg    MCHC 33.0 32.0 - 36.0 g/dL    RDW 13.4 11.5 - 14.5 %    Platelets 229 150 - 450 x10*3/uL    Immature Granulocytes %, Automated 0.4 0.0 - 0.9 %    Immature Granulocytes Absolute, Automated 0.02 0.00 - 0.70 x10*3/uL   Manual Differential   Result Value Ref Range    Neutrophils %, Manual 55.0 40.0 - 80.0 %    Lymphocytes %, Manual 34.0 13.0 - 44.0 %    Monocytes %, Manual 7.0 2.0 - 10.0 %    Eosinophils %, Manual 3.0 0.0 - 6.0 %    Basophils %, Manual 1.0 0.0 - 2.0 %    Seg Neutrophils Absolute, Manual 2.64 1.20 -  7.00 x10*3/uL    Lymphocytes Absolute, Manual 1.63 1.20 - 4.80 x10*3/uL    Monocytes Absolute, Manual 0.34 0.10 - 1.00 x10*3/uL    Eosinophils Absolute, Manual 0.14 0.00 - 0.70 x10*3/uL    Basophils Absolute, Manual 0.05 0.00 - 0.10 x10*3/uL    Total Cells Counted 100     RBC Morphology No significant RBC morphology present    POCT GLUCOSE   Result Value Ref Range    POCT Glucose 183 (H) 74 - 99 mg/dL            Assessment/Plan   Principal Problem:    Above knee amputation of right lower extremity (Multi)  Active Problems:    Alcoholic cirrhosis (Multi)    Coronary artery disease involving native coronary artery of native heart with unstable angina pectoris    Hyperlipidemia    Hypertension    Right above-knee amputee (Multi)      Right above-knee amputation  Begin physical therapy for strengthening, preprosthetic gait training, transfer training, wheelchair mobility  Begin occupational therapy for ADL retraining, strengthening, transfer training, and ADL activities     2.  Hyperlipidemia  Lipitor 40 mg daily     3.  Diabetes mellitus type 2  Metformin 500 mg twice daily  Lantus 30 units at at bedtime  Sliding scale with meals  Trulicity 0.75 weekly     4.  Hypertension   Lisinopril 20 mg  Hydrochlorothiazide 12.5 mg daily  Adjust medication for appropriate blood pressure control     5.  Pain management   Neurontin 400 mg 3 times daily  Oxycodone as needed     6.  Mood  Lexapro 10 mg daily  Deseryl     7.  Bowel bladder  Medications to encourage bowel movement every 2 to 3 days     FENGI  Patient is at risk for infection we will continue to monitor his wound for adequate wound healing  He is a high fall risk will encourage safe transfers  He is on a bowel program for appropriate bowel movement    12/9   Sit to stand transfers with a front wheeled walker are min assist of 1  Continue to reduce oxycodone  12/9 BUN and creatinine are 29 and 0.8, white blood cell count is 4.8, H&H is 9 and 28  Mood is good  Pain is  controlled  Blood pressure is 113/57 continue hydrochlorothiazide and lisinopril       I spent 32 minutes in the professional and overall care of this patient.      Christi Conde, DO

## 2024-12-09 NOTE — PROGRESS NOTES
Physical Therapy    Physical Therapy Treatment    Patient Name: Errol Fonseca  MRN: [id number]  Department: [facility] REHAB  Room: [address] [place] [address]  Today's Date: 12/9/2024  Time Calculation  Start Time: 1300  Stop Time: 1345  Time Calculation (min): 45 min         Assessment/Plan   PT Assessment  End of Session Patient Position: Bed, 3 rail up, Alarm on     PT Plan  Treatment/Interventions: Bed mobility, Transfer training, Gait training, Stair training, Balance training, Strengthening, Endurance training, Therapeutic exercise, Therapeutic activity, Wheelchair management  PT Plan: Ongoing PT  PT Frequency: 90 min/5 days per week (1 week)  PT Discharge Recommendations:  (Anticipate discharge home mod independent at the wheelchair level.  Assist with higher level functional tasks.)  Equipment Recommended upon Discharge: Wheeled walker, Wheelchair  PT Recommended Transfer Status: Assist x2      General Visit Information:   PT  Visit  PT Received On: 12/09/24  General  Patient Position Received: Up in chair, Alarm on    Subjective   Precautions:  Precautions  Medical Precautions: Fall precautions  Precautions Comment: R AKA    Objective   Pain:  Pain Assessment  Pain Assessment: 0-10  0-10 (Numeric) Pain Score: 5 - Moderate pain  Pain Location: Leg  Pain Orientation: Right  Pain Interventions: Repositioned, Ambulation/increased activity, Distraction    Treatments:  Therapeutic Activity  Therapeutic Activity Performed:  (Pt standing with walker for 1 minute with CGA focusing on right hip extension.)    Bed Mobility  Bed Mobility:  (Sit to supine mod independent)    Ambulation/Gait Training  Ambulation/Gait Training Performed:  (Pt amb 14 ft x2 (including approach steps) with wheeled walker and min assist.  Unsteady during approach steps.)    Transfers  Transfer:  (Pivot transfer training x7 to the left and right without a device to various surfaces and heights with up to min assist.  Pt able to independently position wheelchair  when pivoting to chair but requires mod/max cues to position for pivot to loveseat.)    Wheelchair Activities  Wheelchair Size:  (Pt currently in 20 inch wide wheelchair for comfort and to ensure no increased contact with right residual limb.  Trialed 18 inch wide wheelchair and discussed pros and cons of home use with smaller chair.)    EDUCATION:  Education Comment:  (Pt educated on walker safety and the need to maintain bilateral hand grasp on walker in standing at all times.)    Encounter Problems       Encounter Problems (Active)       PT Problem       STG - Bed mobility with SBA (Progressing)       Start:  12/04/24    Expected End:  12/07/24            STG - Transfers with min/mod assist x1.  (Progressing)       Start:  12/04/24    Expected End:  12/07/24            STG- Pt will amb 15 ft with wheeled walker and mod assist x1. (Progressing)       Start:  12/04/24    Expected End:  12/07/24            STG - Will determine most approp means of entry into home. (Progressing)       Start:  12/04/24    Expected End:  12/07/24            STG - Pt will propel wheelchair 100 ft with SBA (Progressing)       Start:  12/04/24    Expected End:  12/07/24            LTG - Mod independent bed mobility. (Progressing)       Start:  12/04/24    Expected End:  12/11/24            LTG - Mod independent transfers. (Progressing)       Start:  12/04/24    Expected End:  12/11/24            LTG - Pt will amb 25 ft with wheeled walker and min assist. (Progressing)       Start:  12/04/24    Expected End:  12/11/24            LTG - Will educate pt and family on most approp means of entry into home. (Progressing)       Start:  12/04/24    Expected End:  12/11/24            LTG - Pt will propel wheelchair 150 ft independently (Progressing)       Start:  12/04/24    Expected End:  12/11/24

## 2024-12-09 NOTE — PROGRESS NOTES
Physical Therapy    Physical Therapy Treatment    Patient Name: Errol Fonseca  MRN: [id number]  Department: [facility] REHAB  Room: [address] [place] [address]  Today's Date: 12/9/2024  Time Calculation  Start Time: 0915  Stop Time: 1000  Time Calculation (min): 45 min         Assessment/Plan   PT Assessment  End of Session Communication: Bedside nurse  End of Session Patient Position: Up in chair, Alarm on     PT Plan  Treatment/Interventions: Bed mobility, Transfer training, Gait training, Stair training, Balance training, Strengthening, Endurance training, Therapeutic exercise, Therapeutic activity, Wheelchair management  PT Plan: Ongoing PT  PT Frequency: 90 min/5 days per week (1 week)  PT Discharge Recommendations:  (Anticipate discharge home mod independent at the wheelchair level.  Assist with higher level functional tasks.)  Equipment Recommended upon Discharge: Wheeled walker, Wheelchair  PT Recommended Transfer Status: Assist x2      General Visit Information:   PT  Visit  PT Received On: 12/09/24  General  Prior to Session Communication:  (Pt received from OT.)  Patient Position Received: Up in chair, Alarm on  General Comment:  (Pt pleasant and willing to participate in therapy session.)    Subjective   Precautions:  Precautions  Hearing/Visual Limitations: Glasses for reading  LE Weight Bearing Status: Right Non-Weight Bearing  Medical Precautions: Fall precautions  Precautions Comment: R AKA    Vital Signs (Past 2hrs)                 Objective   Pain:  Pain Assessment  Pain Assessment: 0-10  0-10 (Numeric) Pain Score: 6  Pain Type: Surgical pain  Pain Location: Leg  Pain Orientation: Right  Pain Interventions: Distraction, Repositioned             Treatments:  Therapeutic Exercise  Therapeutic Exercise Performed: Yes (Pt performed supine therex consisting of: GS, RLE ABD and SLR; LLE: AP, HS, QS, ABD 2x10 reps each in order to improve strength and functional mobility.)    Bed Mobility  Bed Mobility: Yes (Pt performed  sit<>supine and rolling R/L on gym mat with SBA.)    Ambulation/Gait Training  Ambulation/Gait Training Performed: Yes (Pt able to amb 32' and 20' with FWW and CGA/MinAx1 plus w/c follow; demos slow, steady lexi, still fatigues quickly requiring seated rest break b/t trials.)  Transfers  Transfer: Yes (Pt performed sit<>stand CGA/[id number] squat pivot transfers w/c<>gym mat with CGAx1. Denies dizziness and no LOB noted.)    Education Documentation  No documentation found.  Education Comments  Patient educated in correct bed mobility with instruction for proper trunk positioning, upper and lower body placement and positioning and use of bed rail if appropriate.  Patient educated in safe techniques for gait with a device in order to maximize safety and functional independence.  Patient educated in safe and proper transfer techniques including proper positioning and hand/foot placement to maximize safety and functional independence.  Pt educated on therex, including optimal techniques to maximize function.        OP EDUCATION:       Encounter Problems       Encounter Problems (Active)       PT Problem       STG - Bed mobility with SBA (Progressing)       Start:  12/04/24    Expected End:  12/07/24            STG - Transfers with min/mod assist x1.  (Progressing)       Start:  12/04/24    Expected End:  12/07/24            STG- Pt will amb 15 ft with wheeled walker and mod assist x1. (Progressing)       Start:  12/04/24    Expected End:  12/07/24            STG - Will determine most approp means of entry into home. (Progressing)       Start:  12/04/24    Expected End:  12/07/24            STG - Pt will propel wheelchair 100 ft with SBA (Progressing)       Start:  12/04/24    Expected End:  12/07/24            LTG - Mod independent bed mobility. (Progressing)       Start:  12/04/24    Expected End:  12/11/24            LTG - Mod independent transfers. (Progressing)       Start:  12/04/24    Expected End:  12/11/24             LTG - Pt will amb 25 ft with wheeled walker and min assist. (Progressing)       Start:  12/04/24    Expected End:  12/11/24            LTG - Will educate pt and family on most approp means of entry into home. (Progressing)       Start:  12/04/24    Expected End:  12/11/24            LTG - Pt will propel wheelchair 150 ft independently (Progressing)       Start:  12/04/24    Expected End:  12/11/24

## 2024-12-10 LAB
GLUCOSE BLD MANUAL STRIP-MCNC: 146 MG/DL (ref 74–99)
GLUCOSE BLD MANUAL STRIP-MCNC: 150 MG/DL (ref 74–99)
GLUCOSE BLD MANUAL STRIP-MCNC: 189 MG/DL (ref 74–99)
GLUCOSE BLD MANUAL STRIP-MCNC: 197 MG/DL (ref 74–99)

## 2024-12-10 PROCEDURE — 2500000001 HC RX 250 WO HCPCS SELF ADMINISTERED DRUGS (ALT 637 FOR MEDICARE OP): Performed by: PHYSICAL MEDICINE & REHABILITATION

## 2024-12-10 PROCEDURE — 99232 SBSQ HOSP IP/OBS MODERATE 35: CPT | Performed by: PHYSICAL MEDICINE & REHABILITATION

## 2024-12-10 PROCEDURE — 97530 THERAPEUTIC ACTIVITIES: CPT | Mod: GP

## 2024-12-10 PROCEDURE — 97530 THERAPEUTIC ACTIVITIES: CPT | Mod: GO

## 2024-12-10 PROCEDURE — S4991 NICOTINE PATCH NONLEGEND: HCPCS | Performed by: PHYSICAL MEDICINE & REHABILITATION

## 2024-12-10 PROCEDURE — 2500000002 HC RX 250 W HCPCS SELF ADMINISTERED DRUGS (ALT 637 FOR MEDICARE OP, ALT 636 FOR OP/ED): Performed by: PHYSICAL MEDICINE & REHABILITATION

## 2024-12-10 PROCEDURE — 97116 GAIT TRAINING THERAPY: CPT | Mod: GP

## 2024-12-10 PROCEDURE — 1280000001 HC REHAB SEMI-PRIVATE ROOM DAILY

## 2024-12-10 PROCEDURE — 97110 THERAPEUTIC EXERCISES: CPT | Mod: GO

## 2024-12-10 PROCEDURE — 97110 THERAPEUTIC EXERCISES: CPT | Mod: GP

## 2024-12-10 PROCEDURE — 82947 ASSAY GLUCOSE BLOOD QUANT: CPT

## 2024-12-10 ASSESSMENT — PAIN - FUNCTIONAL ASSESSMENT
PAIN_FUNCTIONAL_ASSESSMENT: 0-10

## 2024-12-10 ASSESSMENT — PAIN SCALES - GENERAL
PAINLEVEL_OUTOF10: 7
PAINLEVEL_OUTOF10: 5 - MODERATE PAIN
PAINLEVEL_OUTOF10: 7
PAINLEVEL_OUTOF10: 5 - MODERATE PAIN
PAINLEVEL_OUTOF10: 7
PAINLEVEL_OUTOF10: 5 - MODERATE PAIN
PAINLEVEL_OUTOF10: 4
PAINLEVEL_OUTOF10: 5 - MODERATE PAIN
PAINLEVEL_OUTOF10: 5 - MODERATE PAIN
PAINLEVEL_OUTOF10: 7

## 2024-12-10 ASSESSMENT — PAIN DESCRIPTION - ORIENTATION
ORIENTATION: RIGHT;LOWER;POSTERIOR
ORIENTATION: RIGHT

## 2024-12-10 ASSESSMENT — PAIN DESCRIPTION - LOCATION
LOCATION: LEG
LOCATION: LEG

## 2024-12-10 ASSESSMENT — PAIN DESCRIPTION - DESCRIPTORS: DESCRIPTORS: ACHING

## 2024-12-10 NOTE — PROGRESS NOTES
Occupational Therapy    OT Treatment    Patient Name: Errol Fonseca  MRN: [id number]  Department: [facility] REHAB  Room: [address] [place] [address]  Today's Date: 12/10/2024  Time Calculation  Start Time: 0830  Stop Time: 0915  Time Calculation (min): 45 min        Assessment:  End of Session Communication:  (to PT)  End of Session Patient Position: Up in chair, Alarm on     Plan:  Treatment Interventions: ADL retraining, Functional transfer training, UE strengthening/ROM, Endurance training, Patient/family training, Equipment evaluation/education, Compensatory technique education, Continued evaluation  OT Frequency: 90 min/5 days per week (1 week)  OT Discharge Recommendations:  (mod I at wc level, sba tub/dme transfers)  Equipment Recommended upon Discharge: Wheeled walker, Wheelchair, Bedside commode  Treatment Interventions: ADL retraining, Functional transfer training, UE strengthening/ROM, Endurance training, Patient/family training, Equipment evaluation/education, Compensatory technique education, Continued evaluation    Subjective   Previous Visit Info:  OT Last Visit  OT Received On: 12/10/24  General:  General  Prior to Session Communication:  (rehab tech)  Patient Position Received: Up in chair, Alarm on  General Comment: pleasant and cooperative  Precautions:  LE Weight Bearing Status: Right Non-Weight Bearing  Medical Precautions: Fall precautions  Precautions Comment: R AKA          Pain:  Pain Assessment  Pain Assessment: 0-10  0-10 (Numeric) Pain Score: 5 - Moderate pain  Pain Location:  (rle)    Objective         Bed Mobility/Transfers: Transfers  Transfer:  (sit to stand cga; min a via wwalker to/from commode)      Functional Mobility:  Functional Mobility  Functional Mobility Performed: Yes (OTR and pt problem solving son's apt access- doorway ~22-24 inch into bathrm from photos provided, will not be able to access in ; discussed needing to use commode in room or side step with ww)  Functional Mobility 1  Comments  1: pt able to complete side stepping (simulating access to bathroom/commode) via wwalker min a overall, x2 trials       Therapy/Activity: Therapeutic Exercise  Therapeutic Exercise Activity 1: completes bilat ue wc pushups x5 sba/cga       EDUCATION:   To pt, home going recommends./problem solving access/dme/techs; exact measurements not yet available for kitchen and bedrm door, however, questionable wc access; pt reports has small fridge in bedroom already, may add microwave or cold meals during day; will continue to problem solve home going, reports understanding, walker bag issued     Goals:  Encounter Problems       Encounter Problems (Active)       OT Problem       LTG - Patient will complete grooming with mod I (Progressing)       Start:  12/04/24    Expected End:  12/11/24            LTG - Patient will complete bathing with supervision or better (Progressing)       Start:  12/04/24    Expected End:  12/11/24            LTG - Patient will complete UE dressing with mod I (Progressing)       Start:  12/04/24    Expected End:  12/11/24            LTG - Patient will complete LE dressing using adaptive equip as needed with mod I (Progressing)       Start:  12/04/24    Expected End:  12/11/24            LTG - Patient will complete toileting with mod I (Progressing)       Start:  12/04/24    Expected End:  12/11/24            LTG - Patient will complete commode transfer via wwalker or pivot with mod I (Progressing)       Start:  12/04/24    Expected End:  12/11/24            LTG - Patient will complete tub/shower transfer using DME as needed with sba (Progressing)       Start:  12/04/24    Expected End:  12/11/24            LTG - Patient will complete functional mobility via wheelchar mod I (Progressing)       Start:  12/04/24    Expected End:  12/11/24            LTG - Patient will complete simple mobility/approach steps via wwalker supervision or better (Progressing)       Start:  12/04/24    Expected End:   12/11/24            LTG - Patient will complete light meal prep or kitchen access at wheelchair level mod I (Progressing)       Start:  12/04/24    Expected End:  12/11/24                              Opt out

## 2024-12-10 NOTE — CARE PLAN
The patient's goals for the shift include      The clinical goals for the shift include pain control      Problem: Diabetes  Goal: Achieve decreasing blood glucose levels by end of shift  Outcome: Progressing  Goal: Increase stability of blood glucose readings by end of shift  Outcome: Progressing  Goal: Decrease in ketones present in urine by end of shift  Outcome: Progressing  Goal: Maintain electrolyte levels within acceptable range throughout shift  Outcome: Progressing  Goal: Maintain glucose levels >70mg/dl to <250mg/dl throughout shift  Outcome: Progressing  Goal: No changes in neurological exam by end of shift  Outcome: Progressing  Goal: Learn about and adhere to nutrition recommendations by end of shift  Outcome: Progressing  Goal: Vital signs within normal range for age by end of shift  Outcome: Progressing  Goal: Increase self care and/or family involovement by end of shift  Outcome: Progressing  Goal: Receive DSME education by end of shift  Outcome: Progressing     Problem: Pain  Goal: Takes deep breaths with improved pain control throughout the shift  Outcome: Progressing  Goal: Turns in bed with improved pain control throughout the shift  Outcome: Progressing  Goal: Walks with improved pain control throughout the shift  Outcome: Progressing  Goal: Performs ADL's with improved pain control throughout shift  Outcome: Progressing  Goal: Participates in PT with improved pain control throughout the shift  Outcome: Progressing  Goal: Free from opioid side effects throughout the shift  Outcome: Progressing  Goal: Free from acute confusion related to pain meds throughout the shift  Outcome: Progressing     Problem: Skin  Goal: Decreased wound size/increased tissue granulation at next dressing change  Outcome: Progressing  Flowsheets (Taken 12/10/2024 1209)  Decreased wound size/increased tissue granulation at next dressing change:   Protective dressings over bony prominences   Promote sleep for wound  healing  Goal: Participates in plan/prevention/treatment measures  Outcome: Progressing  Flowsheets (Taken 12/10/2024 1207)  Participates in plan/prevention/treatment measures:   Elevate heels   Increase activity/out of bed for meals  Goal: Prevent/manage excess moisture  Outcome: Progressing  Flowsheets (Taken 12/10/2024 1207)  Prevent/manage excess moisture:   Cleanse incontinence/protect with barrier cream   Monitor for/manage infection if present   Follow provider orders for dressing changes   Moisturize dry skin  Goal: Prevent/minimize sheer/friction injuries  Outcome: Progressing  Flowsheets (Taken 12/10/2024 1207)  Prevent/minimize sheer/friction injuries:   Use pull sheet   Increase activity/out of bed for meals   HOB 30 degrees or less   Turn/reposition every 2 hours/use positioning/transfer devices  Goal: Promote/optimize nutrition  Outcome: Progressing  Flowsheets (Taken 12/10/2024 1207)  Promote/optimize nutrition:   Monitor/record intake including meals   Consume > 50% meals/supplements   Offer water/supplements/favorite foods  Goal: Promote skin healing  Outcome: Progressing  Flowsheets (Taken 12/10/2024 1207)  Promote skin healing: Turn/reposition every 2 hours/use positioning/transfer devices     Problem: Nutrition  Goal: Oral intake greater 75%  Outcome: Progressing  Goal: Consume prescribed supplement  Outcome: Progressing  Goal: Adequate PO fluid intake  Outcome: Progressing  Goal: BG  mg/dL  Outcome: Progressing  Goal: Lab values WNL  Outcome: Progressing  Goal: Promote healing  Outcome: Progressing  Goal: Maintain stable weight  Outcome: Progressing     Problem: Fall/Injury  Goal: Not fall by end of shift  Outcome: Progressing  Goal: Be free from injury by end of the shift  Outcome: Progressing  Goal: Verbalize understanding of personal risk factors for fall in the hospital  Outcome: Progressing  Goal: Verbalize understanding of risk factor reduction measures to prevent injury from fall in  the home  Outcome: Progressing  Goal: Use assistive devices by end of the shift  Outcome: Progressing  Goal: Pace activities to prevent fatigue by end of the shift  Outcome: Progressing

## 2024-12-10 NOTE — PROGRESS NOTES
Physical Therapy    Physical Therapy Treatment    Patient Name: Errol Fonseca  MRN: [id number]  Department: [facility] REHAB  Room: [address] [place] [address]  Today's Date: 12/10/2024  Time Calculation  Start Time: 1300  Stop Time: 1345  Time Calculation (min): 45 min         Assessment/Plan   PT Assessment  End of Session Communication: Bedside nurse  End of Session Patient Position: Up in chair, Alarm on     PT Plan  Treatment/Interventions: Bed mobility, Transfer training, Gait training, Stair training, Balance training, Strengthening, Endurance training, Therapeutic exercise, Therapeutic activity, Wheelchair management  PT Plan: Ongoing PT  PT Frequency: 90 min/5 days per week (1 week)  PT Discharge Recommendations:  (Anticipate discharge home mod independent at the wheelchair level.  Assist with higher level functional tasks.)  Equipment Recommended upon Discharge: Wheeled walker, Wheelchair  PT Recommended Transfer Status: Assist x2      General Visit Information:   PT  Visit  PT Received On: 12/10/24  General  Patient Position Received: Up in chair, Alarm on  General Comment: Pt pleasant and cooperative    Subjective   Precautions:  Precautions  LE Weight Bearing Status: Right Non-Weight Bearing  Medical Precautions: Fall precautions  Precautions Comment: R AKA    Objective   Pain:  Pain Assessment  Pain Assessment: 0-10  0-10 (Numeric) Pain Score: 5 - Moderate pain (R residual limb)  Pain Interventions: Repositioned, Emotional support, Distraction, Medication (See MAR)  Response to Interventions: No change in pain     Treatments:  Therapeutic Exercise  Therapeutic Exercise Activity 1: Pt performs seated LLE therex 10 x 21 reps of LAQs and marching to inc strength related to bed mob and transfers.    Ambulation/Gait Training 1  Comments/Distance (ft) 1: Pt amb 15' trials x 4 reps with FWW and CGA practicing turn and approach to sitting surfaces. Pt ambulates with controlled lexi and good safety.  Transfer 1  Trials/Comments  1: Sit/stand transfer training from various surfaces with CGA. Pivot transfer training wc <> arm chairs, and wc <> couch all toward L side with CGA. Minimal cues for optimal setup of wc to prepare for transfers.    Education Documentation  Pt educated on techniques for transfers and gait training with device in order to maximize safety and independence.  Pt educated on therapeutic exercises, including optimal techniques to maximize function.       Encounter Problems       Encounter Problems (Active)       PT Problem       STG - Bed mobility with SBA (Progressing)       Start:  12/04/24    Expected End:  12/07/24            STG - Transfers with min/mod assist x1.  (Progressing)       Start:  12/04/24    Expected End:  12/07/24            STG- Pt will amb 15 ft with wheeled walker and mod assist x1. (Progressing)       Start:  12/04/24    Expected End:  12/07/24            STG - Will determine most approp means of entry into home. (Progressing)       Start:  12/04/24    Expected End:  12/07/24            STG - Pt will propel wheelchair 100 ft with SBA (Progressing)       Start:  12/04/24    Expected End:  12/07/24            LTG - Mod independent bed mobility. (Progressing)       Start:  12/04/24    Expected End:  12/11/24            LTG - Mod independent transfers. (Progressing)       Start:  12/04/24    Expected End:  12/11/24            LTG - Pt will amb 25 ft with wheeled walker and min assist. (Progressing)       Start:  12/04/24    Expected End:  12/11/24            LTG - Will educate pt and family on most approp means of entry into home. (Progressing)       Start:  12/04/24    Expected End:  12/11/24            LTG - Pt will propel wheelchair 150 ft independently (Progressing)       Start:  12/04/24    Expected End:  12/11/24

## 2024-12-10 NOTE — PROGRESS NOTES
Physical Therapy    Physical Therapy Treatment    Patient Name: Errol Fonseca  MRN: [id number]  Department: [facility] REHAB  Room: [address] [place] [address]  Today's Date: 12/10/2024  Time Calculation  Start Time: 0915  Stop Time: 1000  Time Calculation (min): 45 min         Assessment/Plan   PT Assessment  End of Session Patient Position: Bed, 2 rail up     PT Plan  Treatment/Interventions: Bed mobility, Transfer training, Gait training, Stair training, Balance training, Strengthening, Endurance training, Therapeutic exercise, Therapeutic activity, Wheelchair management  PT Plan: Ongoing PT  PT Frequency: 90 min/5 days per week (1 week)  PT Discharge Recommendations:  (Anticipate discharge home mod independent at the wheelchair level.  Assist with higher level functional tasks.)  Equipment Recommended upon Discharge: Wheeled walker, Wheelchair  PT Recommended Transfer Status: Assist x2      General Visit Information:   PT  Visit  PT Received On: 12/10/24  General  Patient Position Received: Up in chair, Alarm on  General Comment: Pt pleasant and cooperative    Subjective   Precautions:  Precautions  Medical Precautions: Fall precautions  Precautions Comment: R AKA    Objective   Pain:  Pain Assessment  Pain Assessment: 0-10  0-10 (Numeric) Pain Score: 5 - Moderate pain  Pain Location:  (RLE)  Pain Interventions: Repositioned, Distraction, Emotional support (Pt reports having pain medication this morning)    Treatments:  Therapeutic Exercise  Therapeutic Exercise Performed: Yes  Pt performs supine BLE therex 10 x 3 reps: Ankle pumps (LLE), glute sets, SAQs (LLE), heel slides (LLE), SLRs; prone AROM: HS curls (LLE), hip ext (RLE); L sidelying AROM: hip abd (RLE); in order to facilitate improved strength related to transfers, gait, and balance.    Bed Mobility  Bed Mobility:  (Pt performs sit/supine/sit with supervision.  Pt rolls from supine/prone/supine with supervision)    Transfers  Transfer:  (Pt performs stand pivot transfers x 3  all toward L side with CGA. Pt appropriately sets up w/c for pivot transfers.)    Wheelchair Activities  Propulsion:  (Pt propels w/c 200 ft with BUE with mod I.  Pt able to appropriately manage w/c brakes.)      Education Documentation  Pt educated on therapeutic exercises, including optimal techniques to maximize function.  Pt educated on techniques for transfers in order to maximize safety and independence.      Encounter Problems       Encounter Problems (Active)       PT Problem       STG - Bed mobility with SBA (Progressing)       Start:  12/04/24    Expected End:  12/07/24            STG - Transfers with min/mod assist x1.  (Progressing)       Start:  12/04/24    Expected End:  12/07/24            STG- Pt will amb 15 ft with wheeled walker and mod assist x1. (Progressing)       Start:  12/04/24    Expected End:  12/07/24            STG - Will determine most approp means of entry into home. (Progressing)       Start:  12/04/24    Expected End:  12/07/24            STG - Pt will propel wheelchair 100 ft with SBA (Progressing)       Start:  12/04/24    Expected End:  12/07/24            LTG - Mod independent bed mobility. (Progressing)       Start:  12/04/24    Expected End:  12/11/24            LTG - Mod independent transfers. (Progressing)       Start:  12/04/24    Expected End:  12/11/24            LTG - Pt will amb 25 ft with wheeled walker and min assist. (Progressing)       Start:  12/04/24    Expected End:  12/11/24            LTG - Will educate pt and family on most approp means of entry into home. (Progressing)       Start:  12/04/24    Expected End:  12/11/24            LTG - Pt will propel wheelchair 150 ft independently (Progressing)       Start:  12/04/24    Expected End:  12/11/24

## 2024-12-10 NOTE — CARE PLAN
The patient's goals for the shift include      The clinical goals for the shift include Patient will receive adequate rest this shift.      Problem: Diabetes  Goal: Achieve decreasing blood glucose levels by end of shift  Outcome: Progressing  Goal: Increase stability of blood glucose readings by end of shift  Outcome: Progressing  Goal: Decrease in ketones present in urine by end of shift  Outcome: Progressing  Goal: Maintain electrolyte levels within acceptable range throughout shift  Outcome: Progressing  Goal: Maintain glucose levels >70mg/dl to <250mg/dl throughout shift  Outcome: Progressing  Goal: No changes in neurological exam by end of shift  Outcome: Progressing  Goal: Learn about and adhere to nutrition recommendations by end of shift  Outcome: Progressing  Goal: Vital signs within normal range for age by end of shift  Outcome: Progressing  Goal: Increase self care and/or family involovement by end of shift  Outcome: Progressing  Goal: Receive DSME education by end of shift  Outcome: Progressing     Problem: Pain  Goal: Takes deep breaths with improved pain control throughout the shift  Outcome: Progressing  Goal: Turns in bed with improved pain control throughout the shift  Outcome: Progressing  Goal: Walks with improved pain control throughout the shift  Outcome: Progressing  Goal: Performs ADL's with improved pain control throughout shift  Outcome: Progressing  Goal: Participates in PT with improved pain control throughout the shift  Outcome: Progressing  Goal: Free from opioid side effects throughout the shift  Outcome: Progressing  Goal: Free from acute confusion related to pain meds throughout the shift  Outcome: Progressing     Problem: Skin  Goal: Decreased wound size/increased tissue granulation at next dressing change  Outcome: Progressing  Flowsheets (Taken 12/9/2024 2229)  Decreased wound size/increased tissue granulation at next dressing change: Promote sleep for wound healing  Goal:  Participates in plan/prevention/treatment measures  Outcome: Progressing  Flowsheets (Taken 12/9/2024 2229)  Participates in plan/prevention/treatment measures: Elevate heels  Goal: Prevent/manage excess moisture  Outcome: Progressing  Flowsheets (Taken 12/9/2024 2229)  Prevent/manage excess moisture: Moisturize dry skin  Goal: Prevent/minimize sheer/friction injuries  Outcome: Progressing  Flowsheets (Taken 12/9/2024 2229)  Prevent/minimize sheer/friction injuries: Use pull sheet  Goal: Promote/optimize nutrition  Outcome: Progressing  Flowsheets (Taken 12/9/2024 2229)  Promote/optimize nutrition: Offer water/supplements/favorite foods  Goal: Promote skin healing  Outcome: Progressing  Flowsheets (Taken 12/9/2024 2229)  Promote skin healing: Protective dressings over bony prominences     Problem: Nutrition  Goal: Oral intake greater 75%  Outcome: Progressing  Goal: Consume prescribed supplement  Outcome: Progressing  Goal: Adequate PO fluid intake  Outcome: Progressing  Goal: BG  mg/dL  Outcome: Progressing  Goal: Lab values WNL  Outcome: Progressing  Goal: Promote healing  Outcome: Progressing  Goal: Maintain stable weight  Outcome: Progressing     Problem: Fall/Injury  Goal: Not fall by end of shift  Outcome: Progressing  Goal: Be free from injury by end of the shift  Outcome: Progressing  Goal: Verbalize understanding of personal risk factors for fall in the hospital  Outcome: Progressing  Goal: Verbalize understanding of risk factor reduction measures to prevent injury from fall in the home  Outcome: Progressing  Goal: Use assistive devices by end of the shift  Outcome: Progressing  Goal: Pace activities to prevent fatigue by end of the shift  Outcome: Progressing

## 2024-12-10 NOTE — PROGRESS NOTES
"  Errol Fonseca is a 54 y.o. male on day 7 of admission presenting with Above knee amputation of right lower extremity (Multi).    Subjective   Today he is working on balance. He feels he is getting stronger with transfers and short distance ambulation.    He is preparing for safe DC home this week.       Objective       Physical Exam  Pleasant male in NAD  Alert and oriented x 4  CTAB  S1S2  SNTND+BS  Negative Jennie's on the left LE  Blister on the back of the right distal LE stump with dressing in place    Function: CGA/min A     Assessment/Plan        Last Recorded Vitals  Blood pressure 139/54, pulse 64, temperature 36.9 °C (98.4 °F), temperature source Temporal, resp. rate 16, height 1.854 m (6' 0.99\"), weight 103 kg (227 lb 8.2 oz), SpO2 99%.    Therapy notes from last 24 hours reviewed.    Relevant Results                  Scheduled medications  aspirin, 81 mg, oral, Daily  atorvastatin, 40 mg, oral, Daily  dulaglutide, 0.75 mg, subcutaneous, Weekly  ergocalciferol, 50,000 Units, oral, Weekly  escitalopram, 10 mg, oral, Daily  influenza, 0.5 mL, intramuscular, During hospitalization  folic acid, 1 mg, oral, Daily  gabapentin, 400 mg, oral, TID  lisinopril, 20 mg, oral, Daily   And  hydroCHLOROthiazide, 12.5 mg, oral, Daily  insulin glargine, 34 Units, subcutaneous, Daily  insulin lispro, 0-10 Units, subcutaneous, TID AC  insulin lispro, 7 Units, subcutaneous, TID AC  melatonin, 5 mg, oral, Nightly  metFORMIN XR, 500 mg, oral, BID  metoprolol tartrate, 25 mg, oral, BID  nicotine, 1 patch, transdermal, Daily  nystatin, 1 Application, Topical, BID  sennosides, 1 tablet, oral, Nightly      Continuous medications     PRN medications  PRN medications: acetaminophen, alum-mag hydroxide-simeth, bisacodyl, bisacodyl, dextrose, dextrose, glucagon, glucagon, oxyCODONE, polyethylene glycol, traZODone     Results for orders placed or performed during the hospital encounter of 12/03/24 (from the past 24 hours)   POCT " GLUCOSE   Result Value Ref Range    POCT Glucose 255 (H) 74 - 99 mg/dL   POCT GLUCOSE   Result Value Ref Range    POCT Glucose 231 (H) 74 - 99 mg/dL   POCT GLUCOSE   Result Value Ref Range    POCT Glucose 158 (H) 74 - 99 mg/dL   POCT GLUCOSE   Result Value Ref Range    POCT Glucose 146 (H) 74 - 99 mg/dL                 Assessment/Plan   Principal Problem:    Above knee amputation of right lower extremity (Multi)  Active Problems:    Alcoholic cirrhosis (Multi)    Coronary artery disease involving native coronary artery of native heart with unstable angina pectoris    Hyperlipidemia    Hypertension    Right above-knee amputee (Multi)              Right above-knee amputation  Begin physical therapy for strengthening, preprosthetic gait training, transfer training, wheelchair mobility  Begin occupational therapy for ADL retraining, strengthening, transfer training, and ADL activities     2.  Hyperlipidemia  Lipitor 40 mg daily     3.  Diabetes mellitus type 2  Metformin 500 mg twice daily  Lantus 30 units at at bedtime  Sliding scale with meals  Trulicity 0.75 weekly     4.  Hypertension   Lisinopril 20 mg  Hydrochlorothiazide 12.5 mg daily  Adjust medication for appropriate blood pressure control     5.  Pain management   Neurontin 400 mg 3 times daily  Oxycodone as needed     6.  Mood  Lexapro 10 mg daily  Deseryl     7.  Bowel bladder  Medications to encourage bowel movement every 2 to 3 days     FENGI  Patient is at risk for infection we will continue to monitor his wound for adequate wound healing  He is a high fall risk will encourage safe transfers  He is on a bowel program for appropriate bowel movement     12/10  -ongoing rehab to increase strength for stability for transfers  -HI home at a  level  Due to the patient's mobility limitation, the patient agrees to accept a wheelchair to use in and outside the home.  The patient is unable to use a walker due to general weakness and pain.  Having the wheelchair  with assistance from the caregiver, the patient will be able to perform daily activities.  -BS elevated and fluctuant on lantus  -continue with SSI       I spent 35 minutes in the professional and overall care of this patient.      Radha Carranza MD

## 2024-12-10 NOTE — PROGRESS NOTES
Occupational Therapy    OT Treatment    Patient Name: Errol Fonseca  MRN: [id number]  Department: [facility] REHAB  Room: [address] [place] [address]  Today's Date: 12/10/2024  Time Calculation  Start Time: 1045  Stop Time: 1130  Time Calculation (min): 45 min        Assessment:  End of Session Communication:  (to PT)  End of Session Patient Position: Alarm on, Up in chair     Plan:  Treatment Interventions: ADL retraining, Functional transfer training, UE strengthening/ROM, Endurance training, Patient/family training, Equipment evaluation/education, Compensatory technique education, Continued evaluation  OT Frequency: 90 min/5 days per week (1 week)  OT Discharge Recommendations:  (mod I at wc level, sba tub/dme transfers)  Equipment Recommended upon Discharge: Wheeled walker, Wheelchair, Bedside commode  Treatment Interventions: ADL retraining, Functional transfer training, UE strengthening/ROM, Endurance training, Patient/family training, Equipment evaluation/education, Compensatory technique education, Continued evaluation    Subjective   Previous Visit Info:  OT Last Visit  OT Received On: 12/10/24  General:  General  Prior to Session Communication:  (rehab tech)  Patient Position Received: Up in chair, Alarm on  General Comment: Pt pleasant and cooperative  Precautions:  LE Weight Bearing Status: Right Non-Weight Bearing  Medical Precautions: Fall precautions  Precautions Comment: R AKA            Pain:  Pain Assessment  Pain Assessment: 0-10  0-10 (Numeric) Pain Score: 5 - Moderate pain  Pain Location:  (rle)    Objective      Functional Mobility:  Functional Mobility  Functional Mobility Performed:  (wc mobility distal supervision to room from OT)  Functional Mobility 1  Comments 1: pt able to complete side stepping (simulating access to bathroom/commode) via wwalker min a overall, x2 trials       Therapy/Activity: Therapeutic Exercise  Therapeutic Exercise Activity 1: completes bilat ue wc pushups x5 sba/cga  Therapeutic  Exercise Activity 2: completes bilat ue ther ex program with 4# weights 10 x3 sets 9 planes with sba following written HEP to promote indep in adl, transfers, mobility     EDUCATION:  Education  Individual(s) Educated: Patient  Home Program: Strengthening  Patient Response to Education: Patient/Caregiver Verbalized Understanding of Information    Goals:  Encounter Problems       Encounter Problems (Active)       OT Problem       LTG - Patient will complete grooming with mod I (Progressing)       Start:  12/04/24    Expected End:  12/11/24            LTG - Patient will complete bathing with supervision or better (Progressing)       Start:  12/04/24    Expected End:  12/11/24            LTG - Patient will complete UE dressing with mod I (Progressing)       Start:  12/04/24    Expected End:  12/11/24            LTG - Patient will complete LE dressing using adaptive equip as needed with mod I (Progressing)       Start:  12/04/24    Expected End:  12/11/24            LTG - Patient will complete toileting with mod I (Progressing)       Start:  12/04/24    Expected End:  12/11/24            LTG - Patient will complete commode transfer via wwalker or pivot with mod I (Progressing)       Start:  12/04/24    Expected End:  12/11/24            LTG - Patient will complete tub/shower transfer using DME as needed with sba (Progressing)       Start:  12/04/24    Expected End:  12/11/24            LTG - Patient will complete functional mobility via wheelchar mod I (Progressing)       Start:  12/04/24    Expected End:  12/11/24            LTG - Patient will complete simple mobility/approach steps via wwalker supervision or better (Progressing)       Start:  12/04/24    Expected End:  12/11/24            LTG - Patient will complete light meal prep or kitchen access at wheelchair level mod I (Progressing)       Start:  12/04/24    Expected End:  12/11/24

## 2024-12-11 LAB
GLUCOSE BLD MANUAL STRIP-MCNC: 150 MG/DL (ref 74–99)
GLUCOSE BLD MANUAL STRIP-MCNC: 153 MG/DL (ref 74–99)
GLUCOSE BLD MANUAL STRIP-MCNC: 153 MG/DL (ref 74–99)
GLUCOSE BLD MANUAL STRIP-MCNC: 160 MG/DL (ref 74–99)
LABORATORY COMMENT REPORT: NORMAL
PATH REPORT.FINAL DX SPEC: NORMAL
PATH REPORT.GROSS SPEC: NORMAL
PATH REPORT.RELEVANT HX SPEC: NORMAL
PATH REPORT.TOTAL CANCER: NORMAL

## 2024-12-11 PROCEDURE — S4991 NICOTINE PATCH NONLEGEND: HCPCS | Performed by: PHYSICAL MEDICINE & REHABILITATION

## 2024-12-11 PROCEDURE — 1280000001 HC REHAB SEMI-PRIVATE ROOM DAILY

## 2024-12-11 PROCEDURE — 97110 THERAPEUTIC EXERCISES: CPT | Mod: GO

## 2024-12-11 PROCEDURE — 97110 THERAPEUTIC EXERCISES: CPT | Mod: GP,CQ

## 2024-12-11 PROCEDURE — 97530 THERAPEUTIC ACTIVITIES: CPT | Mod: GP

## 2024-12-11 PROCEDURE — 2500000002 HC RX 250 W HCPCS SELF ADMINISTERED DRUGS (ALT 637 FOR MEDICARE OP, ALT 636 FOR OP/ED): Performed by: PHYSICAL MEDICINE & REHABILITATION

## 2024-12-11 PROCEDURE — 97535 SELF CARE MNGMENT TRAINING: CPT | Mod: GO

## 2024-12-11 PROCEDURE — 97116 GAIT TRAINING THERAPY: CPT | Mod: GP

## 2024-12-11 PROCEDURE — 82947 ASSAY GLUCOSE BLOOD QUANT: CPT

## 2024-12-11 PROCEDURE — 97530 THERAPEUTIC ACTIVITIES: CPT | Mod: GO

## 2024-12-11 PROCEDURE — 2500000001 HC RX 250 WO HCPCS SELF ADMINISTERED DRUGS (ALT 637 FOR MEDICARE OP): Performed by: PHYSICAL MEDICINE & REHABILITATION

## 2024-12-11 RX ORDER — LISINOPRIL 20 MG/1
20 TABLET ORAL DAILY
Status: DISCONTINUED | OUTPATIENT
Start: 2024-12-12 | End: 2024-12-12

## 2024-12-11 RX ORDER — HYDROCHLOROTHIAZIDE 12.5 MG/1
12.5 TABLET ORAL
Status: DISCONTINUED | OUTPATIENT
Start: 2024-12-13 | End: 2024-12-12

## 2024-12-11 ASSESSMENT — PAIN SCALES - GENERAL
PAINLEVEL_OUTOF10: 7
PAINLEVEL_OUTOF10: 5 - MODERATE PAIN
PAINLEVEL_OUTOF10: 0 - NO PAIN
PAINLEVEL_OUTOF10: 7
PAINLEVEL_OUTOF10: 5 - MODERATE PAIN
PAINLEVEL_OUTOF10: 0 - NO PAIN
PAINLEVEL_OUTOF10: 4
PAINLEVEL_OUTOF10: 0 - NO PAIN
PAINLEVEL_OUTOF10: 4
PAINLEVEL_OUTOF10: 7
PAINLEVEL_OUTOF10: 5 - MODERATE PAIN
PAINLEVEL_OUTOF10: 7
PAINLEVEL_OUTOF10: 5 - MODERATE PAIN
PAINLEVEL_OUTOF10: 0 - NO PAIN
PAINLEVEL_OUTOF10: 7

## 2024-12-11 ASSESSMENT — PAIN - FUNCTIONAL ASSESSMENT
PAIN_FUNCTIONAL_ASSESSMENT: 0-10

## 2024-12-11 ASSESSMENT — ACTIVITIES OF DAILY LIVING (ADL)
HOME_MANAGEMENT_TIME_ENTRY: 35
BATHING_EQUIPMENT_NEEDED: LONG-HANDLED SPONGE;REMOVEABLE SHOWER HEAD
BATHING_LEVEL_OF_ASSISTANCE: CLOSE SUPERVISION
BATHING_WHERE_ASSESSED: SHOWER

## 2024-12-11 ASSESSMENT — PAIN DESCRIPTION - DESCRIPTORS
DESCRIPTORS: ACHING
DESCRIPTORS: DISCOMFORT

## 2024-12-11 ASSESSMENT — PAIN DESCRIPTION - LOCATION: LOCATION: LEG

## 2024-12-11 ASSESSMENT — PAIN SCALES - PAIN ASSESSMENT IN ADVANCED DEMENTIA (PAINAD): TOTALSCORE: MEDICATION (SEE MAR)

## 2024-12-11 ASSESSMENT — PAIN DESCRIPTION - ORIENTATION: ORIENTATION: RIGHT

## 2024-12-11 ASSESSMENT — PAIN SCALES - WONG BAKER: WONGBAKER_NUMERICALRESPONSE: HURTS WHOLE LOT

## 2024-12-11 NOTE — PROGRESS NOTES
Physical Therapy    Physical Therapy Treatment    Patient Name: Errol Fonseca  MRN: [id number]  Department: [facility]  Room: [address] [place] [address]  Today's Date: 12/11/2024  Time Calculation  Start Time: 0915  Stop Time: 1000  Time Calculation (min): 45 min         Assessment/Plan   PT Assessment  End of Session Patient Position: Up in chair, Alarm off, not on at start of session     PT Plan  Treatment/Interventions: Bed mobility, Transfer training, Gait training, Stair training, Balance training, Strengthening, Endurance training, Therapeutic exercise, Therapeutic activity, Wheelchair management  PT Plan: Ongoing PT  PT Frequency: 90 min/5 days per week (1 week)  PT Discharge Recommendations:  (Anticipate discharge home mod independent at the wheelchair level.  Assist with higher level functional tasks.)  Equipment Recommended upon Discharge: Wheeled walker, Wheelchair  PT Recommended Transfer Status: Assist x2      General Visit Information:   PT  Visit  PT Received On: 12/11/24  General  Patient Position Received: Up in chair, Alarm off, not on at start of session    Subjective   Precautions:  Precautions  LE Weight Bearing Status: Right Non-Weight Bearing  Medical Precautions: Fall precautions  Precautions Comment: R AKA    Vital Signs (Past 2hrs)                 Objective   Pain:  Pain Assessment  Pain Assessment: 0-10  0-10 (Numeric) Pain Score: 5 - Moderate pain  Pain Location:  (RLE)  Pain Interventions: Repositioned, Distraction    Treatments:  Therapeutic Exercise  Therapeutic Exercise Performed: Yes  Pt performs LLE seated AROM exercises: marching, LAQ, hip add with red TB, hip abd with red TB, hs curls with red TB, AP 2 x 10 reps each in order to improve strength and endurance for improvement in functional mobility and transfers.     Ambulation/Gait Training  Ambulation/Gait Training Performed:  (Pt ambulates 60 ft with FWW and CGA.  Pt ambulates 20 ft x 2, 15 ft with FWW and CGA.)  Transfers  Transfer:  (Pt  performs sit/stand transfers with CGA/SBA.)      Education Documentation  Pt educated on therapeutic exercises, including optimal techniques to maximize function.  Pt educated on techniques for transfers and gait training with device in order to maximize safety and independence.       Encounter Problems       Encounter Problems (Active)       PT Problem       STG - Bed mobility with SBA (Met)       Start:  12/04/24    Expected End:  12/07/24    Resolved:  12/11/24         STG - Transfers with min/mod assist x1.  (Met)       Start:  12/04/24    Expected End:  12/07/24    Resolved:  12/11/24         STG- Pt will amb 15 ft with wheeled walker and mod assist x1. (Met)       Start:  12/04/24    Expected End:  12/07/24    Resolved:  12/11/24         STG - Will determine most approp means of entry into home. (Met)       Start:  12/04/24    Expected End:  12/07/24    Resolved:  12/11/24         STG - Pt will propel wheelchair 100 ft with SBA (Met)       Start:  12/04/24    Expected End:  12/07/24    Resolved:  12/11/24         LTG - Mod independent bed mobility. (Progressing)       Start:  12/04/24    Expected End:  12/14/24            LTG - Mod independent transfers. (Progressing)       Start:  12/04/24    Expected End:  12/14/24            LTG - Pt will amb 25 ft with wheeled walker and min assist. (Met)       Start:  12/04/24    Expected End:  12/11/24    Resolved:  12/11/24    Updated to: LTG - Pt will amb 25 ft with wheeled walker and CGA    Update reason: goal met         LTG - Will educate pt and family on most approp means of entry into home. (Progressing)       Start:  12/04/24    Expected End:  12/14/24            LTG - Pt will propel wheelchair 150 ft independently (Met)       Start:  12/04/24    Expected End:  12/11/24    Resolved:  12/11/24         LTG - Pt will amb 25 ft with wheeled walker and CGA (Progressing)       Start:  12/11/24    Expected End:  12/14/24

## 2024-12-11 NOTE — PROGRESS NOTES
"Nutrition Follow Up Assessment:     Nutrition History:  Energy Intake: Good > 75 %  Food and Nutrient History: Pt out of room working with therapy at time of attempted visit today.  Pt eating 100% of meals. Last BM 12/10. Will continue with 75gm carb controlled diet and Ensure HP once daily and Anatoliy twice daily to support healing processes.  -255  Vitamin/Herbal Supplement Use: vitamin D2 and folic acid  Food Allergies/Intolerances:  None  GI Symptoms: None  Oral Problems: None       Anthropometrics:  Height: 185.4 cm (6' 0.99\")   Weight: 103 kg (227 lb 8.2 oz)   BMI (Calculated): 30.02  IBW/kg (Dietitian Calculated): 83.6 kg  Percent of IBW: 123 %       Weight History:     Weight Change %:   No new weight to assess    Nutrition Focused Physical Exam Findings:  defer: completed 12/4/24  Subcutaneous Fat Loss:      Muscle Wasting:     Edema:  Edema: +1 trace  Edema Location: RLE, NP LLE  Physical Findings:  Skin: Positive (R stump incision; R dorsal upper leg wound)    Nutrition Significant Labs:  BMP Trend:   Results from last 7 days   Lab Units 12/09/24  0613 12/07/24  0546   GLUCOSE mg/dL 193* 241*   CALCIUM mg/dL 9.0 8.7   SODIUM mmol/L 131* 131*   POTASSIUM mmol/L 4.6 4.5   CO2 mmol/L 31 29   CHLORIDE mmol/L 95* 95*   BUN mg/dL 29* 20   CREATININE mg/dL 0.82 0.70    , BG POCT trend:   Results from last 7 days   Lab Units 12/11/24  0537 12/10/24  2042 12/10/24  1623 12/10/24  1136 12/10/24  0640   POCT GLUCOSE mg/dL 160* 197* 150* 189* 146*        Nutrition Specific Medications:  Reviewed     I/O:   Last BM Date: 12/10/24; Stool Appearance: Soft, Formed (12/09/24 2055)    Dietary Orders (From admission, onward)       Start     Ordered    12/04/24 1042  Oral nutritional supplements  Until discontinued        Question Answer Comment   Deliver with Lunch    Select supplement: Ensure High Protein        12/04/24 1041    12/04/24 0859  Adult diet Regular, Consistent Carb; CCD 75 gm/meal  Diet effective now      "   Question Answer Comment   Diet type Regular    Diet type Consistent Carb    Carb diet selection: CCD 75 gm/meal        12/04/24 0858    12/04/24 0859  Oral nutritional supplements  Until discontinued        Question Answer Comment   Deliver with Breakfast    Deliver with Dinner    Select supplement: Anatoliy        12/04/24 0858    12/03/24 1751  May Participate in Room Service  ( ROOM SERVICE MAY PARTICIPATE)  Once        Question:  .  Answer:  Yes    12/03/24 1750                     Estimated Needs:      Method for Estimating Needs: 2000-2300kcals (28-32kcals/kg adj IBW)     Method for Estimating Needs: 84-105g (1.2-1.5g/kg adj IBW)     Method for Estimating Needs: 1 mL/kcal or as per MD        Nutrition Diagnosis   Malnutrition Diagnosis  Patient has Malnutrition Diagnosis: No    Nutrition Diagnosis  Patient has Nutrition Diagnosis: Yes  Diagnosis Status (1): Ongoing  Nutrition Diagnosis 1: Increased nutrient needs  Related to (1): increased metabolic demand  As Evidenced by (1): wound healing needs to R AKA stump; acute rehab demands       Nutrition Interventions/Recommendations         Nutrition Prescription:  Individualized Nutrition Prescription Provided for : 75gm carb controlled diet with Ensure HP once daily and Anatoliy twice daily        Nutrition Interventions:   Interventions: Meals and snacks, Medical food supplement  Meals and Snacks: Carbohydrate-modified diet  Goal: consume >75% of meals-met/ongoing  Medical Food Supplement: Commercial beverage  Goal: consume >75% of Ensure HP once daily (for an additional 160 kcals, 16 gm protein each)--met/ongoing  Additional Interventions: consume >75% of Anatoliy twice daily (for an additional 90 kcals, 2.5 gm protein, supplement glutamine and arginine)-met/ongoing         Nutrition Education:   Previously denied need for education      Nutrition Monitoring and Evaluation   Food/Nutrient Related History Monitoring  Monitoring and Evaluation Plan: Energy intake,  Fluid intake, Amount of food  Energy Intake: Estimated energy intake  Criteria: Meal/ONS intake to meet >75% of estimated needs  Fluid Intake: Estimated fluid intake  Criteria: fluid intake to meet >75% of estimated need  Amount of Food: Estimated amout of food, Medical food intake  Criteria: Pt to consume >75% of meals/ONS    Body Composition/Growth/Weight History  Monitoring and Evaluation Plan: Weight  Weight: Measured weight  Criteria: maintain stable weight    Biochemical Data, Medical Tests and Procedures  Monitoring and Evaluation Plan: Electrolyte/renal panel, Glucose/endocrine profile  Electrolyte and Renal Panel: Sodium  Criteria: labs WNL  Glucose/Endocrine Profile: Glucose, casual  Criteria: BG within acceptable range    Nutrition Focused Physical Findings  Monitoring and Evaluation Plan: Skin  Criteria: promote healing through adequate nutrition         Time Spent (min): 30 minutes

## 2024-12-11 NOTE — PROGRESS NOTES
"Patient is seen in his room Errol Fonseca is a 54 y.o. male on day 8 of admission presenting with Above knee amputation of right lower extremity (Multi).    Subjective   Patient is seen in his room after shower.  He feels well.  He denies chest pain or shortness of breath.  10 review of systems are negative    Objective     Physical Exam  Constitutional:       General: no acute distress.    Cardiovascular:   Regular rate and rhythm  Pulmonary:   Lungs are clear without Rales  Abdominal:   Abdomen is soft without tenderness or organomegaly  Musculoskeletal:         General: No swelling, tenderness or deformity.   Stump incision is intact  Skin:     General: Skin is warm and dry.      Findings: No rash.   Neurological:      General: No focal deficit present.      Mental Status:  alert and oriented to person, place, and time.      Cranial Nerves: No cranial nerve deficit.      Psychiatric:         Mood and Affect: Mood normal.      He propels wheelchair with  bilateral upper extremities modified independent.  Transfers to the left are contact-guard assist    Last Recorded Vitals  Blood pressure 114/68, pulse 77, temperature 36.1 °C (97 °F), temperature source Temporal, resp. rate 18, height 1.854 m (6' 0.99\"), weight 103 kg (227 lb 8.2 oz), SpO2 98%.  Intake/Output last 3 Shifts:  I/O last 3 completed shifts:  In: 540 (5.2 mL/kg) [P.O.:540]  Out: 4850 (47 mL/kg) [Urine:4850 (1.3 mL/kg/hr)]  Weight: 103.2 kg     Relevant Results  Scheduled medications  aspirin, 81 mg, oral, Daily  atorvastatin, 40 mg, oral, Daily  dulaglutide, 0.75 mg, subcutaneous, Weekly  ergocalciferol, 50,000 Units, oral, Weekly  escitalopram, 10 mg, oral, Daily  influenza, 0.5 mL, intramuscular, During hospitalization  folic acid, 1 mg, oral, Daily  gabapentin, 400 mg, oral, TID  lisinopril, 20 mg, oral, Daily   And  hydroCHLOROthiazide, 12.5 mg, oral, Daily  insulin glargine, 34 Units, subcutaneous, Daily  insulin lispro, 0-10 Units, " subcutaneous, TID AC  insulin lispro, 7 Units, subcutaneous, TID AC  melatonin, 5 mg, oral, Nightly  metFORMIN XR, 500 mg, oral, BID  metoprolol tartrate, 25 mg, oral, BID  nicotine, 1 patch, transdermal, Daily  nystatin, 1 Application, Topical, BID  sennosides, 1 tablet, oral, Nightly      Continuous medications     PRN medications  PRN medications: acetaminophen, alum-mag hydroxide-simeth, bisacodyl, bisacodyl, dextrose, dextrose, glucagon, glucagon, oxyCODONE, polyethylene glycol, traZODone     Results for orders placed or performed during the hospital encounter of 12/03/24 (from the past 24 hours)   POCT GLUCOSE   Result Value Ref Range    POCT Glucose 150 (H) 74 - 99 mg/dL   POCT GLUCOSE   Result Value Ref Range    POCT Glucose 197 (H) 74 - 99 mg/dL   POCT GLUCOSE   Result Value Ref Range    POCT Glucose 160 (H) 74 - 99 mg/dL   POCT GLUCOSE   Result Value Ref Range    POCT Glucose 153 (H) 74 - 99 mg/dL            Assessment/Plan   Principal Problem:    Above knee amputation of right lower extremity (Multi)  Active Problems:    Alcoholic cirrhosis (Multi)    Coronary artery disease involving native coronary artery of native heart with unstable angina pectoris    Hyperlipidemia    Hypertension    Right above-knee amputee (Multi)      Right above-knee amputation  Begin physical therapy for strengthening, preprosthetic gait training, transfer training, wheelchair mobility  Begin occupational therapy for ADL retraining, strengthening, transfer training, and ADL activities     2.  Hyperlipidemia  Lipitor 40 mg daily     3.  Diabetes mellitus type 2  Metformin 500 mg twice daily  Lantus 30 units at at bedtime  Sliding scale with meals  Trulicity 0.75 weekly     4.  Hypertension   Lisinopril 20 mg  Hydrochlorothiazide 12.5 mg daily  Adjust medication for appropriate blood pressure control     5.  Pain management   Neurontin 400 mg 3 times daily  Oxycodone as needed     6.  Mood  Lexapro 10 mg daily  Deseryl     7.   Bowel bladder  Medications to encourage bowel movement every 2 to 3 days     FENGI  Patient is at risk for infection we will continue to monitor his wound for adequate wound healing  He is a high fall risk will encourage safe transfers  He is on a bowel program for appropriate bowel movement    12/11  Transfers are contact-guard assist  Hypertension BP today is 114/68, continue hydrochlorothiazide and Lopressor  Sodium on 12/9 is 131, will decrease HCTZ to Q2D, BUN and creatinine are 29 and 0.8  Pain is controlled       I spent 36 minutes in the professional and overall care of this patient.      Christi Conde, DO

## 2024-12-11 NOTE — PROGRESS NOTES
Occupational Therapy    OT Treatment    Patient Name: Errol Fonseca  MRN: [id number]  Department: [facility]  Room: [address] [place] [address]  Today's Date: 12/11/2024  Time Calculation  Start Time: 0830  Stop Time: 0915  Time Calculation (min): 45 min        Assessment:  End of Session Communication:  (to PT)  End of Session Patient Position: Up in chair, Alarm off, not on at start of session     Plan:  Treatment Interventions: ADL retraining, Functional transfer training, UE strengthening/ROM, Endurance training, Patient/family training, Equipment evaluation/education, Compensatory technique education, Continued evaluation  OT Frequency: 90 min/5 days per week (1 week)  OT Discharge Recommendations:  (mod I at wc level, sba tub/dme transfers)  Equipment Recommended upon Discharge: Wheeled walker, Wheelchair, Bedside commode  Treatment Interventions: ADL retraining, Functional transfer training, UE strengthening/ROM, Endurance training, Patient/family training, Equipment evaluation/education, Compensatory technique education, Continued evaluation    Subjective   Previous Visit Info:  OT Last Visit  OT Received On: 12/11/24  General:  General  Patient Position Received: Up in chair, Alarm off, not on at start of session  Precautions:  LE Weight Bearing Status: Right Non-Weight Bearing  Medical Precautions: Fall precautions  Precautions Comment: R AKA            Pain:  Pain Assessment  Pain Assessment: 0-10  0-10 (Numeric) Pain Score: 5 - Moderate pain  Pain Orientation:  (rle)  Pain Interventions: Repositioned, Rest (declines meds)    Objective         Bed Mobility/Transfers:      Tub Transfers  Tub Transfers Comments: min a via wwalker to extended tub bench/tub mounted grab bar, cues for hand placement/techs; reviewed setup at son's apt, educated on positionig dme and setup      Functional Mobility:  Functional Mobility  Functional Mobility Performed:  (approach steps via wwalker into bathroom/tub min/cga and cues for  techs)  Functional Mobility 1  Comments 1: wc mobility mod I for simple mobility, supervision for functional mobility/challending maneuvering     Therapy/Activity: Therapeutic Exercise  Therapeutic Exercise Activity 2: completes bilat ue ther ex program with 4# weights 10 x1-2 sets 9 planes with sba following written HEP to promote indep in adl, transfers, mobility         EDUCATION:  Education  Individual(s) Educated: Patient  Education Provided:  (bathroom dme, tub/dme education and transfer techs)  Home Program: Strengthening  Patient Response to Education: Patient/Caregiver Verbalized Understanding of Information  Education Comment: will continue problem solving home going    Goals:  Encounter Problems       Encounter Problems (Active)       OT Problem       LTG - Patient will complete grooming with mod I (Progressing)       Start:  12/04/24    Expected End:  12/11/24            LTG - Patient will complete bathing with supervision or better (Progressing)       Start:  12/04/24    Expected End:  12/11/24            LTG - Patient will complete UE dressing with mod I (Progressing)       Start:  12/04/24    Expected End:  12/11/24            LTG - Patient will complete LE dressing using adaptive equip as needed with mod I (Progressing)       Start:  12/04/24    Expected End:  12/11/24            LTG - Patient will complete toileting with mod I (Progressing)       Start:  12/04/24    Expected End:  12/11/24            LTG - Patient will complete commode transfer via wwalker or pivot with mod I (Progressing)       Start:  12/04/24    Expected End:  12/11/24            LTG - Patient will complete tub/shower transfer using DME as needed with sba (Progressing)       Start:  12/04/24    Expected End:  12/11/24            LTG - Patient will complete functional mobility via wheelchar mod I (Progressing)       Start:  12/04/24    Expected End:  12/11/24            LTG - Patient will complete simple mobility/approach steps  via rafael supervision or better (Progressing)       Start:  12/04/24    Expected End:  12/11/24            LTG - Patient will complete light meal prep or kitchen access at wheelchair level mod I (Progressing)       Start:  12/04/24    Expected End:  12/11/24

## 2024-12-11 NOTE — PROGRESS NOTES
Physical Therapy    Physical Therapy Treatment    Patient Name: Errol Fonseca  MRN: [id number]  Department: [facility]  Room: [address] [place] [address]  Today's Date: 12/11/2024  Time Calculation  Start Time: 1300  Stop Time: 1345  Time Calculation (min): 45 min         Assessment/Plan   PT Assessment  End of Session Communication: Bedside nurse  End of Session Patient Position: Alarm off, not on at start of session (call light and needs within reach.)     PT Plan  Treatment/Interventions: Bed mobility, Transfer training, Gait training, Stair training, Balance training, Strengthening, Endurance training, Therapeutic exercise, Therapeutic activity, Wheelchair management  PT Plan: Ongoing PT  PT Frequency: 90 min/5 days per week (1 week)  PT Discharge Recommendations:  (Anticipate discharge home mod independent at the wheelchair level.  Assist with higher level functional tasks.)  Equipment Recommended upon Discharge: Wheeled walker, Wheelchair  PT Recommended Transfer Status: Assist x2      General Visit Information:   PT  Visit  PT Received On: 12/11/24  General  Patient Position Received: Up in chair, Alarm off, not on at start of session  General Comment: Patient pleasant and agreeable with therapy.    Subjective   Precautions:  Precautions  Hearing/Visual Limitations: Glasses for reading  LE Weight Bearing Status: Right Non-Weight Bearing  Medical Precautions: Fall precautions  Precautions Comment: R AKA      Objective   Pain:  Pain Assessment  Pain Assessment: 0-10  0-10 (Numeric) Pain Score: 0 - No pain  Treatments:  Therapeutic Exercise  Therapeutic Exercise Performed: Yes  Patient performed supine therex, L LE: AP, QS, heel slides, and SAQ; B LE: GS and hip abd/add, all [id number]. Done to increased B LE strength and mobility and improve overall functional independence.    Patient tolerated prone lying ~5mins to promote hip flexor stretch.     Therapeutic Activity  Therapeutic Activity Performed: Yes  This PTA ace wrapped  patient residual limb in beginning of session. Educated patient on importance of wearing wrap to shape, secure, and protect residual limb. Patient receptive and verbalized understand.     Bed Mobility  Bed Mobility: Yes  Patient performed sit<>sup transfer on gym mat and hospital bed as well as rolling supine<>prone on gym mat all with Supervision.     Transfers  Transfer: Yes  Patient performed stand pivot transfer x5 all towards L side with CGA/SBA x1. Patient able appropriately set up WC for transfers.     Education Comments  Educated patient on limb wrapping to protect and shape limb and therex to improve overall strength.   Educated patient on mirror therapy to reduce phantom limb pain.   Patient receptive to all education.       OP EDUCATION:       Encounter Problems       Encounter Problems (Active)       PT Problem       STG - Bed mobility with SBA (Met)       Start:  12/04/24    Expected End:  12/07/24    Resolved:  12/11/24         STG - Transfers with min/mod assist x1.  (Met)       Start:  12/04/24    Expected End:  12/07/24    Resolved:  12/11/24         STG- Pt will amb 15 ft with wheeled walker and mod assist x1. (Met)       Start:  12/04/24    Expected End:  12/07/24    Resolved:  12/11/24         STG - Will determine most approp means of entry into home. (Met)       Start:  12/04/24    Expected End:  12/07/24    Resolved:  12/11/24         STG - Pt will propel wheelchair 100 ft with SBA (Met)       Start:  12/04/24    Expected End:  12/07/24    Resolved:  12/11/24         LTG - Mod independent bed mobility. (Progressing)       Start:  12/04/24    Expected End:  12/14/24            LTG - Mod independent transfers. (Progressing)       Start:  12/04/24    Expected End:  12/14/24            LTG - Pt will amb 25 ft with wheeled walker and min assist. (Met)       Start:  12/04/24    Expected End:  12/11/24    Resolved:  12/11/24    Updated to: LTG - Pt will amb 25 ft with wheeled walker and CGA    Update  reason: goal met         LTG - Will educate pt and family on most approp means of entry into home. (Progressing)       Start:  12/04/24    Expected End:  12/14/24            LTG - Pt will propel wheelchair 150 ft independently (Met)       Start:  12/04/24    Expected End:  12/11/24    Resolved:  12/11/24         LTG - Pt will amb 25 ft with wheeled walker and CGA (Progressing)       Start:  12/11/24    Expected End:  12/14/24

## 2024-12-11 NOTE — PROGRESS NOTES
Occupational Therapy    OT Treatment    Patient Name: Errol Fonseca  MRN: [id number]  Department: [facility] REHAB  Room: [address] [place] [address]  Today's Date: 12/11/2024  Time Calculation  Start Time: 1045  Stop Time: 1130  Time Calculation (min): 45 min        Assessment:  End of Session Communication: Bedside nurse, Physician (PTA)  End of Session Patient Position: Alarm on, Up in chair     Plan:  Treatment Interventions: ADL retraining, Functional transfer training, UE strengthening/ROM, Endurance training, Patient/family training, Equipment evaluation/education, Compensatory technique education, Continued evaluation  OT Frequency: 90 min/5 days per week (1 week)  OT Discharge Recommendations:  (mod I at wc level, sba tub/dme transfers)  Equipment Recommended upon Discharge: Wheeled walker, Wheelchair, Bedside commode  Treatment Interventions: ADL retraining, Functional transfer training, UE strengthening/ROM, Endurance training, Patient/family training, Equipment evaluation/education, Compensatory technique education, Continued evaluation    Subjective   Previous Visit Info:  OT Last Visit  OT Received On: 12/11/24  General:  General  Prior to Session Communication: Bedside nurse (regarding rt residual limb dressing removal and reapplication after shower)  Patient Position Received: Alarm on, Alarm off, not on at start of session  General Comment: pt agreeable with shower this session  Precautions:  LE Weight Bearing Status: Right Non-Weight Bearing  Medical Precautions: Fall precautions  Precautions Comment: R AKA      Pain:  Pain Assessment  Pain Assessment: 0-10  0-10 (Numeric) Pain Score: 5 - Moderate pain  Pain Location:  (rt leg)  Pain Orientation:  (rle)  Pain Interventions: Repositioned, Rest    Objective       Activities of Daily Living: Grooming  Grooming Level of Assistance: Modified independent  Grooming Where Assessed: Wheelchair  Grooming Comments: own retrieval for antipersperant    UE Bathing  UE Bathing  Adaptive Equipment: Long-handled sponge, Removeable shower head  UE Bathing Level of Assistance: Setup  UE Bathing Where Assessed: Shower (bench)    LE Bathing  LE Bathing Adaptive Equipment: Long-handled sponge, Removeable shower head  LE Bathing Level of Assistance: Close supervision  LE Bathing Where Assessed: Shower (seated on bench)    UE Dressing  UE Dressing Level of Assistance: Modified independent (own setup)  UE Dressing Where Assessed: Wheelchair    LE Dressing  Pants Level of Assistance:  (supervision)  Sock Level of Assistance:  (left sock, sba and cues for propping foot up on bed for easier reach)  LE Dressing Where Assessed: Wheelchair       Bed Mobility/Transfers:    Shower Transfers  Shower Transfers Comments: min a wc to bench via wall grab bar, min cues for techs      Functional Mobility:  Functional Mobility  Functional Mobility Performed:  (to/from OT clinic/ in room distal supervision)  Functional Mobility 1  Comments 1: wc mobility mod I for simple mobility, supervision for functional mobility/challending maneuvering       IADL's: Light Housekeeping  Light Housekeeping: at wc level, pt able to retrieve laundry from closet, transporting in bag, loading washer and setting sba overall, seated. Discussed son's laundry area and access; stackable and would need to stand or have assist with loading dryer.       EDUCATION:  Education  Individual(s) Educated: Patient  Education Provided:  (adl/showering techs/transfers safety techs; home going recommendations/ iadl's/laundry)  Home Program: Strengthening  Patient Response to Education: Patient/Caregiver Verbalized Understanding of Information  Education Comment: will continue problem solving home going    Goals:  Encounter Problems       Encounter Problems (Active)       OT Problem       LTG - Patient will complete grooming with mod I (Progressing)       Start:  12/04/24    Expected End:  12/11/24            LTG - Patient will complete bathing with  supervision or better (Progressing)       Start:  12/04/24    Expected End:  12/11/24            LTG - Patient will complete UE dressing with mod I (Progressing)       Start:  12/04/24    Expected End:  12/11/24            LTG - Patient will complete LE dressing using adaptive equip as needed with mod I (Progressing)       Start:  12/04/24    Expected End:  12/11/24            LTG - Patient will complete toileting with mod I (Progressing)       Start:  12/04/24    Expected End:  12/11/24            LTG - Patient will complete commode transfer via wwalker or pivot with mod I (Progressing)       Start:  12/04/24    Expected End:  12/11/24            LTG - Patient will complete tub/shower transfer using DME as needed with sba (Progressing)       Start:  12/04/24    Expected End:  12/11/24            LTG - Patient will complete functional mobility via wheelchar mod I (Progressing)       Start:  12/04/24    Expected End:  12/11/24            LTG - Patient will complete simple mobility/approach steps via wwalker supervision or better (Progressing)       Start:  12/04/24    Expected End:  12/11/24            LTG - Patient will complete light meal prep or kitchen access at wheelchair level mod I (Progressing)       Start:  12/04/24    Expected End:  12/11/24

## 2024-12-12 ENCOUNTER — TELEPHONE (OUTPATIENT)
Dept: VASCULAR SURGERY | Facility: HOSPITAL | Age: 54
End: 2024-12-12
Payer: MEDICAID

## 2024-12-12 LAB
GLUCOSE BLD MANUAL STRIP-MCNC: 123 MG/DL (ref 74–99)
GLUCOSE BLD MANUAL STRIP-MCNC: 157 MG/DL (ref 74–99)
GLUCOSE BLD MANUAL STRIP-MCNC: 166 MG/DL (ref 74–99)
GLUCOSE BLD MANUAL STRIP-MCNC: 177 MG/DL (ref 74–99)

## 2024-12-12 PROCEDURE — 99232 SBSQ HOSP IP/OBS MODERATE 35: CPT | Performed by: PHYSICAL MEDICINE & REHABILITATION

## 2024-12-12 PROCEDURE — 97110 THERAPEUTIC EXERCISES: CPT | Mod: GP

## 2024-12-12 PROCEDURE — 97530 THERAPEUTIC ACTIVITIES: CPT | Mod: GP

## 2024-12-12 PROCEDURE — 97110 THERAPEUTIC EXERCISES: CPT | Mod: GO,CO

## 2024-12-12 PROCEDURE — 2500000001 HC RX 250 WO HCPCS SELF ADMINISTERED DRUGS (ALT 637 FOR MEDICARE OP): Performed by: PHYSICAL MEDICINE & REHABILITATION

## 2024-12-12 PROCEDURE — 82947 ASSAY GLUCOSE BLOOD QUANT: CPT

## 2024-12-12 PROCEDURE — 2500000002 HC RX 250 W HCPCS SELF ADMINISTERED DRUGS (ALT 637 FOR MEDICARE OP, ALT 636 FOR OP/ED): Performed by: PHYSICAL MEDICINE & REHABILITATION

## 2024-12-12 PROCEDURE — S4991 NICOTINE PATCH NONLEGEND: HCPCS | Performed by: PHYSICAL MEDICINE & REHABILITATION

## 2024-12-12 PROCEDURE — 97116 GAIT TRAINING THERAPY: CPT | Mod: GP

## 2024-12-12 PROCEDURE — 1280000001 HC REHAB SEMI-PRIVATE ROOM DAILY

## 2024-12-12 PROCEDURE — 97530 THERAPEUTIC ACTIVITIES: CPT | Mod: GO,CO

## 2024-12-12 RX ORDER — HYDROCHLOROTHIAZIDE 12.5 MG/1
12.5 TABLET ORAL
Status: DISCONTINUED | OUTPATIENT
Start: 2024-12-13 | End: 2024-12-15 | Stop reason: HOSPADM

## 2024-12-12 RX ORDER — LISINOPRIL 10 MG/1
10 TABLET ORAL DAILY
Status: DISCONTINUED | OUTPATIENT
Start: 2024-12-13 | End: 2024-12-15 | Stop reason: HOSPADM

## 2024-12-12 ASSESSMENT — PAIN - FUNCTIONAL ASSESSMENT
PAIN_FUNCTIONAL_ASSESSMENT: 0-10

## 2024-12-12 ASSESSMENT — PAIN SCALES - WONG BAKER: WONGBAKER_NUMERICALRESPONSE: HURTS WHOLE LOT

## 2024-12-12 ASSESSMENT — PAIN SCALES - GENERAL
PAINLEVEL_OUTOF10: 7
PAINLEVEL_OUTOF10: 7
PAINLEVEL_OUTOF10: 5 - MODERATE PAIN
PAINLEVEL_OUTOF10: 5 - MODERATE PAIN
PAINLEVEL_OUTOF10: 6
PAINLEVEL_OUTOF10: 7

## 2024-12-12 ASSESSMENT — PAIN DESCRIPTION - DESCRIPTORS: DESCRIPTORS: DISCOMFORT

## 2024-12-12 NOTE — PROGRESS NOTES
Physical Therapy    Physical Therapy Treatment    Patient Name: Errol Fonseca  MRN: [id number]  Department: [facility] RE[facility]  Room: [address] [place] [address]  Today's Date: 12/12/2024  Time Calculation  Start Time: 0830  Stop Time: 0915  Time Calculation (min): 45 min         Assessment/Plan   PT Assessment  End of Session Patient Position: Up in bathroom (call light in reach)     PT Plan  Treatment/Interventions: Bed mobility, Transfer training, Gait training, Stair training, Balance training, Strengthening, Endurance training, Therapeutic exercise, Therapeutic activity, Wheelchair management  PT Plan: Ongoing PT  PT Frequency: 90 min/5 days per week (1 week)  PT Discharge Recommendations:  (Anticipate discharge home mod independent at the wheelchair level.  Assist with higher level functional tasks.)  Equipment Recommended upon Discharge: Wheeled walker, Wheelchair  PT Recommended Transfer Status: Assist x2      General Visit Information:   PT  Visit  PT Received On: 12/12/24  General  Prior to Session Communication: Bedside nurse  Patient Position Received: Up in chair, Alarm off, not on at start of session  General Comment: Pt reports increased soreness BLE and shoulders this date.  Pt agreeable to PT.    Subjective   Precautions:  Precautions  LE Weight Bearing Status: Right Non-Weight Bearing  Medical Precautions: Fall precautions  Precautions Comment: R AKA      Objective   Pain:  Pain Assessment  Pain Assessment: 0-10  0-10 (Numeric) Pain Score: 6  Pain Location:  (BLE and shoulders)  Pain Interventions: Repositioned, Distraction, Emotional support    Treatments:  Therapeutic Exercise  Therapeutic Exercise Performed: Yes  Pt performs LLE seated AROM exercises: marching, LAQ, hip add with red tb, hip abd with red TB, hs curls with red TB, AP 2 x 10 reps each in order to improve strength and endurance for improvement in functional mobility and transfers.     Ambulation/Gait Training  Ambulation/Gait Training Performed:  (Pt  ambulates 25 ft x 2 with seated rest break between trials all with FWW and CGA)  Transfers  Transfer:  (Pt performs sit/stand with SBA.  Pt performs stand pivot transfer from w/c to arm chair toward L with SBA.  Pt performs stand piovt from arm chair to w/c toward R with CGA. Pt performs stand pivot to L to toilet from w/c with SBA)    Wheelchair Activities  Propulsion:  (Pt propels w/c 150 ft over tile, carpet and thresholds with mod I)      Education Documentation  Pt educated on techniques for transfers and gait training with device in order to maximize safety and independence.  Pt educated on therapeutic exercises, including optimal techniques to maximize function.        Encounter Problems       Encounter Problems (Active)       PT Problem       STG - Bed mobility with SBA (Met)       Start:  12/04/24    Expected End:  12/07/24    Resolved:  12/11/24         STG - Transfers with min/mod assist x1.  (Met)       Start:  12/04/24    Expected End:  12/07/24    Resolved:  12/11/24         STG- Pt will amb 15 ft with wheeled walker and mod assist x1. (Met)       Start:  12/04/24    Expected End:  12/07/24    Resolved:  12/11/24         STG - Will determine most approp means of entry into home. (Met)       Start:  12/04/24    Expected End:  12/07/24    Resolved:  12/11/24         STG - Pt will propel wheelchair 100 ft with SBA (Met)       Start:  12/04/24    Expected End:  12/07/24    Resolved:  12/11/24         LTG - Mod independent bed mobility. (Progressing)       Start:  12/04/24    Expected End:  12/14/24            LTG - Mod independent transfers. (Progressing)       Start:  12/04/24    Expected End:  12/14/24            LTG - Pt will amb 25 ft with wheeled walker and min assist. (Met)       Start:  12/04/24    Expected End:  12/11/24    Resolved:  12/11/24    Updated to: LTG - Pt will amb 25 ft with wheeled walker and CGA    Update reason: goal met         LTG - Will educate pt and family on most approp means  of entry into home. (Progressing)       Start:  12/04/24    Expected End:  12/14/24            LTG - Pt will propel wheelchair 150 ft independently (Met)       Start:  12/04/24    Expected End:  12/11/24    Resolved:  12/11/24         LTG - Pt will amb 25 ft with wheeled walker and CGA (Progressing)       Start:  12/11/24    Expected End:  12/14/24

## 2024-12-12 NOTE — PROGRESS NOTES
Physical Therapy    Physical Therapy Treatment    Patient Name: Errol Fonseca  MRN: [id number]  Department: [facility] REH[facility]  Room: [address] [place] [address]  Today's Date: 12/12/2024  Time Calculation  Start Time: 1300  Stop Time: 1345  Time Calculation (min): 45 min         Assessment/Plan         PT Plan  Treatment/Interventions: Bed mobility, Transfer training, Gait training, Stair training, Balance training, Strengthening, Endurance training, Therapeutic exercise, Therapeutic activity, Wheelchair management  PT Plan: Ongoing PT  PT Frequency: 90 min/5 days per week (1 week)  PT Discharge Recommendations:  (Anticipate discharge home mod independent at the wheelchair level.  Assist with higher level functional tasks.)  Equipment Recommended upon Discharge: Wheeled walker, Wheelchair  PT Recommended Transfer Status: Assist x2      General Visit Information:   PT  Visit  PT Received On: 12/12/24  General  Patient Position Received: Up in chair, Alarm on    Subjective   Precautions:  Precautions  Medical Precautions: Fall precautions  Precautions Comment: R AKA        Objective   Pain:  Pain Assessment  Pain Assessment: 0-10  0-10 (Numeric) Pain Score: 6  Pain Location: Leg  Pain Orientation: Right  Pain Interventions: Repositioned, Ambulation/increased activity, Distraction    Treatments:  Therapeutic Exercise  Therapeutic Exercise Performed:  (Pt performing supine, sidelying and prone AKA mat ex program 2 x 10 reps each.  Pt tolerating prone positioning for 7 minutes.)    Bed Mobility  Bed Mobility:  (Sit <> supine mod independent.  Rolling supine <> prone with SBA.)    Ambulation/Gait Training  Ambulation/Gait Training Performed:  (Pt amb 20 ft x 2 with approach steps using wheeled walker and CGA.)    Transfers  Transfer:  (Pivot transfer training x2 to/from low therapy mat and to bed with CGA,  Pt independently able to position wheelchair for pivot transfer.)    EDUCATION:  Education Comment:  (Pt educated on recovery process and  rehab process.)    Encounter Problems       Encounter Problems (Active)       PT Problem       STG - Bed mobility with SBA (Met)       Start:  12/04/24    Expected End:  12/07/24    Resolved:  12/11/24         STG - Transfers with min/mod assist x1.  (Met)       Start:  12/04/24    Expected End:  12/07/24    Resolved:  12/11/24         STG- Pt will amb 15 ft with wheeled walker and mod assist x1. (Met)       Start:  12/04/24    Expected End:  12/07/24    Resolved:  12/11/24         STG - Will determine most approp means of entry into home. (Met)       Start:  12/04/24    Expected End:  12/07/24    Resolved:  12/11/24         STG - Pt will propel wheelchair 100 ft with SBA (Met)       Start:  12/04/24    Expected End:  12/07/24    Resolved:  12/11/24         LTG - Mod independent bed mobility. (Progressing)       Start:  12/04/24    Expected End:  12/14/24            LTG - Mod independent transfers. (Progressing)       Start:  12/04/24    Expected End:  12/14/24            LTG - Pt will amb 25 ft with wheeled walker and min assist. (Met)       Start:  12/04/24    Expected End:  12/11/24    Resolved:  12/11/24    Updated to: LTG - Pt will amb 25 ft with wheeled walker and CGA    Update reason: goal met         LTG - Will educate pt and family on most approp means of entry into home. (Progressing)       Start:  12/04/24    Expected End:  12/14/24            LTG - Pt will propel wheelchair 150 ft independently (Met)       Start:  12/04/24    Expected End:  12/11/24    Resolved:  12/11/24         LTG - Pt will amb 25 ft with wheeled walker and CGA (Progressing)       Start:  12/11/24    Expected End:  12/14/24

## 2024-12-12 NOTE — PROGRESS NOTES
Occupational Therapy    OT Treatment    Patient Name: Errol Fonseca  MRN: [id number]  Department: [facility] REHAB  Room: [address] [place] [address]  Today's Date: 12/12/2024  Time Calculation  Start Time: 0915  Stop Time: 1000  Time Calculation (min): 45 min    Amada Whitman OTR/L supervised and approves Raine Madera S/OT documentation and treatment of this patient.          Assessment:  End of Session Communication: Bedside nurse  End of Session Patient Position: Alarm on, Up in chair     Plan:  Treatment Interventions: ADL retraining, Functional transfer training, UE strengthening/ROM, Endurance training, Patient/family training, Equipment evaluation/education, Compensatory technique education, Continued evaluation  OT Frequency: 90 min/5 days per week (1 week)  OT Discharge Recommendations:  (mod I at wc level, sba tub/dme transfers)  Equipment Recommended upon Discharge: Wheeled walker, Wheelchair, Bedside commode  Treatment Interventions: ADL retraining, Functional transfer training, UE strengthening/ROM, Endurance training, Patient/family training, Equipment evaluation/education, Compensatory technique education, Continued evaluation    Subjective   Previous Visit Info:  OT Last Visit  OT Received On: 12/12/24  General:  General  Prior to Session Communication: Bedside nurse  Patient Position Received: Alarm on, Up in chair  General Comment: Patient pleasant and cooperative during this session.  Precautions:  LE Weight Bearing Status: Right Non-Weight Bearing  Medical Precautions: Fall precautions  Precautions Comment: R AKA      Pain:  Pain Assessment  Pain Assessment: 0-10  0-10 (Numeric) Pain Score: 5 - Moderate pain  Pain Location:  (BLE and shoulders)  Pain Interventions: Repositioned, Rest    Objective    Transfers  Transfer: Yes (Performs sit<>stand from wheelchair to wheeled walker or table top with SBA. Performs stand pivot from wheelchair to bed towards L side with SBA. Pt Mod I for aligning and positioning up to  transfer surfaces and wheelchair management)    Toilet Transfers  Toilet Transfers Comments: Performs simulated home toilet transfer including side stepping towards R with CGA using wheeled walker, no noted LOB; Sit<>stand with commode SBA    Functional Mobility:  Functional Mobility  Functional Mobility Performed:  (Completed wheelchair mobility including navigating tight corners and spaces with Mod I)       Therapy/Activity: Therapeutic Activity  Therapeutic Activity Performed: Yes  Therapeutic Activity 1: Completes item retrival with min-maximal resistance clothes pins at floor and waist level with 1 cue for locating pin with Mod I wheelchair mobility and minimal effort.    Therapeutic Activity 2: Completes standing activity at table top in kitchen with SBA for simulated housemaking task including reaching outside base of support towards involved side and placing pins on shelf to increase safety and independence in kitchen with moderate effort, intermittent rest breaks    Therapeutic Activity 3: Completes kitchen access using wheelchair to position and align to counters and cabinets with Mod I, education on item setup recommendations for home      OP EDUCATION:  Education  Individual(s) Educated: Patient  Education Provided:  (IADL training, kitchen access adaptive and compensatory strategies, wheelchair mobility, safety training during functional transfers)  Patient Response to Education: Patient/Caregiver Verbalized Understanding of Information    Goals:  Encounter Problems       Encounter Problems (Active)       OT Problem       LTG - Patient will complete grooming with mod I (Met)       Start:  12/04/24    Expected End:  12/11/24    Resolved:  12/11/24         LTG - Patient will complete bathing with supervision or better (Met)       Start:  12/04/24    Expected End:  12/11/24    Resolved:  12/11/24         LTG - Patient will complete UE dressing with mod I (Met)       Start:  12/04/24    Expected End:   12/11/24    Resolved:  12/11/24         LTG - Patient will complete LE dressing using adaptive equip as needed with mod I (Progressing)       Start:  12/04/24    Expected End:  12/18/24            LTG - Patient will complete toileting with mod I (Progressing)       Start:  12/04/24    Expected End:  12/18/24            LTG - Patient will complete commode transfer via wwalker or pivot with mod I (Progressing)       Start:  12/04/24    Expected End:  12/18/24            LTG - Patient will complete tub/shower transfer using DME as needed with sba (Progressing)       Start:  12/04/24    Expected End:  12/18/24            LTG - Patient will complete functional mobility via wheelchar mod I (Progressing)       Start:  12/04/24    Expected End:  12/18/24            LTG - Patient will complete simple mobility/approach steps via wwalker supervision or better (Progressing)       Start:  12/04/24    Expected End:  12/18/24            LTG - Patient will complete light meal prep or kitchen access at wheelchair level mod I (Progressing)       Start:  12/04/24    Expected End:  12/18/24

## 2024-12-12 NOTE — PROGRESS NOTES
Occupational Therapy    OT Treatment    Patient Name: Errol Fonseca  MRN: [id number]  Department: [facility] REHAB  Room: [address] [place] [address]  Today's Date: 12/12/2024  Time Calculation  Start Time: 1045  Stop Time: 1130  Time Calculation (min): 45 min        Assessment:  End of Session Communication: Bedside nurse  End of Session Patient Position: Alarm on, Up in chair     Plan:  Treatment Interventions: ADL retraining, Functional transfer training, UE strengthening/ROM, Endurance training, Patient/family training, Equipment evaluation/education, Compensatory technique education, Continued evaluation  OT Frequency: 90 min/5 days per week (1 week)  OT Discharge Recommendations:  (mod I at wc level, sba tub/dme transfers)  Equipment Recommended upon Discharge: Wheeled walker, Wheelchair, Bedside commode  Treatment Interventions: ADL retraining, Functional transfer training, UE strengthening/ROM, Endurance training, Patient/family training, Equipment evaluation/education, Compensatory technique education, Continued evaluation    Subjective   Previous Visit Info:  OT Last Visit  OT Received On: 12/12/24  General:  General  Prior to Session Communication: Bedside nurse  Patient Position Received: Bed, 2 rail up, Alarm off, not on at start of session  General Comment: Patient reports feeling drained and weak.  Precautions:  LE Weight Bearing Status: Right Non-Weight Bearing  Medical Precautions: Fall precautions  Precautions Comment: R AKA    Pain:  Pain Assessment  Pain Assessment: 0-10  0-10 (Numeric) Pain Score: 6  Pain Location:  (Residual Limb)  Pain Orientation: Right  Pain Interventions: Repositioned, Relaxation technique (Patient declines medication and would prefer to take it after therapies.)    Bed Mobility/Transfers: Bed Mobility  Bed Mobility: Yes  Bed Mobility 1  Bed Mobility 1: Supine to sitting, Sitting to supine  Level of Assistance 1: Modified independent  Bed Mobility Comments 1: elevated hob and use of rail,  patient has at home.    Transfers  Transfer: Yes  Transfer 1  Trials/Comments 1: Stand pivot wheelchair <> bed towards L side with SBA. Pt Mod I for alignment of wheelchair    Therapy/Activity: Therapeutic Exercise  Therapeutic Exercise Performed: Yes  Therapeutic Exercise Activity 1: bue ther ex using 4# weights completing 7 exercises x 3 sets x 10 reps to promote greater indep with ADLs and transfers. Rest breaks given as needed.   EDUCATION:  Education  Individual(s) Educated: Patient  Education Provided: Diagnosis & Precautions, Fall precautons, POC discussed and agreed upon, Risk and benefits of OT discussed with patient or other  Patient Response to Education: Patient/Caregiver Verbalized Understanding of Information    Goals:  Encounter Problems       Encounter Problems (Active)       OT Problem       LTG - Patient will complete grooming with mod I (Met)       Start:  12/04/24    Expected End:  12/11/24    Resolved:  12/11/24         LTG - Patient will complete bathing with supervision or better (Met)       Start:  12/04/24    Expected End:  12/11/24    Resolved:  12/11/24         LTG - Patient will complete UE dressing with mod I (Met)       Start:  12/04/24    Expected End:  12/11/24    Resolved:  12/11/24         LTG - Patient will complete LE dressing using adaptive equip as needed with mod I (Progressing)       Start:  12/04/24    Expected End:  12/18/24            LTG - Patient will complete toileting with mod I (Progressing)       Start:  12/04/24    Expected End:  12/18/24            LTG - Patient will complete commode transfer via wwalker or pivot with mod I (Progressing)       Start:  12/04/24    Expected End:  12/18/24            LTG - Patient will complete tub/shower transfer using DME as needed with sba (Progressing)       Start:  12/04/24    Expected End:  12/18/24            LTG - Patient will complete functional mobility via wheelchar mod I (Progressing)       Start:  12/04/24    Expected End:   12/18/24            LTG - Patient will complete simple mobility/approach steps via wwalker supervision or better (Progressing)       Start:  12/04/24    Expected End:  12/18/24            LTG - Patient will complete light meal prep or kitchen access at wheelchair level mod I (Progressing)       Start:  12/04/24    Expected End:  12/18/24

## 2024-12-12 NOTE — TELEPHONE ENCOUNTER
Blanka, RN at [facility], called requesting that patient be connected with a prosthetist.  She states patient is in need of a .      He is s/p R AKA by Dr. Sánchez on 11/29.      Patient is being discharged from [facility] this Sunday and hoping to have this for him before he leaves.     Anastacia order was faxed to Anastacia and emailed to Baron (rep).   Updated Blanka on new orders and asked her to call me tomorrow if she has not heard anything.     He is scheduled for follow-up w/ Princess on 11/27.     Blanka (patient's nurse) phone number: [phone]     ULICES Yip-CNP  Vascular Surgery

## 2024-12-12 NOTE — PROGRESS NOTES
"  Errol Fonseca is a 54 y.o. male on day 9 of admission presenting with Above knee amputation of right lower extremity (Multi).    Subjective   He feels he is gaining strength with mobility. Pain is not limiting his function. He is at CGA/SBA for mobility. He will be DC home at a WC level with limited ambulation with his walker.       Objective       Physical Exam  Pleasant  Alert and oriented x 3  CTAB  S1S2  SNTND+BS  Right LE blister improved    Function: CGA/SBA overall   Assessment/Plan        Last Recorded Vitals  Blood pressure 112/65, pulse 80, temperature 36 °C (96.8 °F), resp. rate 18, height 1.854 m (6' 0.99\"), weight 103 kg (227 lb 8.2 oz), SpO2 97%.    Therapy notes from last 24 hours reviewed.    Relevant Results                  Scheduled medications  aspirin, 81 mg, oral, Daily  atorvastatin, 40 mg, oral, Daily  dulaglutide, 0.75 mg, subcutaneous, Weekly  ergocalciferol, 50,000 Units, oral, Weekly  escitalopram, 10 mg, oral, Daily  influenza, 0.5 mL, intramuscular, During hospitalization  folic acid, 1 mg, oral, Daily  gabapentin, 400 mg, oral, TID  lisinopril, 20 mg, oral, Daily   And  [START ON 12/13/2024] hydroCHLOROthiazide, 12.5 mg, oral, q48h  insulin glargine, 34 Units, subcutaneous, Daily  insulin lispro, 0-10 Units, subcutaneous, TID AC  insulin lispro, 7 Units, subcutaneous, TID AC  melatonin, 5 mg, oral, Nightly  metFORMIN XR, 500 mg, oral, BID  metoprolol tartrate, 25 mg, oral, BID  nicotine, 1 patch, transdermal, Daily  nystatin, 1 Application, Topical, BID  sennosides, 1 tablet, oral, Nightly      Continuous medications     PRN medications  PRN medications: acetaminophen, alum-mag hydroxide-simeth, bisacodyl, bisacodyl, dextrose, dextrose, glucagon, glucagon, oxyCODONE, polyethylene glycol, traZODone     Results for orders placed or performed during the hospital encounter of 12/03/24 (from the past 24 hours)   POCT GLUCOSE   Result Value Ref Range    POCT Glucose 153 (H) 74 - 99 " mg/dL   POCT GLUCOSE   Result Value Ref Range    POCT Glucose 153 (H) 74 - 99 mg/dL   POCT GLUCOSE   Result Value Ref Range    POCT Glucose 150 (H) 74 - 99 mg/dL   POCT GLUCOSE   Result Value Ref Range    POCT Glucose 157 (H) 74 - 99 mg/dL                 Assessment/Plan   Assessment & Plan  Above knee amputation of right lower extremity (Multi)    Alcoholic cirrhosis (Multi)    Coronary artery disease involving native coronary artery of native heart with unstable angina pectoris    Hyperlipidemia    Hypertension    Right above-knee amputee (Multi)      Right above-knee amputation  Begin physical therapy for strengthening, preprosthetic gait training, transfer training, wheelchair mobility  Begin occupational therapy for ADL retraining, strengthening, transfer training, and ADL activities     2.  Hyperlipidemia  Lipitor 40 mg daily     3.  Diabetes mellitus type 2  Metformin 500 mg twice daily  Lantus 30 units at at bedtime  Sliding scale with meals  Trulicity 0.75 weekly     4.  Hypertension   Lisinopril 20 mg  Hydrochlorothiazide 12.5 mg daily  Adjust medication for appropriate blood pressure control     5.  Pain management   Neurontin 400 mg 3 times daily  Oxycodone as needed     6.  Mood  Lexapro 10 mg daily  Deseryl     7.  Bowel bladder  Medications to encourage bowel movement every 2 to 3 days     FENGI  Patient is at risk for infection we will continue to monitor his wound for adequate wound healing  He is a high fall risk will encourage safe transfers  He is on a bowel program for appropriate bowel movement     12/12  -ongoing rehab to work to mod I for safe DC home  -DM controlled with insulin and diet  -Vitals stable  -function at [facility].SBA DC home at  level mod I  -recheck Na    I was present and led the team conference. The plan of care was developed and agreed upon as discussed and documented during the team meeting.  See separate Note.    Team conference today with the rehab team - PT/OT/ST, SW, RN,  Dietary, Case Management. Additional separate note in the chart for today. Over 35 min spent with paint care, team meetings, and coordination of care.         I spent 35 minutes in the professional and overall care of this patient.      Radha Carranza MD

## 2024-12-13 ENCOUNTER — HOSPITAL ENCOUNTER (OUTPATIENT)
Dept: CARDIOLOGY | Facility: HOSPITAL | Age: 54
Discharge: HOME | End: 2024-12-13
Payer: MEDICAID

## 2024-12-13 DIAGNOSIS — I24.9 ACS (ACUTE CORONARY SYNDROME) (MULTI): ICD-10-CM

## 2024-12-13 LAB
ALBUMIN SERPL BCP-MCNC: 3.3 G/DL (ref 3.4–5)
ALP SERPL-CCNC: 134 U/L (ref 33–120)
ALT SERPL W P-5'-P-CCNC: 22 U/L (ref 10–52)
ANION GAP SERPL CALC-SCNC: 12 MMOL/L (ref 10–20)
ANION GAP SERPL CALC-SCNC: 12 MMOL/L (ref 10–20)
AST SERPL W P-5'-P-CCNC: 19 U/L (ref 9–39)
BASOPHILS # BLD AUTO: 0.05 X10*3/UL (ref 0–0.1)
BASOPHILS # BLD AUTO: 0.08 X10*3/UL (ref 0–0.1)
BASOPHILS NFR BLD AUTO: 0.5 %
BASOPHILS NFR BLD AUTO: 0.6 %
BILIRUB SERPL-MCNC: 0.5 MG/DL (ref 0–1.2)
BUN SERPL-MCNC: 41 MG/DL (ref 6–23)
BUN SERPL-MCNC: 46 MG/DL (ref 6–23)
CALCIUM SERPL-MCNC: 8.9 MG/DL (ref 8.6–10.3)
CALCIUM SERPL-MCNC: 9 MG/DL (ref 8.6–10.3)
CHLORIDE SERPL-SCNC: 97 MMOL/L (ref 98–107)
CHLORIDE SERPL-SCNC: 99 MMOL/L (ref 98–107)
CO2 SERPL-SCNC: 23 MMOL/L (ref 21–32)
CO2 SERPL-SCNC: 25 MMOL/L (ref 21–32)
CREAT SERPL-MCNC: 0.92 MG/DL (ref 0.5–1.3)
CREAT SERPL-MCNC: 0.94 MG/DL (ref 0.5–1.3)
EGFRCR SERPLBLD CKD-EPI 2021: >90 ML/MIN/1.73M*2
EGFRCR SERPLBLD CKD-EPI 2021: >90 ML/MIN/1.73M*2
EOSINOPHIL # BLD AUTO: 0.13 X10*3/UL (ref 0–0.7)
EOSINOPHIL # BLD AUTO: 0.14 X10*3/UL (ref 0–0.7)
EOSINOPHIL NFR BLD AUTO: 1.1 %
EOSINOPHIL NFR BLD AUTO: 1.3 %
ERYTHROCYTE [DISTWIDTH] IN BLOOD BY AUTOMATED COUNT: 13.7 % (ref 11.5–14.5)
ERYTHROCYTE [DISTWIDTH] IN BLOOD BY AUTOMATED COUNT: 13.7 % (ref 11.5–14.5)
GLUCOSE BLD MANUAL STRIP-MCNC: 163 MG/DL (ref 74–99)
GLUCOSE BLD MANUAL STRIP-MCNC: 172 MG/DL (ref 74–99)
GLUCOSE BLD MANUAL STRIP-MCNC: 200 MG/DL (ref 74–99)
GLUCOSE BLD MANUAL STRIP-MCNC: 215 MG/DL (ref 74–99)
GLUCOSE SERPL-MCNC: 154 MG/DL (ref 74–99)
GLUCOSE SERPL-MCNC: 192 MG/DL (ref 74–99)
HCT VFR BLD AUTO: 29.5 % (ref 41–52)
HCT VFR BLD AUTO: 30.4 % (ref 41–52)
HGB BLD-MCNC: 9.5 G/DL (ref 13.5–17.5)
HGB BLD-MCNC: 9.8 G/DL (ref 13.5–17.5)
HOLD SPECIMEN: NORMAL
IMM GRANULOCYTES # BLD AUTO: 0.04 X10*3/UL (ref 0–0.7)
IMM GRANULOCYTES # BLD AUTO: 0.07 X10*3/UL (ref 0–0.7)
IMM GRANULOCYTES NFR BLD AUTO: 0.4 % (ref 0–0.9)
IMM GRANULOCYTES NFR BLD AUTO: 0.5 % (ref 0–0.9)
LYMPHOCYTES # BLD AUTO: 1.48 X10*3/UL (ref 1.2–4.8)
LYMPHOCYTES # BLD AUTO: 1.68 X10*3/UL (ref 1.2–4.8)
LYMPHOCYTES NFR BLD AUTO: 11.2 %
LYMPHOCYTES NFR BLD AUTO: 16.3 %
MCH RBC QN AUTO: 31.7 PG (ref 26–34)
MCH RBC QN AUTO: 31.9 PG (ref 26–34)
MCHC RBC AUTO-ENTMCNC: 32.2 G/DL (ref 32–36)
MCHC RBC AUTO-ENTMCNC: 32.2 G/DL (ref 32–36)
MCV RBC AUTO: 98 FL (ref 80–100)
MCV RBC AUTO: 99 FL (ref 80–100)
MONOCYTES # BLD AUTO: 0.89 X10*3/UL (ref 0.1–1)
MONOCYTES # BLD AUTO: 1.04 X10*3/UL (ref 0.1–1)
MONOCYTES NFR BLD AUTO: 7.9 %
MONOCYTES NFR BLD AUTO: 8.7 %
NEUTROPHILS # BLD AUTO: 10.4 X10*3/UL (ref 1.2–7.7)
NEUTROPHILS # BLD AUTO: 7.49 X10*3/UL (ref 1.2–7.7)
NEUTROPHILS NFR BLD AUTO: 72.8 %
NEUTROPHILS NFR BLD AUTO: 78.7 %
NRBC BLD-RTO: 0 /100 WBCS (ref 0–0)
NRBC BLD-RTO: 0 /100 WBCS (ref 0–0)
PLATELET # BLD AUTO: 185 X10*3/UL (ref 150–450)
PLATELET # BLD AUTO: 187 X10*3/UL (ref 150–450)
POTASSIUM SERPL-SCNC: 4 MMOL/L (ref 3.5–5.3)
POTASSIUM SERPL-SCNC: 4.3 MMOL/L (ref 3.5–5.3)
PROT SERPL-MCNC: 6.4 G/DL (ref 6.4–8.2)
RBC # BLD AUTO: 3 X10*6/UL (ref 4.5–5.9)
RBC # BLD AUTO: 3.07 X10*6/UL (ref 4.5–5.9)
SODIUM SERPL-SCNC: 128 MMOL/L (ref 136–145)
SODIUM SERPL-SCNC: 132 MMOL/L (ref 136–145)
WBC # BLD AUTO: 10.3 X10*3/UL (ref 4.4–11.3)
WBC # BLD AUTO: 13.2 X10*3/UL (ref 4.4–11.3)

## 2024-12-13 PROCEDURE — 1280000001 HC REHAB SEMI-PRIVATE ROOM DAILY

## 2024-12-13 PROCEDURE — 97110 THERAPEUTIC EXERCISES: CPT | Mod: GO

## 2024-12-13 PROCEDURE — 97530 THERAPEUTIC ACTIVITIES: CPT | Mod: GP

## 2024-12-13 PROCEDURE — 80048 BASIC METABOLIC PNL TOTAL CA: CPT | Mod: CCI | Performed by: PHYSICAL MEDICINE & REHABILITATION

## 2024-12-13 PROCEDURE — 82947 ASSAY GLUCOSE BLOOD QUANT: CPT

## 2024-12-13 PROCEDURE — 80053 COMPREHEN METABOLIC PANEL: CPT | Performed by: STUDENT IN AN ORGANIZED HEALTH CARE EDUCATION/TRAINING PROGRAM

## 2024-12-13 PROCEDURE — 97116 GAIT TRAINING THERAPY: CPT | Mod: GP

## 2024-12-13 PROCEDURE — 2500000002 HC RX 250 W HCPCS SELF ADMINISTERED DRUGS (ALT 637 FOR MEDICARE OP, ALT 636 FOR OP/ED): Performed by: PHYSICAL MEDICINE & REHABILITATION

## 2024-12-13 PROCEDURE — S4991 NICOTINE PATCH NONLEGEND: HCPCS | Performed by: PHYSICAL MEDICINE & REHABILITATION

## 2024-12-13 PROCEDURE — 99231 SBSQ HOSP IP/OBS SF/LOW 25: CPT | Performed by: INTERNAL MEDICINE

## 2024-12-13 PROCEDURE — 97530 THERAPEUTIC ACTIVITIES: CPT | Mod: GO

## 2024-12-13 PROCEDURE — 36415 COLL VENOUS BLD VENIPUNCTURE: CPT | Performed by: STUDENT IN AN ORGANIZED HEALTH CARE EDUCATION/TRAINING PROGRAM

## 2024-12-13 PROCEDURE — 2500000001 HC RX 250 WO HCPCS SELF ADMINISTERED DRUGS (ALT 637 FOR MEDICARE OP): Performed by: PHYSICAL MEDICINE & REHABILITATION

## 2024-12-13 PROCEDURE — 36415 COLL VENOUS BLD VENIPUNCTURE: CPT | Performed by: PHYSICAL MEDICINE & REHABILITATION

## 2024-12-13 PROCEDURE — 85025 COMPLETE CBC W/AUTO DIFF WBC: CPT | Performed by: STUDENT IN AN ORGANIZED HEALTH CARE EDUCATION/TRAINING PROGRAM

## 2024-12-13 PROCEDURE — 2500000004 HC RX 250 GENERAL PHARMACY W/ HCPCS (ALT 636 FOR OP/ED): Performed by: STUDENT IN AN ORGANIZED HEALTH CARE EDUCATION/TRAINING PROGRAM

## 2024-12-13 PROCEDURE — 97110 THERAPEUTIC EXERCISES: CPT | Mod: GP

## 2024-12-13 PROCEDURE — 76937 US GUIDE VASCULAR ACCESS: CPT

## 2024-12-13 PROCEDURE — 85025 COMPLETE CBC W/AUTO DIFF WBC: CPT | Performed by: PHYSICAL MEDICINE & REHABILITATION

## 2024-12-13 PROCEDURE — 2500000004 HC RX 250 GENERAL PHARMACY W/ HCPCS (ALT 636 FOR OP/ED): Performed by: INTERNAL MEDICINE

## 2024-12-13 PROCEDURE — 93005 ELECTROCARDIOGRAM TRACING: CPT

## 2024-12-13 RX ORDER — KETOROLAC TROMETHAMINE 15 MG/ML
15 INJECTION, SOLUTION INTRAMUSCULAR; INTRAVENOUS ONCE
Status: DISCONTINUED | OUTPATIENT
Start: 2024-12-13 | End: 2024-12-13

## 2024-12-13 RX ORDER — KETOROLAC TROMETHAMINE 30 MG/ML
15 INJECTION, SOLUTION INTRAMUSCULAR; INTRAVENOUS ONCE
Status: COMPLETED | OUTPATIENT
Start: 2024-12-13 | End: 2024-12-13

## 2024-12-13 ASSESSMENT — PAIN DESCRIPTION - ORIENTATION
ORIENTATION: RIGHT
ORIENTATION: RIGHT;LOWER
ORIENTATION: RIGHT

## 2024-12-13 ASSESSMENT — PAIN SCALES - GENERAL
PAINLEVEL_OUTOF10: 4
PAINLEVEL_OUTOF10: 7
PAINLEVEL_OUTOF10: 3
PAINLEVEL_OUTOF10: 0 - NO PAIN
PAINLEVEL_OUTOF10: 0 - NO PAIN
PAINLEVEL_OUTOF10: 7
PAINLEVEL_OUTOF10: 3
PAINLEVEL_OUTOF10: 5 - MODERATE PAIN
PAINLEVEL_OUTOF10: 4
PAINLEVEL_OUTOF10: 7
PAINLEVEL_OUTOF10: 0 - NO PAIN
PAINLEVEL_OUTOF10: 3

## 2024-12-13 ASSESSMENT — PAIN DESCRIPTION - LOCATION
LOCATION: LEG

## 2024-12-13 ASSESSMENT — PAIN - FUNCTIONAL ASSESSMENT
PAIN_FUNCTIONAL_ASSESSMENT: 0-10
PAIN_FUNCTIONAL_ASSESSMENT: UNABLE TO SELF-REPORT
PAIN_FUNCTIONAL_ASSESSMENT: 0-10

## 2024-12-13 ASSESSMENT — PAIN SCALES - WONG BAKER: WONGBAKER_NUMERICALRESPONSE: HURTS WHOLE LOT

## 2024-12-13 ASSESSMENT — PAIN DESCRIPTION - DESCRIPTORS: DESCRIPTORS: DISCOMFORT

## 2024-12-13 NOTE — PROGRESS NOTES
Physical Therapy    Physical Therapy Treatment    Patient Name: Errol Fonseca  MRN: [id number]  Department: [facility] REHAB  Room: [address] [place] [address]  Today's Date: 12/13/2024  Time Calculation  Start Time: 1300  Stop Time: 1345  Time Calculation (min): 45 min         Assessment/Plan   PT Assessment  End of Session Communication: Bedside nurse  End of Session Patient Position: Up in chair, Alarm on     PT Plan  Treatment/Interventions: Bed mobility, Transfer training, Gait training, Stair training, Balance training, Strengthening, Endurance training, Therapeutic exercise, Therapeutic activity, Wheelchair management  PT Plan: Ongoing PT  PT Frequency: 90 min/5 days per week (1 week)  PT Discharge Recommendations:  (Anticipate discharge home mod independent at the wheelchair level.  Assist with higher level functional tasks.)  Equipment Recommended upon Discharge: Wheeled walker, Wheelchair  PT Recommended Transfer Status: Assist x2      General Visit Information:   PT  Visit  PT Received On: 12/13/24  General  Prior to Session Communication: Bedside nurse  Patient Position Received: Alarm on, Up in chair  General Comment: Ongoing fatgiue and upset stomach, but agreeable to therapy. Taken to bathroom during session and pt reports loose stool. RN informed.    Subjective   Precautions:  Precautions  LE Weight Bearing Status: Right Non-Weight Bearing  Medical Precautions: Fall precautions  Precautions Comment: R AKA    Objective   Pain:  Pain Assessment  Pain Assessment: 0-10  0-10 (Numeric) Pain Score: 3 (R residual limb pain)  Pain Interventions: Repositioned, Emotional support, Distraction  Response to Interventions: Decrease in pain     Treatments:  Therapeutic Exercise  Therapeutic Exercise Activity 1: Pt performs seated BLE therex 10 x 2 reps: L ankle pumps, L LAQs, B marching, B GS; in order to improve strength related to transfers, gait, and balance.    Ambulation/Gait Training 1  Comments/Distance (ft) 1: Pt amb 10' with  FWW and CGA plus wc follow. Pt suddenly needs to have BM. Continent of loose stool in commode. After BM pt states he does not think it is a good idea to amb anymore due to risk of more loose stools.  Transfers  Transfer:  (Sit/stand transfers with CGA. Pivot transfer training to standard arm chairs on carpet with CGA. Commode transfer with CGA with grab bar.)    Education Documentation  Pt educated on techniques for transfers and gait training with device in order to maximize safety and independence.  Pt educated on therapeutic exercises, including optimal techniques to maximize function.       Encounter Problems       Encounter Problems (Active)       PT Problem       STG - Bed mobility with SBA (Met)       Start:  12/04/24    Expected End:  12/07/24    Resolved:  12/11/24         STG - Transfers with min/mod assist x1.  (Met)       Start:  12/04/24    Expected End:  12/07/24    Resolved:  12/11/24         STG- Pt will amb 15 ft with wheeled walker and mod assist x1. (Met)       Start:  12/04/24    Expected End:  12/07/24    Resolved:  12/11/24         STG - Will determine most approp means of entry into home. (Met)       Start:  12/04/24    Expected End:  12/07/24    Resolved:  12/11/24         STG - Pt will propel wheelchair 100 ft with SBA (Met)       Start:  12/04/24    Expected End:  12/07/24    Resolved:  12/11/24         LTG - Mod independent bed mobility. (Progressing)       Start:  12/04/24    Expected End:  12/14/24            LTG - Mod independent transfers. (Progressing)       Start:  12/04/24    Expected End:  12/14/24            LTG - Pt will amb 25 ft with wheeled walker and min assist. (Met)       Start:  12/04/24    Expected End:  12/11/24    Resolved:  12/11/24    Updated to: LTG - Pt will amb 25 ft with wheeled walker and CGA    Update reason: goal met         LTG - Will educate pt and family on most approp means of entry into home. (Progressing)       Start:  12/04/24    Expected End:  12/14/24             LTG - Pt will propel wheelchair 150 ft independently (Met)       Start:  12/04/24    Expected End:  12/11/24    Resolved:  12/11/24         LTG - Pt will amb 25 ft with wheeled walker and CGA (Progressing)       Start:  12/11/24    Expected End:  12/14/24

## 2024-12-13 NOTE — SIGNIFICANT EVENT
"[facility] Medicine Rapid Response Note    Name: Errol Fonseca  Age: 54 y.o.  Location: 457/457-A  Code Status: Full Code    Narrative Summary:    Rapid response was called at 12:27 am for room 457. Patient was reported to be hypotensive. Pressure was 67/45 on arrival. On evaluation patient appeared lethargic but he responded appropriately to questioning. Patient endorsed fatigue that had been occurring throughout the day and pain from his right LE stump. Patient denied SOB, chest pain, abdominal pain, urinary/bowel changes. Per nurse patient has been having watery stools. EKG showed NSR with PVCs. On chart review patient's pressures have been soft throughout the day. Patient has continued to receive oxycodone & antihypertensives throughout the day. Patient was given a 1L NS bolus and pressures responded appropriately. Pressure improved to 122/70.     Physical Exam:    BP (!) 79/47   Pulse 75   Temp 37 °C (98.6 °F) (Temporal)   Resp 18   Ht 1.854 m (6' 0.99\")   Wt 103 kg (227 lb 8.2 oz)   SpO2 91%   BMI 30.02 kg/m²     Physical Exam:    GENERAL: moderately distressed, lethargic  HEENT: normocephalic, atraumatic, sclera clear  CARDIAC: regular rate & rhythm, S1/S2  PULMONARY: CTA b/l, no respiratory distress, - wheezes  ABDOMEN: soft, non-tender, non-distended  MSK: Patient has right LE stump with dressing in place.  A&O X 3  SKIN: Warm and dry, no lesions, no rashes.  PSYCH: appropriate mood & affect    Assessment/Plan:    # Hypotension  # Hyponatremia  # Leukocytosis  - Patient chart was reviewed.  - CMP and CBC were ordered. Labs showed patient had a white count of 13.2. Labs also showed Na 128.   - UA was ordered.   - Believe that Lisinopril and Hydrochlorothiazide could be contributing to worsening hyponatremia.   - Antihypertensives were held due to soft pressures.  - EKG ordered and showed NSR with PVCs.  - Patient was given 1L NS bolus. Patient's pressure responded to fluids.  - Ordered 15 mg Toradol " injection     Arsalan Barry, DO  Hospital Medicine

## 2024-12-13 NOTE — SIGNIFICANT EVENT
"2049- message sent to Dr. Springer regarding patient's low blood pressure, severe pain in RLE, very fatigued throughout day, and multiple loose BMs. Pt had HR of 99 and BP of 84/58, auscultated. Patient was also complaining of 7/10 RLE pain and requesting oxycodone.  Message stated this RN would hold metoprolol and give tylenol instead of oxycodone, patient educated on why tylenol preferred by nursing d/t low BP and pt was agreeable. Secure text received from Dr. Springer stated \"Ok to give metoprol. And Tylenol is fine if he is ok regarding pain. Oxy would be ok too.\" This RN remained with original pain management plan with tylenol and other comfort measures, administered metoprolol as ordered and told pt to call when pain increased to an intolerable level.  Patient rang call bell at 0005 d/t increasing pain, requesting oxycodone. When rechecking BP it decreased to 60s/40s and this RN called MD at 0010 for interventions for patient. Dr. Springer said to still give the oxycodone and to continue with routine timed vitals per unit standards. This RN used the resource of the code white nurse on shift and explained scenario. She immediately seemed concerned and came to the room to assess the patient.   RNs decided to call rapid response at 0023 d/t patient lethargy, weakness, pain, and hypotension. Resource nurse attempted to place P.I.V and was unsuccessful, US guidance used and #20 placed in LAC. RRT assessed and ordered 1L NS bolus, EKG showing NSR with occasional PVCs, x1 dose of Toradol, CMP, and CBC. Labs showed patient had a white count of 13.2 and sodium of 128. UA ordered.  0135- Patients VSS and resting comfortably quickly after Toradol administration.   0500- Patient resting quietly, sleeping.   "

## 2024-12-13 NOTE — CARE PLAN
The patient's goals for the shift include      The clinical goals for the shift include Patient will receive adequate rest this shift.      Problem: Diabetes  Goal: Achieve decreasing blood glucose levels by end of shift  Outcome: Progressing  Goal: Increase stability of blood glucose readings by end of shift  Outcome: Progressing  Goal: Decrease in ketones present in urine by end of shift  Outcome: Progressing  Goal: Maintain electrolyte levels within acceptable range throughout shift  Outcome: Progressing  Goal: No changes in neurological exam by end of shift  Outcome: Progressing  Goal: Learn about and adhere to nutrition recommendations by end of shift  Outcome: Progressing  Goal: Increase self care and/or family involovement by end of shift  Outcome: Progressing  Goal: Receive DSME education by end of shift  Outcome: Progressing     Problem: Pain  Goal: Takes deep breaths with improved pain control throughout the shift  Outcome: Progressing  Goal: Turns in bed with improved pain control throughout the shift  Outcome: Progressing  Goal: Walks with improved pain control throughout the shift  Outcome: Progressing  Goal: Performs ADL's with improved pain control throughout shift  Outcome: Progressing  Goal: Participates in PT with improved pain control throughout the shift  Outcome: Progressing     Problem: Skin  Goal: Decreased wound size/increased tissue granulation at next dressing change  Outcome: Progressing  Flowsheets (Taken 12/12/2024 2340)  Decreased wound size/increased tissue granulation at next dressing change: Promote sleep for wound healing  Goal: Participates in plan/prevention/treatment measures  Outcome: Progressing  Flowsheets (Taken 12/12/2024 2340)  Participates in plan/prevention/treatment measures: Elevate heels  Goal: Promote/optimize nutrition  Outcome: Progressing  Flowsheets (Taken 12/12/2024 2340)  Promote/optimize nutrition: Offer water/supplements/favorite foods  Goal: Promote skin  healing  Outcome: Progressing  Flowsheets (Taken 12/12/2024 2890)  Promote skin healing: Assess skin/pad under line(s)/device(s)     Problem: Nutrition  Goal: Oral intake greater 75%  Outcome: Progressing  Goal: Consume prescribed supplement  Outcome: Progressing  Goal: Adequate PO fluid intake  Outcome: Progressing  Goal: BG  mg/dL  Outcome: Progressing  Goal: Lab values WNL  Outcome: Progressing  Goal: Promote healing  Outcome: Progressing  Goal: Maintain stable weight  Outcome: Progressing     Problem: Fall/Injury  Goal: Verbalize understanding of risk factor reduction measures to prevent injury from fall in the home  Outcome: Progressing  Goal: Use assistive devices by end of the shift  Outcome: Progressing  Goal: Pace activities to prevent fatigue by end of the shift  Outcome: Progressing

## 2024-12-13 NOTE — PROGRESS NOTES
Physical Therapy    Physical Therapy Treatment    Patient Name: Errol Fonseca  MRN: [id number]  Department: [facility] REHAB  Room: [address] [place] [address]  Today's Date: 12/13/2024  Time Calculation  Start Time: 0915  Stop Time: 1000  Time Calculation (min): 45 min         Assessment/Plan   PT Assessment  End of Session Communication: Bedside nurse  End of Session Patient Position: Up in chair, Alarm on     PT Plan  Treatment/Interventions: Bed mobility, Transfer training, Gait training, Stair training, Balance training, Strengthening, Endurance training, Therapeutic exercise, Therapeutic activity, Wheelchair management  PT Plan: Ongoing PT  PT Frequency: 90 min/5 days per week (1 week)  PT Discharge Recommendations:  (Anticipate discharge home mod independent at the wheelchair level.  Assist with higher level functional tasks.)  Equipment Recommended upon Discharge: Wheeled walker, Wheelchair  PT Recommended Transfer Status: Assist x2      General Visit Information:   PT  Visit  PT Received On: 12/13/24  General  Prior to Session Communication: Bedside nurse  Patient Position Received: Up in chair, Alarm on  General Comment: Pt had rapid response called in the middle of the night for low BP 60's over 40's. Pt cleared to participate in therapy and agreeable. Pt states he is drained, fatigued, and had upset stomach from diarhea.    Subjective   Precautions:  Precautions  LE Weight Bearing Status: Right Non-Weight Bearing  Medical Precautions: Fall precautions  Precautions Comment: R AKA    Objective   Pain:  Pain Assessment  Pain Assessment: 0-10  0-10 (Numeric) Pain Score: 5 - Moderate pain (R residual limb pain)  Pain Interventions: Repositioned, Emotional support, Distraction  Response to Interventions:  (decrease in pain)     Treatments:  Therapeutic Exercise  Therapeutic Exercise Activity 1: Pt performs the following mat exercises 10 x 2 sets, in order to improve strength and ROM related to transfers and mobility:  supine: L AP,  QS, B GS, L SLRs, L hip abd/add.  Pt politely requesting to avoid laying prone today due to upset stomach.    Bed Mobility  Bed Mobility:  (supine <> sit on mat mod I.)    Transfers  Transfer:  (Pivot transfers wc <> mat toward R and L with CGA.)    Education Documentation  Pt educated on therapeutic exercises, including optimal techniques to maximize function.  Pt educated on techniques for bed mobility training as well as transfers in order to improve functional independence.      Encounter Problems       Encounter Problems (Active)       PT Problem       STG - Bed mobility with SBA (Met)       Start:  12/04/24    Expected End:  12/07/24    Resolved:  12/11/24         STG - Transfers with min/mod assist x1.  (Met)       Start:  12/04/24    Expected End:  12/07/24    Resolved:  12/11/24         STG- Pt will amb 15 ft with wheeled walker and mod assist x1. (Met)       Start:  12/04/24    Expected End:  12/07/24    Resolved:  12/11/24         STG - Will determine most approp means of entry into home. (Met)       Start:  12/04/24    Expected End:  12/07/24    Resolved:  12/11/24         STG - Pt will propel wheelchair 100 ft with SBA (Met)       Start:  12/04/24    Expected End:  12/07/24    Resolved:  12/11/24         LTG - Mod independent bed mobility. (Progressing)       Start:  12/04/24    Expected End:  12/14/24            LTG - Mod independent transfers. (Progressing)       Start:  12/04/24    Expected End:  12/14/24            LTG - Pt will amb 25 ft with wheeled walker and min assist. (Met)       Start:  12/04/24    Expected End:  12/11/24    Resolved:  12/11/24    Updated to: LTG - Pt will amb 25 ft with wheeled walker and CGA    Update reason: goal met         LTG - Will educate pt and family on most approp means of entry into home. (Progressing)       Start:  12/04/24    Expected End:  12/14/24            LTG - Pt will propel wheelchair 150 ft independently (Met)       Start:  12/04/24    Expected End:   12/11/24    Resolved:  12/11/24         LTG - Pt will amb 25 ft with wheeled walker and CGA (Progressing)       Start:  12/11/24    Expected End:  12/14/24

## 2024-12-13 NOTE — PROGRESS NOTES
Occupational Therapy    OT Treatment    Patient Name: Errol Fonseca  MRN: [id number]  Department: [facility] REHAB  Room: [address] [place] [address]  Today's Date: 12/13/2024  Time Calculation  Start Time: 1000  Stop Time: 1045  Time Calculation (min): 45 min    Amada Whitman OTR/L supervised and approves Raine Madera S/OT documentation and treatment of this patient.          Assessment:  End of Session Communication: Bedside nurse  End of Session Patient Position: Alarm on, Up in chair    Plan:  Treatment Interventions: ADL retraining, Functional transfer training, UE strengthening/ROM, Endurance training, Patient/family training, Equipment evaluation/education, Compensatory technique education, Continued evaluation  OT Frequency: 90 min/5 days per week (1 week)  OT Discharge Recommendations:  (mod I at wc level, sba tub/dme transfers)  Equipment Recommended upon Discharge: Wheeled walker, Wheelchair, Bedside commode  Treatment Interventions: ADL retraining, Functional transfer training, UE strengthening/ROM, Endurance training, Patient/family training, Equipment evaluation/education, Compensatory technique education, Continued evaluation    Subjective   Previous Visit Info:  OT Last Visit  OT Received On: 12/13/24  General:  General  Prior to Session Communication: Bedside nurse  Patient Position Received: Alarm on, Up in chair  General Comment: Patient reports fatigue and reported rapid response called for him this prior night secondary to low BP.    Precautions:  Precautions  Medical Precautions: Fall precautions  Precautions Comment: R AKA    Pain:  Pain Assessment  Pain Assessment: 0-10  0-10 (Numeric) Pain Score: 0 - No pain    Objective    Bed Mobility/Transfers: Transfers  Transfer: Yes  Transfer 1  Transfer From 1:  (Completed stand pivot transfers towards L side from wheelchair to bed with SBA; Mod I for wheelchair mobility and positioning up to bed prior to transfer)    Therapy/Activity: Therapeutic  Exercise  Therapeutic Exercise Performed: Yes  Therapeutic Exercise Activity 1: Bilateral UE ther ex using 3# weights completing 5 exercises x 3 sets x 10 reps to increase functional task performance; intermittent rest breaks provided     Therapeutic Activity  Therapeutic Activity Performed: Yes  Therapeutic Activity 1: Completes ther act to increase postural stability and trunk flexion to increase ease in commode transfers, performs closed chain kinetic exercise using bilateral wheel to roll out moderate resistive putty with moderate effort, required intermittent rest breaks and cues for technique and biomechanics      OP EDUCATION:  Education  Individual(s) Educated: Patient  Education Provided:  (energy conservation techniques, UE strengthening techniques, adaptive strategies, safety training during transfers)  Patient Response to Education: Patient/Caregiver Verbalized Understanding of Information    Goals:  Encounter Problems       Encounter Problems (Active)       OT Problem       LTG - Patient will complete grooming with mod I (Met)       Start:  12/04/24    Expected End:  12/11/24    Resolved:  12/11/24         LTG - Patient will complete bathing with supervision or better (Met)       Start:  12/04/24    Expected End:  12/11/24    Resolved:  12/11/24         LTG - Patient will complete UE dressing with mod I (Met)       Start:  12/04/24    Expected End:  12/11/24    Resolved:  12/11/24         LTG - Patient will complete LE dressing using adaptive equip as needed with mod I (Progressing)       Start:  12/04/24    Expected End:  12/18/24            LTG - Patient will complete toileting with mod I (Progressing)       Start:  12/04/24    Expected End:  12/18/24            LTG - Patient will complete commode transfer via wwalker or pivot with mod I (Progressing)       Start:  12/04/24    Expected End:  12/18/24            LTG - Patient will complete tub/shower transfer using DME as needed with sba (Progressing)        Start:  12/04/24    Expected End:  12/18/24            LTG - Patient will complete functional mobility via wheelchar mod I (Progressing)       Start:  12/04/24    Expected End:  12/18/24            LTG - Patient will complete simple mobility/approach steps via wwalker supervision or better (Progressing)       Start:  12/04/24    Expected End:  12/18/24            LTG - Patient will complete light meal prep or kitchen access at wheelchair level mod I (Progressing)       Start:  12/04/24    Expected End:  12/18/24

## 2024-12-13 NOTE — CARE PLAN
The patient's goals for the shift include  participate in scheduled therapy     The clinical goals for the shift include pain control     Problem: Diabetes  Goal: Achieve decreasing blood glucose levels by end of shift  Outcome: Progressing  Goal: Increase stability of blood glucose readings by end of shift  Outcome: Progressing  Goal: Decrease in ketones present in urine by end of shift  Outcome: Progressing  Goal: Maintain electrolyte levels within acceptable range throughout shift  Outcome: Progressing  Goal: No changes in neurological exam by end of shift  Outcome: Progressing  Goal: Learn about and adhere to nutrition recommendations by end of shift  Outcome: Progressing  Goal: Increase self care and/or family involovement by end of shift  Outcome: Progressing  Goal: Receive DSME education by end of shift  Outcome: Progressing     Problem: Pain  Goal: Takes deep breaths with improved pain control throughout the shift  Outcome: Progressing  Goal: Turns in bed with improved pain control throughout the shift  Outcome: Progressing  Goal: Walks with improved pain control throughout the shift  Outcome: Progressing  Goal: Performs ADL's with improved pain control throughout shift  Outcome: Progressing  Goal: Participates in PT with improved pain control throughout the shift  Outcome: Progressing     Problem: Skin  Goal: Decreased wound size/increased tissue granulation at next dressing change  Outcome: Progressing  Flowsheets (Taken 12/13/2024 1632)  Decreased wound size/increased tissue granulation at next dressing change:   Protective dressings over bony prominences   Promote sleep for wound healing  Goal: Participates in plan/prevention/treatment measures  Outcome: Progressing  Flowsheets (Taken 12/13/2024 1632)  Participates in plan/prevention/treatment measures:   Increase activity/out of bed for meals   Elevate heels  Goal: Promote/optimize nutrition  Outcome: Progressing  Flowsheets (Taken 12/13/2024  1632)  Promote/optimize nutrition:   Assist with feeding   Monitor/record intake including meals   Offer water/supplements/favorite foods  Goal: Promote skin healing  Outcome: Progressing  Flowsheets (Taken 12/13/2024 1632)  Promote skin healing: Turn/reposition every 2 hours/use positioning/transfer devices     Problem: Nutrition  Goal: Oral intake greater 75%  Outcome: Progressing  Goal: Consume prescribed supplement  Outcome: Progressing  Goal: Adequate PO fluid intake  Outcome: Progressing  Goal: BG  mg/dL  Outcome: Progressing  Goal: Lab values WNL  Outcome: Progressing  Goal: Promote healing  Outcome: Progressing  Goal: Maintain stable weight  Outcome: Progressing     Problem: Fall/Injury  Goal: Verbalize understanding of risk factor reduction measures to prevent injury from fall in the home  Outcome: Progressing  Goal: Use assistive devices by end of the shift  Outcome: Progressing  Goal: Pace activities to prevent fatigue by end of the shift  Outcome: Progressing

## 2024-12-13 NOTE — PROGRESS NOTES
Occupational Therapy    OT Treatment    Patient Name: Errol Fonseca  MRN: [id number]  Department: [facility] REHAB  Room: [address] [place] [address]  Today's Date: 12/13/2024  Time Calculation  Start Time: 1000  Stop Time: 1045  Time Calculation (min): 45 min        Amada Whitman OTR/L supervised and approves Raine Madera S/OT documentation and treatment of this patient.      Assessment:  End of Session Communication: Bedside nurse  End of Session Patient Position: Up in chair, Alarm on     Plan:  Treatment Interventions: ADL retraining, Functional transfer training, UE strengthening/ROM, Endurance training, Patient/family training, Equipment evaluation/education, Compensatory technique education, Continued evaluation  OT Frequency: 90 min/5 days per week (1 week)  OT Discharge Recommendations:  (mod I at wc level, sba tub/dme transfers)  Equipment Recommended upon Discharge: Wheeled walker, Wheelchair, Bedside commode  Treatment Interventions: ADL retraining, Functional transfer training, UE strengthening/ROM, Endurance training, Patient/family training, Equipment evaluation/education, Compensatory technique education, Continued evaluation    Subjective   Previous Visit Info:  OT Last Visit  OT Received On: 12/13/24  General:  General  Prior to Session Communication: Bedside nurse  Patient Position Received: Alarm on, Up in chair  General Comment: Patient reports more energy this afternoon, pt recieved  from prothesis represenative.  Precautions:  LE Weight Bearing Status: Right Non-Weight Bearing  Medical Precautions: Fall precautions  Precautions Comment: R AKA    Pain:  Pain Assessment  Pain Assessment: 0-10  0-10 (Numeric) Pain Score: 0 - No pain    Objective    Activities of Daily Living: LE Dressing  LE Dressing:  (Completes LE erin/doff off  involved side in bed with HOB raised with independence, completed by bridging LLE and making adjustments)    Bed Mobility/Transfers: Bed Mobility  Bed Mobility:  (Completes EOB  sitting to supine with Mod I)    Transfers  Transfer:  (Completes stand pivot from wheelchair to bed toward left side with SBA)  Transfer 1  Transfer From 1:  (Completed stand pivot transfers towards L side from wheelchair to bed with SBA; Mod I for wheelchair mobility and positioning up to bed piror to transfer)    Toilet Transfers  Toilet Transfer From:  (Completes commode transfer with SBA, Mod I with wheelchair positioning and alignement prior to and after transfer including wheelchair management and safety)    Functional Mobility:  Functional Mobility  Functional Mobility Performed: Yes (Completes wheelchair mobility including navigating corners, aligning correctly up to transfer surfaces, and negotiating environmental barriers with Mod I)  Functional Mobility 1  Surface 1:  (Completes side steps for homegoing environemental setup for bathroom with SBA/CGA overall via wheeled walker)    Sensory:  Desensitization: Completed desensitization with various object on involved side to decrease phantom limb pain and increase awareness, including tapping, rubbing, and applying various textures around limb      OP EDUCATION:  Education  Individual(s) Educated: Patient  Education Provided:  (wheelchair management and mobility, adaptive strategies, ADL training, energy conservation, safety training during functional mobility)  Patient Response to Education: Patient/Caregiver Verbalized Understanding of Information    Goals:  Encounter Problems       Encounter Problems (Active)       OT Problem       LTG - Patient will complete grooming with mod I (Met)       Start:  12/04/24    Expected End:  12/11/24    Resolved:  12/11/24         LTG - Patient will complete bathing with supervision or better (Met)       Start:  12/04/24    Expected End:  12/11/24    Resolved:  12/11/24         LTG - Patient will complete UE dressing with mod I (Met)       Start:  12/04/24    Expected End:  12/11/24    Resolved:  12/11/24         LTG -  Patient will complete LE dressing using adaptive equip as needed with mod I (Progressing)       Start:  12/04/24    Expected End:  12/18/24            LTG - Patient will complete toileting with mod I (Progressing)       Start:  12/04/24    Expected End:  12/18/24            LTG - Patient will complete commode transfer via wwalker or pivot with mod I (Progressing)       Start:  12/04/24    Expected End:  12/18/24            LTG - Patient will complete tub/shower transfer using DME as needed with sba (Progressing)       Start:  12/04/24    Expected End:  12/18/24            LTG - Patient will complete functional mobility via wheelchar mod I (Progressing)       Start:  12/04/24    Expected End:  12/18/24            LTG - Patient will complete simple mobility/approach steps via wwalker supervision or better (Progressing)       Start:  12/04/24    Expected End:  12/18/24            LTG - Patient will complete light meal prep or kitchen access at wheelchair level mod I (Progressing)       Start:  12/04/24    Expected End:  12/18/24

## 2024-12-14 LAB
ANION GAP SERPL CALC-SCNC: 10 MMOL/L (ref 10–20)
BUN SERPL-MCNC: 25 MG/DL (ref 6–23)
CALCIUM SERPL-MCNC: 9.1 MG/DL (ref 8.6–10.3)
CHLORIDE SERPL-SCNC: 99 MMOL/L (ref 98–107)
CO2 SERPL-SCNC: 28 MMOL/L (ref 21–32)
CREAT SERPL-MCNC: 0.75 MG/DL (ref 0.5–1.3)
EGFRCR SERPLBLD CKD-EPI 2021: >90 ML/MIN/1.73M*2
GLUCOSE BLD MANUAL STRIP-MCNC: 151 MG/DL (ref 74–99)
GLUCOSE BLD MANUAL STRIP-MCNC: 169 MG/DL (ref 74–99)
GLUCOSE BLD MANUAL STRIP-MCNC: 273 MG/DL (ref 74–99)
GLUCOSE SERPL-MCNC: 226 MG/DL (ref 74–99)
HOLD SPECIMEN: NORMAL
POTASSIUM SERPL-SCNC: 4.4 MMOL/L (ref 3.5–5.3)
SODIUM SERPL-SCNC: 133 MMOL/L (ref 136–145)

## 2024-12-14 PROCEDURE — 97530 THERAPEUTIC ACTIVITIES: CPT | Mod: GO

## 2024-12-14 PROCEDURE — 87324 CLOSTRIDIUM AG IA: CPT | Mod: PARLAB | Performed by: PHYSICAL MEDICINE & REHABILITATION

## 2024-12-14 PROCEDURE — 1280000001 HC REHAB SEMI-PRIVATE ROOM DAILY

## 2024-12-14 PROCEDURE — 2500000001 HC RX 250 WO HCPCS SELF ADMINISTERED DRUGS (ALT 637 FOR MEDICARE OP): Performed by: PHYSICAL MEDICINE & REHABILITATION

## 2024-12-14 PROCEDURE — S4991 NICOTINE PATCH NONLEGEND: HCPCS | Performed by: PHYSICAL MEDICINE & REHABILITATION

## 2024-12-14 PROCEDURE — 97542 WHEELCHAIR MNGMENT TRAINING: CPT | Mod: GP,CQ

## 2024-12-14 PROCEDURE — 2500000002 HC RX 250 W HCPCS SELF ADMINISTERED DRUGS (ALT 637 FOR MEDICARE OP, ALT 636 FOR OP/ED): Performed by: PHYSICAL MEDICINE & REHABILITATION

## 2024-12-14 PROCEDURE — 97530 THERAPEUTIC ACTIVITIES: CPT | Mod: GP,CQ

## 2024-12-14 PROCEDURE — 82947 ASSAY GLUCOSE BLOOD QUANT: CPT

## 2024-12-14 PROCEDURE — 36415 COLL VENOUS BLD VENIPUNCTURE: CPT | Performed by: PHYSICAL MEDICINE & REHABILITATION

## 2024-12-14 PROCEDURE — 97116 GAIT TRAINING THERAPY: CPT | Mod: GP,CQ

## 2024-12-14 PROCEDURE — 80048 BASIC METABOLIC PNL TOTAL CA: CPT | Performed by: PHYSICAL MEDICINE & REHABILITATION

## 2024-12-14 PROCEDURE — 87493 C DIFF AMPLIFIED PROBE: CPT | Mod: PARLAB | Performed by: PHYSICAL MEDICINE & REHABILITATION

## 2024-12-14 PROCEDURE — 97535 SELF CARE MNGMENT TRAINING: CPT | Mod: GO

## 2024-12-14 RX ORDER — LOPERAMIDE HYDROCHLORIDE 2 MG/1
2 CAPSULE ORAL 4 TIMES DAILY PRN
Status: DISCONTINUED | OUTPATIENT
Start: 2024-12-14 | End: 2024-12-15 | Stop reason: HOSPADM

## 2024-12-14 RX ORDER — IBUPROFEN 200 MG
1 TABLET ORAL DAILY
Qty: 30 PATCH | Refills: 0 | Status: SHIPPED | OUTPATIENT
Start: 2024-12-15 | End: 2025-01-14

## 2024-12-14 RX ORDER — METFORMIN HYDROCHLORIDE 500 MG/1
500 TABLET, EXTENDED RELEASE ORAL
Qty: 60 TABLET | Refills: 0 | Status: SHIPPED | OUTPATIENT
Start: 2024-12-14 | End: 2025-01-13

## 2024-12-14 RX ORDER — OXYCODONE HYDROCHLORIDE 5 MG/1
5 TABLET ORAL EVERY 6 HOURS PRN
Qty: 20 TABLET | Refills: 0 | Status: SHIPPED | OUTPATIENT
Start: 2024-12-14

## 2024-12-14 RX ORDER — INSULIN GLARGINE 100 [IU]/ML
34 INJECTION, SOLUTION SUBCUTANEOUS DAILY
Qty: 10.2 ML | Refills: 0 | Status: SHIPPED | OUTPATIENT
Start: 2024-12-15 | End: 2025-01-14

## 2024-12-14 RX ORDER — OXYCODONE HYDROCHLORIDE 5 MG/1
5 TABLET ORAL EVERY 6 HOURS PRN
Qty: 15 TABLET | Refills: 0 | Status: SHIPPED | OUTPATIENT
Start: 2024-12-14 | End: 2024-12-14

## 2024-12-14 RX ORDER — NYSTATIN 100000 [USP'U]/G
1 POWDER TOPICAL 2 TIMES DAILY
Qty: 15 G | Refills: 0 | Status: SHIPPED | OUTPATIENT
Start: 2024-12-14 | End: 2024-12-29

## 2024-12-14 RX ORDER — INSULIN LISPRO 100 [IU]/ML
7 INJECTION, SOLUTION INTRAVENOUS; SUBCUTANEOUS
Qty: 6.3 ML | Refills: 0 | Status: SHIPPED | OUTPATIENT
Start: 2024-12-14 | End: 2025-01-13

## 2024-12-14 ASSESSMENT — PAIN - FUNCTIONAL ASSESSMENT
PAIN_FUNCTIONAL_ASSESSMENT: 0-10

## 2024-12-14 ASSESSMENT — PAIN SCALES - WONG BAKER
WONGBAKER_NUMERICALRESPONSE: HURTS WHOLE LOT
WONGBAKER_NUMERICALRESPONSE: HURTS EVEN MORE
WONGBAKER_NUMERICALRESPONSE: HURTS EVEN MORE

## 2024-12-14 ASSESSMENT — ACTIVITIES OF DAILY LIVING (ADL)
BATHING_EQUIPMENT_NEEDED: LONG-HANDLED SPONGE;REMOVEABLE SHOWER HEAD
BATHING_WHERE_ASSESSED: SHOWER
HOME_MANAGEMENT_TIME_ENTRY: 35
BATHING_LEVEL_OF_ASSISTANCE: CLOSE SUPERVISION

## 2024-12-14 ASSESSMENT — PAIN DESCRIPTION - DESCRIPTORS
DESCRIPTORS: DISCOMFORT

## 2024-12-14 ASSESSMENT — PAIN SCALES - GENERAL
PAINLEVEL_OUTOF10: 6
PAINLEVEL_OUTOF10: 7
PAINLEVEL_OUTOF10: 5 - MODERATE PAIN
PAINLEVEL_OUTOF10: 0 - NO PAIN
PAINLEVEL_OUTOF10: 0 - NO PAIN
PAINLEVEL_OUTOF10: 4
PAINLEVEL_OUTOF10: 7
PAINLEVEL_OUTOF10: 0 - NO PAIN
PAINLEVEL_OUTOF10: 7

## 2024-12-14 NOTE — DISCHARGE SUMMARY
Rehabilitation Discharge Summary     BRIEF OVERVIEW  Admitting Provider: Radha Carranza MD  Discharge Provider: Radha Cheema*  Primary Care Physician at Discharge: Angel Hurtado  [phone]     Admission Date: 12/3/2024     Discharge Date: 12/14/2024    Primary Discharge Diagnosis  Patient Active Problem List   Diagnosis    Gangrene of left foot (Multi)    Foot ulcer with fat layer exposed, right (Multi)    Alcoholic cirrhosis (Multi)    Closed fracture of mandible (Multi)    Coronary artery disease involving native coronary artery of native heart with unstable angina pectoris    Coronary artery disease    Fall    Hepatic cirrhosis (Multi)    Hyperlipidemia    Hypertension    Hypomagnesemia    S/P right coronary artery (RCA) stent placement    Ventral hernia without obstruction or gangrene    Type 2 diabetes mellitus, with long-term current use of insulin    Necrotizing fasciitis of lower leg (Multi)    Above knee amputation of right lower extremity (Multi)    Right above-knee amputee (Multi)        Discharge Disposition  Inpatient Rehab Facility (IRF)  Code Status at Discharge: full    Discharge meds     Your medication list        START taking these medications        Instructions Last Dose Given Next Dose Due   nicotine 14 mg/24 hr patch  Commonly known as: Nicoderm CQ  Start taking on: December 15, 2024      Place 1 patch over 24 hours on the skin once daily.       nystatin 100,000 unit/gram powder  Commonly known as: Mycostatin      Apply 1 Application topically 2 times a day for 15 days.              CHANGE how you take these medications        Instructions Last Dose Given Next Dose Due   insulin glargine 100 unit/mL injection  Commonly known as: Lantus  Start taking on: December 15, 2024  What changed:   medication strength  how much to take  when to take this  additional instructions      Inject 34 Units under the skin once daily. Take as directed per insulin  "instructions.       insulin lispro 100 unit/mL injection  What changed: when to take this      Inject 7 Units under the skin 3 times a day before meals. Take as directed per insulin instructions.              CONTINUE taking these medications        Instructions Last Dose Given Next Dose Due   acetaminophen 325 mg tablet  Commonly known as: Tylenol      Take 2 tablets (650 mg) by mouth every 8 hours if needed for mild pain (1 - 3).       aspirin 81 mg EC tablet      Take 1 tablet (81 mg) by mouth once daily.       atorvastatin 40 mg tablet  Commonly known as: Lipitor      Take 1 tablet (40 mg) by mouth once daily.       Blood Glucose Test strip  Generic drug: blood sugar diagnostic      Use 4 times daily as instructed       Blood Pressure Cuff misc  Generic drug: miscellaneous medical supply      Dispense 1 blood pressure cuff       escitalopram 10 mg tablet  Commonly known as: Lexapro      Take 1 tablet (10 mg) by mouth once daily.       folic acid 1 mg tablet  Commonly known as: Folvite      Take 1 tablet (1 mg) by mouth once daily. Do not start before April 3, 2024.       FreeStyle Brenda 3 Plus Sensor device  Generic drug: blood-glucose sensor      Dispense 1 free style brenda monitoring kit       FreeStyle Brenda 3 Beebe misc  Generic drug: blood-glucose meter,continuous      Use as instructed       gabapentin 400 mg capsule  Commonly known as: Neurontin      Take 1 capsule (400 mg) by mouth 3 times a day.       metFORMIN  mg 24 hr tablet  Commonly known as: Glucophage-XR      Take 1 tablet (500 mg) by mouth 2 times daily (morning and late afternoon). Do not crush, chew, or split.       oxyCODONE 5 mg immediate release tablet  Commonly known as: Roxicodone      Take 1 tablet (5 mg) by mouth every 6 hours if needed for severe pain (7 - 10) for up to 20 doses.       pen needle, diabetic 32 gauge x 5/32\" needle      Use 4x daily as instructed       traZODone 50 mg tablet  Commonly known as: Desyrel      Take 1 " tablet (50 mg) by mouth as needed at bedtime for sleep.       Trulicity 0.75 mg/0.5 mL pen injector  Generic drug: dulaglutide      Inject 0.75 mg under the skin 1 (one) time per week.              STOP taking these medications      lisinopriL-hydrochlorothiazide 20-12.5 mg tablet        melatonin 5 mg tablet        metoprolol tartrate 25 mg tablet  Commonly known as: Lopressor        multivitamin with minerals tablet        nitroglycerin 0.4 mg SL tablet  Commonly known as: Nitrostat        polyethylene glycol 17 gram packet  Commonly known as: Glycolax, Miralax        Vitamin D2 1,250 mcg (50,000 unit) capsule  Generic drug: ergocalciferol                  Where to Get Your Medications        These medications were sent to [id number] - [place], [place] - [address]  [address], [facility] [address]      Phone: [phone]   insulin glargine 100 unit/mL injection  insulin lispro 100 unit/mL injection  metFORMIN  mg 24 hr tablet  nicotine 14 mg/24 hr patch  nystatin 100,000 unit/gram powder  oxyCODONE 5 mg immediate release tablet            Outpatient Follow-Up  Future Appointments   Date Time Provider Department Center   12/26/2024 11:00 AM Angel Hurtado DO [id number] [place]   12/27/2024  1:40 PM Oliverio Sánchez MD [id number]       [unfilled]  Test Results Pending at Discharge  Pending Labs       Order Current Status    C. difficile, PCR In process            DETAILS OF HOSPITAL STAY    Presenting Problem/History of Present Illness  Right above-knee amputee (Multi) [Z89.611]  Admission Functional Status: Patient was admitted following a right above-knee amputation.  At the time of admission he was overall a min assist for functional mobility.  He made very good progress and is being discharged home at a contact-guard assist and independent at a wheelchair level.  He did have dehydration and hypotension and his blood pressure meds were discontinued until his blood pressure becomes  elevated      Review of Systems   Patient is doing okay.  He denies chest pain or shortness of breath.  He denies abdominal pain.  Stump pain is controlled.  10 review of systems are negative    Physical Exam at Discharge  Constitutional:       General: he is not in acute distress.     Appearance: Normal appearance.   HENT:      Head: Normocephalic.   No jugular venous distention  Heart:  S1-S2 no gallops  Pulmonary:      Breath sounds: Normal breath sounds. No wheezing, rhonchi or rales.   Abdominal:      General: Abdomen is flat. Bowel sounds are normal. There is no   Rigidity  Musculoskeletal:         General: No swelling, tenderness or deformity.   Stump incision is intact with slight redness at the sutures.  No evidence of infection, no drainage  Skin:     General: Skin is warm and dry.      Findings: No rash.   Neurological:      General: No focal deficit present.      Mental Status: he is alert and oriented to person, place, and time.      Cranial Nerves: No cranial nerve deficit.      Psychiatric:         Mood and Affect: Mood normal.      Time spent with discharge preparation is 35 minutes

## 2024-12-14 NOTE — PROGRESS NOTES
Physical Therapy    Physical Therapy Treatment    Patient Name: Errol Fonseca  MRN: [id number]  Department: [facility] REHAB  Room: [address] [place] [address]  Today's Date: 12/14/2024  Time Calculation  Start Time: 0830  Stop Time: 0915  Time Calculation (min): 45 min         Assessment/Plan   PT Assessment  End of Session Communication: Bedside nurse  End of Session Patient Position: Bed, 2 rail up, Alarm off, not on at start of session (Supine with HOB elevated, call bell in reach.)     PT Plan  Treatment/Interventions: Bed mobility, Transfer training, Gait training, Stair training, Balance training, Strengthening, Endurance training, Therapeutic exercise, Therapeutic activity, Wheelchair management  PT Plan: Ongoing PT  PT Frequency: 90 min/5 days per week (1 week)  PT Discharge Recommendations:  (Anticipate discharge home mod independent at the wheelchair level.  Assist with higher level functional tasks.)  Equipment Recommended upon Discharge: Wheeled walker, Wheelchair  PT Recommended Transfer Status: Assist x2      General Visit Information:   PT  Visit  PT Received On: 12/14/24  General  Prior to Session Communication:  (Passed off from OT.)  Patient Position Received: Bed, 2 rail up, Alarm on  General Comment:  (Pt pleasant and willing to participate in therapy session)    Subjective   Precautions:  Precautions  Medical Precautions: Fall precautions  Precautions Comment: R AKA    Vital Signs (Past 2hrs)                 Objective   Pain:  Pain Assessment  Pain Assessment: 0-10  0-10 (Numeric) Pain Score: 0 - No pain              Treatments:  Bed Mobility  Bed Mobility: Yes (Pt performed sit<>supine and rolling R/L in ADL bed and sit>supine in room independently.)    Ambulation/Gait Training  Ambulation/Gait Training Performed: Yes (Pt able to amb 40' over tile and 25' over carpet with FWW and CGA plus w/c follow; demos steady lexi. Denies dizziness and no LOB.)  Transfers  Transfer: Yes (Pt performed sit<>stand x2 trials  with FWW, pivot transfer w/c<><bed x2 trials,  w/c<>NuStep to simulate car transfer toward left/uninvolved side all independently.)    Wheelchair Activities  Description/Details 1:  (Pt able to self propel w/c with 'x2 independently.)  Education Documentation  No documentation found.  Education Comments  Patient educated in correct bed mobility with instruction for proper trunk positioning, upper and lower body placement and positioning and use of bed rail if appropriate.  Patient educated in safe and proper transfer techniques including proper positioning and hand/foot placement to maximize safety and functional independence.  Patient educated in safe techniques for gait with a device in order to maximize safety and functional independence.  Educated in safe w/c mobility including maneuvering on different surfaces, around obstacles and managing brakes, arm and leg rests.          OP EDUCATION:       Encounter Problems       Encounter Problems (Active)       PT Problem       STG - Bed mobility with SBA (Met)       Start:  12/04/24    Expected End:  12/07/24    Resolved:  12/11/24         STG - Transfers with min/mod assist x1.  (Met)       Start:  12/04/24    Expected End:  12/07/24    Resolved:  12/11/24         STG- Pt will amb 15 ft with wheeled walker and mod assist x1. (Met)       Start:  12/04/24    Expected End:  12/07/24    Resolved:  12/11/24         STG - Will determine most approp means of entry into home. (Met)       Start:  12/04/24    Expected End:  12/07/24    Resolved:  12/11/24         STG - Pt will propel wheelchair 100 ft with SBA (Met)       Start:  12/04/24    Expected End:  12/07/24    Resolved:  12/11/24         LTG - Mod independent bed mobility. (Progressing)       Start:  12/04/24    Expected End:  12/14/24            LTG - Mod independent transfers. (Progressing)       Start:  12/04/24    Expected End:  12/14/24            LTG - Pt will amb 25 ft with wheeled walker and min assist.  (Met)       Start:  12/04/24    Expected End:  12/11/24    Resolved:  12/11/24    Updated to: LTG - Pt will amb 25 ft with wheeled walker and CGA    Update reason: goal met         LTG - Will educate pt and family on most approp means of entry into home. (Progressing)       Start:  12/04/24    Expected End:  12/14/24            LTG - Pt will propel wheelchair 150 ft independently (Met)       Start:  12/04/24    Expected End:  12/11/24    Resolved:  12/11/24         LTG - Pt will amb 25 ft with wheeled walker and CGA (Progressing)       Start:  12/11/24    Expected End:  12/14/24

## 2024-12-14 NOTE — CARE PLAN
Recertification: Based upon physical, mental and social evaluations, I certify that inpatient psychiatric services continue to be medically necessary for this patient for a duration of 15   midnights for the treatment of  Schizoaffective disorder, bipolar type [facility])   Available alternative community resources still do not meet the patient's mental health care needs  I further attest that an established written individualized plan of care has been updated and is outlined in the patient's medical records  The patient's goals for the shift include  pain management    The clinical goals for the shift include pain control and remain HD stable

## 2024-12-14 NOTE — PROGRESS NOTES
Occupational Therapy    OT Treatment    Patient Name: Errol Fonseca  MRN: [id number]  Department: [facility]  Room: [address] [place] [address]  Today's Date: 12/14/2024  Time Calculation  Start Time: 0745  Stop Time: 0830  Time Calculation (min): 45 min        Assessment:  End of Session Communication:  (handoff to PT)  End of Session Patient Position: Up in chair, Alarm on     Plan:  Treatment Interventions: ADL retraining, Functional transfer training, UE strengthening/ROM, Endurance training, Patient/family training, Equipment evaluation/education, Compensatory technique education, Continued evaluation  OT Frequency: 90 min/5 days per week (1 week)  OT Discharge Recommendations:  (mod I at wc level, sba tub/dme transfers)  Equipment Recommended upon Discharge: Wheeled walker, Wheelchair, Bedside commode  Treatment Interventions: ADL retraining, Functional transfer training, UE strengthening/ROM, Endurance training, Patient/family training, Equipment evaluation/education, Compensatory technique education, Continued evaluation    Subjective   Previous Visit Info:  OT Last Visit  OT Received On: 12/14/24  General:  General  Prior to Session Communication: Bedside nurse  Patient Position Received: Bed, 2 rail up, Alarm off, not on at start of session  General Comment: patient motivated to take a shower this session    Precautions:  Medical Precautions: Fall precautions  Precautions Comment: R AKA            Pain:  Pain Assessment  Pain Assessment: 0-10  0-10 (Numeric) Pain Score: 0 - No pain    Objective         Activities of Daily Living: Grooming  Grooming Level of Assistance: Independent  Grooming Where Assessed: Wheelchair    UE Bathing  UE Bathing Adaptive Equipment: Removeable shower head, Long-handled sponge  UE Bathing Level of Assistance:  mod I, seated   UE Bathing Where Assessed: Shower    LE Bathing  LE Bathing Adaptive Equipment: Long-handled sponge, Removeable shower head  LE Bathing Level of Assistance: mod I,  seated  LE Bathing Where Assessed: Shower  LE Bathing Comments: patient able to manage bathing buttocks by weight-shifting and bridging (did not stand)    UE Dressing  UE Dressing Level of Assistance: Independent  UE Dressing Where Assessed: Wheelchair  UE Dressing Comments: independent wheelchair level to gather clothing    LE Dressing  Pants Level of Assistance:  (mod I to doff pants, briefs, and right  at bedlevel; dons pants to knees while seated in wheelchair with mod I; sba to pull over hips while standing at bedrail)    Sock Level of Assistance:  (independent don/doff left sock, uses bed to prop left foot on while seated in wheelchair; independent to doff right  while supine in bed)       Bed Mobility/Transfers: Bed Mobility  Bed Mobility:  (independent supine to sit)    Transfers  Transfer:  (mod I pivot transfer bed to wheelchair towards uninvolved left side)    Shower Transfers  Shower Transfers Comments: cga stand-pivot transfer to/from wheelchair and shower seat using wall grab bar to assist      Functional Mobility:  Functional Mobility  Functional Mobility Performed:  (mod I challenging functional wheelchair mobility)    EDUCATION:  Education  Individual(s) Educated: Patient  Education Provided:  (safe transfers and ADLs)  Patient Response to Education: Patient/Caregiver Verbalized Understanding of Information, Patient/Caregiver Performed Return Demonstration of Exercises/Activities    Goals:  Encounter Problems       Encounter Problems (Active)       OT Problem       LTG - Patient will complete grooming with mod I (Met)       Start:  12/04/24    Expected End:  12/11/24    Resolved:  12/11/24         LTG - Patient will complete bathing with supervision or better (Met)       Start:  12/04/24    Expected End:  12/11/24    Resolved:  12/11/24         LTG - Patient will complete UE dressing with mod I (Met)       Start:  12/04/24    Expected End:  12/11/24    Resolved:  12/11/24         LTG  - Patient will complete LE dressing using adaptive equip as needed with mod I (Progressing)       Start:  12/04/24    Expected End:  12/18/24            LTG - Patient will complete toileting with mod I (Progressing)       Start:  12/04/24    Expected End:  12/18/24            LTG - Patient will complete commode transfer via wwalker or pivot with mod I (Progressing)       Start:  12/04/24    Expected End:  12/18/24            LTG - Patient will complete tub/shower transfer using DME as needed with sba (Progressing)       Start:  12/04/24    Expected End:  12/18/24            LTG - Patient will complete functional mobility via wheelchar mod I (Progressing)       Start:  12/04/24    Expected End:  12/18/24            LTG - Patient will complete simple mobility/approach steps via wwalker supervision or better (Progressing)       Start:  12/04/24    Expected End:  12/18/24            LTG - Patient will complete light meal prep or kitchen access at wheelchair level mod I (Progressing)       Start:  12/04/24    Expected End:  12/18/24

## 2024-12-14 NOTE — CARE PLAN
The patient's goals for the shift include  pain control    The clinical goals for the shift include pain control and remain HD stable

## 2024-12-15 ENCOUNTER — DOCUMENTATION (OUTPATIENT)
Dept: HOME HEALTH SERVICES | Facility: HOME HEALTH | Age: 54
End: 2024-12-15
Payer: MEDICAID

## 2024-12-15 VITALS
WEIGHT: 227.51 LBS | BODY MASS INDEX: 30.15 KG/M2 | OXYGEN SATURATION: 96 % | TEMPERATURE: 97.7 F | HEART RATE: 115 BPM | SYSTOLIC BLOOD PRESSURE: 158 MMHG | HEIGHT: 73 IN | DIASTOLIC BLOOD PRESSURE: 76 MMHG | RESPIRATION RATE: 16 BRPM

## 2024-12-15 LAB
C DIF TOX TCDA+TCDB STL QL NAA+PROBE: DETECTED
C DIFF TOX A+B STL QL IA: POSITIVE
GLUCOSE BLD MANUAL STRIP-MCNC: 149 MG/DL (ref 74–99)
GLUCOSE BLD MANUAL STRIP-MCNC: 198 MG/DL (ref 74–99)

## 2024-12-15 PROCEDURE — 2500000001 HC RX 250 WO HCPCS SELF ADMINISTERED DRUGS (ALT 637 FOR MEDICARE OP): Performed by: PHYSICAL MEDICINE & REHABILITATION

## 2024-12-15 PROCEDURE — 2500000002 HC RX 250 W HCPCS SELF ADMINISTERED DRUGS (ALT 637 FOR MEDICARE OP, ALT 636 FOR OP/ED): Performed by: PHYSICAL MEDICINE & REHABILITATION

## 2024-12-15 PROCEDURE — S4991 NICOTINE PATCH NONLEGEND: HCPCS | Performed by: PHYSICAL MEDICINE & REHABILITATION

## 2024-12-15 PROCEDURE — 82947 ASSAY GLUCOSE BLOOD QUANT: CPT

## 2024-12-15 RX ORDER — OXYCODONE HYDROCHLORIDE 5 MG/1
10 TABLET ORAL EVERY 6 HOURS PRN
Status: DISCONTINUED | OUTPATIENT
Start: 2024-12-15 | End: 2024-12-15

## 2024-12-15 SDOH — ECONOMIC STABILITY: TRANSPORTATION INSECURITY
IN THE PAST 12 MONTHS, HAS LACK OF TRANSPORTATION KEPT YOU FROM MEETINGS, WORK, OR FROM GETTING THINGS NEEDED FOR DAILY LIVING?: YES

## 2024-12-15 SDOH — ECONOMIC STABILITY: TRANSPORTATION INSECURITY
IN THE PAST 12 MONTHS, HAS THE LACK OF TRANSPORTATION KEPT YOU FROM MEDICAL APPOINTMENTS OR FROM GETTING MEDICATIONS?: YES

## 2024-12-15 ASSESSMENT — BRIEF INTERVIEW FOR MENTAL STATUS (BIMS): COGNITIVE PATTERN ASSESSMENT USED: STAFF ASSESSMENT

## 2024-12-15 ASSESSMENT — PAIN DESCRIPTION - DESCRIPTORS: DESCRIPTORS: DISCOMFORT

## 2024-12-15 ASSESSMENT — PAIN SCALES - GENERAL
PAINLEVEL_OUTOF10: 7
PAINLEVEL_OUTOF10: 0 - NO PAIN
PAINLEVEL_OUTOF10: 6
PAINLEVEL_OUTOF10: 8
PAINLEVEL_OUTOF10: 3

## 2024-12-15 ASSESSMENT — PAIN - FUNCTIONAL ASSESSMENT
PAIN_FUNCTIONAL_ASSESSMENT: 0-10

## 2024-12-15 ASSESSMENT — PAIN SCALES - WONG BAKER
WONGBAKER_NUMERICALRESPONSE: HURTS EVEN MORE
WONGBAKER_NUMERICALRESPONSE: HURTS WHOLE LOT

## 2024-12-15 NOTE — HH CARE COORDINATION
Home Care received a Referral for Physical Therapy and Occupational Therapy. We have processed the referral for a Start of Care on 12/16-12/17.     If you have any questions or concerns, please feel free to contact us at [phone]. Follow the prompts, enter your five digit zip code, and you will be directed to your care team on [facility].

## 2024-12-15 NOTE — CARE PLAN
The patient's goals for the shift include  pain management    The clinical goals for the shift include successful discharge

## 2024-12-16 LAB
LABORATORY COMMENT REPORT: NORMAL
PATH REPORT.FINAL DX SPEC: NORMAL
PATH REPORT.GROSS SPEC: NORMAL
PATH REPORT.RELEVANT HX SPEC: NORMAL
PATH REPORT.TOTAL CANCER: NORMAL

## 2024-12-16 ASSESSMENT — BRIEF INTERVIEW FOR MENTAL STATUS (BIMS)
ASKED TO RECALL BED: YES, NO CUE REQUIRED
WHAT DAY OF THE WEEK IS IT: CORRECT
ASKED TO RECALL SOCK: YES, NO CUE REQUIRED
WHAT YEAR IS IT: CORRECT
WHAT MONTH IS IT: ACCURATE WITHIN 5 DAYS
INITIAL REPETITION OF BED BLUE SOCK - FIRST ATTEMPT: 3
BIMS SUMMARY SCORE: 15
ASKED TO RECALL BLUE: YES, NO CUE REQUIRED

## 2024-12-16 NOTE — CARE PLAN
Pt met all ltg from eval; discharged home with son at mod I wc level; OT c recommended for problem solving wc access in home setting. Son was unable to participate in discharge family teaching.    Problem: OT Problem  Goal: LTG - Patient will complete LE dressing using adaptive equip as needed with mod I  Outcome: Met  Goal: LTG - Patient will complete toileting with mod I  Outcome: Met  Goal: LTG - Patient will complete commode transfer via wwalker or pivot with mod I  Outcome: Met  Goal: LTG - Patient will complete tub/shower transfer using DME as needed with sba  Outcome: Met  Goal: LTG - Patient will complete functional mobility via wheelchar mod I  Outcome: Met  Goal: LTG - Patient will complete simple mobility/approach steps via wwalker supervision or better  Outcome: Met  Goal: LTG - Patient will complete light meal prep or kitchen access at wheelchair level mod I  Outcome: Met

## 2024-12-16 NOTE — CARE PLAN
Patient achieved 4/4 LTGs and 5/5 STGs established at initial evaluation.  Patient discharged home at mod I wheelchair level with CGA at walker level.  Family teaching offered and declined.  Recommend continued [facility] PT at this time. Patient issued wheelchair and wheeled walker adjusted to appropriate height for patient.  Patient understands choices in obtaining DME prior to issuing equipment.      Problem: PT Problem  Goal: STG - Bed mobility with SBA  12/16/2024 0753 by Silke Pelaez, PT  Outcome: Met  12/16/2024 0753 by Silke Pelaez, PT  Reactivated  Goal: STG - Transfers with min/mod assist x1.   12/16/2024 0753 by Silke Pelaez, PT  Outcome: Met  12/16/2024 0753 by Silke Pelaez, PT  Reactivated  Goal: STG- Pt will amb 15 ft with wheeled walker and mod assist x1.  12/16/2024 0753 by Silke Pelaez, PT  Outcome: Met  12/16/2024 0753 by Silke Pelaez, PT  Reactivated  Goal: STG - Will determine most approp means of entry into home.  12/16/2024 0753 by Silke Pelaez, PT  Outcome: Met  12/16/2024 0753 by Silke Pelaez, PT  Reactivated  Goal: STG - Pt will propel wheelchair 100 ft with SBA  12/16/2024 0753 by Silke Pelaez, PT  Outcome: Met  12/16/2024 0753 by Silke Pelaez, PT  Reactivated  Goal: LTG - Mod independent bed mobility.  12/16/2024 0753 by Silke Pelaez, PT  Outcome: Met  12/16/2024 0753 by Silke Pelaez, PT  Reactivated  Goal: LTG - Mod independent transfers.  12/16/2024 0753 by Silke Pelaez, PT  Outcome: Met  12/16/2024 0753 by Silke Pelaez, PT  Reactivated  Goal: LTG - Will educate pt and family on most approp means of entry into home.  12/16/2024 0753 by Silke Pelaez, PT  Outcome: Met  12/16/2024 0753 by Silke Pelaez, PT  Reactivated  Goal: LTG - Pt will propel wheelchair 150 ft independently  12/16/2024 0753 by Silke Pelaez, PT  Outcome: Met  12/16/2024 0753 by Silke Pelaez PT  Reactivated  Goal: LTG - Pt will amb 25 ft  with wheeled walker and CGA  12/16/2024 0753 by Silke Pelaez, PT  Outcome: Met  12/16/2024 0753 by Silke Pelaez, PT  Reactivated

## 2024-12-17 ENCOUNTER — PATIENT OUTREACH (OUTPATIENT)
Dept: PRIMARY CARE | Facility: CLINIC | Age: 54
End: 2024-12-17
Payer: MEDICAID

## 2024-12-17 PROCEDURE — 93005 ELECTROCARDIOGRAM TRACING: CPT

## 2024-12-17 NOTE — PROGRESS NOTES
Discharge Facility:  [facility]  Discharge Diagnosis:  Necrotizing fasciitis of lower leg   Admission Date: 12/3/24  Discharge Date: 12/15/24    PCP Appointment Date:   Angel Hurtado DO 12/26/24  Specialist Appointment Date: Vasc Surg Dr Sánchez 12/27/24  Hospital Encounter and Summary Linked: Yes      Two attempts were made to reach patient within two business days after discharge. Voicemail left with contact information for patient to call back with any non-emergent questions or concerns.

## 2024-12-18 ENCOUNTER — HOME CARE VISIT (OUTPATIENT)
Dept: HOME HEALTH SERVICES | Facility: HOME HEALTH | Age: 54
End: 2024-12-18

## 2024-12-18 LAB
ATRIAL RATE: 73 BPM
P AXIS: 39 DEGREES
P OFFSET: 183 MS
P ONSET: 118 MS
PR INTERVAL: 184 MS
Q ONSET: 210 MS
QRS COUNT: 12 BEATS
QRS DURATION: 92 MS
QT INTERVAL: 392 MS
QTC CALCULATION(BAZETT): 431 MS
QTC FREDERICIA: 418 MS
R AXIS: -10 DEGREES
T AXIS: 38 DEGREES
T OFFSET: 406 MS
VENTRICULAR RATE: 73 BPM

## 2024-12-20 ENCOUNTER — HOME CARE VISIT (OUTPATIENT)
Dept: HOME HEALTH SERVICES | Facility: HOME HEALTH | Age: 54
End: 2024-12-20
Payer: MEDICAID

## 2024-12-20 PROCEDURE — G0151 HHCP-SERV OF PT,EA 15 MIN: HCPCS

## 2024-12-20 ASSESSMENT — ENCOUNTER SYMPTOMS
PAIN: 1
LOWEST PAIN SEVERITY IN PAST 24 HOURS: 0/10
PERSON REPORTING PAIN: PATIENT
PAIN LOCATION: RIGHT LEG
SUBJECTIVE PAIN PROGRESSION: WAXING AND WANING
HIGHEST PAIN SEVERITY IN PAST 24 HOURS: 10/10
PAIN SEVERITY GOAL: 0/10

## 2024-12-20 ASSESSMENT — ACTIVITIES OF DAILY LIVING (ADL)
OASIS_M1830: 03
ENTERING_EXITING_HOME: MAXIMUM ASSIST

## 2024-12-26 ENCOUNTER — APPOINTMENT (OUTPATIENT)
Dept: PRIMARY CARE | Facility: CLINIC | Age: 54
End: 2024-12-26
Payer: MEDICAID

## 2024-12-26 VITALS — DIASTOLIC BLOOD PRESSURE: 66 MMHG | SYSTOLIC BLOOD PRESSURE: 126 MMHG

## 2024-12-26 DIAGNOSIS — Z79.4 TYPE 2 DIABETES MELLITUS WITH OTHER CIRCULATORY COMPLICATION, WITH LONG-TERM CURRENT USE OF INSULIN: ICD-10-CM

## 2024-12-26 DIAGNOSIS — F32.A ANXIETY AND DEPRESSION: ICD-10-CM

## 2024-12-26 DIAGNOSIS — Z89.611 S/P AKA (ABOVE KNEE AMPUTATION), RIGHT (MULTI): Primary | ICD-10-CM

## 2024-12-26 DIAGNOSIS — E11.65 TYPE 2 DIABETES MELLITUS WITH HYPERGLYCEMIA, WITH LONG-TERM CURRENT USE OF INSULIN: ICD-10-CM

## 2024-12-26 DIAGNOSIS — F41.9 ANXIETY AND DEPRESSION: ICD-10-CM

## 2024-12-26 DIAGNOSIS — Z79.4 TYPE 2 DIABETES MELLITUS WITH HYPERGLYCEMIA, WITH LONG-TERM CURRENT USE OF INSULIN: ICD-10-CM

## 2024-12-26 DIAGNOSIS — E11.59 TYPE 2 DIABETES MELLITUS WITH OTHER CIRCULATORY COMPLICATION, WITH LONG-TERM CURRENT USE OF INSULIN: ICD-10-CM

## 2024-12-26 DIAGNOSIS — A49.8 CLOSTRIDIUM DIFFICILE INFECTION: ICD-10-CM

## 2024-12-26 DIAGNOSIS — M79.89 SWELLING OF LEFT LOWER EXTREMITY: ICD-10-CM

## 2024-12-26 PROCEDURE — 3049F LDL-C 100-129 MG/DL: CPT | Performed by: STUDENT IN AN ORGANIZED HEALTH CARE EDUCATION/TRAINING PROGRAM

## 2024-12-26 PROCEDURE — 1036F TOBACCO NON-USER: CPT | Performed by: STUDENT IN AN ORGANIZED HEALTH CARE EDUCATION/TRAINING PROGRAM

## 2024-12-26 PROCEDURE — 3074F SYST BP LT 130 MM HG: CPT | Performed by: STUDENT IN AN ORGANIZED HEALTH CARE EDUCATION/TRAINING PROGRAM

## 2024-12-26 PROCEDURE — 99495 TRANSJ CARE MGMT MOD F2F 14D: CPT | Performed by: STUDENT IN AN ORGANIZED HEALTH CARE EDUCATION/TRAINING PROGRAM

## 2024-12-26 PROCEDURE — 3051F HG A1C>EQUAL 7.0%<8.0%: CPT | Performed by: STUDENT IN AN ORGANIZED HEALTH CARE EDUCATION/TRAINING PROGRAM

## 2024-12-26 PROCEDURE — 3060F POS MICROALBUMINURIA REV: CPT | Performed by: STUDENT IN AN ORGANIZED HEALTH CARE EDUCATION/TRAINING PROGRAM

## 2024-12-26 PROCEDURE — 3078F DIAST BP <80 MM HG: CPT | Performed by: STUDENT IN AN ORGANIZED HEALTH CARE EDUCATION/TRAINING PROGRAM

## 2024-12-26 RX ORDER — METFORMIN HYDROCHLORIDE 500 MG/1
500 TABLET, EXTENDED RELEASE ORAL
Qty: 180 TABLET | Refills: 1 | Status: SHIPPED | OUTPATIENT
Start: 2024-12-26 | End: 2025-06-24

## 2024-12-26 RX ORDER — BLOOD-GLUCOSE SENSOR
EACH MISCELLANEOUS
Qty: 4 EACH | Refills: 3 | Status: SHIPPED | OUTPATIENT
Start: 2024-12-26

## 2024-12-26 RX ORDER — TRAMADOL HYDROCHLORIDE 50 MG/1
50 TABLET ORAL NIGHTLY PRN
Qty: 15 TABLET | Refills: 0 | Status: SHIPPED | OUTPATIENT
Start: 2024-12-26 | End: 2025-01-10

## 2024-12-26 RX ORDER — VANCOMYCIN HYDROCHLORIDE 125 MG/1
125 CAPSULE ORAL 4 TIMES DAILY
Qty: 40 CAPSULE | Refills: 0 | Status: SHIPPED | OUTPATIENT
Start: 2024-12-26 | End: 2025-01-05

## 2024-12-26 RX ORDER — INSULIN GLARGINE 100 [IU]/ML
34 INJECTION, SOLUTION SUBCUTANEOUS DAILY
Qty: 10.2 ML | Refills: 5 | Status: SHIPPED | OUTPATIENT
Start: 2024-12-26 | End: 2025-06-24

## 2024-12-26 RX ORDER — ESCITALOPRAM OXALATE 10 MG/1
10 TABLET ORAL DAILY
Qty: 90 TABLET | Refills: 1 | Status: SHIPPED | OUTPATIENT
Start: 2024-12-26 | End: 2025-06-24

## 2024-12-26 RX ORDER — DULAGLUTIDE 0.75 MG/.5ML
0.75 INJECTION, SOLUTION SUBCUTANEOUS WEEKLY
Qty: 2 ML | Refills: 2 | Status: SHIPPED | OUTPATIENT
Start: 2024-12-26

## 2024-12-26 RX ORDER — TRAZODONE HYDROCHLORIDE 50 MG/1
50 TABLET ORAL NIGHTLY PRN
Qty: 90 TABLET | Refills: 1 | Status: SHIPPED | OUTPATIENT
Start: 2024-12-26 | End: 2025-12-26

## 2024-12-26 RX ORDER — INSULIN LISPRO 100 [IU]/ML
7 INJECTION, SOLUTION INTRAVENOUS; SUBCUTANEOUS
Qty: 6.3 ML | Refills: 5 | Status: SHIPPED | OUTPATIENT
Start: 2024-12-26 | End: 2025-06-24

## 2024-12-26 NOTE — PROGRESS NOTES
Subjective   Patient ID: Errol Fonseca is a 54 y.o. male who presents for Post Hospitalization.    HPI     Review of Systems    Objective   /66     Physical Exam    Assessment/Plan

## 2024-12-26 NOTE — PROGRESS NOTES
"Subjective   Patient ID: Errol Fonseca is a 54 y.o. male who presents for Post Hospitalization.  HPI  Errol is here for hospital follow up visit.    Admitted with RLE necrotizing infection S/p right AKA. Patient endorses throbbing and sore pain in the stump. He reports some phantom pain but says he has been \"taking it in stride\". He reports taking tylenol daily for pain. He is requesting \"something stronger\" to hep with the pain at night. Patient states the pain is Interfering with sleep. Patient also states he has been having diarrhea for the past 12 days. He reports taking 4 immodium tablets a day with no relief. Patient states before he left the hospital, he was having multiple episodes of diarrhea. His stool tested positive for c diff.  Patient also reports swelling and pain in the left leg for the last couple of days. PT has been coming to the house and has been able to stand on left leg.     Review of Systems  12-point ROS was reviewed and is negative, unless otherwise noted in HPI    Objective   Vitals:    12/26/24 1058   BP: 126/66      Physical Exam  GEN: alert, conversant, NAD  HEENT: PERRL, EOMI, MMM  NECK: supple, no LAD appreciated  CHEST: CTAB  CV: S1, S2, RRR, no murmurs appreciated  ABD: soft, NT, ND  EXT: Right AKA. Staples and sutures present at incision site. No overt erythema. No drainage. Non pitting edema of left lower extremity. Calf tender to palpation.   SKIN: warm, dry    Assessment/Plan   #transitional care management  -Reviewed recent hospitalization, including: labwork, imaging, documentation and reconciled current medications with patient  #RLE necrotizing infection s/p right AKA  - Continue pain management with tylenol  - Will give acute course of tramadol to use nightly  - Follow up with surgeon tomorrow    #C. Diff   - Oral vancomycin 125 mg q6h for 10 days    #Left lower extremity edema  - obtain stat US Doppler to rule out DVT    # Diabetes Mellitus type 2 with hyperglycemia and " neuropathy  Most Recent HgbA1c: 7.1%  Continue current management: Lantus 30units daily, Lispro 7U TID with meals +SSI  - continue metformin 500mg BID, continue trulicity 0.75mg weekly  Increase dietary efforts and physical activity.  Routine diabetic retinopathy screening, foot exam/monofilament testing screening: Following with podiatry, referral to ophthalmology to update retinopathy screening     # HTN  Off of BP medications currently, BP was soft in the hospital  Discussed DASH diet and dietary sodium restrictions.   Increase dietary efforts and physical activity.      #CAD  # Dyslipidemia  Stable. Most recent Lipid Panel: 4/2024  Continue with current management: ASA 81mg daily, Atorvastatin 40mg daily  Discussed healthy diet and lifestyle.     #Former smoker  Quit >6 months  1 - 1.5ppd smoker >40 pack year hx  - LDCT ordered previously, will schedule     #alcohol use disorder  No recent alcohol use in the last ~4 months  - he is established with AA  - advised continued complete cessation     #insomnia  #Depression/Anxiety  - continue trazodone 50mg nightyl  - continue lexapro 10mg daily     Health Maintenance:  Vaccines: COVID (x1), Flu (UTD), TDAP (2023), Shingles (advised), Pneumonia (UTD, prevnar 20)  Screening: Colonoscopy (cologuard ordered again today), LDCT (ordered at last visit)  Labs: Reviewed recent, obtain hgba1c, and urine albumin today       Gail Castillo, DO PGY-1 Internal Medicine

## (undated) DEVICE — Device

## (undated) DEVICE — DRESSING, ADHESIVE, ISLAND, TELFA, 4 X 10 IN

## (undated) DEVICE — TIP, SUCTION, YANKAUER, FLEXIBLE

## (undated) DEVICE — BANDAGE, COFLEX, 4 X 5 YDS, TAN, STERILE, LF

## (undated) DEVICE — BANDAGE, GAUZE, CONFORMING, KERLIX, 6 PLY, 4.5 IN X 4.1 YD

## (undated) DEVICE — DRESSING, ABDOMINAL, WET PRUF, TENDERSORB, 5 X 9 IN, STERILE

## (undated) DEVICE — BLADE, SAW, SAGITTAL, 18.5 X 73 X 0.76 MM, STAINLESS STEEL, STERILE

## (undated) DEVICE — TUBESET, IRRIGATION, BONE CUTTER BONESCALPEL

## (undated) DEVICE — SUTURE, ETHILON, 2-0, FSLX 30, BLACK

## (undated) DEVICE — PACKING, PLAIN, CURITY, 0.5 IN X 5 YD, STERILE

## (undated) DEVICE — HANDLE, PH, FOR YANKAUER SUCTION DEVICE

## (undated) DEVICE — GLOVE, SURGICAL, PROTEXIS PI MICRO, 6.0, PF, LF

## (undated) DEVICE — STAPLER, SKIN PROXIMATE, 35 WIDE

## (undated) DEVICE — DRAPE, INSTRUMENT, W/POUCH, STERI DRAPE, 7 X 11 IN, DISPOSABLE, STERILE

## (undated) DEVICE — TOWEL, SURGICAL, NEURO, O/R, 16 X 26, BLUE, STERILE

## (undated) DEVICE — COVER, CART, 45 X 27 X 48 IN, CLEAR

## (undated) DEVICE — BOLSTER, HIP

## (undated) DEVICE — BANDAGE, ELASTIC, SELF-CLOSE, 4 IN, HONEYCOMB, STERILE

## (undated) DEVICE — DRAPE, SHEET, EXTREMITY, W/ARM BOARD COVERS, 87 X 106 X 128 IN, DISPOSABLE, LF, STERILE

## (undated) DEVICE — NEEDLE, HYPODERMIC, 23G X 1-1/4 IN

## (undated) DEVICE — PROTECTOR, NERVE, ULNAR, PINK

## (undated) DEVICE — SPEAR, EYE, SURGICAL, WECK-CEL, CELLULOSE

## (undated) DEVICE — SUTURE, VICRYL PLUS 3-0, SH, 27IN

## (undated) DEVICE — DRESSING, ABDOMINAL, TENDERSORB, 8 X 10 IN, STERILE

## (undated) DEVICE — SUTURE, VICRYL, 2-0, 18 IN, CT-1, UNDYED

## (undated) DEVICE — BANDAGE, ELASTIC, SELF-CLOSE, 6 IN, HONEYCOMB, STERILE

## (undated) DEVICE — DRESSING, GAUZE, 16 PLY, 4 X 4 IN, STERILE

## (undated) DEVICE — PADDING, UNDERCAST, WEBRIL, 4 IN X 4 YD, REG, NS

## (undated) DEVICE — BANDAGE, ELASTIC, ADHESIVE, TENSOPLAST, 3 IN X 5 YD, WHITE

## (undated) DEVICE — OINTMENT, BACITRACIN, W/ZINC, 1 OZ

## (undated) DEVICE — SYRINGE, LUER LOCK, 12ML

## (undated) DEVICE — SYRINGE, 50 CC, LUER LOCK

## (undated) DEVICE — BANDAGE, STRETCH, CONFORM, 2 IN X 4.1 YD, STERILE

## (undated) DEVICE — SUCTION TIP , YANKAUER, W/BULB SUCTION

## (undated) DEVICE — TUBING, SUCTION, CONNECTING, STERILE 0.25 X 120 IN., LF

## (undated) DEVICE — HEMOSTAT, SURGICEL POWDER 3.0GRAMS

## (undated) DEVICE — SUTURE, SILK, 2-0, 18 IN, BLACK

## (undated) DEVICE — DRESSING, ISLAND, ADHESIVE, TELFA, 4 X 8 IN

## (undated) DEVICE — NEEDLE, HYPODERMIC, NEEDLE PRO, 25G X 1.5, ORANGE

## (undated) DEVICE — DRAPE, PAD, PREP, W/ 9 IN CUFF, 24 X 41, LF, NS

## (undated) DEVICE — COUNTER, NEEDLE, FOAM BLOCK, POP-N-COUNT, W/BLADEGUARD, W/ADHESIVE 40 COUNT, RED

## (undated) DEVICE — DRAPE, SHEET, U, W/ADHESIVE STRIP, IMPERVIOUS, 60 X 70 IN, DISPOSABLE, LF, STERILE

## (undated) DEVICE — APPLICATOR, CHLORAPREP, W/ORANGE TINT, 26ML

## (undated) DEVICE — DRAPE, IRRIGATION, W/POUCH, ADHESIVE STRIP, STERI DRAPE, 19 X 23 IN, DISPOSABLE, STERILE

## (undated) DEVICE — INTERPULSE HANDPIECE SET W/ 10FT SUCTION TUBING

## (undated) DEVICE — SUTURE, SILK, 3-0, 18 IN SH/CR, BLACK

## (undated) DEVICE — PAD, GROUNDING, ELECTROSURGICAL, W/9 FT CABLE, POLYHESIVE II, ADULT, LF

## (undated) DEVICE — DRESSING, SPONGE, GAUZE, CURITY, 4 X 4 IN, STERILE

## (undated) DEVICE — SUTURE, PERMA HAND 2-0, TAPER POINT, SH BLACK 8-18 INCH

## (undated) DEVICE — BLADE, OSCILLATING/SAGITTAL, 25MM X 9MM

## (undated) DEVICE — MANIFOLD, 4 PORT NEPTUNE STANDARD

## (undated) DEVICE — SPONGE, HEMOSTAT, SURGICEL FIBRILLAR, ABS, 4 X 4, LF

## (undated) DEVICE — PROBE, CYLINDRICAL, SUCTION, TITANIUM

## (undated) DEVICE — DRESSING, GAUZE, SPONGE, 12 PLY, CURITY, 4 X 4 IN, RIGID TRAY, STERILE

## (undated) DEVICE — PREP TRAY, SKIN, DRY, W/GLOVES

## (undated) DEVICE — HIGH FLOW TIP FOR INTERPULSE HANDPIECE SET

## (undated) DEVICE — TUBESET, SONICVAC

## (undated) DEVICE — COLLECTION/DELIVERY SYSTEM, COPAN ESWAB, REG SIZE SWAB

## (undated) DEVICE — BANDAGE, ELASTIC, PREMIUM, SELF-CLOSE, 6 IN X 5.5 YD, STERILE

## (undated) DEVICE — GLOVE, SURGICAL, PROTEXIS PI BLUE W/NEUTHERA, 6.0, PF, LF

## (undated) DEVICE — SUTURE, SILK, 3-0, 30 IN, BR SH, BLACK

## (undated) DEVICE — DRAPE, SHEET, FAN FOLDED, HALF, 44 X 58 IN, DISPOSABLE, LF, STERILE

## (undated) DEVICE — DEVICE, STIMULATOR, NERVE LOCATOR, ST

## (undated) DEVICE — GOWN, SURGICAL, SMARTGOWN, XLARGE, STERILE